# Patient Record
Sex: MALE | Race: BLACK OR AFRICAN AMERICAN | Employment: OTHER | URBAN - METROPOLITAN AREA
[De-identification: names, ages, dates, MRNs, and addresses within clinical notes are randomized per-mention and may not be internally consistent; named-entity substitution may affect disease eponyms.]

---

## 2022-07-11 ENCOUNTER — APPOINTMENT (OUTPATIENT)
Dept: GENERAL RADIOLOGY | Age: 47
DRG: 291 | End: 2022-07-11
Attending: EMERGENCY MEDICINE

## 2022-07-11 ENCOUNTER — HOSPITAL ENCOUNTER (INPATIENT)
Age: 47
LOS: 2 days | Discharge: HOME | DRG: 291 | End: 2022-07-13
Attending: EMERGENCY MEDICINE | Admitting: INTERNAL MEDICINE

## 2022-07-11 ENCOUNTER — APPOINTMENT (OUTPATIENT)
Dept: ULTRASOUND IMAGING | Age: 47
DRG: 291 | End: 2022-07-11
Attending: INTERNAL MEDICINE

## 2022-07-11 DIAGNOSIS — N18.4 ANEMIA DUE TO STAGE 4 CHRONIC KIDNEY DISEASE (HCC): ICD-10-CM

## 2022-07-11 DIAGNOSIS — D63.1 ANEMIA DUE TO STAGE 4 CHRONIC KIDNEY DISEASE (HCC): ICD-10-CM

## 2022-07-11 DIAGNOSIS — E87.20 METABOLIC ACIDOSIS: ICD-10-CM

## 2022-07-11 DIAGNOSIS — E87.5 HYPERKALEMIA, DIMINISHED RENAL EXCRETION: ICD-10-CM

## 2022-07-11 DIAGNOSIS — N18.4 CHRONIC RENAL FAILURE, STAGE 4 (SEVERE) (HCC): Primary | ICD-10-CM

## 2022-07-11 PROBLEM — I16.0 HYPERTENSIVE URGENCY: Status: ACTIVE | Noted: 2022-07-11

## 2022-07-11 PROBLEM — N17.9 AKI (ACUTE KIDNEY INJURY) (HCC): Status: ACTIVE | Noted: 2022-07-11

## 2022-07-11 PROBLEM — I50.9 ACUTE EXACERBATION OF CHF (CONGESTIVE HEART FAILURE) (HCC): Status: ACTIVE | Noted: 2022-07-11

## 2022-07-11 LAB
ALBUMIN SERPL-MCNC: 2.6 G/DL (ref 3.5–5)
ALBUMIN/GLOB SERPL: 0.6 {RATIO} (ref 1.1–2.2)
ALP SERPL-CCNC: 174 U/L (ref 45–117)
ALT SERPL-CCNC: 23 U/L (ref 12–78)
ANION GAP SERPL CALC-SCNC: 7 MMOL/L (ref 5–15)
AST SERPL-CCNC: 26 U/L (ref 15–37)
ATRIAL RATE: 111 BPM
BASOPHILS # BLD: 0 K/UL (ref 0–0.1)
BASOPHILS NFR BLD: 0 % (ref 0–1)
BILIRUB SERPL-MCNC: 0.5 MG/DL (ref 0.2–1)
BNP SERPL-MCNC: 9330 PG/ML
BUN SERPL-MCNC: 57 MG/DL (ref 6–20)
BUN/CREAT SERPL: 11 (ref 12–20)
CALCIUM SERPL-MCNC: 6.9 MG/DL (ref 8.5–10.1)
CALCULATED P AXIS, ECG09: 63 DEGREES
CALCULATED R AXIS, ECG10: 109 DEGREES
CALCULATED T AXIS, ECG11: -19 DEGREES
CHLORIDE SERPL-SCNC: 114 MMOL/L (ref 97–108)
CO2 SERPL-SCNC: 19 MMOL/L (ref 21–32)
CREAT SERPL-MCNC: 5.37 MG/DL (ref 0.7–1.3)
DIAGNOSIS, 93000: NORMAL
DIFFERENTIAL METHOD BLD: ABNORMAL
EOSINOPHIL # BLD: 0.2 K/UL (ref 0–0.4)
EOSINOPHIL NFR BLD: 2 % (ref 0–7)
ERYTHROCYTE [DISTWIDTH] IN BLOOD BY AUTOMATED COUNT: 14.5 % (ref 11.5–14.5)
GLOBULIN SER CALC-MCNC: 4.5 G/DL (ref 2–4)
GLUCOSE BLD STRIP.AUTO-MCNC: 134 MG/DL (ref 65–117)
GLUCOSE BLD STRIP.AUTO-MCNC: 154 MG/DL (ref 65–117)
GLUCOSE SERPL-MCNC: 133 MG/DL (ref 65–100)
HCT VFR BLD AUTO: 23.7 % (ref 36.6–50.3)
HGB BLD-MCNC: 7.6 G/DL (ref 12.1–17)
IMM GRANULOCYTES # BLD AUTO: 0 K/UL (ref 0–0.04)
IMM GRANULOCYTES NFR BLD AUTO: 0 % (ref 0–0.5)
LYMPHOCYTES # BLD: 1.4 K/UL (ref 0.8–3.5)
LYMPHOCYTES NFR BLD: 15 % (ref 12–49)
MCH RBC QN AUTO: 26.9 PG (ref 26–34)
MCHC RBC AUTO-ENTMCNC: 32.1 G/DL (ref 30–36.5)
MCV RBC AUTO: 83.7 FL (ref 80–99)
MONOCYTES # BLD: 0.9 K/UL (ref 0–1)
MONOCYTES NFR BLD: 10 % (ref 5–13)
NEUTS SEG # BLD: 6.9 K/UL (ref 1.8–8)
NEUTS SEG NFR BLD: 73 % (ref 32–75)
NRBC # BLD: 0 K/UL (ref 0–0.01)
NRBC BLD-RTO: 0 PER 100 WBC
P-R INTERVAL, ECG05: 152 MS
PLATELET # BLD AUTO: 471 K/UL (ref 150–400)
PMV BLD AUTO: 8.7 FL (ref 8.9–12.9)
POTASSIUM SERPL-SCNC: 5.6 MMOL/L (ref 3.5–5.1)
PROT SERPL-MCNC: 7.1 G/DL (ref 6.4–8.2)
Q-T INTERVAL, ECG07: 344 MS
QRS DURATION, ECG06: 80 MS
QTC CALCULATION (BEZET), ECG08: 467 MS
RBC # BLD AUTO: 2.83 M/UL (ref 4.1–5.7)
RBC MORPH BLD: ABNORMAL
SERVICE CMNT-IMP: ABNORMAL
SERVICE CMNT-IMP: ABNORMAL
SODIUM SERPL-SCNC: 140 MMOL/L (ref 136–145)
TROPONIN-HIGH SENSITIVITY: 32 NG/L (ref 0–76)
TROPONIN-HIGH SENSITIVITY: 36 NG/L (ref 0–76)
VENTRICULAR RATE, ECG03: 111 BPM
WBC # BLD AUTO: 9.4 K/UL (ref 4.1–11.1)

## 2022-07-11 PROCEDURE — 85025 COMPLETE CBC W/AUTO DIFF WBC: CPT

## 2022-07-11 PROCEDURE — 76770 US EXAM ABDO BACK WALL COMP: CPT

## 2022-07-11 PROCEDURE — 36416 COLLJ CAPILLARY BLOOD SPEC: CPT

## 2022-07-11 PROCEDURE — 74011250637 HC RX REV CODE- 250/637: Performed by: INTERNAL MEDICINE

## 2022-07-11 PROCEDURE — 74011250636 HC RX REV CODE- 250/636: Performed by: INTERNAL MEDICINE

## 2022-07-11 PROCEDURE — 71046 X-RAY EXAM CHEST 2 VIEWS: CPT

## 2022-07-11 PROCEDURE — 82962 GLUCOSE BLOOD TEST: CPT

## 2022-07-11 PROCEDURE — 80053 COMPREHEN METABOLIC PANEL: CPT

## 2022-07-11 PROCEDURE — 82570 ASSAY OF URINE CREATININE: CPT

## 2022-07-11 PROCEDURE — 99285 EMERGENCY DEPT VISIT HI MDM: CPT

## 2022-07-11 PROCEDURE — 83880 ASSAY OF NATRIURETIC PEPTIDE: CPT

## 2022-07-11 PROCEDURE — 86900 BLOOD TYPING SEROLOGIC ABO: CPT

## 2022-07-11 PROCEDURE — 74011000250 HC RX REV CODE- 250: Performed by: INTERNAL MEDICINE

## 2022-07-11 PROCEDURE — 84484 ASSAY OF TROPONIN QUANT: CPT

## 2022-07-11 PROCEDURE — 86923 COMPATIBILITY TEST ELECTRIC: CPT

## 2022-07-11 PROCEDURE — 84300 ASSAY OF URINE SODIUM: CPT

## 2022-07-11 PROCEDURE — 93005 ELECTROCARDIOGRAM TRACING: CPT

## 2022-07-11 PROCEDURE — 65270000046 HC RM TELEMETRY

## 2022-07-11 RX ORDER — AMLODIPINE BESYLATE 5 MG/1
5 TABLET ORAL DAILY
Status: DISCONTINUED | OUTPATIENT
Start: 2022-07-12 | End: 2022-07-12

## 2022-07-11 RX ORDER — ASPIRIN 325 MG
325 TABLET, DELAYED RELEASE (ENTERIC COATED) ORAL
Status: COMPLETED | OUTPATIENT
Start: 2022-07-11 | End: 2022-07-11

## 2022-07-11 RX ORDER — ACETAMINOPHEN 650 MG/1
650 SUPPOSITORY RECTAL
Status: DISCONTINUED | OUTPATIENT
Start: 2022-07-11 | End: 2022-07-13 | Stop reason: HOSPADM

## 2022-07-11 RX ORDER — DEXTROSE MONOHYDRATE 100 MG/ML
250 INJECTION, SOLUTION INTRAVENOUS ONCE
Status: DISCONTINUED | OUTPATIENT
Start: 2022-07-11 | End: 2022-07-11

## 2022-07-11 RX ORDER — INSULIN LISPRO 100 [IU]/ML
INJECTION, SOLUTION INTRAVENOUS; SUBCUTANEOUS
Status: DISCONTINUED | OUTPATIENT
Start: 2022-07-11 | End: 2022-07-13 | Stop reason: HOSPADM

## 2022-07-11 RX ORDER — MAGNESIUM SULFATE 100 %
4 CRYSTALS MISCELLANEOUS AS NEEDED
Status: DISCONTINUED | OUTPATIENT
Start: 2022-07-11 | End: 2022-07-13 | Stop reason: HOSPADM

## 2022-07-11 RX ORDER — OXYCODONE HYDROCHLORIDE 5 MG/1
5 TABLET ORAL
Status: DISCONTINUED | OUTPATIENT
Start: 2022-07-11 | End: 2022-07-12

## 2022-07-11 RX ORDER — ONDANSETRON 4 MG/1
4 TABLET, ORALLY DISINTEGRATING ORAL
Status: DISCONTINUED | OUTPATIENT
Start: 2022-07-11 | End: 2022-07-13 | Stop reason: HOSPADM

## 2022-07-11 RX ORDER — ACETAMINOPHEN 325 MG/1
650 TABLET ORAL
Status: DISCONTINUED | OUTPATIENT
Start: 2022-07-11 | End: 2022-07-13 | Stop reason: HOSPADM

## 2022-07-11 RX ORDER — POLYETHYLENE GLYCOL 3350 17 G/17G
17 POWDER, FOR SOLUTION ORAL DAILY PRN
Status: DISCONTINUED | OUTPATIENT
Start: 2022-07-11 | End: 2022-07-13 | Stop reason: HOSPADM

## 2022-07-11 RX ORDER — ONDANSETRON 2 MG/ML
4 INJECTION INTRAMUSCULAR; INTRAVENOUS
Status: DISCONTINUED | OUTPATIENT
Start: 2022-07-11 | End: 2022-07-13 | Stop reason: HOSPADM

## 2022-07-11 RX ORDER — SODIUM CHLORIDE 0.9 % (FLUSH) 0.9 %
5-40 SYRINGE (ML) INJECTION EVERY 8 HOURS
Status: DISCONTINUED | OUTPATIENT
Start: 2022-07-11 | End: 2022-07-13 | Stop reason: HOSPADM

## 2022-07-11 RX ORDER — LABETALOL HYDROCHLORIDE 5 MG/ML
20 INJECTION, SOLUTION INTRAVENOUS ONCE
Status: COMPLETED | OUTPATIENT
Start: 2022-07-11 | End: 2022-07-11

## 2022-07-11 RX ORDER — MORPHINE SULFATE 2 MG/ML
1 INJECTION, SOLUTION INTRAMUSCULAR; INTRAVENOUS ONCE
Status: COMPLETED | OUTPATIENT
Start: 2022-07-11 | End: 2022-07-11

## 2022-07-11 RX ORDER — FUROSEMIDE 10 MG/ML
40 INJECTION INTRAMUSCULAR; INTRAVENOUS EVERY 12 HOURS
Status: DISCONTINUED | OUTPATIENT
Start: 2022-07-11 | End: 2022-07-13

## 2022-07-11 RX ORDER — HYDRALAZINE HYDROCHLORIDE 20 MG/ML
10 INJECTION INTRAMUSCULAR; INTRAVENOUS
Status: DISCONTINUED | OUTPATIENT
Start: 2022-07-11 | End: 2022-07-13 | Stop reason: HOSPADM

## 2022-07-11 RX ORDER — SODIUM CHLORIDE 0.9 % (FLUSH) 0.9 %
5-40 SYRINGE (ML) INJECTION AS NEEDED
Status: DISCONTINUED | OUTPATIENT
Start: 2022-07-11 | End: 2022-07-13 | Stop reason: HOSPADM

## 2022-07-11 RX ORDER — GUAIFENESIN 100 MG/5ML
81 LIQUID (ML) ORAL DAILY
Status: DISCONTINUED | OUTPATIENT
Start: 2022-07-12 | End: 2022-07-13 | Stop reason: HOSPADM

## 2022-07-11 RX ADMIN — ASPIRIN 325 MG: 325 TABLET, COATED ORAL at 17:41

## 2022-07-11 RX ADMIN — OXYCODONE 5 MG: 5 TABLET ORAL at 16:50

## 2022-07-11 RX ADMIN — SODIUM CHLORIDE, PRESERVATIVE FREE 10 ML: 5 INJECTION INTRAVENOUS at 17:11

## 2022-07-11 RX ADMIN — HYDRALAZINE HYDROCHLORIDE 10 MG: 20 INJECTION INTRAMUSCULAR; INTRAVENOUS at 18:43

## 2022-07-11 RX ADMIN — FUROSEMIDE 40 MG: 10 INJECTION, SOLUTION INTRAMUSCULAR; INTRAVENOUS at 20:40

## 2022-07-11 RX ADMIN — MORPHINE SULFATE 1 MG: 2 INJECTION, SOLUTION INTRAMUSCULAR; INTRAVENOUS at 20:39

## 2022-07-11 RX ADMIN — LABETALOL HYDROCHLORIDE 20 MG: 5 INJECTION INTRAVENOUS at 16:51

## 2022-07-11 NOTE — PROGRESS NOTES
Report taken on pt.  1855- pt has arrived on floor. Pt is alert and oriented complaining of bilateral foot pain. Last pain medication given at 1650. Per pt he has bilateral metatarsal amputations that took place over 1 year ago. Wounds are currently wrapped with Kerlex with noted break though drainage noted. Will contact MD about dressings and pain medications.

## 2022-07-11 NOTE — PROGRESS NOTES
Cardiology consult called for heart failure and cp    Pt has r/o for MI in the setting of esrd  Echo pending    Will see in the am    Thank you for allowing me to participate in this patients care.     Calin Aguilera MD, Ely Carrel

## 2022-07-11 NOTE — ED TRIAGE NOTES
Patient presents to triage ambulatory complaining of cough and shortness of breath. Patient denies any recent ill exposure, nausea, vomiting, fever or chills. Patient denies any respiratory history.

## 2022-07-11 NOTE — ED NOTES
1651  IV not able to flush at this time swelling so IV infiltrated. Need to obtain alternate IV access. [id number]  ED tech with ultrasound unsuccessful attempt. Will attempt another time. 1722  Patient states foot pain 9/10 and chest pain 7/10 despite Percocet. 1  Dr. Jeanne Ybarra (Cardiology) consulted as ordered. 1758  Patient is being transferred to [facility] Neuroscience ICU, Room # 8438. Report given to Neuro Tele RN, RN on Kenneth Gunn for routine progression of care. Report consisted of the following information SBAR, Kardex, ED Summary, MAR, Recent Results and Cardiac Rhythm NSR. Patient transferred to receiving unit by: transport (RN or tech name). Outstanding consults needed: No ;Nephrology consult needs called. Cardiology consult called. Next labs due: No     The following personal items will be sent with the patient during transfer to the floor:     All valuables:    Cardiac monitoring ordered: Yes    The following CURRENT information was reported to the receiving RN:    Code status: Full Code at time of transfer    Last set of vital signs:  Vital Signs  Level of Consciousness: Alert (0) (07/11/22 1733)  Temp: 98.3 °F (36.8 °C) (07/11/22 1733)  Temp Source: Oral (07/11/22 1733)  Pulse (Heart Rate): (!) 102 (07/11/22 1733)  Heart Rate Source: Monitor (07/11/22 1733)  Cardiac Rhythm: Sinus Tachy (07/11/22 1713)  Resp Rate: 22 (07/11/22 1733)  BP: (!) 177/101 (07/11/22 1733)  MAP (Monitor): 121 (07/11/22 1733)  MAP (Calculated): 126 (07/11/22 1733)  BP 1 Location: Right upper arm (07/11/22 1733)  BP 1 Method: Automatic (07/11/22 1733)  BP Patient Position: At rest (07/11/22 1733)  MEWS Score: 3 (07/11/22 1733)         Oxygen Therapy  O2 Sat (%): 96 % (07/11/22 1733)  Pulse via Oximetry: 102 beats per minute (07/11/22 1733)  O2 Device: None (Room air) (07/11/22 1713)      Last pain assessment:  Pain 1  Pain Scale 1: Numeric (0 - 10)  Pain Intensity 1: 9  Patient Stated Pain Goal: 0  Pain Reassessment 1: Yes  Pain Onset 1: yesterday  Pain Location 1: Chest  Pain Orientation 1: Mid  Pain Description 1: Constant  Pain Intervention(s) 1: Medication (see MAR)      Wounds: unknown dressings to both feet on arrival    Urinary catheter: due to void  Is there a crocker order: No     LDAs:       Peripheral IV 40/80/31 Left Basilic (Active)         Opportunity for questions and clarification was provided.     Justen Paul RN

## 2022-07-11 NOTE — H&P
GENERAL GENERIC H&P/CONSULT  Presenting complaint: Shortness of breath    Subjective: 18-year-old male with past medical history of hypertension, diabetes, history of toe amputations presents to [facility] with complaints of shortness of breath as well as bilateral lower extremity swelling. Patient states over the past few days has been having gradual onset persistent progressive shortness of breath associated with orthopnea as well as paroxysmal nocturnal dyspnea, this associated with increased bilateral lower extremity swelling following which patient presented to the ED. Patient does endorse sudden onset intermittent chest pressure, moderate intensity, nonradiating with no aggravating or alleviating factors. Of note patient does not remember his medications at this time. Patient denies any fevers chills nausea vomiting lightheadedness dizziness palpitations headache focal weakness loss sensation auditory or visual symptoms abdominal stool or urine complaints or any other associated symptoms. Patient endorses no recent sick contacts or travel activity    No past medical history on file. Past medical history of hypertension, diabetes  No past surgical history on file. History of toe amputations  Prior to Admission medications    Not on File   Patient is unable to remember his medications, will need to obtain collateral information from [facility]  Allergies   Allergen Reactions    Heparin Other (comments)     NO PORK    Tramadol Hives   Patient lives at home, denies history of substance use/smoking/EtOH abuse  Social History     Tobacco Use    Smoking status: Not on file    Smokeless tobacco: Not on file   Substance Use Topics    Alcohol use: Not on file      No family history on file. No pertinent paternal or maternal family history  Review of Systems   Constitutional: Positive for activity change, appetite change and fatigue.  Negative for chills, diaphoresis, fever and unexpected weight change. HENT: Negative for congestion, dental problem, drooling, ear discharge, ear pain, facial swelling, hearing loss, mouth sores, nosebleeds, postnasal drip, rhinorrhea, sinus pressure, sinus pain, sneezing, sore throat, tinnitus, trouble swallowing and voice change. Eyes: Negative for photophobia, pain, discharge, redness, itching and visual disturbance. Respiratory: Positive for shortness of breath. Negative for apnea, cough, choking, chest tightness, wheezing and stridor. Cardiovascular: Positive for chest pain and leg swelling. Negative for palpitations. Gastrointestinal: Negative for abdominal distention, abdominal pain, anal bleeding, blood in stool, constipation, diarrhea, nausea, rectal pain and vomiting. Endocrine: Negative for cold intolerance, heat intolerance, polydipsia, polyphagia and polyuria. Genitourinary: Negative for decreased urine volume, difficulty urinating, dysuria, enuresis, flank pain, frequency, genital sores, hematuria, penile discharge, penile pain, penile swelling, scrotal swelling, testicular pain and urgency. Musculoskeletal: Negative for arthralgias, back pain, gait problem, joint swelling, myalgias, neck pain and neck stiffness. Skin: Negative for color change, pallor, rash and wound. Allergic/Immunologic: Negative for environmental allergies, food allergies and immunocompromised state. Neurological: Positive for weakness. Negative for dizziness, tremors, seizures, syncope, facial asymmetry, speech difficulty, light-headedness, numbness and headaches. Hematological: Negative for adenopathy. Does not bruise/bleed easily. Psychiatric/Behavioral: Negative for agitation, behavioral problems, confusion, decreased concentration, dysphoric mood, hallucinations, self-injury, sleep disturbance and suicidal ideas. The patient is not nervous/anxious and is not hyperactive. Objective:    No intake/output data recorded.   No intake/output data recorded. Patient Vitals for the past 8 hrs:   BP Temp Pulse Resp SpO2 Height Weight   07/11/22 1508 (!) 186/100 98.5 °F (36.9 °C) 97 18 100 % -- --   07/11/22 0938 (!) 175/87 98.5 °F (36.9 °C) (!) 109 20 97 % 6' (1.829 m) 81.7 kg (180 lb 1.9 oz)     Physical Exam  Vitals reviewed. Constitutional:       General: He is not in acute distress. Appearance: He is normal weight. He is ill-appearing. He is not toxic-appearing or diaphoretic. HENT:      Head: Normocephalic and atraumatic. Nose: Nose normal. No congestion or rhinorrhea. Mouth/Throat:      Mouth: Mucous membranes are moist.      Pharynx: Oropharynx is clear. No oropharyngeal exudate or posterior oropharyngeal erythema. Eyes:      General: No scleral icterus. Right eye: No discharge. Left eye: No discharge. Extraocular Movements: Extraocular movements intact. Conjunctiva/sclera: Conjunctivae normal.      Pupils: Pupils are equal, round, and reactive to light. Neck:      Vascular: No carotid bruit. Cardiovascular:      Rate and Rhythm: Normal rate and regular rhythm. Pulses: Normal pulses. Heart sounds: Normal heart sounds. No murmur heard. No friction rub. No gallop. Pulmonary:      Effort: Pulmonary effort is normal. No respiratory distress. Breath sounds: Normal breath sounds. No stridor. No wheezing, rhonchi or rales. Chest:      Chest wall: No tenderness. Abdominal:      General: Abdomen is flat. Bowel sounds are normal. There is no distension. Palpations: Abdomen is soft. There is no mass. Tenderness: There is no abdominal tenderness. There is no right CVA tenderness, left CVA tenderness, guarding or rebound. Hernia: No hernia is present. Musculoskeletal:         General: No swelling, tenderness, deformity or signs of injury. Normal range of motion. Cervical back: Normal range of motion and neck supple. No rigidity or tenderness.       Right lower leg: Edema present. Left lower leg: Edema present. Lymphadenopathy:      Cervical: No cervical adenopathy. Skin:     General: Skin is warm. Capillary Refill: Capillary refill takes less than 2 seconds. Coloration: Skin is not jaundiced or pale. Findings: No bruising, erythema, lesion or rash. Neurological:      General: No focal deficit present. Mental Status: He is alert and oriented to person, place, and time. Mental status is at baseline. Cranial Nerves: No cranial nerve deficit. Sensory: No sensory deficit. Motor: No weakness. Coordination: Coordination normal.      Gait: Gait normal.      Deep Tendon Reflexes: Reflexes normal.   Psychiatric:         Mood and Affect: Mood normal.         Behavior: Behavior normal.         Thought Content:  Thought content normal.         Judgment: Judgment normal.          Labs:    Recent Results (from the past 24 hour(s))   EKG, 12 LEAD, INITIAL    Collection Time: 07/11/22 10:15 AM   Result Value Ref Range    Ventricular Rate 111 BPM    Atrial Rate 111 BPM    P-R Interval 152 ms    QRS Duration 80 ms    Q-T Interval 344 ms    QTC Calculation (Bezet) 467 ms    Calculated P Axis 63 degrees    Calculated R Axis 109 degrees    Calculated T Axis -19 degrees    Diagnosis       Sinus tachycardia  Rightward axis  ST & T wave abnormality, consider inferolateral ischemia  No previous ECGs available  Confirmed by Piotr Mcintyre ([id number]) on 7/11/2022 2:42:47 PM     CBC WITH AUTOMATED DIFF    Collection Time: 07/11/22 10:31 AM   Result Value Ref Range    WBC 9.4 4.1 - 11.1 K/uL    RBC 2.83 (L) 4.10 - 5.70 M/uL    HGB 7.6 (L) 12.1 - 17.0 g/dL    HCT 23.7 (L) 36.6 - 50.3 %    MCV 83.7 80.0 - 99.0 FL    MCH 26.9 26.0 - 34.0 PG    MCHC 32.1 30.0 - 36.5 g/dL    RDW 14.5 11.5 - 14.5 %    PLATELET 790 (H) 150 - 400 K/uL    MPV 8.7 (L) 8.9 - 12.9 FL    NRBC 0.0 0  WBC    ABSOLUTE NRBC 0.00 0.00 - 0.01 K/uL    NEUTROPHILS 73 32 - 75 % LYMPHOCYTES 15 12 - 49 %    MONOCYTES 10 5 - 13 %    EOSINOPHILS 2 0 - 7 %    BASOPHILS 0 0 - 1 %    IMMATURE GRANULOCYTES 0 0.0 - 0.5 %    ABS. NEUTROPHILS 6.9 1.8 - 8.0 K/UL    ABS. LYMPHOCYTES 1.4 0.8 - 3.5 K/UL    ABS. MONOCYTES 0.9 0.0 - 1.0 K/UL    ABS. EOSINOPHILS 0.2 0.0 - 0.4 K/UL    ABS. BASOPHILS 0.0 0.0 - 0.1 K/UL    ABS. IMM. GRANS. 0.0 0.00 - 0.04 K/UL    DF SMEAR SCANNED      RBC COMMENTS       ROULEAUX SEEN  ELEVATED PLATLET COUNT   NORMOCYTIC, NORMOCHROMIC     METABOLIC PANEL, COMPREHENSIVE    Collection Time: 07/11/22 10:31 AM   Result Value Ref Range    Sodium 140 136 - 145 mmol/L    Potassium 5.6 (H) 3.5 - 5.1 mmol/L    Chloride 114 (H) 97 - 108 mmol/L    CO2 19 (L) 21 - 32 mmol/L    Anion gap 7 5 - 15 mmol/L    Glucose 133 (H) 65 - 100 mg/dL    BUN 57 (H) 6 - 20 MG/DL    Creatinine 5.37 (H) 0.70 - 1.30 MG/DL    BUN/Creatinine ratio 11 (L) 12 - 20      GFR est AA 14 (L) >60 ml/min/1.73m2    GFR est non-AA 11 (L) >60 ml/min/1.73m2    Calcium 6.9 (L) 8.5 - 10.1 MG/DL    Bilirubin, total 0.5 0.2 - 1.0 MG/DL    ALT (SGPT) 23 12 - 78 U/L    AST (SGOT) 26 15 - 37 U/L    Alk. phosphatase 174 (H) 45 - 117 U/L    Protein, total 7.1 6.4 - 8.2 g/dL    Albumin 2.6 (L) 3.5 - 5.0 g/dL    Globulin 4.5 (H) 2.0 - 4.0 g/dL    A-G Ratio 0.6 (L) 1.1 - 2.2     TROPONIN-HIGH SENSITIVITY    Collection Time: 07/11/22 10:31 AM   Result Value Ref Range    Troponin-High Sensitivity 32 0 - 76 ng/L   NT-PRO BNP    Collection Time: 07/11/22 10:31 AM   Result Value Ref Range    NT pro-BNP 9,330 (H) <125 PG/ML       ECG: nonspecific ST and T waves changes   Chest X-Ray: IMPRESSION  Bilateral pulmonary infiltrates and small left pleural effusion.     Assessment:  Active Problems:    Acute exacerbation of CHF (congestive heart failure) ([facility] [address] (7/11/2022)      [facility] (7/11/2022)      Hypertensive urgency (7/11/2022)        Plan:    Acute decompensated heart failure with unknown ejection fraction-patient presents with above med symptomatology found to have acute decompensated heart failure with unknown ejection fraction, remains hemodynamically stable at this time  Daily weights  Frequent intake and output monitoring  Lasix 40 mg IV twice daily  Obtain transthoracic echo  Obtain cardiology consult    Acute kidney injury-of note patient found to have an elevated serum creatinine, differentials include prerenal versus renal responsible etiologies  Obtain urine electrolytes And fractional excretion of sodium  Obtain ultrasound retroperitoneal to rule out anatomic abnormalities  Could be secondary to cardiorenal syndrome  Obtain nephrology consult further evaluation    Hypertensive urgency-of note patient does not remember his home medications  Hydralazine as needed for systolic blood pressure over 160  Start Norvasc, uptitrate as necessary  Obtain collateral information with regards to patient's home medications    Type 2 diabetes-patient does not remember his home medications, will obtain collateral information from [facility]  Insulin sliding scale at this time    Chest pain-of note patient complaining of chest pain, EKG consistent with nonspecific changes, however ACS needs to be ruled out  Aspirin 325 mg x 1  Aspirin 81 mg once daily  Trend cardiac enzymes  Obtain transthoracic echo  Follow cardiology recommendations    Prophylaxis- SCDs  FEN-cardiac diet with fluid restriction, replete potassium and magnesium  Full code, will clarify about surrogate decision-maker, admitted to telemetry further management    Signed:   Shirley Good MD 7/11/2022

## 2022-07-11 NOTE — ED PROVIDER NOTES
EMERGENCY DEPARTMENT HISTORY AND PHYSICAL EXAM           Date: 7/11/2022  Patient Name: Syed Malagon  Patient Age and Sex: 49 y.o. male  MRN:  [id number]  [facility]:  [id number]    History of Presenting Illness     Chief Complaint   Patient presents with    Shortness of Breath       History Provided By: Patient    Ability to gather history was limited by:     HPI: Syed Malagon, 49 y.o. male with no prior medical records available to me, but who reports a history of kidney disease, complains of shortness of breath, worsening leg swelling. Has been worsening for weeks. Moderate severity. No pain. Location:    Quality:      Severity:    Duration:   Timing:      Context:    Modifying factors:   Associated symptoms:     Past History      The patient's medical, surgical, and social history on file were reviewed by me today.  The family history was reviewed by me today and was non-contributory, unless otherwise specified below:    Past Medical History:  No past medical history on file. Past Surgical History:  No past surgical history on file. Family History:  No family history on file. Social History:  Social History     Tobacco Use    Smoking status: Not on file    Smokeless tobacco: Not on file   Substance Use Topics    Alcohol use: Not on file    Drug use: Not on file       Current Medications:  No current facility-administered medications on file prior to encounter. No current outpatient medications on file prior to encounter. Allergies: Allergies   Allergen Reactions    Heparin Other (comments)     NO PORK    Tramadol Hives     Review of Systems    A complete ROS was reviewed by me today and was negative, unless otherwise specified below:    Review of Systems   Constitutional: Positive for fatigue. Negative for fever. Respiratory: Positive for shortness of breath. Cardiovascular: Positive for leg swelling. Negative for chest pain.    Gastrointestinal: Negative for abdominal pain. All other systems reviewed and are negative. Physical Exam   Vital Signs  Patient Vitals for the past 8 hrs:   Temp Pulse Resp BP SpO2   07/11/22 1508 98.5 °F (36.9 °C) 97 18 (!) 186/100 100 %   07/11/22 0938 98.5 °F (36.9 °C) (!) 109 20 (!) 175/87 97 %          Physical Exam  Vitals and nursing note reviewed. Constitutional:       General: He is not in acute distress. Appearance: Normal appearance. He is well-developed. He is ill-appearing. HENT:      Head: Normocephalic and atraumatic. Eyes:      General:         Right eye: No discharge. Left eye: No discharge. Conjunctiva/sclera: Conjunctivae normal.   Cardiovascular:      Rate and Rhythm: Normal rate and regular rhythm. Heart sounds: Normal heart sounds. No murmur heard. Pulmonary:      Effort: Pulmonary effort is normal. No respiratory distress. Breath sounds: Rales present. No wheezing. Abdominal:      General: There is no distension. Palpations: Abdomen is soft. Tenderness: There is no abdominal tenderness. Musculoskeletal:         General: No deformity. Normal range of motion. Cervical back: Normal range of motion and neck supple. Right lower leg: Edema ( 3+ bilateral lower extremity edema) present. Left lower leg: Edema present. Skin:     General: Skin is warm and dry. Coloration: Skin is pale. Findings: No rash. Neurological:      General: No focal deficit present. Mental Status: He is alert and oriented to person, place, and time. Psychiatric:         Mood and Affect: Mood normal.         Behavior: Behavior normal.         Thought Content:  Thought content normal.         Diagnostic Study Results   Labs  Recent Results (from the past 24 hour(s))   EKG, 12 LEAD, INITIAL    Collection Time: 07/11/22 10:15 AM   Result Value Ref Range    Ventricular Rate 111 BPM    Atrial Rate 111 BPM    P-R Interval 152 ms    QRS Duration 80 ms    Q-T Interval 344 ms QTC Calculation (Bezet) 467 ms    Calculated P Axis 63 degrees    Calculated R Axis 109 degrees    Calculated T Axis -19 degrees    Diagnosis       Sinus tachycardia  Rightward axis  ST & T wave abnormality, consider inferolateral ischemia  No previous ECGs available  Confirmed by Piotr Mcintyre ([id number]) on 7/11/2022 2:42:47 PM     CBC WITH AUTOMATED DIFF    Collection Time: 07/11/22 10:31 AM   Result Value Ref Range    WBC 9.4 4.1 - 11.1 K/uL    RBC 2.83 (L) 4.10 - 5.70 M/uL    HGB 7.6 (L) 12.1 - 17.0 g/dL    HCT 23.7 (L) 36.6 - 50.3 %    MCV 83.7 80.0 - 99.0 FL    MCH 26.9 26.0 - 34.0 PG    MCHC 32.1 30.0 - 36.5 g/dL    RDW 14.5 11.5 - 14.5 %    PLATELET 439 (H) 340 - 400 K/uL    MPV 8.7 (L) 8.9 - 12.9 FL    NRBC 0.0 0  WBC    ABSOLUTE NRBC 0.00 0.00 - 0.01 K/uL    NEUTROPHILS 73 32 - 75 %    LYMPHOCYTES 15 12 - 49 %    MONOCYTES 10 5 - 13 %    EOSINOPHILS 2 0 - 7 %    BASOPHILS 0 0 - 1 %    IMMATURE GRANULOCYTES 0 0.0 - 0.5 %    ABS. NEUTROPHILS 6.9 1.8 - 8.0 K/UL    ABS. LYMPHOCYTES 1.4 0.8 - 3.5 K/UL    ABS. MONOCYTES 0.9 0.0 - 1.0 K/UL    ABS. EOSINOPHILS 0.2 0.0 - 0.4 K/UL    ABS. BASOPHILS 0.0 0.0 - 0.1 K/UL    ABS. IMM. GRANS. 0.0 0.00 - 0.04 K/UL    DF SMEAR SCANNED      RBC COMMENTS       ROULEAUX SEEN  ELEVATED PLATLET COUNT   NORMOCYTIC, NORMOCHROMIC     METABOLIC PANEL, COMPREHENSIVE    Collection Time: 07/11/22 10:31 AM   Result Value Ref Range    Sodium 140 136 - 145 mmol/L    Potassium 5.6 (H) 3.5 - 5.1 mmol/L    Chloride 114 (H) 97 - 108 mmol/L    CO2 19 (L) 21 - 32 mmol/L    Anion gap 7 5 - 15 mmol/L    Glucose 133 (H) 65 - 100 mg/dL    BUN 57 (H) 6 - 20 MG/DL    Creatinine 5.37 (H) 0.70 - 1.30 MG/DL    BUN/Creatinine ratio 11 (L) 12 - 20      GFR est AA 14 (L) >60 ml/min/1.73m2    GFR est non-AA 11 (L) >60 ml/min/1.73m2    Calcium 6.9 (L) 8.5 - 10.1 MG/DL    Bilirubin, total 0.5 0.2 - 1.0 MG/DL    ALT (SGPT) 23 12 - 78 U/L    AST (SGOT) 26 15 - 37 U/L    Alk.  phosphatase 174 (H) 45 - 117 U/L    Protein, total 7.1 6.4 - 8.2 g/dL    Albumin 2.6 (L) 3.5 - 5.0 g/dL    Globulin 4.5 (H) 2.0 - 4.0 g/dL    A-G Ratio 0.6 (L) 1.1 - 2.2     TROPONIN-HIGH SENSITIVITY    Collection Time: 07/11/22 10:31 AM   Result Value Ref Range    Troponin-High Sensitivity 32 0 - 76 ng/L   NT-PRO BNP    Collection Time: 07/11/22 10:31 AM   Result Value Ref Range    NT pro-BNP 9,330 (H) <125 PG/ML       Radiologic Studies  XR CHEST PA LAT   Final Result   Bilateral pulmonary infiltrates and small left pleural effusion. CT Results  (Last 48 hours)    None        CXR Results  (Last 48 hours)               07/11/22 1053  XR CHEST PA LAT Final result    Impression:  Bilateral pulmonary infiltrates and small left pleural effusion. Narrative:  Exam:  2 view chest       Indication: Shortness of breath       PA and lateral views demonstrate normal heart size. There is a small left   pleural effusion. Left lower lobe infiltrate is noted. Lesser infiltrate is seen   throughout the right lung. The osseous structures are unremarkable.                  Billable Procedures   EKG reviewed by ED Physician in the absence of a cardiologist: Yes  EKG below was interpreted by Carla Jason MD    Critical Care  Performed by: Liana Rodrigues MD  Authorized by: Liana Rodrigues MD     Critical care provider statement:     Critical care time (minutes):  40    Critical care time was exclusive of:  Separately billable procedures and treating other patients    Critical care was necessary to treat or prevent imminent or life-threatening deterioration of the following conditions:  Metabolic crisis    Critical care was time spent personally by me on the following activities:  Ordering and review of laboratory studies, pulse oximetry, re-evaluation of patient's condition, examination of patient, evaluation of patient's response to treatment, ordering and performing treatments and interventions and review of old charts    EKG    Date/Time: 7/11/2022 4:16 PM  Performed by: Netta Manzanares MD  Authorized by: Netta Manzanares MD     ECG reviewed by ED Physician in the absence of a cardiologist: yes    Interpretation:     Interpretation: non-specific    Rate:     ECG rate assessment: tachycardic    Rhythm:     Rhythm: sinus rhythm and sinus tachycardia    Ectopy:     Ectopy: none    QRS:     QRS axis:  Normal  ST segments:     ST segments:  Normal  T waves:     T waves: inverted          Medical Decision Making     I reviewed the patient's most recent Emergency Dept notes and diagnostic tests in formulating my MDM on today's visit. Provider Notes (Medical Decision Making):   41-year-old male presenting with shortness of breath and leg swelling for weeks. On examination he is ill-appearing, very hypertensive, afebrile. No oxygen requirement. 3+ lower extremity edema. Pale mucous membranes and conjunctiva. Laboratories with multiple severe derangements including very elevated creatinine, hyperkalemia, mild metabolic acidosis. Also anemic, not quite requiring blood transfusion at this time. Admit to medicine for diuresis for fluid overload in the setting of end-stage renal disease, hyperkalemia, hypertension, may benefit from dialysis. I have a nephrology consult pending. Labetalol for high blood pressure.       Sonny Sears MD  4:10 PM  7/11/2022       Social History     Tobacco Use    Smoking status: Not on file    Smokeless tobacco: Not on file   Substance Use Topics    Alcohol use: Not on file    Drug use: Not on file       Medications Administered during ED course:  Medications   insulin regular (NOVOLIN R, HUMULIN R) injection 10 Units (has no administration in time range)   dextrose 10% infusion 250 mL (has no administration in time range)   labetaloL (NORMODYNE;TRANDATE) injection 20 mg (has no administration in time range)          Prescriptions from today's ED visit:  There are no discharge medications for this patient. Diagnosis and Disposition     Disposition:  Admitted    Clinical Impression:   1. Chronic renal failure, stage 4 (severe) (HCC)    2. Hyperkalemia, diminished renal excretion    3. Metabolic acidosis    4. Anemia due to stage 4 chronic kidney disease ([facility]        Attestation:  I personally performed the services described in this documentation on this date 7/11/2022 for patient Carissa Carrasco. Perla Aponte MD        I was the first provider for this patient on this visit. To the best of my ability I reviewed relevant prior medical records, electrocardiograms, laboratories, and radiologic studies. The patient's presenting problems were discussed, and the patient was in agreement with the care plan formulated and outlined with them. Perla Aponte MD    Please note that this dictation was completed with Dragon voice recognition software. Quite often unanticipated grammatical, syntax, homophones, and other interpretive errors are inadvertently transcribed by the computer software. Please disregard these errors and excuse any errors that have escaped final proofreading.

## 2022-07-12 ENCOUNTER — APPOINTMENT (OUTPATIENT)
Dept: NON INVASIVE DIAGNOSTICS | Age: 47
DRG: 291 | End: 2022-07-12
Attending: INTERNAL MEDICINE

## 2022-07-12 PROBLEM — R06.02 SOB (SHORTNESS OF BREATH): Status: ACTIVE | Noted: 2022-07-12

## 2022-07-12 LAB
ANION GAP SERPL CALC-SCNC: 7 MMOL/L (ref 5–15)
APPEARANCE UR: CLEAR
BACTERIA URNS QL MICRO: NEGATIVE /HPF
BASOPHILS # BLD: 0 K/UL (ref 0–0.1)
BASOPHILS NFR BLD: 1 % (ref 0–1)
BILIRUB UR QL: NEGATIVE
BUN SERPL-MCNC: 58 MG/DL (ref 6–20)
BUN/CREAT SERPL: 11 (ref 12–20)
CALCIUM SERPL-MCNC: 6.5 MG/DL (ref 8.5–10.1)
CHLORIDE SERPL-SCNC: 115 MMOL/L (ref 97–108)
CHLORIDE UR-SCNC: 126 MMOL/L
CO2 SERPL-SCNC: 18 MMOL/L (ref 21–32)
COLOR UR: ABNORMAL
CREAT SERPL-MCNC: 5.33 MG/DL (ref 0.7–1.3)
CREAT UR-MCNC: 60 MG/DL
DIFFERENTIAL METHOD BLD: ABNORMAL
ECHO AO ROOT DIAM: 3.1 CM
ECHO AO ROOT INDEX: 1.52 CM/M2
ECHO AV AREA PEAK VELOCITY: 3 CM2
ECHO AV AREA PEAK VELOCITY: 3.1 CM2
ECHO AV AREA PEAK VELOCITY: 3.1 CM2
ECHO AV AREA PEAK VELOCITY: 3.2 CM2
ECHO AV AREA VTI: 2.6 CM2
ECHO AV AREA/BSA VTI: 1.3 CM2/M2
ECHO AV CUSP MM: 2.3 CM
ECHO AV MEAN GRADIENT: 3 MMHG
ECHO AV MEAN VELOCITY: 0.9 M/S
ECHO AV PEAK GRADIENT: 6 MMHG
ECHO AV PEAK GRADIENT: 6 MMHG
ECHO AV PEAK VELOCITY: 1.2 M/S
ECHO AV PEAK VELOCITY: 1.3 M/S
ECHO AV VTI: 23.1 CM
ECHO LA DIAMETER INDEX: 2.55 CM/M2
ECHO LA DIAMETER: 5.2 CM
ECHO LA TO AORTIC ROOT RATIO: 1.68
ECHO LA VOL 2C: 120 ML (ref 18–58)
ECHO LA VOL 4C: 102 ML (ref 18–58)
ECHO LA VOLUME AREA LENGTH: 127 ML
ECHO LA VOLUME INDEX A2C: 59 ML/M2 (ref 16–34)
ECHO LA VOLUME INDEX A4C: 50 ML/M2 (ref 16–34)
ECHO LA VOLUME INDEX AREA LENGTH: 62 ML/M2 (ref 16–34)
ECHO LV FRACTIONAL SHORTENING: 34 % (ref 28–44)
ECHO LV INTERNAL DIMENSION DIASTOLE INDEX: 2.3 CM/M2
ECHO LV INTERNAL DIMENSION DIASTOLIC: 4.7 CM (ref 4.2–5.9)
ECHO LV INTERNAL DIMENSION SYSTOLIC INDEX: 1.52 CM/M2
ECHO LV INTERNAL DIMENSION SYSTOLIC: 3.1 CM
ECHO LV IVSD: 2 CM (ref 0.6–1)
ECHO LV MASS 2D: 462.1 G (ref 88–224)
ECHO LV MASS INDEX 2D: 226.5 G/M2 (ref 49–115)
ECHO LV POSTERIOR WALL DIASTOLIC: 2 CM (ref 0.6–1)
ECHO LV RELATIVE WALL THICKNESS RATIO: 0.85
ECHO LVOT AREA: 3.1 CM2
ECHO LVOT AV VTI INDEX: 0.86
ECHO LVOT DIAM: 2 CM
ECHO LVOT MEAN GRADIENT: 3 MMHG
ECHO LVOT PEAK GRADIENT: 6 MMHG
ECHO LVOT PEAK GRADIENT: 6 MMHG
ECHO LVOT PEAK VELOCITY: 1.3 M/S
ECHO LVOT PEAK VELOCITY: 1.3 M/S
ECHO LVOT STROKE VOLUME INDEX: 30.6 ML/M2
ECHO LVOT SV: 62.5 ML
ECHO LVOT VTI: 19.9 CM
ECHO MV AREA PHT: 4.2 CM2
ECHO MV E DECELERATION TIME (DT): 175.5 MS
ECHO MV E VELOCITY: 1.14 M/S
ECHO MV PRESSURE HALF TIME (PHT): 52.9 MS
ECHO MV REGURGITANT PEAK VELOCITY: 5.1 M/S
ECHO MV REGURGITANT PEAK VELOCITY: 5.1 M/S
ECHO MV REGURGITANT VTIA: 161 CM
ECHO PV MAX VELOCITY: 1.3 M/S
ECHO PV PEAK GRADIENT: 6 MMHG
ECHO RV FREE WALL PEAK S': 16 CM/S
ECHO RV INTERNAL DIMENSION: 4.4 CM
ECHO RVOT PEAK GRADIENT: 3 MMHG
ECHO RVOT PEAK VELOCITY: 0.8 M/S
ECHO TV REGURGITANT MAX VELOCITY: 2.38 M/S
ECHO TV REGURGITANT PEAK GRADIENT: 23 MMHG
EOSINOPHIL # BLD: 0.2 K/UL (ref 0–0.4)
EOSINOPHIL NFR BLD: 4 % (ref 0–7)
EPITH CASTS URNS QL MICRO: ABNORMAL /LPF
ERYTHROCYTE [DISTWIDTH] IN BLOOD BY AUTOMATED COUNT: 14.7 % (ref 11.5–14.5)
FERRITIN SERPL-MCNC: 165 NG/ML (ref 26–388)
FOLATE SERPL-MCNC: 5.1 NG/ML (ref 5–21)
GLUCOSE BLD STRIP.AUTO-MCNC: 123 MG/DL (ref 65–117)
GLUCOSE BLD STRIP.AUTO-MCNC: 138 MG/DL (ref 65–117)
GLUCOSE BLD STRIP.AUTO-MCNC: 150 MG/DL (ref 65–117)
GLUCOSE BLD STRIP.AUTO-MCNC: 192 MG/DL (ref 65–117)
GLUCOSE SERPL-MCNC: 125 MG/DL (ref 65–100)
GLUCOSE UR STRIP.AUTO-MCNC: NEGATIVE MG/DL
HCT VFR BLD AUTO: 20 % (ref 36.6–50.3)
HGB BLD-MCNC: 6.3 G/DL (ref 12.1–17)
HGB UR QL STRIP: ABNORMAL
HISTORY CHECKED?,CKHIST: NORMAL
HYALINE CASTS URNS QL MICRO: ABNORMAL /LPF (ref 0–2)
IMM GRANULOCYTES # BLD AUTO: 0 K/UL (ref 0–0.04)
IMM GRANULOCYTES NFR BLD AUTO: 0 % (ref 0–0.5)
KETONES UR QL STRIP.AUTO: NEGATIVE MG/DL
LEUKOCYTE ESTERASE UR QL STRIP.AUTO: NEGATIVE
LYMPHOCYTES # BLD: 1.8 K/UL (ref 0.8–3.5)
LYMPHOCYTES NFR BLD: 28 % (ref 12–49)
MAGNESIUM SERPL-MCNC: 1.5 MG/DL (ref 1.6–2.4)
MCH RBC QN AUTO: 26.7 PG (ref 26–34)
MCHC RBC AUTO-ENTMCNC: 31.5 G/DL (ref 30–36.5)
MCV RBC AUTO: 84.7 FL (ref 80–99)
MONOCYTES # BLD: 0.8 K/UL (ref 0–1)
MONOCYTES NFR BLD: 12 % (ref 5–13)
NEUTS SEG # BLD: 3.8 K/UL (ref 1.8–8)
NEUTS SEG NFR BLD: 57 % (ref 32–75)
NITRITE UR QL STRIP.AUTO: NEGATIVE
NRBC # BLD: 0 K/UL (ref 0–0.01)
NRBC BLD-RTO: 0 PER 100 WBC
PH UR STRIP: 5.5 [PH] (ref 5–8)
PLATELET # BLD AUTO: 360 K/UL (ref 150–400)
PMV BLD AUTO: 8.6 FL (ref 8.9–12.9)
POTASSIUM SERPL-SCNC: 4.7 MMOL/L (ref 3.5–5.1)
PROT UR STRIP-MCNC: 300 MG/DL
RBC # BLD AUTO: 2.36 M/UL (ref 4.1–5.7)
RBC #/AREA URNS HPF: ABNORMAL /HPF (ref 0–5)
SERVICE CMNT-IMP: ABNORMAL
SODIUM SERPL-SCNC: 140 MMOL/L (ref 136–145)
SODIUM UR-SCNC: 91 MMOL/L
SP GR UR REFRACTOMETRY: 1.01
TROPONIN-HIGH SENSITIVITY: 33 NG/L (ref 0–76)
UA: UC IF INDICATED,UAUC: ABNORMAL
UROBILINOGEN UR QL STRIP.AUTO: 0.2 EU/DL (ref 0.2–1)
VIT B12 SERPL-MCNC: 529 PG/ML (ref 193–986)
WBC # BLD AUTO: 6.6 K/UL (ref 4.1–11.1)
WBC URNS QL MICRO: ABNORMAL /HPF (ref 0–4)

## 2022-07-12 PROCEDURE — 83540 ASSAY OF IRON: CPT

## 2022-07-12 PROCEDURE — 93306 TTE W/DOPPLER COMPLETE: CPT | Performed by: INTERNAL MEDICINE

## 2022-07-12 PROCEDURE — 36415 COLL VENOUS BLD VENIPUNCTURE: CPT

## 2022-07-12 PROCEDURE — 81001 URINALYSIS AUTO W/SCOPE: CPT

## 2022-07-12 PROCEDURE — 36430 TRANSFUSION BLD/BLD COMPNT: CPT

## 2022-07-12 PROCEDURE — 82746 ASSAY OF FOLIC ACID SERUM: CPT

## 2022-07-12 PROCEDURE — 74011250636 HC RX REV CODE- 250/636: Performed by: INTERNAL MEDICINE

## 2022-07-12 PROCEDURE — 74011000250 HC RX REV CODE- 250: Performed by: INTERNAL MEDICINE

## 2022-07-12 PROCEDURE — 93306 TTE W/DOPPLER COMPLETE: CPT

## 2022-07-12 PROCEDURE — 83735 ASSAY OF MAGNESIUM: CPT

## 2022-07-12 PROCEDURE — 85025 COMPLETE CBC W/AUTO DIFF WBC: CPT

## 2022-07-12 PROCEDURE — 82962 GLUCOSE BLOOD TEST: CPT

## 2022-07-12 PROCEDURE — 30233N1 TRANSFUSION OF NONAUTOLOGOUS RED BLOOD CELLS INTO PERIPHERAL VEIN, PERCUTANEOUS APPROACH: ICD-10-PCS | Performed by: INTERNAL MEDICINE

## 2022-07-12 PROCEDURE — 84484 ASSAY OF TROPONIN QUANT: CPT

## 2022-07-12 PROCEDURE — 82436 ASSAY OF URINE CHLORIDE: CPT

## 2022-07-12 PROCEDURE — 82728 ASSAY OF FERRITIN: CPT

## 2022-07-12 PROCEDURE — 65270000046 HC RM TELEMETRY

## 2022-07-12 PROCEDURE — C9113 INJ PANTOPRAZOLE SODIUM, VIA: HCPCS | Performed by: INTERNAL MEDICINE

## 2022-07-12 PROCEDURE — 80048 BASIC METABOLIC PNL TOTAL CA: CPT

## 2022-07-12 PROCEDURE — P9016 RBC LEUKOCYTES REDUCED: HCPCS

## 2022-07-12 PROCEDURE — 74011250637 HC RX REV CODE- 250/637: Performed by: INTERNAL MEDICINE

## 2022-07-12 PROCEDURE — 82607 VITAMIN B-12: CPT

## 2022-07-12 PROCEDURE — 74011000258 HC RX REV CODE- 258: Performed by: INTERNAL MEDICINE

## 2022-07-12 RX ORDER — INSULIN GLARGINE-YFGN 100 [IU]/ML
15 INJECTION, SOLUTION SUBCUTANEOUS
COMMUNITY
Start: 2022-02-21

## 2022-07-12 RX ORDER — ALPRAZOLAM 0.5 MG/1
1 TABLET ORAL
COMMUNITY
Start: 2022-02-21

## 2022-07-12 RX ORDER — HYDRALAZINE HYDROCHLORIDE 100 MG/1
1 TABLET, FILM COATED ORAL 3 TIMES DAILY
COMMUNITY
Start: 2022-02-21

## 2022-07-12 RX ORDER — NIFEDIPINE 30 MG/1
90 TABLET, EXTENDED RELEASE ORAL DAILY
Status: DISCONTINUED | OUTPATIENT
Start: 2022-07-12 | End: 2022-07-13 | Stop reason: HOSPADM

## 2022-07-12 RX ORDER — CHLORTHALIDONE 25 MG/1
1 TABLET ORAL DAILY
COMMUNITY
Start: 2022-02-21 | End: 2022-07-13

## 2022-07-12 RX ORDER — ASPIRIN 81 MG/1
1 TABLET ORAL DAILY
COMMUNITY
Start: 2022-02-21

## 2022-07-12 RX ORDER — CALCIUM GLUCONATE 20 MG/ML
2 INJECTION, SOLUTION INTRAVENOUS ONCE
Status: COMPLETED | OUTPATIENT
Start: 2022-07-12 | End: 2022-07-12

## 2022-07-12 RX ORDER — HYDRALAZINE HYDROCHLORIDE 25 MG/1
100 TABLET, FILM COATED ORAL 3 TIMES DAILY
Status: DISCONTINUED | OUTPATIENT
Start: 2022-07-12 | End: 2022-07-13 | Stop reason: HOSPADM

## 2022-07-12 RX ORDER — OXYCODONE HYDROCHLORIDE 5 MG/1
7.5 TABLET ORAL
Status: DISCONTINUED | OUTPATIENT
Start: 2022-07-12 | End: 2022-07-13 | Stop reason: HOSPADM

## 2022-07-12 RX ORDER — NIFEDIPINE 90 MG/1
90 TABLET, EXTENDED RELEASE ORAL DAILY
COMMUNITY
Start: 2021-10-07

## 2022-07-12 RX ORDER — FLUDROCORTISONE ACETATE 0.1 MG/1
1 TABLET ORAL DAILY
COMMUNITY
Start: 2022-02-21

## 2022-07-12 RX ORDER — SITAGLIPTIN AND METFORMIN HYDROCHLORIDE 500; 50 MG/1; MG/1
1 TABLET, FILM COATED ORAL DAILY
COMMUNITY
Start: 2022-02-21

## 2022-07-12 RX ORDER — SODIUM CHLORIDE 9 MG/ML
250 INJECTION, SOLUTION INTRAVENOUS AS NEEDED
Status: DISCONTINUED | OUTPATIENT
Start: 2022-07-12 | End: 2022-07-13 | Stop reason: HOSPADM

## 2022-07-12 RX ORDER — ATORVASTATIN CALCIUM 40 MG/1
1 TABLET, FILM COATED ORAL DAILY
COMMUNITY
Start: 2022-02-21

## 2022-07-12 RX ORDER — ATORVASTATIN CALCIUM 40 MG/1
40 TABLET, FILM COATED ORAL DAILY
Status: DISCONTINUED | OUTPATIENT
Start: 2022-07-12 | End: 2022-07-13 | Stop reason: HOSPADM

## 2022-07-12 RX ORDER — ARIPIPRAZOLE 5 MG/1
10 TABLET ORAL DAILY
Status: DISCONTINUED | OUTPATIENT
Start: 2022-07-12 | End: 2022-07-13 | Stop reason: HOSPADM

## 2022-07-12 RX ORDER — HYDROMORPHONE HYDROCHLORIDE 1 MG/ML
0.5 INJECTION, SOLUTION INTRAMUSCULAR; INTRAVENOUS; SUBCUTANEOUS ONCE
Status: COMPLETED | OUTPATIENT
Start: 2022-07-12 | End: 2022-07-12

## 2022-07-12 RX ORDER — OXYCODONE HYDROCHLORIDE 5 MG/1
5 TABLET ORAL
Status: DISCONTINUED | OUTPATIENT
Start: 2022-07-12 | End: 2022-07-13 | Stop reason: HOSPADM

## 2022-07-12 RX ORDER — HYDROMORPHONE HYDROCHLORIDE 2 MG/1
2 TABLET ORAL ONCE
Status: COMPLETED | OUTPATIENT
Start: 2022-07-12 | End: 2022-07-12

## 2022-07-12 RX ORDER — ALPRAZOLAM 0.5 MG/1
0.5 TABLET ORAL
Status: DISCONTINUED | OUTPATIENT
Start: 2022-07-12 | End: 2022-07-13 | Stop reason: HOSPADM

## 2022-07-12 RX ORDER — AMLODIPINE BESYLATE 5 MG/1
10 TABLET ORAL DAILY
Status: DISCONTINUED | OUTPATIENT
Start: 2022-07-12 | End: 2022-07-12

## 2022-07-12 RX ORDER — ARIPIPRAZOLE 10 MG/1
1 TABLET ORAL DAILY
COMMUNITY
Start: 2022-02-21

## 2022-07-12 RX ADMIN — HYDRALAZINE HYDROCHLORIDE 10 MG: 20 INJECTION INTRAMUSCULAR; INTRAVENOUS at 04:51

## 2022-07-12 RX ADMIN — SODIUM CHLORIDE, PRESERVATIVE FREE 10 ML: 5 INJECTION INTRAVENOUS at 21:07

## 2022-07-12 RX ADMIN — SODIUM CHLORIDE, PRESERVATIVE FREE 10 ML: 5 INJECTION INTRAVENOUS at 05:06

## 2022-07-12 RX ADMIN — HYDROMORPHONE HYDROCHLORIDE 0.5 MG: 1 INJECTION, SOLUTION INTRAMUSCULAR; INTRAVENOUS; SUBCUTANEOUS at 07:01

## 2022-07-12 RX ADMIN — HYDRALAZINE HYDROCHLORIDE 100 MG: 25 TABLET, FILM COATED ORAL at 21:03

## 2022-07-12 RX ADMIN — MAGNESIUM SULFATE HEPTAHYDRATE 3 G: 500 INJECTION, SOLUTION INTRAMUSCULAR; INTRAVENOUS at 13:43

## 2022-07-12 RX ADMIN — HYDRALAZINE HYDROCHLORIDE 100 MG: 25 TABLET, FILM COATED ORAL at 10:59

## 2022-07-12 RX ADMIN — ATORVASTATIN CALCIUM 40 MG: 40 TABLET, FILM COATED ORAL at 10:59

## 2022-07-12 RX ADMIN — HYDRALAZINE HYDROCHLORIDE 100 MG: 25 TABLET, FILM COATED ORAL at 17:56

## 2022-07-12 RX ADMIN — OXYCODONE 5 MG: 5 TABLET ORAL at 04:29

## 2022-07-12 RX ADMIN — OXYCODONE 5 MG: 5 TABLET ORAL at 21:02

## 2022-07-12 RX ADMIN — SODIUM CHLORIDE, PRESERVATIVE FREE 10 ML: 5 INJECTION INTRAVENOUS at 13:47

## 2022-07-12 RX ADMIN — NIFEDIPINE 90 MG: 30 TABLET, FILM COATED, EXTENDED RELEASE ORAL at 10:59

## 2022-07-12 RX ADMIN — SODIUM CHLORIDE 40 MG: 9 INJECTION, SOLUTION INTRAMUSCULAR; INTRAVENOUS; SUBCUTANEOUS at 11:00

## 2022-07-12 RX ADMIN — HYDROMORPHONE HYDROCHLORIDE 2 MG: 2 TABLET ORAL at 13:43

## 2022-07-12 RX ADMIN — CALCIUM GLUCONATE 2 G: 20 INJECTION, SOLUTION INTRAVENOUS at 10:58

## 2022-07-12 NOTE — CONSULTS
NEPHROLOGY CONSULT NOTE     Patient: Maury Sandhoff MRN: [id number]  PCP: None   :     1975  Age:   49 y.o. Sex:  male      Referring physician: Forrest Soto*  Reason for consultation: 49 y.o. male with Acute exacerbation of CHF (congestive heart failure) ([facility] [I50.9]  PONCHO (acute kidney injury) ([facility] [N17.9]  Hypertensive urgency [C78.9] complicated by PONCHO   Admission Date: 2022  3:56 PM  LOS: 1 day      ASSESSMENT and PLAN :   PONCHO on CKD:  - suspect progressive underlying CKD, likely diabetic nephropathy  - Cr stable, U/S neg for hydro  - cont diuretics, can probably transition to oral today  - ECHO pending to eval LV function  - no urgent need for RRT  - if ECHO nl and cleared from cardiology standpoint, he can be d/c'd home with oral diuretics and follow up with his outpatient nephrologist in  [address], [place]    CKD 4:  - presumed diabetic nephropathy  - will quantify his proteinuria  - hold RAAS inhibition for now    Hypervolemia:  - cont diuresis  - CHF w/u as above    HTN:  - cont present medications    Severe anemia:  - would transfuse 1 unit of blood today  - check iron studies, stool for occult blood  - denies any active bleeding    DM2:  - on insulin    Hx of PAD     Active Problems / Assessment AAActive  :   Principal Problem:    SOB (shortness of breath) (2022)    Active Problems:    Acute exacerbation of CHF (congestive heart failure) ([facility] (2022)      PONCHO (acute kidney injury) ([facility] (2022)      Hypertensive urgency (2022)         Subjective:   HPI: Maury Sandhoff is a 49 y.o.  male who has been admitted to the hospital for SOB. He has a hx of CKD 4, DM2, PAD, HTN. He receives all of his care at [facility]. He was last there the beginning of . His Cr was 4.9 at that time. He was referred to a nephrologist but missed his appointment.   He is visiting his mother here in [place] when he presented with the above complaints. Cr was 5.3, CXR showed b/l infiltrates. hgb 7.6, 6.3 this AM.  Renal U/S showing no hydronephrosis. He was diuresed with IV lasix. Stable UOP. Cr stable today. Feeling better. Pending ECHO this AM.  Denies any further cp, sob, n/v/d. Past Medical Hx:   No past medical history on file. Past Surgical Hx:   No past surgical history on file. Medications:  Prior to Admission medications    Medication Sig Start Date End Date Taking? Authorizing Provider   ALPRAZolam (Xanax) 0.5 mg tablet Take 1 Tablet by mouth two (2) times daily as needed. 2/21/22  Yes Provider, Historical   ARIPiprazole (ABILIFY) 10 mg tablet Take 1 Tablet by mouth daily. 2/21/22  Yes Provider, Historical   aspirin delayed-release (Adult Low Dose Aspirin) 81 mg tablet Take 1 Tablet by mouth daily. 2/21/22  Yes Provider, Historical   atorvastatin (Lipitor) 40 mg tablet Take 1 Tablet by mouth daily. 2/21/22  Yes Provider, Historical   chlorthalidone (HYGROTON) 25 mg tablet Take 1 Tablet by mouth daily. 2/21/22  Yes Provider, Historical   fludrocortisone (FLORINEF) 0.1 mg tablet Take 1 Tablet by mouth daily. 2/21/22  Yes Provider, Historical   hydrALAZINE (APRESOLINE) 100 mg tablet Take 1 Tablet by mouth three (3) times daily. 2/21/22  Yes Provider, Historical   insulin glargine-yfgn 100 unit/mL soln 15 Units by SubCUTAneous route nightly. 2/21/22  Yes Provider, Historical   insulin lispro-aabc 100 unit/mL soln 5 Units by SubCUTAneous route three (3) times daily. 2/21/22  Yes Provider, Historical   SITagliptin-metFORMIN (Janumet)  mg per tablet Take 1 Tablet by mouth daily. 2/21/22  Yes Provider, Historical   NIFEdipine ER (Procardia XL) 90 mg ER tablet Take 90 mg by mouth daily. 10/7/21  Yes Provider, Historical       Allergies   Allergen Reactions    Heparin Other (comments)     NO PORK    Tramadol Hives       Social Hx:       No family history on file.     Review of Systems:  A twelve point review of system was performed today. Pertinent positives and negatives are mentioned in the HPI. The reminder of the ROS is negative and noncontributory. Objective:    Vitals:    Vitals:    07/11/22 2040 07/11/22 2100 07/12/22 0451 07/12/22 0755   BP: (!) 169/84  (!) 170/83 (!) 150/76   Pulse: 99  (!) 110 94   Resp:   19 18   Temp:   98.7 °F (37.1 °C) 98 °F (36.7 °C)   SpO2:  99% 99% 99%   Weight:       Height:         I&O's:  07/11 0701 - 07/12 0700  In: 125 [P.O.:125]  Out: 1200 [Urine:1200]  Visit Vitals  BP (!) 150/76 (BP 1 Location: Right upper arm, BP Patient Position: Sitting)   Pulse 94   Temp 98 °F (36.7 °C)   Resp 18   Ht 6' (1.829 m)   Wt 81.7 kg (180 lb 1.9 oz)   SpO2 99%   BMI 24.43 kg/m²       Physical Exam:  General:Alert, No distress,   HEENT: Eyes are PERRL. Conjunctiva without pallor ,erythema. The sclerae without icterus. .   Neck:Supple,no mass palpable  Lungs : Clears to auscultation Bilaterally, Normal respiratory effort  CVS: RRR, S1 S2 normal, No rub,  trace LE edema  Abdomen: Soft, Non tender, No hepatosplenomegaly, bowel sounds present  Extremities: No cyanosis, No clubbing  Skin: No rash or lesions.   Lymph nodes: No palpable nodes  MS: No joint swelling, erythema, warmth  Neurologic: non focal, AAO x 3  Psych: normal affect    Laboratory Results:    Lab Results   Component Value Date    BUN 58 (H) 07/12/2022     07/12/2022    K 4.7 07/12/2022     (H) 07/12/2022    CO2 18 (L) 07/12/2022       Lab Results   Component Value Date    BUN 58 (H) 07/12/2022    BUN 57 (H) 07/11/2022    K 4.7 07/12/2022    K 5.6 (H) 07/11/2022       Lab Results   Component Value Date    WBC 6.6 07/12/2022    RBC 2.36 (L) 07/12/2022    HGB 6.3 (L) 07/12/2022    HCT 20.0 (L) 07/12/2022    MCV 84.7 07/12/2022    MCH 26.7 07/12/2022    RDW 14.7 (H) 07/12/2022     07/12/2022       No results found for: PTH, PHOS    Urine dipstick:   Lab Results   Component Value Date/Time    Color YELLOW/STRAW 07/12/2022 04:11 AM Appearance CLEAR 07/12/2022 04:11 AM    Specific gravity 1.008 07/12/2022 04:11 AM    pH (UA) 5.5 07/12/2022 04:11 AM    Protein 300 (A) 07/12/2022 04:11 AM    Glucose Negative 07/12/2022 04:11 AM    Ketone Negative 07/12/2022 04:11 AM    Bilirubin Negative 07/12/2022 04:11 AM    Urobilinogen 0.2 07/12/2022 04:11 AM    Nitrites Negative 07/12/2022 04:11 AM    Leukocyte Esterase Negative 07/12/2022 04:11 AM    Epithelial cells FEW 07/12/2022 04:11 AM    Bacteria Negative 07/12/2022 04:11 AM    WBC 0-4 07/12/2022 04:11 AM    RBC 0-5 07/12/2022 04:11 AM             Thank you for allowing us to participate in the care of this patient. We will follow patient. Please dont hesitate to call with any questions    Kamar Adams MD  7/12/2022    [facility] [address]  Phone - [phone]   Fax - [phone]  [email or web address]

## 2022-07-12 NOTE — PROGRESS NOTES
Hospitalist Progress Note    NAME: Asif Rodriguez   :  1975   MRN:  [id number]           Subjective:     Chief Complaint / Reason for Physician Visit  Patient seen and evaluated at bedside, overnight events reviewed, patient states his bilateral lower extremity swelling has improved, has no new complaints. Discussed with RN events overnight. Review of Systems:  Symptom Y/N Comments  Symptom Y/N Comments   Fever/Chills N   Chest Pain N    Poor Appetite Y   Edema Y    Cough N   Abdominal Pain N    Sputum N   Joint Pain N    SOB/DIETZ N   Pruritis/Rash N    Nausea/vomit N   Tolerating PT/OT NA    Diarrhea N   Tolerating Diet Y    Constipation N   Other       Could NOT obtain due to:    Patient denies any fevers chills nausea vomiting lightheadedness dizziness palpitations headache focal weakness loss of sensation auditory or visual symptoms abdominal stool or urinary complaints or any other associated symptoms. Objective:     VITALS:   Last 24hrs VS reviewed since prior progress note. Most recent are:  Patient Vitals for the past 24 hrs:   Temp Pulse Resp BP SpO2   22 0755 98 °F (36.7 °C) 94 18 (!) 150/76 99 %   22 0451 98.7 °F (37.1 °C) (!) 110 19 (!) 170/83 99 %   22 2100 -- -- -- -- 99 %   22 2040 -- 99 -- (!) 169/84 --   22 1850 98.3 °F (36.8 °C) 88 18 (!) 188/103 97 %   22 1843 -- 90 -- (!) 173/106 --   22 1733 98.3 °F (36.8 °C) (!) 102 22 (!) 177/101 96 %   22 1722 -- (!) 101 18 -- 100 %   22 1712 -- (!) 102 25 -- 98 %   22 1651 -- 100 -- (!) 182/100 --   22 1508 98.5 °F (36.9 °C) 97 18 (!) 186/100 100 %   22 0938 98.5 °F (36.9 °C) (!) 109 20 (!) 175/87 97 %     No intake or output data in the 24 hours ending 22 0808     PHYSICAL EXAM:  General: Patient appears comfortable    EENT:  EOMI. Anicteric sclerae.  MMM  Resp:  Decreased air entry bilaterally with appreciable bilateral bibasilar crackles  CV:  Regular  rhythm, s1/s2 no m/r/g 2+ edema  GI:  Soft, Non distended, Non tender. +Bowel sounds  Neurologic:  Alert and oriented X 3, normal speech,   Psych:   Good insight. Not anxious nor agitated  Skin:  No rashes. No jaundice    Procedures: see electronic medical records for all procedures/Xrays and details which were not copied into this note but were reviewed prior to creation of Plan. LABS:  I reviewed today's most current labs and imaging studies. Pertinent labs include:  Recent Labs     07/12/22  0411 07/11/22  1031   WBC 6.6 9.4   HGB 6.3* 7.6*   HCT 20.0* 23.7*    471*     Recent Labs     07/12/22  0411 07/11/22  1031    140   K 4.7 5.6*   * 114*   CO2 18* 19*   * 133*   BUN 58* 57*   CREA 5.33* 5.37*   CA 6.5* 6.9*   MG 1.5*  --    ALB  --  2.6*   TBILI  --  0.5   ALT  --  23       Signed: Abiel Khan MD    X-ray chest:IMPRESSION  Bilateral pulmonary infiltrates and small left pleural effusion.     US RP:IMPRESSION     No hydronephrosis.       Reviewed most current lab test results and cultures  YES  Reviewed most current radiology test results   YES  Review and summation of old records today    NO  Reviewed patient's current orders and MAR    YES  PMH/SH reviewed - no change compared to H&P      Assessment / Plan:    Acute decompensated heart failure with unknown ejection fraction-patient presents with above med symptomatology found to have acute decompensated heart failure with unknown ejection fraction, remains hemodynamically stable at this time, currently diuresing well  Daily weights  Frequent intake and output monitoring  Lasix 40 mg IV twice daily  Follow-up transthoracic echo  Follow-up cardiology recommendations     Acute kidney injury-of note patient found to have an elevated serum creatinine, differentials include prerenal versus renal responsible etiologies, ultrasound retroperitoneal negative for any acute abnormalities, serum creatinine remained stable, patient diuresing well, lower extremity edema improving  Continue Lasix for diuresis  Continue to trend serum creatinine  Nephrology consult appreciated, no emergent need for dialysis at this time, continue to follow recommendations     Hypertensive urgency- reinitiate home antihypertensive medications, medical records obtained  Hydralazine as needed for systolic blood pressure of over 160     Type 2 diabetes- hold off on home Lantus and home insulin regimen as patient's blood glucoses remain normal  Insulin sliding scale    Hypomagnesemia-replete magnesium and recheck magnesium     Chest pain-of note patient complaining of chest pain, EKG consistent with nonspecific changes, however ACS needs to be ruled out  Continue aspirin once daily  Troponins remain normal  Follow-up transthoracic echo  Follow up cardiology recommendations     Prophylaxis- SCDs  FEN-cardiac diet with fluid restriction, replete potassium and magnesium  Full code, will clarify about surrogate decision-maker,   Disposition-pending clinical improvement/clearance by cardiology/nephrology, possible discharge in 24 to 48 hours    18.5 - 24.9 Normal weight / Body mass index is 24.43 kg/m². Code status: Full  Prophylaxis: SCD's  Recommended Disposition: Home w/Family     ________________________________________________________________________  Care Plan discussed with:    Comments   Patient x    Family      RN x    Care Manager x    Consultant  x                     x Multidiciplinary team rounds were held today with , nursing, pharmacist and clinical coordinator. Patient's plan of care was discussed; medications were reviewed and discharge planning was addressed.      ________________________________________________________________________  Total NON critical care TIME:  35   Minutes      Comments   >50% of visit spent in counseling and coordination of care x    ________________________________________________________________________  Rena Malave Pola Hadley MD

## 2022-07-12 NOTE — WOUND CARE
Wound care nurse consult: consult placed for POA bilateral feet wounds. Patient is a 50 y/o AAM admitted 7/11/22 for acute CHF exacerbation and PONCHO on CKD. No past medical history on file. No past surgical history on file. Per chart review: CKD 4, HTN, DM2, PAD - he states he receives all his care at [facility] and is followed there for his bilateral metatarsal amputations which he states he had \"a few months ago\".     Wounds are open surgical wounds from bilateral metatarsal amputations that are complicated by DM and PAD:  Left TMA: 2.5 x 4 x 0.2 cm    Right TMA: 4 x 5 cm      WOUND POA CONDITIONS    Wound Foot Right Green exudate seen, foul smelling, chronic wound, 07/12/22 (Active)   Wound Image   07/12/22 1042   Wound Etiology Surgical 07/12/22 1042   Dressing Status New dressing applied 07/12/22 1042   Cleansed Wound cleanser 07/12/22 1042   Dressing/Treatment Hydrating gel with Ag;Alginate with Ag 07/12/22 1042   Dressing Change Due 07/13/22 07/12/22 1042   Wound Length (cm) 4 cm 07/12/22 1042   Wound Width (cm) 5 cm 07/12/22 1042   Wound Surface Area (cm^2) 20 cm^2 07/12/22 1042   Wound Assessment Eschar dry;Eschar moist;Slough 07/12/22 1042   Drainage Amount Small 07/12/22 1042   Drainage Description Green;Rosa 07/12/22 1042   Wound Odor Mild 07/12/22 1042   Luciana-Wound/Incision Assessment Dry/flaky 07/12/22 1042   Edges Defined edges 07/12/22 1042   Wound Thickness Description Full thickness 07/12/22 1042   Number of days: 1       Wound Foot Left TMA 07/12/22 (Active)   Wound Image   07/12/22 1042   Wound Etiology Surgical 07/12/22 1042   Dressing Status New dressing applied 07/12/22 1042   Cleansed Wound cleanser 07/12/22 1042   Dressing/Treatment Hydrating gel with Ag;Alginate with Ag 07/12/22 1042   Wound Length (cm) 2.5 cm 07/12/22 1042   Wound Width (cm) 4 cm 07/12/22 1042   Wound Depth (cm) 0.2 cm 07/12/22 1042   Wound Surface Area (cm^2) 10 cm^2 07/12/22 1042   Wound Volume (cm^3) 2 cm^3 07/12/22 1042   Wound Assessment Pink/red 07/12/22 1042   Drainage Amount Small 07/12/22 1042   Drainage Description Serosanguinous 07/12/22 1042   Wound Odor Mild 07/12/22 1042   Luciana-Wound/Incision Assessment Dry/flaky 07/12/22 1042   Edges Defined edges 07/12/22 1042   Wound Thickness Description Partial thickness 07/12/22 1042   Number of days: 1        nurse spoke briefly with Dr Lisa Ford about wounds and dressing. No acute needs for wound, patient needs to follow up with his MD/foot surgeon at [facility] for right foot care soon. He is only wrapping it daily with a dry dressing at home and he is in this area visiting his mom. Right & Left foot wounds: daily, cleanse with wound cleanser spray and wipe firmly clean. Spread some Silvasorb gel onto each wound with gloved finger and cover with a piece of Opticell-AG. Cover with dry dressing (4x4's, ABD pad, Kerlix/Bulkee rolled gauze and ACE wrap).     Dominguez Taylor RN, Tierney Muñoz, RN, [employer]

## 2022-07-12 NOTE — PROGRESS NOTES
End of Shift Note    Bedside shift change report given to Vandana Red (oncoming nurse) by Vin Chow RN (offgoing nurse). Report included the following information     Shift worked:  3-7p   Shift summary and any significant changes:     receiving unit of [facility]       Concerns for physician to address:     Zone phone for oncoming shift:        Patient Information  Yared Whyte  49 y.o.  7/11/2022  3:56 PM by Ute Blank MD. Yared Whyte was admitted from home    Problem List  Patient Active Problem List    Diagnosis Date Noted    SOB (shortness of breath) 07/12/2022    Acute exacerbation of CHF (congestive heart failure) ([facility] [address] 07/11/2022    PONCHO (acute kidney injury) ([facility] 07/11/2022    Hypertensive urgency 07/11/2022     No past medical history on file. Core Measures: Activity:  Activity Level: Up ad leonardo  Number times ambulated in hallways past shift: 2  Number of times OOB to chair past shift:0    Cardiac:   Cardiac Monitoring:Y   Cardiac Rhythm: Sinus Rhythm    Access:   Current line(s):PIV  Genitourinary:   Urinary status:voiding  Respiratory:   O2 Device: None (Room air)  Chronic home O2 use?:NO  GI:  Last Bowel Movement Date: 07/11/22  Current diet:  ADULT DIET Regular; Low Fat/Low Chol/High Fiber/AMISH; No Salt Added (3-4 gm); 1200 ml  DIET NPO Sips of Water with Meds  Passing flatus:   Pain Management:   Patient states pain is manageable on current regimen: N/A  Skin:  Dao Score: 21  Interventions:  Patient Safety:  Fall Score:  Total Score: 1  Interventions   @Rollbelt  @dexterity to release roll belt  Yes/No ( must document dexterity  here by stating Yes or No here, otherwise this is a restraint and must follow restraint documentation policy.)    DVT prophylaxis:  DVT prophylaxis Med-N  Wounds: (If Applicable)  Wounds-N/A  Active Consults:  IP CONSULT TO HOSPITALIST  IP CONSULT TO NEPHROLOGY  IP CONSULT TO GASTROENTEROLOGY  IP CONSULT TO CARDIOLOGY    Length of Stay:  Expected LOS: 3d 0h  Actual LOS: 1  Discharge Plan: to be deteremined

## 2022-07-12 NOTE — CONSULTS
Gastroenterology Consult  ([facility], [address]   for Dr. Jina Narvaez)     Referring Physician: Dr. Gareld Sacks Date: 7/12/2022     Subjective:     Chief Complaint: SOB, swelling     History of Present Illness: Hector Rios is a 49 y.o. male who is seen in consultation for anemia. He lives in the [facility] and is here visiting family. He does not have a gastroenterologist.  He has never had a colonoscopy or an EGD. No history of GI bleeding though he was anemic during a recent [place] and received a unit of blood there. He does not know why he was anemic and did not follow-up afterwards. He is not on any blood thinners or regular aspirin or NSAIDs. He denies any melena or hematochezia. He denies any abdominal pain or unexplained weight loss. He states he used to weigh at 250 pounds and lost a lot of weight several years ago when he was diagnosed with diabetes. States his diabetes is currently well controlled. When he was overweight he had a lot of acid reflux but has not needed to take any medication for that recently. He was admitted with shortness of breath and lower extremity swelling and says that is much better. Patient's hemoglobin on admission was 7.6 with an MCV of 83. This morning it is 6.3. His BUN and creatinine are 58 and 5.33 respectively. PMH  DM  CKD    No past surgical history on file.      FH:  No FH of CRC or stomach cancer    Social History     Tobacco Use    Smoking status: Not on file    Smokeless tobacco: Not on file   Substance Use Topics    Alcohol use: Not on file      Allergies   Allergen Reactions    Heparin Other (comments)     NO PORK    Tramadol Hives     Current Facility-Administered Medications   Medication Dose Route Frequency    ALPRAZolam (XANAX) tablet 0.5 mg  0.5 mg Oral BID PRN    ARIPiprazole (ABILIFY) tablet 10 mg  10 mg Oral DAILY    atorvastatin (LIPITOR) tablet 40 mg  40 mg Oral DAILY    hydrALAZINE (APRESOLINE) tablet 100 mg  100 mg Oral TID    NIFEdipine ER (PROCARDIA XL) tablet 90 mg  90 mg Oral DAILY    magnesium sulfate 3 g in 0.9% sodium chloride 100 mL IVPB  3 g IntraVENous ONCE    calcium gluconate 2 g/100 mL sodium chloride (ISO-OSM)  2 g IntraVENous ONCE    0.9% sodium chloride infusion 250 mL  250 mL IntraVENous PRN    pantoprazole (PROTONIX) 40 mg in 0.9% sodium chloride 10 mL injection  40 mg IntraVENous Q12H    furosemide (LASIX) injection 40 mg  40 mg IntraVENous Q12H    hydrALAZINE (APRESOLINE) 20 mg/mL injection 10 mg  10 mg IntraVENous Q6H PRN    sodium chloride (NS) flush 5-40 mL  5-40 mL IntraVENous Q8H    sodium chloride (NS) flush 5-40 mL  5-40 mL IntraVENous PRN    acetaminophen (TYLENOL) tablet 650 mg  650 mg Oral Q6H PRN    Or    acetaminophen (TYLENOL) suppository 650 mg  650 mg Rectal Q6H PRN    polyethylene glycol (MIRALAX) packet 17 g  17 g Oral DAILY PRN    ondansetron (ZOFRAN ODT) tablet 4 mg  4 mg Oral Q8H PRN    Or    ondansetron (ZOFRAN) injection 4 mg  4 mg IntraVENous Q6H PRN    glucose chewable tablet 16 g  4 Tablet Oral PRN    dextrose 10% infusion 0-250 mL  0-250 mL IntraVENous PRN    glucagon (GLUCAGEN) injection 1 mg  1 mg IntraMUSCular PRN    insulin lispro (HUMALOG) injection   SubCUTAneous AC&HS    [Held by provider] aspirin chewable tablet 81 mg  81 mg Oral DAILY    oxyCODONE IR (ROXICODONE) tablet 5 mg  5 mg Oral Q4H PRN        Review of Systems:  A detailed review of systems was performed as follows:  Constitutional:  Negative  Eyes:  No ocular sensitivity to the sun, blurred vision or double vision. ENMT:  No nose or sinus problems. Respiratory: + sob  Cardiac:  +CP and LE edema  Gastrointestinal:  See history of the present illness  :   No pain with urination or hematuria  Musculoskeletal:  No arthritis or hot swollen joints.     Endocrine:  + diabetes  Psychiatric: No depression or feeling blue  Integumentary:  No skin rash or sensitivity to the sun. Neurologic:  No stroke or seizure; no numbness or tingling of the extremities. Heme-Lymphatic:  See HPI    Objective:     Physical Exam:  Visit Vitals  BP (!) 150/76 (BP 1 Location: Right upper arm, BP Patient Position: Sitting)   Pulse 94   Temp 98 °F (36.7 °C)   Resp 18   Ht 6' (1.829 m)   Wt 81.7 kg (180 lb 1.9 oz)   SpO2 99%   BMI 24.43 kg/m²        Gen: BM in NAD  Skin:  Extremities and face reveal no rashes. HEENT: Sclerae anicteric. Cardiovascular: Regular rate and rhythm. \"click\" appreciated  Respiratory:  Comfortable breathing with no accessory muscle use. Clear breath sounds with no wheezes, rales, or rhonchi. GI:  Abdomen nondistended, soft, and nontender. Normal active bowel sounds. No enlargement of the liver or spleen. No masses palpable. Rectal:  Patient refused  Musculoskeletal:  2+ pitting edema bilaterally   Neurological:  Gross memory appears intact. Patient is alert and oriented. Psychiatric:  Appears agitated   Lymphatic:  No cervical or supraclavicular adenopathy. Lab/Data Review:  Recent Labs     07/12/22  0411 07/11/22  1031   WBC 6.6 9.4   HGB 6.3* 7.6*   HCT 20.0* 23.7*    471*     Recent Labs     07/12/22  0411 07/11/22  1031    140   K 4.7 5.6*   * 114*   CO2 18* 19*   BUN 58* 57*   CREA 5.33* 5.37*   * 133*   CA 6.5* 6.9*   MG 1.5*  --      Recent Labs     07/11/22  1031   ALT 23   *   TBILI 0.5   TP 7.1   ALB 2.6*   GLOB 4.5*     No results for input(s): INR, PTP, APTT, INREXT in the last 72 hours. No results for input(s): FE, TIBC, PSAT, FERR in the last 72 hours. No results found for: FOL, RBCF   No results for input(s): PH, PCO2, PO2 in the last 72 hours. No results for input(s): CPK, CKNDX, TROIQ in the last 72 hours.     No lab exists for component: CPKMB  No results found for: CHOL, CHOLX, CHLST, CHOLV, HDL, HDLP, LDL, LDLC, DLDLP, TGLX, TRIGL, TRIGP, CHHD, CHHDX  Lab Results   Component Value Date/Time Glucose (POC) 150 (H) 07/12/2022 07:59 AM    Glucose (POC) 154 (H) 07/11/2022 10:26 PM    Glucose (POC) 134 (H) 07/11/2022 04:59 PM     Lab Results   Component Value Date/Time    Color YELLOW/STRAW 07/12/2022 04:11 AM    Appearance CLEAR 07/12/2022 04:11 AM    Specific gravity 1.008 07/12/2022 04:11 AM    pH (UA) 5.5 07/12/2022 04:11 AM    Protein 300 (A) 07/12/2022 04:11 AM    Glucose Negative 07/12/2022 04:11 AM    Ketone Negative 07/12/2022 04:11 AM    Bilirubin Negative 07/12/2022 04:11 AM    Urobilinogen 0.2 07/12/2022 04:11 AM    Nitrites Negative 07/12/2022 04:11 AM    Leukocyte Esterase Negative 07/12/2022 04:11 AM    Epithelial cells FEW 07/12/2022 04:11 AM    Bacteria Negative 07/12/2022 04:11 AM    WBC 0-4 07/12/2022 04:11 AM    RBC 0-5 07/12/2022 04:11 AM           Assessment/Plan:     Principal Problem:    SOB (shortness of breath) (7/12/2022)    Active Problems:    Acute exacerbation of CHF (congestive heart failure) ([facility] [address] (7/11/2022)      PONCHO (acute kidney injury) ([facility] (7/11/2022)      Hypertensive urgency (7/11/2022)    Normocytic anemia       76-year-old male presents with normocytic anemia. There is no overt bleeding. Patient refuses a rectal exam.  He also refuses an EGD and colonoscopy during this admission. He states he will go back to [address] [place] [address] to have it done. I recommend blood transfusion which he agrees to and checking iron and B12 and folate studies prior to giving the transfusion. If iron is low, I recommend IV iron infusion. I recommend empiric PPI and avoidance of NSAIDs. I recommend follow-up with a local GI for EGD and colonoscopy to further evaluate his unexplained anemia. He does have renal failure and if this is chronic he may have anemia of chronic disease.   We will see again on request    Dontae Oakley  07/12/22  11:02 AM

## 2022-07-12 NOTE — PROGRESS NOTES
Text paged Yamile[employer] [id number]    Pt complaining of bilateral lower leg pain, he has had oxycodone and i gave one time dose of morphine that was ordered. He is requesting for more pain meds, as those are not working for him. .  .  .  .  .  .  .  . Javy Gonzales

## 2022-07-12 NOTE — PROGRESS NOTES
1030: During medication administration pt verbalized that he didn't want to get endoscopy or blood transfusion here, said he was visiting and wants to do it when he goes back to his hometown in [place]. Verbalized to pt the urgency of his care while here, pt continued to state that he doesn't need his lasix because hes peeing just fine. Atempted to educate pt on importance of these things. MD notified. 1200: pt lost his IV. Difficult stick. PICC team called for placement. 1300: MD in to speak with pt. Pt now agreeable to endoscopy and blood transfusion. Paged DEIRDRE chery to notify, stated simmilink will be in to see pt this evening. 1600: Bedside and Verbal shift change report given to brian matthew (oncoming nurse) by Ric Hernandez (offgoing nurse). Report given with SBAR, Kardex, Intake/Output, MAR and Recent Results.

## 2022-07-12 NOTE — CONSULTS
[facility]  [address]  [phone]  [id number]       PLEASE NOTE THAT WE CONFIRMED WITH THE REFERRING PHYSICIAN PRIOR TO SEEING THE PATIENT THAT THE PATIENT IS BEING REFERRED FOR INITIAL HOSPITAL EVALUATION AND FOR LONG-TERM ONGOING CARDIAC CARE. Date of  Admission: 7/11/2022  3:56 PM     Admission type:Emergency    Yulissa Ramsay is a 49 y.o. male admitted for Acute exacerbation of CHF (congestive heart failure) ([facility] [address] [I50.9]  [facility] [N17.9]  Hypertensive urgency [I16.0]. He presented to the er with 2 days of sob. [place]. Had some chest pain yesterday. Patient Active Problem List    Diagnosis Date Noted    SOB (shortness of breath) 07/12/2022    Acute exacerbation of CHF (congestive heart failure) ([facility] 07/11/2022    PONCHO (acute kidney injury) ([facility] 07/11/2022    Hypertensive urgency 07/11/2022      None  No past medical history on file. No past surgical history on file. Allergies   Allergen Reactions    Heparin Other (comments)     NO PORK    Tramadol Hives      Social History     Tobacco Use    Smoking status: Not on file    Smokeless tobacco: Not on file   Substance Use Topics    Alcohol use: Not on file    Drug use: Not on file     No family history on file.    Current Facility-Administered Medications   Medication Dose Route Frequency    ALPRAZolam (XANAX) tablet 0.5 mg  0.5 mg Oral BID PRN    ARIPiprazole (ABILIFY) tablet 10 mg  10 mg Oral DAILY    atorvastatin (LIPITOR) tablet 40 mg  40 mg Oral DAILY    hydrALAZINE (APRESOLINE) tablet 100 mg  100 mg Oral TID    NIFEdipine ER (PROCARDIA XL) tablet 90 mg  90 mg Oral DAILY    magnesium sulfate 3 g in 0.9% sodium chloride 100 mL IVPB  3 g IntraVENous ONCE    calcium gluconate 2 g/100 mL sodium chloride (ISO-OSM)  2 g IntraVENous ONCE    furosemide (LASIX) injection 40 mg  40 mg IntraVENous Q12H    hydrALAZINE (APRESOLINE) 20 mg/mL injection 10 mg  10 mg IntraVENous Q6H PRN    sodium chloride (NS) flush 5-40 mL  5-40 mL IntraVENous Q8H    sodium chloride (NS) flush 5-40 mL  5-40 mL IntraVENous PRN    acetaminophen (TYLENOL) tablet 650 mg  650 mg Oral Q6H PRN    Or    acetaminophen (TYLENOL) suppository 650 mg  650 mg Rectal Q6H PRN    polyethylene glycol (MIRALAX) packet 17 g  17 g Oral DAILY PRN    ondansetron (ZOFRAN ODT) tablet 4 mg  4 mg Oral Q8H PRN    Or    ondansetron (ZOFRAN) injection 4 mg  4 mg IntraVENous Q6H PRN    glucose chewable tablet 16 g  4 Tablet Oral PRN    dextrose 10% infusion 0-250 mL  0-250 mL IntraVENous PRN    glucagon (GLUCAGEN) injection 1 mg  1 mg IntraMUSCular PRN    insulin lispro (HUMALOG) injection   SubCUTAneous AC&HS    aspirin chewable tablet 81 mg  81 mg Oral DAILY    oxyCODONE IR (ROXICODONE) tablet 5 mg  5 mg Oral Q4H PRN         Review of Symptoms:  Constitutional: negative  Eyes: negative  Ears, nose, mouth, throat, and face: negative  Respiratory: sob  Cardiovascular: cp (now resolved)  Gastrointestinal: negative  Genitourinary: negative  Musculoskeletal: negative  Neurological: negative  Behvioral/Psych: negative  Endocrine: negative     Subjective:      Visit Vitals  BP (!) 150/76 (BP 1 Location: Right upper arm, BP Patient Position: Sitting)   Pulse 94   Temp 98 °F (36.7 °C)   Resp 18   Ht 6' (1.829 m)   Wt 180 lb 1.9 oz (81.7 kg)   SpO2 99%   BMI 24.43 kg/m²       Physical Exam:  General:  Alert, cooperative, well noursished, well developed, appears stated age   Eyes:  Sclera anicteric. Pupils equally round and reactive to light. Mouth/Throat: Mucous membranes normal, oral pharynx clear   Neck: Supple   Lungs:   Clear to auscultation bilaterally, good effort   CV:  Regular rate and rhythm,no murmur, click, rub or gallop   Abdomen:   Soft, non-tender.  bowel sounds normal. non-distended   Extremities: No cyanosis or edema   Skin: Skin color, texture, turgor normal. no acute rash or lesions   Lymph nodes: Cervical and supraclavicular normal   Musculoskeletal: No swelling or deformity       Psych: Alert and oriented, normal mood affect given the setting       Cardiographics    Telemetry: nsr    ECG: nsr    Echocardiogram: pending    Labs:  Recent Labs     07/12/22  0411 07/11/22  1031   WBC 6.6 9.4   HGB 6.3* 7.6*   HCT 20.0* 23.7*    471*     Recent Labs     07/12/22  0411 07/11/22  1031    140   K 4.7 5.6*   * 114*   CO2 18* 19*   * 133*   BUN 58* 57*   CREA 5.33* 5.37*   CA 6.5* 6.9*   MG 1.5*  --    ALB  --  2.6*   TBILI  --  0.5   ALT  --  23       No results for input(s): TROIQ, CPK, CKMB in the last 72 hours. Intake/Output Summary (Last 24 hours) at 7/12/2022 0901  Last data filed at 7/12/2022 0600  Gross per 24 hour   Intake 125 ml   Output 1200 ml   Net -1075 ml         Assessment:     Assessment:       Principal Problem:    SOB (shortness of breath) (7/12/2022)    Active Problems:    Acute exacerbation of CHF (congestive heart failure) ([facility] [address] (7/11/2022)      PONCHO (acute kidney injury) ([facility] (7/11/2022)      Hypertensive urgency (7/11/2022)         Plan:     Koko Gillespie is a a pleasant gentleman with sob and new dx of ARF and marked anemia as the likely cause of his sob. No previous hx of chf. Echo pending. If lvef is wnl, then ok to dc from cardiac standpoint. Cont med rx for htn.  D/w Dr Adeline Goodpasture, MD, [facility]    7/12/2022

## 2022-07-13 VITALS
HEIGHT: 72 IN | WEIGHT: 180.12 LBS | SYSTOLIC BLOOD PRESSURE: 125 MMHG | RESPIRATION RATE: 18 BRPM | TEMPERATURE: 97.9 F | DIASTOLIC BLOOD PRESSURE: 60 MMHG | OXYGEN SATURATION: 96 % | HEART RATE: 96 BPM | BODY MASS INDEX: 24.4 KG/M2

## 2022-07-13 LAB
ABO + RH BLD: NORMAL
ANION GAP SERPL CALC-SCNC: 7 MMOL/L (ref 5–15)
BLD PROD TYP BPU: NORMAL
BLOOD GROUP ANTIBODIES SERPL: NORMAL
BPU ID: NORMAL
BUN SERPL-MCNC: 57 MG/DL (ref 6–20)
BUN/CREAT SERPL: 10 (ref 12–20)
CALCIUM SERPL-MCNC: 7 MG/DL (ref 8.5–10.1)
CHLORIDE SERPL-SCNC: 112 MMOL/L (ref 97–108)
CO2 SERPL-SCNC: 18 MMOL/L (ref 21–32)
CREAT SERPL-MCNC: 5.52 MG/DL (ref 0.7–1.3)
CROSSMATCH RESULT,%XM: NORMAL
ERYTHROCYTE [DISTWIDTH] IN BLOOD BY AUTOMATED COUNT: 14.5 % (ref 11.5–14.5)
FERRITIN SERPL-MCNC: 180 NG/ML (ref 26–388)
GLUCOSE BLD STRIP.AUTO-MCNC: 123 MG/DL (ref 65–117)
GLUCOSE SERPL-MCNC: 163 MG/DL (ref 65–100)
HCT VFR BLD AUTO: 24.9 % (ref 36.6–50.3)
HGB BLD-MCNC: 7.9 G/DL (ref 12.1–17)
IRON SATN MFR SERPL: 14 % (ref 20–50)
IRON SERPL-MCNC: 23 UG/DL (ref 35–150)
MAGNESIUM SERPL-MCNC: 2 MG/DL (ref 1.6–2.4)
MCH RBC QN AUTO: 26.7 PG (ref 26–34)
MCHC RBC AUTO-ENTMCNC: 31.7 G/DL (ref 30–36.5)
MCV RBC AUTO: 84.1 FL (ref 80–99)
NRBC # BLD: 0 K/UL (ref 0–0.01)
NRBC BLD-RTO: 0 PER 100 WBC
PLATELET # BLD AUTO: 378 K/UL (ref 150–400)
PMV BLD AUTO: 8.3 FL (ref 8.9–12.9)
POTASSIUM SERPL-SCNC: 5.3 MMOL/L (ref 3.5–5.1)
RBC # BLD AUTO: 2.96 M/UL (ref 4.1–5.7)
SERVICE CMNT-IMP: ABNORMAL
SODIUM SERPL-SCNC: 137 MMOL/L (ref 136–145)
SPECIMEN EXP DATE BLD: NORMAL
STATUS OF UNIT,%ST: NORMAL
TIBC SERPL-MCNC: 170 UG/DL (ref 250–450)
UNIT DIVISION, %UDIV: 0
WBC # BLD AUTO: 8.2 K/UL (ref 4.1–11.1)

## 2022-07-13 PROCEDURE — 74011250637 HC RX REV CODE- 250/637: Performed by: INTERNAL MEDICINE

## 2022-07-13 PROCEDURE — 83735 ASSAY OF MAGNESIUM: CPT

## 2022-07-13 PROCEDURE — 82962 GLUCOSE BLOOD TEST: CPT

## 2022-07-13 PROCEDURE — 83540 ASSAY OF IRON: CPT

## 2022-07-13 PROCEDURE — 36415 COLL VENOUS BLD VENIPUNCTURE: CPT

## 2022-07-13 PROCEDURE — 85027 COMPLETE CBC AUTOMATED: CPT

## 2022-07-13 PROCEDURE — C9113 INJ PANTOPRAZOLE SODIUM, VIA: HCPCS | Performed by: INTERNAL MEDICINE

## 2022-07-13 PROCEDURE — 74011250637 HC RX REV CODE- 250/637: Performed by: PHYSICIAN ASSISTANT

## 2022-07-13 PROCEDURE — 80048 BASIC METABOLIC PNL TOTAL CA: CPT

## 2022-07-13 PROCEDURE — 74011000250 HC RX REV CODE- 250: Performed by: INTERNAL MEDICINE

## 2022-07-13 PROCEDURE — 74011250636 HC RX REV CODE- 250/636: Performed by: INTERNAL MEDICINE

## 2022-07-13 PROCEDURE — 82728 ASSAY OF FERRITIN: CPT

## 2022-07-13 RX ORDER — BUMETANIDE 2 MG/1
2 TABLET ORAL DAILY
Qty: 60 TABLET | Refills: 0 | Status: SHIPPED | OUTPATIENT
Start: 2022-07-13

## 2022-07-13 RX ORDER — BUMETANIDE 1 MG/1
2 TABLET ORAL DAILY
Status: DISCONTINUED | OUTPATIENT
Start: 2022-07-13 | End: 2022-07-13 | Stop reason: HOSPADM

## 2022-07-13 RX ORDER — LANOLIN ALCOHOL/MO/W.PET/CERES
325 CREAM (GRAM) TOPICAL
Qty: 30 TABLET | Refills: 0 | Status: SHIPPED | OUTPATIENT
Start: 2022-07-13

## 2022-07-13 RX ADMIN — BUMETANIDE 2 MG: 1 TABLET ORAL at 09:11

## 2022-07-13 RX ADMIN — ATORVASTATIN CALCIUM 40 MG: 40 TABLET, FILM COATED ORAL at 09:12

## 2022-07-13 RX ADMIN — OXYCODONE 7.5 MG: 5 TABLET ORAL at 01:15

## 2022-07-13 RX ADMIN — HYDRALAZINE HYDROCHLORIDE 100 MG: 25 TABLET, FILM COATED ORAL at 09:12

## 2022-07-13 RX ADMIN — SODIUM CHLORIDE, PRESERVATIVE FREE 10 ML: 5 INJECTION INTRAVENOUS at 05:49

## 2022-07-13 RX ADMIN — SODIUM CHLORIDE 40 MG: 9 INJECTION, SOLUTION INTRAMUSCULAR; INTRAVENOUS; SUBCUTANEOUS at 09:12

## 2022-07-13 RX ADMIN — NIFEDIPINE 90 MG: 30 TABLET, FILM COATED, EXTENDED RELEASE ORAL at 09:11

## 2022-07-13 RX ADMIN — OXYCODONE 7.5 MG: 5 TABLET ORAL at 09:12

## 2022-07-13 NOTE — DISCHARGE INSTRUCTIONS
No acute needs for wound, patient needs to follow up with his MD/foot surgeon at [facility] for right foot care soon. He is only wrapping it daily with a dry dressing at home and he is in this area visiting his mom.     Right & Left foot wounds: daily, cleanse with wound cleanser spray and wipe firmly clean. Spread some Silvasorb gel onto each wound with gloved finger and cover with a piece of Opticell-AG. Cover with dry dressing (4x4's, ABD pad, Kerlix/Bulkee rolled gauze and ACE wrap).

## 2022-07-13 NOTE — PROGRESS NOTES
[facility]   [address] [facility], [address] [facility]  Phone: [phone]   [phone]    [email or web address]     Nephrology Progress Note    Patient Name : Loy Snow      : 1975     MRN : [id number]  Date of Admission : 2022  Date of Servive : 22    CC:  Follow up for progressive CKD      Assessment and Plan   PONCHO on CKD:  - suspect progressive underlying CKD, likely diabetic nephropathy  - Cr mostly stable, U/S neg for hydro  - ECHO -normal LV function, no VHD  - no urgent need for RRT  - change to oral Bumex  -OK for discharge from renal perspective, with F/U with home Nephroogist     CKD 4:  - presumed diabetic nephropathy  - will quantify his proteinuria  - hold RAAS inhibition for now     Hypervolemia:  - cont diuresis  - CHF w/u as above     HTN:  - cont present medications     Severe anemia:  - would transfuse 1 unit of blood today  - check iron studies, stool for occult blood  - denies any active bleeding     DM2:  - on insulin     Hx of PAD     Interval History:  Stable, good U/O with diuretics; not SOB      Review of Systems: Pertinent items are noted in HPI.     Current Medications:   Current Facility-Administered Medications   Medication Dose Route Frequency    bumetanide (BUMEX) tablet 2 mg  2 mg Oral DAILY    ALPRAZolam (XANAX) tablet 0.5 mg  0.5 mg Oral BID PRN    ARIPiprazole (ABILIFY) tablet 10 mg  10 mg Oral DAILY    atorvastatin (LIPITOR) tablet 40 mg  40 mg Oral DAILY    hydrALAZINE (APRESOLINE) tablet 100 mg  100 mg Oral TID    NIFEdipine ER (PROCARDIA XL) tablet 90 mg  90 mg Oral DAILY    0.9% sodium chloride infusion 250 mL  250 mL IntraVENous PRN    pantoprazole (PROTONIX) 40 mg in 0.9% sodium chloride 10 mL injection  40 mg IntraVENous Q12H    oxyCODONE IR (ROXICODONE) tablet 7.5 mg  7.5 mg Oral Q4H PRN    oxyCODONE IR (ROXICODONE) tablet 5 mg  5 mg Oral Q4H PRN    hydrALAZINE (APRESOLINE) 20 mg/mL injection 10 mg  10 mg IntraVENous Q6H PRN    sodium chloride (NS) flush 5-40 mL  5-40 mL IntraVENous Q8H    sodium chloride (NS) flush 5-40 mL  5-40 mL IntraVENous PRN    acetaminophen (TYLENOL) tablet 650 mg  650 mg Oral Q6H PRN    Or    acetaminophen (TYLENOL) suppository 650 mg  650 mg Rectal Q6H PRN    polyethylene glycol (MIRALAX) packet 17 g  17 g Oral DAILY PRN    ondansetron (ZOFRAN ODT) tablet 4 mg  4 mg Oral Q8H PRN    Or    ondansetron (ZOFRAN) injection 4 mg  4 mg IntraVENous Q6H PRN    glucose chewable tablet 16 g  4 Tablet Oral PRN    dextrose 10 % infusion 0-250 mL  0-250 mL IntraVENous PRN    glucagon (GLUCAGEN) injection 1 mg  1 mg IntraMUSCular PRN    insulin lispro (HUMALOG) injection   SubCUTAneous AC&HS    [Held by provider] aspirin chewable tablet 81 mg  81 mg Oral DAILY      Allergies   Allergen Reactions    Heparin Other (comments)     NO PORK    Tramadol Hives       Objective:  Vitals:    Vitals:    07/12/22 1859 07/12/22 1915 07/12/22 2330 07/13/22 0330   BP: 132/68 (!) 120/54 (!) 121/58 125/60   Pulse: 94 85 95 96   Resp: 16 18 18 18   Temp: 97.4 °F (36.3 °C) 97.9 °F (36.6 °C) 97.9 °F (36.6 °C) 97.9 °F (36.6 °C)   SpO2: 99% 98% 96% 96%   Weight:       Height:         Intake and Output:  07/12 1901 - 07/13 0700  In: 786.7 [P.O.:480]  Out: -   07/11 0701 - 07/12 1900  In: 625 [P.O.:625]  Out: 2825 [Urine:2825]    Physical Examination:    Pt intubated    No    General: NAD,Conversant   Neck:  Supple, no mass  Resp:  Lungs CTA B/L, no wheezing , normal respiratory effort  CV:  RRR,  no murmur or rub, ++LE edema  GI:  Soft, NT, + BS, no HS megaly  Neurologic:  Non focal  Psych:             AAO x 3 appropriate affect   Skin:  No Rash  :  [place]  -       []    High complexity decision making was performed  []    Patient is at high-risk of decompensation with multiple organ involvement    Lab Data Personally Reviewed: I have reviewed all the pertinent labs, microbiology data and radiology studies during assessment. Recent Labs     07/13/22  0127 07/12/22  0411 07/11/22  1031    140 140   K 5.3* 4.7 5.6*   * 115* 114*   CO2 18* 18* 19*   * 125* 133*   BUN 57* 58* 57*   CREA 5.52* 5.33* 5.37*   CA 7.0* 6.5* 6.9*   MG 2.0 1.5*  --    ALB  --   --  2.6*   ALT  --   --  23     Recent Labs     07/13/22  0127 07/12/22  0411 07/11/22  1031   WBC 8.2 6.6 9.4   HGB 7.9* 6.3* 7.6*   HCT 24.9* 20.0* 23.7*    360 471*     No results found for: SDES  No results found for: CULT  Recent Results (from the past 24 hour(s))   GLUCOSE, POC    Collection Time: 07/12/22  7:59 AM   Result Value Ref Range    Glucose (POC) 150 (H) 65 - 117 mg/dL    Performed by Zebedee Krabbe PCT    RBC, ALLOCATE    Collection Time: 07/12/22  9:15 AM   Result Value Ref Range    HISTORY CHECKED?  Historical check performed    GLUCOSE, POC    Collection Time: 07/12/22 11:48 AM   Result Value Ref Range    Glucose (POC) 138 (H) 65 - 117 mg/dL    Performed by Zebedee Krabbe PCT    ECHO ADULT COMPLETE    Collection Time: 07/12/22  3:55 PM   Result Value Ref Range    IVSd 2.0 (A) 0.6 - 1.0 cm    LVIDd 4.7 4.2 - 5.9 cm    LVIDs 3.1 cm    LVOT Diameter 2.0 cm    LVPWd 2.0 (A) 0.6 - 1.0 cm    LVOT Peak Gradient 6 mmHg    LVOT Peak Gradient 6 mmHg    LVOT Mean Gradient 3 mmHg    LVOT SV 62.5 ml    LVOT Peak Velocity 1.3 m/s    LVOT Peak Velocity 1.3 m/s    LVOT VTI 19.9 cm    RVIDd 4.4 cm    RV Free Wall Peak S' 16 cm/s    RVOT Peak Gradient 3 mmHg    RVOT Peak Velocity 0.8 m/s    LA Diameter 5.2 cm    LA Volume A/L 127 mL    LA Volume 2C 120 (A) 18 - 58 mL    LA Volume 4C 102 (A) 18 - 58 mL    AV Cusp Mmode 2.3 cm    AV Area by Peak Velocity 3.0 cm2    AV Area by Peak Velocity 3.1 cm2    AV Area by Peak Velocity 3.2 cm2    AV Area by Peak Velocity 3.1 cm2    AV Area by VTI 2.6 cm2    AV Peak Gradient 6 mmHg    AV Peak Gradient 6 mmHg    AV Mean Gradient 3 mmHg    AV Peak Velocity 1.3 m/s    AV Peak Velocity 1.2 m/s AV Mean Velocity 0.9 m/s    AV VTI 23.1 cm    MV E Wave Deceleration Time 175.5 ms    MV E Velocity 1.14 m/s    MV PHT 52.9 ms    MV Area by PHT 4.2 cm2    MR Peak Velocity 5.1 m/s    MR Peak Velocity 5.1 m/s    MR .0 cm    PV Peak Gradient 6 mmHg    PV Max Velocity 1.3 m/s    TR Peak Gradient 23 mmHg    TR Max Velocity 2.38 m/s    Aortic Root 3.1 cm    Fractional Shortening 2D 34 28 - 44 %    LVIDd Index 2.30 cm/m2    LVIDs Index 1.52 cm/m2    LV RWT Ratio 0.85     LV Mass 2D 462.1 (A) 88 - 224 g    LV Mass 2D Index 226.5 (A) 49 - 115 g/m2    LA Volume Index A/L 62 16 - 34 mL/m2    LVOT Stroke Volume Index 30.6 mL/m2    LVOT Area 3.1 cm2    LA Volume Index 2C 59 (A) 16 - 34 mL/m2    LA Volume Index 4C 50 (A) 16 - 34 mL/m2    LA Size Index 2.55 cm/m2    LA/AO Root Ratio 1.68     Ao Root Index 1.52 cm/m2    LVOT:AV VTI Index 0.86     LUIS/BSA VTI 1.3 cm2/m2   GLUCOSE, POC    Collection Time: 07/12/22  5:08 PM   Result Value Ref Range    Glucose (POC) 123 (H) 65 - 117 mg/dL    Performed by Gopal SOTO    GLUCOSE, POC    Collection Time: 07/12/22  9:46 PM   Result Value Ref Range    Glucose (POC) 192 (H) 65 - 117 mg/dL    Performed by Kaz Booker RN    CBC W/O DIFF    Collection Time: 07/13/22  1:27 AM   Result Value Ref Range    WBC 8.2 4.1 - 11.1 K/uL    RBC 2.96 (L) 4.10 - 5.70 M/uL    HGB 7.9 (L) 12.1 - 17.0 g/dL    HCT 24.9 (L) 36.6 - 50.3 %    MCV 84.1 80.0 - 99.0 FL    MCH 26.7 26.0 - 34.0 PG    MCHC 31.7 30.0 - 36.5 g/dL    RDW 14.5 11.5 - 14.5 %    PLATELET 196 796 - 323 K/uL    MPV 8.3 (L) 8.9 - 12.9 FL    NRBC 0.0 0  WBC    ABSOLUTE NRBC 0.00 0.00 - 0.89 K/uL   METABOLIC PANEL, BASIC    Collection Time: 07/13/22  1:27 AM   Result Value Ref Range    Sodium 137 136 - 145 mmol/L    Potassium 5.3 (H) 3.5 - 5.1 mmol/L    Chloride 112 (H) 97 - 108 mmol/L    CO2 18 (L) 21 - 32 mmol/L    Anion gap 7 5 - 15 mmol/L    Glucose 163 (H) 65 - 100 mg/dL    BUN 57 (H) 6 - 20 MG/DL    Creatinine 5.52 (H) 0.70 - 1.30 MG/DL    BUN/Creatinine ratio 10 (L) 12 - 20      GFR est AA 13 (L) >60 ml/min/1.73m2    GFR est non-AA 11 (L) >60 ml/min/1.73m2    Calcium 7.0 (L) 8.5 - 10.1 MG/DL   MAGNESIUM    Collection Time: 07/13/22  1:27 AM   Result Value Ref Range    Magnesium 2.0 1.6 - 2.4 mg/dL           I have reviewed the flowsheets. Chart and Pertinent Notes have been reviewed. No change in PMH ,family and social history from Consult note.       Dasha Barnett MD  [facility]

## 2022-07-13 NOTE — PROGRESS NOTES
No further CM needs identified. CM notified pt's nurse of d/c. Transition of Care Plan:    RUR: 11% - \"low risk\"  Disposition: Home with f/u apts - pt returning to [address] [place] [address]   Follow up appointments: PCP & specialist as indicated   DME needed: No DME needs identified for d/c  Transportation at Discharge: Pt's mother will provide transportation at d/c  [place] or means to access home: Pt has access to his home      IM Medicare Letter: N/A - \"Self-pay\"  Is patient a BCPI-A Bundle: N/A       Is patient a  and connected with the [facility]? N/A               Caregiver Contact: Pt's mother Man Antonio: [phone])  Discharge Caregiver contacted prior to discharge? Per request  Care Conference needed?: N/A                  Initial note: Chart reviewed. CM spoke with attending MD who reported pt is anticipated to d/c today. Pt will d/c home with f/u apts; no post-acute therapy needs identified for d/c. CM met with pt at bedside, introduced role as d/c planner, and confirmed demographic information is up-to-date in the chart. CM inquired if pt had access to insurance coverage; pt confirmed reporting he has [facility] coverage. CM inquired if pt had copy of care and/or policy number for reference; pt declined. CM inquired if pt had access to PCP; pt confirmed. CM inquired about name of provider/office location; pt declined to provide but reported provider is associated with a clinic in [facility]. [address] informed pf of need for PCP f/u within 3-5 days of d/c; pt reported he'll make his own apt once he returns to [place] C. Pt reported his mother Man Antonio) will provide transportation at d/c; pt to contact his mother now to request ride within the hr. Pt agreeable to the d/c plan; pt not interested in CM's assistance securing/identifying post-acute apts/resources. No additional questions/concerns identified. CM will remain accessible for consult if additional needs arise prior to d/c.  Full assessment detailed below:     Reason for Admission: Acute exacerbation of CHF, PONCHO, Hypertensive urgency                    RUR Score: 11% - \"low risk\"                   Plan for utilizing home health: No PT/OT/SLP consults; no post-acute therapy needs identified for d/c. Pt reported being independent with ADL's at baseline. Pt identified DME use in the form of a cane, walker, and wheel chair. Pt reported [facility] intervention; name of servicing agency unknown. Pt declined prior hx of utilizing SNF/IPR interventions         PCP: First and Last name:  PCP's name unknown - located in [place]   Name of Practice: Pt reported PCP is associated with a clinic - name of provider/clinic unknown    Are you a current patient: Yes/No: Yes   Approximate date of last visit: Pt declined to discuss   Can you participate in a virtual visit with your PCP: In-person                    Current Advanced Directive/Advance Care Plan: Full Code    Advance Care Planning     General Advance Care Planning (ACP) Conversation    Date of Conversation: 7/11/2022  Conducted with: Patient with decision making capacity     Content/Action Overview:   DECLINED ACP conversation - will revisit periodically   Reviewed DNR/DNI and patient elects Full Code (Attempt Resuscitation)     Length of Voluntary ACP Conversation in minutes:  <16 minutes (Non-Billable)    Care Management Interventions  PCP Verified by CM: No (Pt's PCP is central to [address] [place] [address] - pt's PCP is associated with a clinic (provider name unknown))  Palliative Care Criteria Met (RRAT>21 & CHF Dx)?: Yes (CHF dx)  Mode of Transport at Discharge:  Other (see comment) (Pt's mother will provide transportation at d/c)  Transition of Care Consult (CM Consult): Discharge Planning  Discharge Durable Medical Equipment: No  Physical Therapy Consult: No  Occupational Therapy Consult: No  Speech Therapy Consult: No  Support Systems: Parent(s) (Pt lives alone in a third story apt with an elevator )  Confirm Follow Up Transport: [address] Provided?: No  Discharge Location  Patient Expects to be Discharged to[de-identified] Home (f/u apts)    Gail Spivey, [address], [address] [facility]

## 2022-07-13 NOTE — DISCHARGE SUMMARY
Hospitalist Discharge Summary     Patient ID:  Yohan Brown  [id number]  49 y.o.  1975    PCP on record: None    Admit date: 7/11/2022  Discharge date and time: 7/13/2022      DISCHARGE DIAGNOSIS:    Acute decompensated heart failure  DM 2  Chest pain  ESRD   Anemia      CONSULTATIONS:  IP CONSULT TO HOSPITALIST  IP CONSULT TO NEPHROLOGY  IP CONSULT TO GASTROENTEROLOGY  IP CONSULT TO CARDIOLOGY    Excerpted HPI from H&P of Cristal Landry MD:    Past medical history of hypertension, diabetes, history of toe amputations presents to [facility] with complaints of shortness of breath as well as bilateral lower extremity swelling. Patient states over the past few days has been having gradual onset persistent progressive shortness of breath associated with orthopnea as well as paroxysmal nocturnal dyspnea, this associated with increased bilateral lower extremity swelling following which patient presented to the ED. Patient does endorse sudden onset intermittent chest pressure, moderate intensity, nonradiating with no aggravating or alleviating factors. Of note patient does not remember his medications at this time. Patient denies any fevers chills nausea vomiting lightheadedness dizziness palpitations headache focal weakness loss sensation auditory or visual symptoms abdominal stool or urine complaints or any other associated symptoms. Patient endorses no recent sick contacts or travel activity  ______________________________________________________________________  DISCHARGE SUMMARY/HOSPITAL COURSE:  for full details see H&P, daily progress notes, labs, consult notes.      Acute decompensated heart failure with unknown ejection fraction-patient presents with above med symptomatology found to have acute decompensated heart failure with unknown ejection fraction, remains hemodynamically stable  Daily weights  Frequent intake and output monitoring  Lasix transitioned to bumex  ECHO done (see below)     Acute kidney injury-of note patient found to have an elevated serum creatinine, differentials include prerenal versus renal responsible etiologies, ultrasound retroperitoneal negative for any acute abnormalities, serum creatinine remained stable, patient diuresing well, lower extremity edema improving  Lasix transitioned to oral bumex. Will continue on DC  Continue to trend serum creatinine  Nephrology consult appreciated, no emergent need for dialysis at this time,    Hypertensive urgency- resolved       Type 2 diabetes- hold off on home Lantus and home insulin regimen as patient's blood glucoses remain normal  Insulin sliding scale  Resume home medications on DC     Hypomagnesemia-Resolved     Chest pain-of note patient complaining of chest pain, EKG consistent with nonspecific changes, however ACS needs to be ruled out  Continue aspirin once daily  Troponins remain normal  TTE showed Left Ventricle: Normal left ventricular systolic function with a visually estimated EF of 60 - 65%. Left ventricle size is normal. Severely increased wall thickness. Normal wall motion. Abnormal diastolic function.   Left Atrium: Left atrium is moderately dilated. Cardiology recs appreciated. Outpatient follow up. Anemia  Given 1 unit PRBC  Iron deficient on labs  Will give ferrous sulfate 325 mg PO for home    Bilateral foot wounds  Wound care appreciated  C/w recs  Outpatient follow up with podiatry    Patient stable for discharge    _______________________________________________________________________  Patient seen and examined by me on discharge day. Pertinent Findings:  General:          Patient appears comfortable    EENT:              EOMI. Anicteric sclerae.  MMM  Resp:               Decreased air entry bilaterally with appreciable bilateral bibasilar crackles  CV:                  Regular  rhythm, s1/s2 no m/r/g 2+ edema  GI:                   Soft, Non distended, Non tender.  +Bowel sounds  Neurologic:       Alert and oriented X 3, normal speech,   Psych:   Good insight. Not anxious nor agitated  Skin:                No rashes. No jaundice  MSK:               B/l TMA  _______________________________________________________________________  DISCHARGE MEDICATIONS:   Current Discharge Medication List      START taking these medications    Details   bumetanide (BUMEX) 2 mg tablet Take 1 Tablet by mouth daily. Qty: 60 Tablet, Refills: 0  Start date: 7/13/2022      ferrous sulfate 325 mg (65 mg iron) tablet Take 1 Tablet by mouth Daily (before breakfast). Qty: 30 Tablet, Refills: 0  Start date: 7/13/2022         CONTINUE these medications which have NOT CHANGED    Details   ALPRAZolam (Xanax) 0.5 mg tablet Take 1 Tablet by mouth two (2) times daily as needed. ARIPiprazole (ABILIFY) 10 mg tablet Take 1 Tablet by mouth daily. aspirin delayed-release (Adult Low Dose Aspirin) 81 mg tablet Take 1 Tablet by mouth daily. atorvastatin (Lipitor) 40 mg tablet Take 1 Tablet by mouth daily. fludrocortisone (FLORINEF) 0.1 mg tablet Take 1 Tablet by mouth daily. hydrALAZINE (APRESOLINE) 100 mg tablet Take 1 Tablet by mouth three (3) times daily. insulin glargine-yfgn 100 unit/mL soln 15 Units by SubCUTAneous route nightly. insulin lispro-aabc 100 unit/mL soln 5 Units by SubCUTAneous route three (3) times daily. NIFEdipine ER (Procardia XL) 90 mg ER tablet Take 90 mg by mouth daily. SITagliptin-metFORMIN (Janumet)  mg per tablet Take 1 Tablet by mouth daily. STOP taking these medications       chlorthalidone (HYGROTON) 25 mg tablet Comments:   Reason for Stopping:               My Recommended Diet, Activity, Wound Care, and follow-up labs are listed in the patient's Discharge Insturctions which I have personally completed and reviewed.   Risk of deterioration: High    Condition at Discharge: Stable  _____________________________________________________________________    Disposition  Home with family, no needs  ____________________________________________________________________    Care Plan discussed with:   Patient, Family, RN, Care Manager, Consultant    ____________________________________________________________________    Code Status: Full Code  ____________________________________________________________________      Condition at Discharge:  Stable  _____________________________________________________________________  Follow up with:   PCP : None  Follow-up Information     Follow up With Specialties Details Why Contact Info    PCP        Cardiology        Nephrology        None    None (395) Patient stated that they have no PCP                Total time in minutes spent coordinating this discharge (includes going over instructions, follow-up, prescriptions, and preparing report for sign off to her PCP) :  35 minutes    Signed:  Yolanda Selby MD

## 2022-07-13 NOTE — PROGRESS NOTES
End of Shift Note    Bedside shift change report given to Kayla Bonilla 1313 (oncoming nurse) by Wolfgang Michelle, RN (offgoing nurse). Report included the following information SBAR, Kardex, ED Summary, Intake/Output, MAR, Recent Results and Med Rec Status    Shift worked:  7p-7a   Shift summary and any significant changes:    none   Concerns for physician to address: none   Zone phone for oncoming shift:  2220       Patient Information  Judd Veal  49 y.o.  7/11/2022  3:56 PM by Yousuf Farfan MD. Judd Vela was admitted from Home    Problem List  Patient Active Problem List    Diagnosis Date Noted    SOB (shortness of breath) 07/12/2022    Acute exacerbation of CHF (congestive heart failure) ([facility] 07/11/2022    PONCHO (acute kidney injury) ([facility] 07/11/2022    Hypertensive urgency 07/11/2022     No past medical history on file. Core Measures:  CVA: No No  CHF:Yes Yes  PNA:No No    Activity:  Activity Level: Up ad leonardo  Number times ambulated in hallways past shift: 0  Number of times OOB to chair past shift: 0    Cardiac:   Cardiac Monitoring: Yes      Cardiac Rhythm: Sinus Rhythm    Access:   Current line(s): PIV       Genitourinary:   Urinary status: voiding    Respiratory:   O2 Device: None (Room air)  Chronic home O2 use?: NO  Incentive spirometer at bedside: N/A       GI:  Last Bowel Movement Date: 07/11/22  Current diet:  DIET NPO Sips of Water with Meds  Passing flatus: YES  Tolerating current diet: YES       Pain Management:   Patient states pain is manageable on current regimen: NO    Skin:  Dao Score: 21  Interventions: increase time out of bed    Patient Safety:  Fall Score:  Total Score: 1  Interventions: bed/chair alarm and gripper socks     @Rollbelt  @dexterity to release roll belt  Yes/No ( must document dexterity  here by stating Yes or No here, otherwise this is a restraint and must follow restraint documentation policy.)    DVT prophylaxis:  DVT prophylaxis Med- No  DVT prophylaxis SCD or MORAIMA- Yes     Wounds: (If Applicable)  Wounds- yes    Location bilat feet      Active Consults:  IP CONSULT TO HOSPITALIST  IP CONSULT TO NEPHROLOGY  IP CONSULT TO GASTROENTEROLOGY  IP CONSULT TO CARDIOLOGY    Length of Stay:  Expected LOS: 3d 0h  Actual LOS: 2  Discharge Plan: Yes home with family       Jeronimo Pop RN

## 2022-08-18 ENCOUNTER — HOSPITAL ENCOUNTER (INPATIENT)
Age: 47
LOS: 11 days | Discharge: LEFT BEFORE DISCHARGE | DRG: 291 | End: 2022-08-29
Attending: EMERGENCY MEDICINE | Admitting: INTERNAL MEDICINE
Payer: MEDICAID

## 2022-08-18 ENCOUNTER — APPOINTMENT (OUTPATIENT)
Dept: GENERAL RADIOLOGY | Age: 47
DRG: 291 | End: 2022-08-18
Attending: EMERGENCY MEDICINE
Payer: MEDICAID

## 2022-08-18 DIAGNOSIS — E87.5 ACUTE HYPERKALEMIA: ICD-10-CM

## 2022-08-18 DIAGNOSIS — E87.70 HYPERVOLEMIA, UNSPECIFIED HYPERVOLEMIA TYPE: ICD-10-CM

## 2022-08-18 DIAGNOSIS — N18.9 ACUTE RENAL FAILURE SUPERIMPOSED ON CHRONIC KIDNEY DISEASE, UNSPECIFIED CKD STAGE, UNSPECIFIED ACUTE RENAL FAILURE TYPE (HCC): Primary | ICD-10-CM

## 2022-08-18 DIAGNOSIS — N17.9 ACUTE RENAL FAILURE SUPERIMPOSED ON CHRONIC KIDNEY DISEASE, UNSPECIFIED CKD STAGE, UNSPECIFIED ACUTE RENAL FAILURE TYPE (HCC): Primary | ICD-10-CM

## 2022-08-18 LAB
ALBUMIN SERPL-MCNC: 2.4 G/DL (ref 3.5–5)
ALBUMIN/GLOB SERPL: 0.6 {RATIO} (ref 1.1–2.2)
ALP SERPL-CCNC: 231 U/L (ref 45–117)
ALT SERPL-CCNC: 23 U/L (ref 12–78)
ANION GAP SERPL CALC-SCNC: 9 MMOL/L (ref 5–15)
AST SERPL-CCNC: 28 U/L (ref 15–37)
ATRIAL RATE: 100 BPM
BASOPHILS # BLD: 0 K/UL (ref 0–0.1)
BASOPHILS NFR BLD: 0 % (ref 0–1)
BILIRUB SERPL-MCNC: 0.5 MG/DL (ref 0.2–1)
BNP SERPL-MCNC: ABNORMAL PG/ML
BUN SERPL-MCNC: 67 MG/DL (ref 6–20)
BUN/CREAT SERPL: 10 (ref 12–20)
CALCIUM SERPL-MCNC: 6.5 MG/DL (ref 8.5–10.1)
CALCULATED P AXIS, ECG09: 55 DEGREES
CALCULATED R AXIS, ECG10: -27 DEGREES
CALCULATED T AXIS, ECG11: 98 DEGREES
CHLORIDE SERPL-SCNC: 113 MMOL/L (ref 97–108)
CO2 SERPL-SCNC: 18 MMOL/L (ref 21–32)
CREAT SERPL-MCNC: 6.51 MG/DL (ref 0.7–1.3)
DIAGNOSIS, 93000: NORMAL
DIFFERENTIAL METHOD BLD: ABNORMAL
EOSINOPHIL # BLD: 0.2 K/UL (ref 0–0.4)
EOSINOPHIL NFR BLD: 2 % (ref 0–7)
ERYTHROCYTE [DISTWIDTH] IN BLOOD BY AUTOMATED COUNT: 16 % (ref 11.5–14.5)
GLOBULIN SER CALC-MCNC: 4 G/DL (ref 2–4)
GLUCOSE BLD STRIP.AUTO-MCNC: 157 MG/DL (ref 65–117)
GLUCOSE BLD STRIP.AUTO-MCNC: 192 MG/DL (ref 65–117)
GLUCOSE SERPL-MCNC: 198 MG/DL (ref 65–100)
HCT VFR BLD AUTO: 23.7 % (ref 36.6–50.3)
HGB BLD-MCNC: 7.5 G/DL (ref 12.1–17)
IMM GRANULOCYTES # BLD AUTO: 0 K/UL (ref 0–0.04)
IMM GRANULOCYTES NFR BLD AUTO: 0 % (ref 0–0.5)
LYMPHOCYTES # BLD: 1.4 K/UL (ref 0.8–3.5)
LYMPHOCYTES NFR BLD: 17 % (ref 12–49)
MAGNESIUM SERPL-MCNC: 1.7 MG/DL (ref 1.6–2.4)
MCH RBC QN AUTO: 27.4 PG (ref 26–34)
MCHC RBC AUTO-ENTMCNC: 31.6 G/DL (ref 30–36.5)
MCV RBC AUTO: 86.5 FL (ref 80–99)
MONOCYTES # BLD: 0.7 K/UL (ref 0–1)
MONOCYTES NFR BLD: 9 % (ref 5–13)
NEUTS SEG # BLD: 5.7 K/UL (ref 1.8–8)
NEUTS SEG NFR BLD: 72 % (ref 32–75)
NRBC # BLD: 0 K/UL (ref 0–0.01)
NRBC BLD-RTO: 0 PER 100 WBC
P-R INTERVAL, ECG05: 174 MS
PLATELET # BLD AUTO: 298 K/UL (ref 150–400)
PMV BLD AUTO: 9.6 FL (ref 8.9–12.9)
POTASSIUM SERPL-SCNC: 5.6 MMOL/L (ref 3.5–5.1)
PROT SERPL-MCNC: 6.4 G/DL (ref 6.4–8.2)
Q-T INTERVAL, ECG07: 384 MS
QRS DURATION, ECG06: 82 MS
QTC CALCULATION (BEZET), ECG08: 495 MS
RBC # BLD AUTO: 2.74 M/UL (ref 4.1–5.7)
SERVICE CMNT-IMP: ABNORMAL
SERVICE CMNT-IMP: ABNORMAL
SODIUM SERPL-SCNC: 140 MMOL/L (ref 136–145)
TROPONIN-HIGH SENSITIVITY: 37 NG/L (ref 0–76)
VENTRICULAR RATE, ECG03: 100 BPM
WBC # BLD AUTO: 7.9 K/UL (ref 4.1–11.1)

## 2022-08-18 PROCEDURE — 83880 ASSAY OF NATRIURETIC PEPTIDE: CPT

## 2022-08-18 PROCEDURE — 74011250636 HC RX REV CODE- 250/636: Performed by: EMERGENCY MEDICINE

## 2022-08-18 PROCEDURE — 82962 GLUCOSE BLOOD TEST: CPT

## 2022-08-18 PROCEDURE — 99285 EMERGENCY DEPT VISIT HI MDM: CPT

## 2022-08-18 PROCEDURE — 74011250637 HC RX REV CODE- 250/637: Performed by: EMERGENCY MEDICINE

## 2022-08-18 PROCEDURE — 74011250636 HC RX REV CODE- 250/636: Performed by: INTERNAL MEDICINE

## 2022-08-18 PROCEDURE — 74011250637 HC RX REV CODE- 250/637: Performed by: INTERNAL MEDICINE

## 2022-08-18 PROCEDURE — 85025 COMPLETE CBC W/AUTO DIFF WBC: CPT

## 2022-08-18 PROCEDURE — 65270000029 HC RM PRIVATE

## 2022-08-18 PROCEDURE — 71046 X-RAY EXAM CHEST 2 VIEWS: CPT

## 2022-08-18 PROCEDURE — 84484 ASSAY OF TROPONIN QUANT: CPT

## 2022-08-18 PROCEDURE — 96375 TX/PRO/DX INJ NEW DRUG ADDON: CPT

## 2022-08-18 PROCEDURE — 36415 COLL VENOUS BLD VENIPUNCTURE: CPT

## 2022-08-18 PROCEDURE — 93005 ELECTROCARDIOGRAM TRACING: CPT

## 2022-08-18 PROCEDURE — 80053 COMPREHEN METABOLIC PANEL: CPT

## 2022-08-18 PROCEDURE — 74011250637 HC RX REV CODE- 250/637: Performed by: NURSE PRACTITIONER

## 2022-08-18 PROCEDURE — 83735 ASSAY OF MAGNESIUM: CPT

## 2022-08-18 PROCEDURE — 96374 THER/PROPH/DIAG INJ IV PUSH: CPT

## 2022-08-18 PROCEDURE — 74011000250 HC RX REV CODE- 250: Performed by: INTERNAL MEDICINE

## 2022-08-18 RX ORDER — ASPIRIN 81 MG/1
81 TABLET ORAL DAILY
Status: DISCONTINUED | OUTPATIENT
Start: 2022-08-19 | End: 2022-08-29 | Stop reason: HOSPADM

## 2022-08-18 RX ORDER — LANOLIN ALCOHOL/MO/W.PET/CERES
325 CREAM (GRAM) TOPICAL
Status: DISCONTINUED | OUTPATIENT
Start: 2022-08-19 | End: 2022-08-22

## 2022-08-18 RX ORDER — POLYETHYLENE GLYCOL 3350 17 G/17G
17 POWDER, FOR SOLUTION ORAL DAILY PRN
Status: DISCONTINUED | OUTPATIENT
Start: 2022-08-18 | End: 2022-08-29 | Stop reason: HOSPADM

## 2022-08-18 RX ORDER — INSULIN GLARGINE 100 [IU]/ML
15 INJECTION, SOLUTION SUBCUTANEOUS
Status: DISCONTINUED | OUTPATIENT
Start: 2022-08-18 | End: 2022-08-18

## 2022-08-18 RX ORDER — NIFEDIPINE 90 MG/1
90 TABLET, EXTENDED RELEASE ORAL DAILY
Status: DISCONTINUED | OUTPATIENT
Start: 2022-08-18 | End: 2022-08-29 | Stop reason: HOSPADM

## 2022-08-18 RX ORDER — HYDRALAZINE HYDROCHLORIDE 20 MG/ML
20 INJECTION INTRAMUSCULAR; INTRAVENOUS ONCE
Status: COMPLETED | OUTPATIENT
Start: 2022-08-18 | End: 2022-08-18

## 2022-08-18 RX ORDER — OXYCODONE HYDROCHLORIDE 5 MG/1
5 TABLET ORAL
Status: COMPLETED | OUTPATIENT
Start: 2022-08-18 | End: 2022-08-18

## 2022-08-18 RX ORDER — LABETALOL HYDROCHLORIDE 5 MG/ML
20 INJECTION, SOLUTION INTRAVENOUS
Status: DISCONTINUED | OUTPATIENT
Start: 2022-08-18 | End: 2022-08-29 | Stop reason: HOSPADM

## 2022-08-18 RX ORDER — BUMETANIDE 0.25 MG/ML
4 INJECTION INTRAMUSCULAR; INTRAVENOUS 2 TIMES DAILY
Status: DISCONTINUED | OUTPATIENT
Start: 2022-08-18 | End: 2022-08-22

## 2022-08-18 RX ORDER — HYDROMORPHONE HYDROCHLORIDE 1 MG/ML
0.5 INJECTION, SOLUTION INTRAMUSCULAR; INTRAVENOUS; SUBCUTANEOUS
Status: COMPLETED | OUTPATIENT
Start: 2022-08-18 | End: 2022-08-18

## 2022-08-18 RX ORDER — SODIUM CHLORIDE 0.9 % (FLUSH) 0.9 %
5-40 SYRINGE (ML) INJECTION AS NEEDED
Status: DISCONTINUED | OUTPATIENT
Start: 2022-08-18 | End: 2022-08-29 | Stop reason: HOSPADM

## 2022-08-18 RX ORDER — ACETAMINOPHEN 325 MG/1
650 TABLET ORAL
Status: DISCONTINUED | OUTPATIENT
Start: 2022-08-18 | End: 2022-08-29 | Stop reason: HOSPADM

## 2022-08-18 RX ORDER — PROMETHAZINE HYDROCHLORIDE 25 MG/1
12.5 TABLET ORAL
Status: DISCONTINUED | OUTPATIENT
Start: 2022-08-18 | End: 2022-08-29 | Stop reason: HOSPADM

## 2022-08-18 RX ORDER — DEXTROSE MONOHYDRATE 100 MG/ML
0-250 INJECTION, SOLUTION INTRAVENOUS AS NEEDED
Status: DISCONTINUED | OUTPATIENT
Start: 2022-08-18 | End: 2022-08-29 | Stop reason: HOSPADM

## 2022-08-18 RX ORDER — OXYCODONE AND ACETAMINOPHEN 10; 325 MG/1; MG/1
1 TABLET ORAL ONCE
Status: COMPLETED | OUTPATIENT
Start: 2022-08-19 | End: 2022-08-18

## 2022-08-18 RX ORDER — ONDANSETRON 2 MG/ML
4 INJECTION INTRAMUSCULAR; INTRAVENOUS
Status: DISCONTINUED | OUTPATIENT
Start: 2022-08-18 | End: 2022-08-29 | Stop reason: HOSPADM

## 2022-08-18 RX ORDER — INSULIN LISPRO 100 [IU]/ML
INJECTION, SOLUTION INTRAVENOUS; SUBCUTANEOUS
Status: DISCONTINUED | OUTPATIENT
Start: 2022-08-18 | End: 2022-08-29 | Stop reason: HOSPADM

## 2022-08-18 RX ORDER — FLUDROCORTISONE ACETATE 0.1 MG/1
0.1 TABLET ORAL DAILY
Status: DISCONTINUED | OUTPATIENT
Start: 2022-08-19 | End: 2022-08-22

## 2022-08-18 RX ORDER — ARIPIPRAZOLE 5 MG/1
10 TABLET ORAL DAILY
Status: DISCONTINUED | OUTPATIENT
Start: 2022-08-19 | End: 2022-08-29 | Stop reason: HOSPADM

## 2022-08-18 RX ORDER — SODIUM CHLORIDE 0.9 % (FLUSH) 0.9 %
5-40 SYRINGE (ML) INJECTION EVERY 8 HOURS
Status: DISCONTINUED | OUTPATIENT
Start: 2022-08-18 | End: 2022-08-29 | Stop reason: HOSPADM

## 2022-08-18 RX ORDER — HYDRALAZINE HYDROCHLORIDE 50 MG/1
100 TABLET, FILM COATED ORAL 3 TIMES DAILY
Status: DISCONTINUED | OUTPATIENT
Start: 2022-08-18 | End: 2022-08-29 | Stop reason: HOSPADM

## 2022-08-18 RX ORDER — ATORVASTATIN CALCIUM 40 MG/1
40 TABLET, FILM COATED ORAL DAILY
Status: DISCONTINUED | OUTPATIENT
Start: 2022-08-19 | End: 2022-08-29 | Stop reason: HOSPADM

## 2022-08-18 RX ORDER — MAGNESIUM SULFATE 100 %
4 CRYSTALS MISCELLANEOUS AS NEEDED
Status: DISCONTINUED | OUTPATIENT
Start: 2022-08-18 | End: 2022-08-29 | Stop reason: HOSPADM

## 2022-08-18 RX ORDER — MORPHINE SULFATE 2 MG/ML
2 INJECTION, SOLUTION INTRAMUSCULAR; INTRAVENOUS
Status: COMPLETED | OUTPATIENT
Start: 2022-08-18 | End: 2022-08-20

## 2022-08-18 RX ORDER — FUROSEMIDE 10 MG/ML
60 INJECTION INTRAMUSCULAR; INTRAVENOUS
Status: COMPLETED | OUTPATIENT
Start: 2022-08-18 | End: 2022-08-18

## 2022-08-18 RX ORDER — ACETAMINOPHEN 650 MG/1
650 SUPPOSITORY RECTAL
Status: DISCONTINUED | OUTPATIENT
Start: 2022-08-18 | End: 2022-08-29 | Stop reason: HOSPADM

## 2022-08-18 RX ORDER — ACETAMINOPHEN 325 MG/1
650 TABLET ORAL
Status: DISCONTINUED | OUTPATIENT
Start: 2022-08-18 | End: 2022-08-18 | Stop reason: SDUPTHER

## 2022-08-18 RX ORDER — SODIUM POLYSTYRENE SULFONATE 15 G/60ML
30 SUSPENSION ORAL; RECTAL
Status: DISCONTINUED | OUTPATIENT
Start: 2022-08-18 | End: 2022-08-18

## 2022-08-18 RX ADMIN — SODIUM CHLORIDE, PRESERVATIVE FREE 10 ML: 5 INJECTION INTRAVENOUS at 22:02

## 2022-08-18 RX ADMIN — BUMETANIDE 4 MG: 0.25 INJECTION INTRAMUSCULAR; INTRAVENOUS at 16:44

## 2022-08-18 RX ADMIN — EPOETIN ALFA-EPBX 20000 UNITS: 20000 INJECTION, SOLUTION INTRAVENOUS; SUBCUTANEOUS at 23:06

## 2022-08-18 RX ADMIN — HYDROMORPHONE HYDROCHLORIDE 0.5 MG: 1 INJECTION, SOLUTION INTRAMUSCULAR; INTRAVENOUS; SUBCUTANEOUS at 14:04

## 2022-08-18 RX ADMIN — SODIUM CHLORIDE, PRESERVATIVE FREE 10 ML: 5 INJECTION INTRAVENOUS at 16:46

## 2022-08-18 RX ADMIN — HYDRALAZINE HYDROCHLORIDE 100 MG: 50 TABLET, FILM COATED ORAL at 16:45

## 2022-08-18 RX ADMIN — NIFEDIPINE 90 MG: 90 TABLET, EXTENDED RELEASE ORAL at 16:45

## 2022-08-18 RX ADMIN — HYDRALAZINE HYDROCHLORIDE 100 MG: 50 TABLET, FILM COATED ORAL at 22:00

## 2022-08-18 RX ADMIN — FUROSEMIDE 60 MG: 10 INJECTION, SOLUTION INTRAMUSCULAR; INTRAVENOUS at 11:58

## 2022-08-18 RX ADMIN — OXYCODONE AND ACETAMINOPHEN 1 TABLET: 10; 325 TABLET ORAL at 23:34

## 2022-08-18 RX ADMIN — HYDRALAZINE HYDROCHLORIDE 20 MG: 20 INJECTION INTRAMUSCULAR; INTRAVENOUS at 11:58

## 2022-08-18 RX ADMIN — OXYCODONE 5 MG: 5 TABLET ORAL at 12:48

## 2022-08-18 NOTE — CONSULTS
[address]  ZST:[id number]   :1975     Pt [id number]  Elvis due to progression of ckd  Sob  Uncontrolled htn  Hyperkalemia    Plan  Iv lasix  Po lokelma  He is refusing kayexalate  No RRT indicated at present  Resume home bp meds   D/w patient        No admission diagnoses are documented for this encounter.      Opal Dang, [facility]  [facility] [address], [place] [address]  [place], [address] [facility]  Phone - [phone]         Fax - [phone] [facility] Office  [address] [address]  Phone - [phone]        Fax - [phone]

## 2022-08-18 NOTE — ED NOTES
Pt states 8/10 phantom pain in amputated toes and legs. Not relieved by previous pain meds. Pt ok with IV medications. Received verba, order from MD Navarro for 0.5mg of dilaudid. reviewed allergies with pt and pt does not have reaction to this medication.

## 2022-08-18 NOTE — H&P
Hospitalist Admission Note    NAME: Maura Banuelos   :  1975   MRN:  [id number]     Date/Time:  2022 2:31 PM    Patient PCP: None  ______________________________________________________________________  Given the patient's current clinical presentation, I have a high level of concern for decompensation if discharged from the emergency department. Complex decision making was performed, which includes reviewing the patient's available past medical records, laboratory results, and x-ray films. My assessment of this patient's clinical condition and my plan of care is as follows. Assessment / Plan:    PONCHO on CKD stage IV, POA  Hyperkalemia, POA  Uncontrolled hypertension, POA  - Presented with shortness of breath initially  - Potassium 5.6 on admission with creatinine 6.5 from 4 last month  - Evaluated by nephrology, plans for IV diuresis for now  - Patient refused Kayexalate  - Resume home blood pressure medications  - Follow BMP    Acute on chronic diastolic heart failure, POA  - Presented with shortness of breath  - Edema lower extremities  - Chest x-ray with cardiomegaly, vascular congestions, small to moderate left pleural effusion  - IV diuresis and Bumex  - Daily weights  - PATRICA's  - Echo last month with normal EF and abnormal diastolic dysfunction    Chronic normocytic anemia, POA  - Anemia of CKD  - Hemoglobin stable  - Follow CBC  - Chronically on iron, continue    Code Status: Full code  Surrogate Decision Maker: Patient is not sure who to name and he said he will tell us later    DVT Prophylaxis: Allergic to heparin products  GI Prophylaxis: not indicated    Baseline:  Active, independent      Subjective:   CHIEF COMPLAINT: Shortness of breath, leg swellings    HISTORY OF PRESENT ILLNESS:       The patient is 49years old man with past medical history significant for diastolic heart failure, CKD stage IV, chronic anemia presented emergency department due to shortness of breath and leg swelling started few days ago. Patient reports that he has been getting short of breath every time he moves around he has been noticing swelling in his legs. He denied any chest pain, dizziness, abdominal pain, nausea, vomiting, diarrhea. In the emergency department, his creatinine was found to be 6.5 and his potassium was found to be around 5.5. Nephrology was called and recommended IV diuresis for now. We were asked to admit for work up and evaluation of the above problems. No past medical history on file. No past surgical history on file. Social History     Tobacco Use    Smoking status: Not on file    Smokeless tobacco: Not on file   Substance Use Topics    Alcohol use: Not on file        No family history on file. Allergies   Allergen Reactions    Heparin Other (comments)     NO PORK    Tramadol Hives        Prior to Admission medications    Medication Sig Start Date End Date Taking? Authorizing Provider   bumetanide (BUMEX) 2 mg tablet Take 1 Tablet by mouth daily. 7/13/22   Flores Barron MD   ferrous sulfate 325 mg (65 mg iron) tablet Take 1 Tablet by mouth Daily (before breakfast). 7/13/22   Flores Barron MD   ALPRAZolam (Xanax) 0.5 mg tablet Take 1 Tablet by mouth two (2) times daily as needed. 2/21/22   Provider, Historical   ARIPiprazole (ABILIFY) 10 mg tablet Take 1 Tablet by mouth daily. 2/21/22   Provider, Historical   aspirin delayed-release (Adult Low Dose Aspirin) 81 mg tablet Take 1 Tablet by mouth daily. 2/21/22   Provider, Historical   atorvastatin (Lipitor) 40 mg tablet Take 1 Tablet by mouth daily. 2/21/22   Provider, Historical   fludrocortisone (FLORINEF) 0.1 mg tablet Take 1 Tablet by mouth daily. 2/21/22   Provider, Historical   hydrALAZINE (APRESOLINE) 100 mg tablet Take 1 Tablet by mouth three (3) times daily. 2/21/22   Provider, Historical   insulin glargine-yfgn 100 unit/mL soln 15 Units by SubCUTAneous route nightly.  2/21/22   Provider, Historical   insulin lispro-aabc 100 unit/mL soln 5 Units by SubCUTAneous route three (3) times daily. 2/21/22   Provider, Historical   SITagliptin-metFORMIN (Janumet)  mg per tablet Take 1 Tablet by mouth daily. Patient not taking: Reported on 7/12/2022 2/21/22   Provider, Historical   NIFEdipine ER (Procardia XL) 90 mg ER tablet Take 90 mg by mouth daily. 10/7/21   Provider, Historical       REVIEW OF SYSTEMS:     I am not able to complete the review of systems because:    The patient is intubated and sedated    The patient has altered mental status due to his acute medical problems    The patient has baseline aphasia from prior stroke(s)    The patient has baseline dementia and is not reliable historian    The patient is in acute medical distress and unable to provide information           Total of 12 systems reviewed as follows:       POSITIVE= underlined text  Negative = text not underlined  General:  fever, chills, sweats, generalized weakness, weight loss/gain,      loss of appetite   Eyes:    blurred vision, eye pain, loss of vision, double vision  ENT:    rhinorrhea, pharyngitis   Respiratory:   cough, sputum production, SOB, DIETZ, wheezing, pleuritic pain   Cardiology:   chest pain, palpitations, orthopnea, PND, edema, syncope   Gastrointestinal:  abdominal pain , N/V, diarrhea, dysphagia, constipation, bleeding   Genitourinary:  frequency, urgency, dysuria, hematuria, incontinence   Muskuloskeletal :  arthralgia, myalgia, back pain  Hematology:  easy bruising, nose or gum bleeding, lymphadenopathy   Dermatological: rash, ulceration, pruritis, color change / jaundice  Endocrine:   hot flashes or polydipsia   Neurological:  headache, dizziness, confusion, focal weakness, paresthesia,     Speech difficulties, memory loss, gait difficulty  Psychological: Feelings of anxiety, depression, agitation    Objective:   VITALS:    Visit Vitals  BP (!) 161/76   Pulse 100   Temp 97.8 °F (36.6 °C)   Resp 19   Ht 6' (1.829 m)   Wt 88.9 kg (195 lb 15.8 oz)   SpO2 94%   BMI 26.58 kg/m²       PHYSICAL EXAM:    General:    Alert, cooperative, no distress, appears stated age. HEENT: Atraumatic, anicteric sclerae, pink conjunctivae     No oral ulcers, mucosa moist, throat clear, dentition fair  Neck:  Supple, symmetrical,  thyroid: non tender  Lungs:   Clear to auscultation bilaterally. No Wheezing or Rhonchi. No rales. Chest wall:  No tenderness  No Accessory muscle use. Heart:   Regular  rhythm,  No  murmur   +2 pitting edema on lower extremities  Abdomen:   Soft, non-tender. Not distended. Bowel sounds normal  Extremities: No cyanosis. No clubbing, toes are amputated on both feet    Skin turgor normal, Capillary refill normal, Radial dial pulse 2+  Skin:     Not pale. Not Jaundiced  No rashes   Psych:  Good insight. Not depressed. Not anxious or agitated. Neurologic: EOMs intact. No facial asymmetry. No aphasia or slurred speech. Symmetrical strength, Sensation grossly intact.  Alert and oriented X 4.     _______________________________________________________________________  Care Plan discussed with:    Comments   Patient x    Family      RN x    Care Manager                    Consultant:      _______________________________________________________________________  Expected  Disposition:   Home with Family x   HH/PT/OT/RN    SNF/LTC    [facility]    ________________________________________________________________________  TOTAL TIME:  66 Minutes    Critical Care Provided     Minutes non procedure based      Comments    x Reviewed previous records   >50% of visit spent in counseling and coordination of care x Discussion with patient and/or family and questions answered       ________________________________________________________________________  Signed: Tomas Iverson MD    Procedures: see electronic medical records for all procedures/Xrays and details which were not copied into this note but were reviewed prior to creation of Plan. LAB DATA REVIEWED:    Recent Results (from the past 24 hour(s))   EKG, 12 LEAD, INITIAL    Collection Time: 08/18/22 10:04 AM   Result Value Ref Range    Ventricular Rate 100 BPM    Atrial Rate 100 BPM    P-R Interval 174 ms    QRS Duration 82 ms    Q-T Interval 384 ms    QTC Calculation (Bezet) 495 ms    Calculated P Axis 55 degrees    Calculated R Axis -27 degrees    Calculated T Axis 98 degrees    Diagnosis       Normal sinus rhythm  Possible Left atrial enlargement  Nonspecific T wave abnormality  Prolonged QT  When compared with ECG of 11-JUL-2022 10:15,  QRS axis shifted left  T wave inversion no longer evident in Inferior leads  Confirmed by Burt Murray ([id number]) on 8/18/2022 11:23:36 AM     CBC WITH AUTOMATED DIFF    Collection Time: 08/18/22 10:17 AM   Result Value Ref Range    WBC 7.9 4.1 - 11.1 K/uL    RBC 2.74 (L) 4.10 - 5.70 M/uL    HGB 7.5 (L) 12.1 - 17.0 g/dL    HCT 23.7 (L) 36.6 - 50.3 %    MCV 86.5 80.0 - 99.0 FL    MCH 27.4 26.0 - 34.0 PG    MCHC 31.6 30.0 - 36.5 g/dL    RDW 16.0 (H) 11.5 - 14.5 %    PLATELET 339 890 - 358 K/uL    MPV 9.6 8.9 - 12.9 FL    NRBC 0.0 0  WBC    ABSOLUTE NRBC 0.00 0.00 - 0.01 K/uL    NEUTROPHILS 72 32 - 75 %    LYMPHOCYTES 17 12 - 49 %    MONOCYTES 9 5 - 13 %    EOSINOPHILS 2 0 - 7 %    BASOPHILS 0 0 - 1 %    IMMATURE GRANULOCYTES 0 0.0 - 0.5 %    ABS. NEUTROPHILS 5.7 1.8 - 8.0 K/UL    ABS. LYMPHOCYTES 1.4 0.8 - 3.5 K/UL    ABS. MONOCYTES 0.7 0.0 - 1.0 K/UL    ABS. EOSINOPHILS 0.2 0.0 - 0.4 K/UL    ABS. BASOPHILS 0.0 0.0 - 0.1 K/UL    ABS. IMM.  GRANS. 0.0 0.00 - 0.04 K/UL    DF AUTOMATED     METABOLIC PANEL, COMPREHENSIVE    Collection Time: 08/18/22 10:17 AM   Result Value Ref Range    Sodium 140 136 - 145 mmol/L    Potassium 5.6 (H) 3.5 - 5.1 mmol/L    Chloride 113 (H) 97 - 108 mmol/L    CO2 18 (L) 21 - 32 mmol/L    Anion gap 9 5 - 15 mmol/L    Glucose 198 (H) 65 - 100 mg/dL    BUN 67 (H) 6 - 20 MG/DL    Creatinine 6.51 (H) 0.70 - 1.30 MG/DL BUN/Creatinine ratio 10 (L) 12 - 20      GFR est AA 11 (L) >60 ml/min/1.73m2    GFR est non-AA 9 (L) >60 ml/min/1.73m2    Calcium 6.5 (L) 8.5 - 10.1 MG/DL    Bilirubin, total 0.5 0.2 - 1.0 MG/DL    ALT (SGPT) 23 12 - 78 U/L    AST (SGOT) 28 15 - 37 U/L    Alk.  phosphatase 231 (H) 45 - 117 U/L    Protein, total 6.4 6.4 - 8.2 g/dL    Albumin 2.4 (L) 3.5 - 5.0 g/dL    Globulin 4.0 2.0 - 4.0 g/dL    A-G Ratio 0.6 (L) 1.1 - 2.2     TROPONIN-HIGH SENSITIVITY    Collection Time: 08/18/22 10:17 AM   Result Value Ref Range    Troponin-High Sensitivity 37 0 - 76 ng/L   NT-PRO BNP    Collection Time: 08/18/22 10:17 AM   Result Value Ref Range    NT pro-BNP 12,475 (H) <125 PG/ML   MAGNESIUM    Collection Time: 08/18/22 10:20 AM   Result Value Ref Range    Magnesium 1.7 1.6 - 2.4 mg/dL

## 2022-08-18 NOTE — ED PROVIDER NOTES
EMERGENCY DEPARTMENT HISTORY AND PHYSICAL EXAM      Date: 8/18/2022  Patient Name: Dovie Blizzard    History of Presenting Illness     Chief Complaint   Patient presents with    Shortness of Breath     Pt with hx of CKD and sob, became more sob last night. History Provided By: Patient    HPI: Dovie Blizzard, 49 y.o. male with PMHx as noted below presents emergency department with chief complaint shortness of breath. Patient states over the last 3 to 4 days now has had increasing dyspnea. He notes his symptoms are severe with exertion, also reports orthopnea and worsening lower extremity edema. Patient states the shortness of breath seem to acutely worsen yesterday evening. Patient reports he had been followed by nephrology and at [address] [place] [address], recommendation to start dialysis however patient states he is returning here to have dialysis initiated here so he can stay with his mother. Pt denies any other alleviating or exacerbating factors. Additionally, pt specifically denies any recent fever, chills, headache, nausea, vomiting, abdominal pain, CP, , lightheadedness, dizziness, numbness, weakness, heart palpitations, urinary sxs, diarrhea, constipation, melena, hematochezia, cough, or congestion. PCP: None    Current Facility-Administered Medications   Medication Dose Route Frequency Provider Last Rate Last Admin    sodium polystyrene (KAYEXALATE) 15 gram/60 mL oral suspension 30 g  30 g Oral NOW Garlan Epley, MD         Current Outpatient Medications   Medication Sig Dispense Refill    bumetanide (BUMEX) 2 mg tablet Take 1 Tablet by mouth daily. 60 Tablet 0    ferrous sulfate 325 mg (65 mg iron) tablet Take 1 Tablet by mouth Daily (before breakfast). 30 Tablet 0    ALPRAZolam (Xanax) 0.5 mg tablet Take 1 Tablet by mouth two (2) times daily as needed. ARIPiprazole (ABILIFY) 10 mg tablet Take 1 Tablet by mouth daily.       aspirin delayed-release (Adult Low Dose Aspirin) 81 mg tablet Take 1 Tablet by mouth daily. atorvastatin (Lipitor) 40 mg tablet Take 1 Tablet by mouth daily. fludrocortisone (FLORINEF) 0.1 mg tablet Take 1 Tablet by mouth daily. hydrALAZINE (APRESOLINE) 100 mg tablet Take 1 Tablet by mouth three (3) times daily. insulin glargine-yfgn 100 unit/mL soln 15 Units by SubCUTAneous route nightly. insulin lispro-aabc 100 unit/mL soln 5 Units by SubCUTAneous route three (3) times daily. SITagliptin-metFORMIN (Janumet)  mg per tablet Take 1 Tablet by mouth daily. (Patient not taking: Reported on 7/12/2022)      NIFEdipine ER (Procardia XL) 90 mg ER tablet Take 90 mg by mouth daily. Past History     Past Medical History:  Chronic kidney disease, hypertension    Past Surgical History:  No past surgical history on file. Family History:  No family history on file. Social History:  Denies heavy alcohol or current illicit drug use    Allergies: Allergies   Allergen Reactions    Heparin Other (comments)     NO PORK    Tramadol Hives         Review of Systems   Review of Systems  Constitutional: Negative for fever, chills, and fatigue. HENT: Negative for congestion, sore throat, rhinorrhea, sneezing and neck stiffness   Eyes: Negative for discharge and redness. Respiratory: Positive for  shortness of breath. Negative wheezing   Cardiovascular: Negative for chest pain, palpitations   Gastrointestinal: Negative for nausea, vomiting, abdominal pain, constipation, diarrhea and blood in stool. Genitourinary: Negative for dysuria, urgency, frequency, hematuria, flank pain, decreased urine volume, discharge,   Musculoskeletal: Negative for myalgias or joint pain . Skin: Negative for rash or lesions . Neurological: Negative weakness, light-headedness, numbness and headaches. Physical Exam   Physical Exam    GENERAL: alert and oriented, no acute distress  EYES: PEERL, No injection, discharge or icterus.   ENT: Mucous membranes pink and moist.  NECK: Supple  LUNGS: Airway patent, mild labored respirations, crackles in the base bilaterally  HEART: Regular rate and rhythm. 1+ pitting edema bilateral lower extremities  ABDOMEN: Non-distended and non-tender, without guarding or rebound. SKIN:  warm, dry  EXTREMITIES: Without swelling, tenderness or deformity, symmetric with normal ROM  NEUROLOGICAL: Alert, oriented      Diagnostic Study Results     Labs -     Recent Results (from the past 12 hour(s))   EKG, 12 LEAD, INITIAL    Collection Time: 08/18/22 10:04 AM   Result Value Ref Range    Ventricular Rate 100 BPM    Atrial Rate 100 BPM    P-R Interval 174 ms    QRS Duration 82 ms    Q-T Interval 384 ms    QTC Calculation (Bezet) 495 ms    Calculated P Axis 55 degrees    Calculated R Axis -27 degrees    Calculated T Axis 98 degrees    Diagnosis       Normal sinus rhythm  Possible Left atrial enlargement  Nonspecific T wave abnormality  Prolonged QT  When compared with ECG of 11-JUL-2022 10:15,  QRS axis shifted left  T wave inversion no longer evident in Inferior leads  Confirmed by Blase Nail ([id number]) on 8/18/2022 11:23:36 AM     CBC WITH AUTOMATED DIFF    Collection Time: 08/18/22 10:17 AM   Result Value Ref Range    WBC 7.9 4.1 - 11.1 K/uL    RBC 2.74 (L) 4.10 - 5.70 M/uL    HGB 7.5 (L) 12.1 - 17.0 g/dL    HCT 23.7 (L) 36.6 - 50.3 %    MCV 86.5 80.0 - 99.0 FL    MCH 27.4 26.0 - 34.0 PG    MCHC 31.6 30.0 - 36.5 g/dL    RDW 16.0 (H) 11.5 - 14.5 %    PLATELET 623 778 - 344 K/uL    MPV 9.6 8.9 - 12.9 FL    NRBC 0.0 0  WBC    ABSOLUTE NRBC 0.00 0.00 - 0.01 K/uL    NEUTROPHILS 72 32 - 75 %    LYMPHOCYTES 17 12 - 49 %    MONOCYTES 9 5 - 13 %    EOSINOPHILS 2 0 - 7 %    BASOPHILS 0 0 - 1 %    IMMATURE GRANULOCYTES 0 0.0 - 0.5 %    ABS. NEUTROPHILS 5.7 1.8 - 8.0 K/UL    ABS. LYMPHOCYTES 1.4 0.8 - 3.5 K/UL    ABS. MONOCYTES 0.7 0.0 - 1.0 K/UL    ABS. EOSINOPHILS 0.2 0.0 - 0.4 K/UL    ABS. BASOPHILS 0.0 0.0 - 0.1 K/UL    ABS. IMM.  GRANS. 0.0 0.00 - 0.04 K/UL    DF AUTOMATED     METABOLIC PANEL, COMPREHENSIVE    Collection Time: 08/18/22 10:17 AM   Result Value Ref Range    Sodium 140 136 - 145 mmol/L    Potassium 5.6 (H) 3.5 - 5.1 mmol/L    Chloride 113 (H) 97 - 108 mmol/L    CO2 18 (L) 21 - 32 mmol/L    Anion gap 9 5 - 15 mmol/L    Glucose 198 (H) 65 - 100 mg/dL    BUN 67 (H) 6 - 20 MG/DL    Creatinine 6.51 (H) 0.70 - 1.30 MG/DL    BUN/Creatinine ratio 10 (L) 12 - 20      GFR est AA 11 (L) >60 ml/min/1.73m2    GFR est non-AA 9 (L) >60 ml/min/1.73m2    Calcium 6.5 (L) 8.5 - 10.1 MG/DL    Bilirubin, total 0.5 0.2 - 1.0 MG/DL    ALT (SGPT) 23 12 - 78 U/L    AST (SGOT) 28 15 - 37 U/L    Alk. phosphatase 231 (H) 45 - 117 U/L    Protein, total 6.4 6.4 - 8.2 g/dL    Albumin 2.4 (L) 3.5 - 5.0 g/dL    Globulin 4.0 2.0 - 4.0 g/dL    A-G Ratio 0.6 (L) 1.1 - 2.2     TROPONIN-HIGH SENSITIVITY    Collection Time: 08/18/22 10:17 AM   Result Value Ref Range    Troponin-High Sensitivity 37 0 - 76 ng/L   NT-PRO BNP    Collection Time: 08/18/22 10:17 AM   Result Value Ref Range    NT pro-BNP 12,475 (H) <125 PG/ML   MAGNESIUM    Collection Time: 08/18/22 10:20 AM   Result Value Ref Range    Magnesium 1.7 1.6 - 2.4 mg/dL       Radiologic Studies -   XR CHEST PA LAT   Final Result   1. Small to moderate left pleural effusion with left lower lobe subsegmental   atelectasis. 2. Unchanged cardiomegaly. Pulmonary vascular congestion without overt pulmonary   edema. CT Results  (Last 48 hours)      None          CXR Results  (Last 48 hours)                 08/18/22 1028  XR CHEST PA LAT Final result    Impression:  1. Small to moderate left pleural effusion with left lower lobe subsegmental   atelectasis. 2. Unchanged cardiomegaly. Pulmonary vascular congestion without overt pulmonary   edema. Narrative:  EXAM:  XR CHEST PA LAT       INDICATION:   Shortness of Breath       COMPARISON: Chest radiograph 7/11/2022.        FINDINGS: PA and lateral radiographs of the chest were obtained. Small to moderate left pleural effusion with left lower lobe subsegmental   atelectasis. Pulmonary vascular congestion without overt pulmonary edema. Unchanged cardiomegaly. No pneumothorax. No acute osseous abnormality. Unchanged   incidental enchondroma in the right humeral head. Mild degenerative disc disease   in the thoracic spine. Medical Decision Making     INina MD am the first provider for this patient and am the attending of record for this patient encounter. I reviewed the vital signs, available nursing notes, past medical history, past surgical history, family history and social history. Vital Signs-Reviewed the patient's vital signs. Patient Vitals for the past 12 hrs:   Temp Pulse Resp BP SpO2   08/18/22 1158 -- 100 -- (!) 170/96 --   08/18/22 0956 97.8 °F (36.6 °C) 100 16 (!) 186/91 100 %         EKG interpretation: (Preliminary)  Rhythm: normal sinus rhythm; and regular . Rate (approx.): 100;  P wave: normal; QRS interval: normal ; ST/T wave: Nonspecific changes;  was interpreted by iZta Moscoso MD,ED Provider. Records Reviewed: Nursing Notes and Old Medical Records    Provider Notes (Medical Decision Making): On presentation, the patient is well appearing, but is hypertensive on arrival.  Differential includes hypertensive emergency, end-stage renal disease, electrolyte abnormalities, volume overload among others. Work-up notable for worsening renal dysfunction, worsening hyperkalemia. Given the patient's significant symptoms he is experiencing consulted with Dr. Norma Hays, nephrology recommended admission for initiation of dialysis. Otherwise we will give Lasix, Kayexalate as well as hydralazine and reassess. ED Course:   Initial assessment performed. The patients presenting problems have been discussed, and they are in agreement with the care plan formulated and outlined with them.   I have encouraged them to ask questions as they arise throughout their visit. Medications   sodium polystyrene (KAYEXALATE) 15 gram/60 mL oral suspension 30 g (has no administration in time range)   hydrALAZINE (APRESOLINE) 20 mg/mL injection 20 mg (20 mg IntraVENous Given 8/18/22 1158)   furosemide (LASIX) injection 60 mg (60 mg IntraVENous Given 8/18/22 1158)   oxyCODONE IR (ROXICODONE) tablet 5 mg (5 mg Oral Given 8/18/22 1248)         PROGRESS:  The patient has been re-evaluated and sx have improved. Reviewed available results with patient and have counseled them on diagnosis and care plan. They have expressed understanding, and all their questions have been answered. They agree with plan for admission. CONSULT:  Edwin Corbett MD spoke with the hospitalist.  Discussed HPI and PE, available diagnostic tests and clinical findings. He is in agreement with care plans as outlined and will evaluate for admission     Admit Note  Patient is being admitted to the hospitalist.  The results of their tests and reasons for their admission have been discussed with them and/or available family. They convey agreement and understanding for the need to be admitted and for their admission diagnosis. Consultation has been made with the inpatient physician specialist for hospitalization. Critical Care Note      IMPENDING DETERIORATION -Respiratory and Cardiovascular  ASSOCIATED RISK FACTORS - Metabolic changes and Vascular Compromise  MANAGEMENT- Bedside Assessment and Supervision of Care  INTERPRETATION -  Xrays, ECG, and Blood Pressure  INTERVENTIONS - hemodynamic mngmt  CASE REVIEW - Hospitalist/Intensivist  TREATMENT RESPONSE -Improved  PERFORMED BY - Self    NOTES   :  I have provided a total of 35 minutes of critical time not including time spent on separately documented procedures.   The reason for providing this level of medical care for this critically ill patient was due to a critical illness that impaired one or more vital organ systems such that there was a high probability of imminent or life threatening deterioration in the patients condition. This care involved high complexity decision making to assess, manipulate, and support vital system functions,  lab review, consultations with specialist, family decision- making, bedside attention and documentation. During this entire length of time I was immediately available to the patient      Disposition:  admission    PLAN:  1. Admit     Diagnosis     Clinical Impression:   1. Acute renal failure superimposed on chronic kidney disease, unspecified CKD stage, unspecified acute renal failure type ([facility] [address]    2. Acute hyperkalemia    3. Hypervolemia, unspecified hypervolemia type        Please note that this dictation was completed with Dragon, computer voice recognition software. Quite often unanticipated grammatical, syntax, homophones, and other interpretive errors are inadvertently transcribed by the computer software. Please disregard these errors. Additionally, please excuse any errors that have escaped final proofreading.

## 2022-08-18 NOTE — ED NOTES
Patient is being transferred to [address], Room # 2109. Report given to Katya Panda RN on [facility] for routine progression of care. Report consisted of the following information SBAR. Patient transferred to receiving unit by: Transport (RN or tech name). Outstanding consults needed: No     Next labs due: No     The following personal items will be sent with the patient during transfer to the floor: All valuables:    Cardiac monitoring ordered: No     The following CURRENT information was reported to the receiving RN:    Code status: Full Code at time of transfer    Last set of vital signs:  Vital Signs  Level of Consciousness: Alert (0) (08/18/22 0956)  Temp: 97.8 °F (36.6 °C) (08/18/22 0956)  Temp Source: Oral (08/18/22 0956)  Pulse (Heart Rate): 100 (08/18/22 1350)  Resp Rate: 19 (08/18/22 1350)  BP: (!) 161/76 (08/18/22 1350)  MAP (Monitor): 100 (08/18/22 1350)  MAP (Calculated): 104 (08/18/22 1350)  MEWS Score: 1 (08/18/22 0956)         Oxygen Therapy  O2 Sat (%): 94 % (08/18/22 1350)  Pulse via Oximetry: 100 beats per minute (08/18/22 1350)  O2 Device: None (Room air) (08/18/22 0956)      Last pain assessment:  Pain 1  Pain Scale 1: Numeric (0 - 10)  Pain Intensity 1: 10  Patient Stated Pain Goal: 0      Wounds: No     Urinary catheter: anuric  Is there a crocker order: No     LDAs:       Peripheral IV 02/31/51 Right Basilic (Active)   Site Assessment Clean, dry, & intact 08/18/22 1029   Phlebitis Assessment 0 08/18/22 1029   Infiltration Assessment 0 08/18/22 1029   Dressing Status Clean, dry, & intact 08/18/22 1029   Dressing Type Tape;Transparent 08/18/22 1029         Opportunity for questions and clarification was provided.     Vicky Rivas RN

## 2022-08-18 NOTE — ED NOTES
Pt states increased SOB over the last 3 days, hx of \Kdneys Failure, Diabetes and HTN. States increased swelling in lower extremities. Pt worsening after ambulation.

## 2022-08-19 ENCOUNTER — APPOINTMENT (OUTPATIENT)
Dept: GENERAL RADIOLOGY | Age: 47
DRG: 291 | End: 2022-08-19
Attending: STUDENT IN AN ORGANIZED HEALTH CARE EDUCATION/TRAINING PROGRAM
Payer: MEDICAID

## 2022-08-19 ENCOUNTER — HOSPITAL ENCOUNTER (INPATIENT)
Dept: INTERVENTIONAL RADIOLOGY/VASCULAR | Age: 47
Discharge: HOME | DRG: 291 | End: 2022-08-19
Attending: INTERNAL MEDICINE
Payer: MEDICAID

## 2022-08-19 LAB
ALBUMIN SERPL-MCNC: 2.6 G/DL (ref 3.5–5)
ALBUMIN SERPL-MCNC: 2.8 G/DL (ref 3.5–5)
ALBUMIN/GLOB SERPL: 0.6 {RATIO} (ref 1.1–2.2)
ALP SERPL-CCNC: 233 U/L (ref 45–117)
ALT SERPL-CCNC: 26 U/L (ref 12–78)
ANION GAP SERPL CALC-SCNC: 10 MMOL/L (ref 5–15)
ANION GAP SERPL CALC-SCNC: 9 MMOL/L (ref 5–15)
AST SERPL-CCNC: 24 U/L (ref 15–37)
BASOPHILS # BLD: 0 K/UL (ref 0–0.1)
BASOPHILS NFR BLD: 0 % (ref 0–1)
BILIRUB SERPL-MCNC: 0.7 MG/DL (ref 0.2–1)
BUN SERPL-MCNC: 72 MG/DL (ref 6–20)
BUN SERPL-MCNC: 72 MG/DL (ref 6–20)
BUN/CREAT SERPL: 10 (ref 12–20)
BUN/CREAT SERPL: 10 (ref 12–20)
CALCIUM SERPL-MCNC: 6.5 MG/DL (ref 8.5–10.1)
CALCIUM SERPL-MCNC: 6.5 MG/DL (ref 8.5–10.1)
CHLORIDE SERPL-SCNC: 113 MMOL/L (ref 97–108)
CHLORIDE SERPL-SCNC: 113 MMOL/L (ref 97–108)
CO2 SERPL-SCNC: 16 MMOL/L (ref 21–32)
CO2 SERPL-SCNC: 17 MMOL/L (ref 21–32)
CREAT SERPL-MCNC: 6.98 MG/DL (ref 0.7–1.3)
CREAT SERPL-MCNC: 6.98 MG/DL (ref 0.7–1.3)
DIFFERENTIAL METHOD BLD: ABNORMAL
EOSINOPHIL # BLD: 0.2 K/UL (ref 0–0.4)
EOSINOPHIL NFR BLD: 2 % (ref 0–7)
ERYTHROCYTE [DISTWIDTH] IN BLOOD BY AUTOMATED COUNT: 15.9 % (ref 11.5–14.5)
EST. AVERAGE GLUCOSE BLD GHB EST-MCNC: 140 MG/DL
GLOBULIN SER CALC-MCNC: 4.4 G/DL (ref 2–4)
GLUCOSE BLD STRIP.AUTO-MCNC: 125 MG/DL (ref 65–117)
GLUCOSE BLD STRIP.AUTO-MCNC: 135 MG/DL (ref 65–117)
GLUCOSE BLD STRIP.AUTO-MCNC: 178 MG/DL (ref 65–117)
GLUCOSE BLD STRIP.AUTO-MCNC: 257 MG/DL (ref 65–117)
GLUCOSE SERPL-MCNC: 112 MG/DL (ref 65–100)
GLUCOSE SERPL-MCNC: 114 MG/DL (ref 65–100)
HBA1C MFR BLD: 6.5 % (ref 4–5.6)
HBV SURFACE AB SER QL: NONREACTIVE
HBV SURFACE AB SER-ACNC: <3.1 MIU/ML
HBV SURFACE AG SER QL: <0.1 INDEX
HBV SURFACE AG SER QL: NEGATIVE
HCT VFR BLD AUTO: 22.6 % (ref 36.6–50.3)
HGB BLD-MCNC: 7 G/DL (ref 12.1–17)
IMM GRANULOCYTES # BLD AUTO: 0 K/UL (ref 0–0.04)
IMM GRANULOCYTES NFR BLD AUTO: 0 % (ref 0–0.5)
LYMPHOCYTES # BLD: 1.9 K/UL (ref 0.8–3.5)
LYMPHOCYTES NFR BLD: 22 % (ref 12–49)
MCH RBC QN AUTO: 26.7 PG (ref 26–34)
MCHC RBC AUTO-ENTMCNC: 31 G/DL (ref 30–36.5)
MCV RBC AUTO: 86.3 FL (ref 80–99)
MONOCYTES # BLD: 0.8 K/UL (ref 0–1)
MONOCYTES NFR BLD: 10 % (ref 5–13)
NEUTS SEG # BLD: 5.8 K/UL (ref 1.8–8)
NEUTS SEG NFR BLD: 66 % (ref 32–75)
NRBC # BLD: 0 K/UL (ref 0–0.01)
NRBC BLD-RTO: 0 PER 100 WBC
PHOSPHATE SERPL-MCNC: 6.7 MG/DL (ref 2.6–4.7)
PLATELET # BLD AUTO: 289 K/UL (ref 150–400)
PMV BLD AUTO: 8.9 FL (ref 8.9–12.9)
POTASSIUM SERPL-SCNC: 5.2 MMOL/L (ref 3.5–5.1)
POTASSIUM SERPL-SCNC: 5.4 MMOL/L (ref 3.5–5.1)
PROT SERPL-MCNC: 7.2 G/DL (ref 6.4–8.2)
RBC # BLD AUTO: 2.62 M/UL (ref 4.1–5.7)
SERVICE CMNT-IMP: ABNORMAL
SODIUM SERPL-SCNC: 138 MMOL/L (ref 136–145)
SODIUM SERPL-SCNC: 140 MMOL/L (ref 136–145)
WBC # BLD AUTO: 8.7 K/UL (ref 4.1–11.1)

## 2022-08-19 PROCEDURE — 80069 RENAL FUNCTION PANEL: CPT

## 2022-08-19 PROCEDURE — 74011250636 HC RX REV CODE- 250/636: Performed by: INTERNAL MEDICINE

## 2022-08-19 PROCEDURE — 82962 GLUCOSE BLOOD TEST: CPT

## 2022-08-19 PROCEDURE — 90935 HEMODIALYSIS ONE EVALUATION: CPT

## 2022-08-19 PROCEDURE — 74011000250 HC RX REV CODE- 250: Performed by: INTERNAL MEDICINE

## 2022-08-19 PROCEDURE — 2709999900 HC NON-CHARGEABLE SUPPLY

## 2022-08-19 PROCEDURE — C1752 CATH,HEMODIALYSIS,SHORT-TERM: HCPCS

## 2022-08-19 PROCEDURE — 65270000029 HC RM PRIVATE

## 2022-08-19 PROCEDURE — 36556 INSERT NON-TUNNEL CV CATH: CPT

## 2022-08-19 PROCEDURE — 87340 HEPATITIS B SURFACE AG IA: CPT

## 2022-08-19 PROCEDURE — 80053 COMPREHEN METABOLIC PANEL: CPT

## 2022-08-19 PROCEDURE — 97530 THERAPEUTIC ACTIVITIES: CPT | Performed by: PHYSICAL THERAPIST

## 2022-08-19 PROCEDURE — C1892 INTRO/SHEATH,FIXED,PEEL-AWAY: HCPCS

## 2022-08-19 PROCEDURE — 77030002996 HC SUT SLK J&J -A

## 2022-08-19 PROCEDURE — 97161 PT EVAL LOW COMPLEX 20 MIN: CPT | Performed by: PHYSICAL THERAPIST

## 2022-08-19 PROCEDURE — 74011636637 HC RX REV CODE- 636/637: Performed by: INTERNAL MEDICINE

## 2022-08-19 PROCEDURE — 02HV33Z INSERTION OF INFUSION DEVICE INTO SUPERIOR VENA CAVA, PERCUTANEOUS APPROACH: ICD-10-PCS | Performed by: STUDENT IN AN ORGANIZED HEALTH CARE EDUCATION/TRAINING PROGRAM

## 2022-08-19 PROCEDURE — 76937 US GUIDE VASCULAR ACCESS: CPT

## 2022-08-19 PROCEDURE — 74011250637 HC RX REV CODE- 250/637: Performed by: INTERNAL MEDICINE

## 2022-08-19 PROCEDURE — 36415 COLL VENOUS BLD VENIPUNCTURE: CPT

## 2022-08-19 PROCEDURE — 83036 HEMOGLOBIN GLYCOSYLATED A1C: CPT

## 2022-08-19 PROCEDURE — 97165 OT EVAL LOW COMPLEX 30 MIN: CPT

## 2022-08-19 PROCEDURE — 85025 COMPLETE CBC W/AUTO DIFF WBC: CPT

## 2022-08-19 PROCEDURE — 71045 X-RAY EXAM CHEST 1 VIEW: CPT

## 2022-08-19 PROCEDURE — 86704 HEP B CORE ANTIBODY TOTAL: CPT

## 2022-08-19 PROCEDURE — 86706 HEP B SURFACE ANTIBODY: CPT

## 2022-08-19 RX ORDER — INSULIN GLARGINE 100 [IU]/ML
5 INJECTION, SOLUTION SUBCUTANEOUS
Status: DISCONTINUED | OUTPATIENT
Start: 2022-08-19 | End: 2022-08-25

## 2022-08-19 RX ORDER — HEPARIN SODIUM 200 [USP'U]/100ML
400 INJECTION, SOLUTION INTRAVENOUS ONCE
Status: DISCONTINUED | OUTPATIENT
Start: 2022-08-19 | End: 2022-08-19

## 2022-08-19 RX ORDER — CALCIUM ACETATE 667 MG/1
2 TABLET ORAL
Status: DISCONTINUED | OUTPATIENT
Start: 2022-08-19 | End: 2022-08-19 | Stop reason: SDUPTHER

## 2022-08-19 RX ORDER — LIDOCAINE HYDROCHLORIDE 20 MG/ML
20 INJECTION, SOLUTION INFILTRATION; PERINEURAL ONCE
Status: DISCONTINUED | OUTPATIENT
Start: 2022-08-19 | End: 2022-08-20 | Stop reason: HOSPADM

## 2022-08-19 RX ORDER — OXYCODONE AND ACETAMINOPHEN 5; 325 MG/1; MG/1
1 TABLET ORAL
Status: DISCONTINUED | OUTPATIENT
Start: 2022-08-19 | End: 2022-08-29 | Stop reason: HOSPADM

## 2022-08-19 RX ORDER — DICLOFENAC SODIUM 10 MG/G
4 GEL TOPICAL
Status: DISCONTINUED | OUTPATIENT
Start: 2022-08-19 | End: 2022-08-29 | Stop reason: HOSPADM

## 2022-08-19 RX ORDER — ALBUMIN HUMAN 250 G/1000ML
12.5 SOLUTION INTRAVENOUS
Status: DISCONTINUED | OUTPATIENT
Start: 2022-08-19 | End: 2022-08-29 | Stop reason: HOSPADM

## 2022-08-19 RX ORDER — CALCIUM GLUCONATE 20 MG/ML
2 INJECTION, SOLUTION INTRAVENOUS ONCE
Status: COMPLETED | OUTPATIENT
Start: 2022-08-19 | End: 2022-08-19

## 2022-08-19 RX ORDER — LIDOCAINE HYDROCHLORIDE 20 MG/ML
10 INJECTION, SOLUTION INFILTRATION; PERINEURAL ONCE
Status: COMPLETED | OUTPATIENT
Start: 2022-08-19 | End: 2022-08-19

## 2022-08-19 RX ORDER — CALCIUM ACETATE 667 MG/1
2 CAPSULE ORAL
Status: DISCONTINUED | OUTPATIENT
Start: 2022-08-19 | End: 2022-08-29 | Stop reason: HOSPADM

## 2022-08-19 RX ADMIN — CALCIUM GLUCONATE 2 G: 20 INJECTION, SOLUTION INTRAVENOUS at 12:46

## 2022-08-19 RX ADMIN — SODIUM CHLORIDE, PRESERVATIVE FREE 10 ML: 5 INJECTION INTRAVENOUS at 12:52

## 2022-08-19 RX ADMIN — NIFEDIPINE 90 MG: 90 TABLET, EXTENDED RELEASE ORAL at 09:11

## 2022-08-19 RX ADMIN — LIDOCAINE HYDROCHLORIDE 200 MG: 20 INJECTION, SOLUTION INFILTRATION; PERINEURAL at 15:00

## 2022-08-19 RX ADMIN — HYDRALAZINE HYDROCHLORIDE 100 MG: 50 TABLET, FILM COATED ORAL at 09:11

## 2022-08-19 RX ADMIN — Medication 3 UNITS: at 17:07

## 2022-08-19 RX ADMIN — SODIUM CHLORIDE, PRESERVATIVE FREE 10 ML: 5 INJECTION INTRAVENOUS at 21:56

## 2022-08-19 RX ADMIN — SODIUM CHLORIDE, PRESERVATIVE FREE 10 ML: 5 INJECTION INTRAVENOUS at 05:15

## 2022-08-19 RX ADMIN — HYDRALAZINE HYDROCHLORIDE 100 MG: 50 TABLET, FILM COATED ORAL at 17:07

## 2022-08-19 RX ADMIN — CALCIUM ACETATE 1334 MG: 667 CAPSULE ORAL at 12:46

## 2022-08-19 RX ADMIN — OXYCODONE AND ACETAMINOPHEN 1 TABLET: 5; 325 TABLET ORAL at 17:07

## 2022-08-19 RX ADMIN — BUMETANIDE 4 MG: 0.25 INJECTION INTRAMUSCULAR; INTRAVENOUS at 21:55

## 2022-08-19 RX ADMIN — CALCIUM ACETATE 1334 MG: 667 CAPSULE ORAL at 17:07

## 2022-08-19 RX ADMIN — INSULIN GLARGINE 5 UNITS: 100 INJECTION, SOLUTION SUBCUTANEOUS at 21:56

## 2022-08-19 RX ADMIN — IRON SUCROSE 300 MG: 20 INJECTION, SOLUTION INTRAVENOUS at 17:32

## 2022-08-19 RX ADMIN — MORPHINE SULFATE 2 MG: 2 INJECTION, SOLUTION INTRAMUSCULAR; INTRAVENOUS at 12:46

## 2022-08-19 NOTE — PROGRESS NOTES
Received notification from bedside RN about patient with regards to: phantom limb pain, requesting medication for relief and refusing Tylenol as it does not help  VS: /82, HR 98, RR 20, O2 sat 94% on RA    Intervention given: Percocet 5/325 PO x 1 dose ordered    0457: Requesting Percocet again for bilateral LE pain  VS: /53, HR 93, RR 18, O2 sat 99% on RA    - Voltaren topical cream PRN ordered

## 2022-08-19 NOTE — PROGRESS NOTES
1542 As per IR Nurse pt is allergic to Heparin so they did not flush the Hemodialysis line with Heparin. Perfect serve dr Renard Reddy to order alternative for Heparin to prevent the line from cloting he asked to flush with NS.    1715 Informed Dr Gentry Schlatter Pt's BP is 118/63, ang gave Hydralazine, she asked to [address] for 8 pm tonight    Bedside shift change report given to [address] (oncoming nurse) by Homer MELVIN RN (offgoing nurse). Report included the following information SBAR, Kardex and MAR.

## 2022-08-19 NOTE — PROGRESS NOTES
Endurance Catheter insertion note     Inserted 20 G, 6 cm long  Endurance Extended Dwell Peripheral Catheter into right forearm using ultrasound guidance and sterile technique. Positive blood return. Patient tolerated procedure well. Denies questions or concerns at this time. The Endurance catheter is an extended dwell peripheral catheter and may remain in place 29 days. Blood samples can be obtained from this catheter. To obtain labs, a tourniquet may be used, flush with 10ml NS, waste 3ml, draw labs, flush with 20ml NS. Dressings needs to be changed with central line dressing kit using bio patch every 7 days by nurse. Primary nurse        RN notified.       [facility] Vascular Access Team. PICC Nurse

## 2022-08-19 NOTE — PROGRESS NOTES
Problem: Mobility Impaired (Adult and Pediatric)  Goal: *Acute Goals and Plan of Care (Insert Text)  Outcome: Resolved/Met  Note:   PHYSICAL THERAPY EVALUATION/DISCHARGE  Patient: Esther Logan (45 y.o. male)  Date: 8/19/2022  Primary Diagnosis: PONCHO (acute kidney [facility]) ([facility] [N17.9]       Precautions: standard         ASSESSMENT  Based on the objective data described below, the patient presents with slightly decreased strength and decreased activity tolerance. Patient sitting EOB upon arrival; agreeable to limited mobility but does not understand why he must move around. Educated patient on purpose of PT. Patient able to ambulate short distance in room and perform transfers with independence. Patient steady ambulating without assistive device. Patient returned to EOB at end of session. No further PT recommended at this time. Functional Outcome Measure: The patient scored 85/100 on the Barthel Index outcome measure which is indicative of 15% decline in mobility. Other factors to consider for discharge: ARF     Further skilled acute physical therapy is not indicated at this time. PLAN :  Recommendation for discharge: (in order for the patient to meet his/her long term goals)  No skilled physical therapy/ follow up rehabilitation needs identified at this time. This discharge recommendation:  Has not yet been discussed the attending provider and/or case management    IF patient discharges home will need the following DME: patient owns DME required for discharge       SUBJECTIVE:   Patient stated Liss Chance do you want me to do?     OBJECTIVE DATA SUMMARY:   HISTORY:    No past medical history on file. No past surgical history on file.     Prior level of function: independent with cane for ambulation  Personal factors and/or comorbidities impacting plan of care: renal failure/new dialysis    Home Situation  Home Environment: Private residence  # Steps to Enter: 0 (elevator)  One/Two Story Residence: One story  Living Alone: No  Support Systems: Parent(s)  Patient Expects to be Discharged to[de-identified] Home with family assistance  Current DME Used/Available at Home: Cane, straight    EXAMINATION/PRESENTATION/DECISION MAKING:   Critical Behavior:  Neurologic State: Alert  Orientation Level: Oriented X4  Cognition: Appropriate decision making  Safety/Judgement: Awareness of environment  Hearing: Auditory  Auditory Impairment: None  Skin:  intact  Edema: feet  Range Of Motion:  AROM: Within functional limits           PROM: Within functional limits           Strength:    Strength: Within functional limits                    Tone & Sensation:   Tone: Normal              Sensation: Intact               Coordination:  Coordination: Within functional limits  Vision:   Acuity: Within Defined Limits  Functional Mobility:  Bed Mobility:  Rolling: Independent  Supine to Sit: Independent  Sit to Supine: Independent  Scooting: Independent  Transfers:  Sit to Stand: Independent  Stand to Sit: Independent        Bed to Chair: Independent              Balance:   Sitting: Intact  Standing: Intact  Ambulation/Gait Training:  Distance (ft): 24 Feet (ft)  Assistive Device:  (none)  Ambulation - Level of Assistance: Independent        Gait Abnormalities: Decreased step clearance  Right Side Weight Bearing: Full  Left Side Weight Bearing: Full  Base of Support: Widened        Step Length: Left shortened;Right shortened               Functional Measure:  Barthel Index:    Bathin  Bladder: 10  Bowels: 10  Groomin  Dressing: 10  Feeding: 10  Mobility: 5  Stairs: 5  Toilet Use: 10  Transfer (Bed to Chair and Back): 15  Total: 85/100       The Barthel ADL Index: Guidelines  1. The index should be used as a record of what a patient does, not as a record of what a patient could do. 2. The main aim is to establish degree of independence from any help, physical or verbal, however minor and for whatever reason.   3. The need for supervision renders the patient not independent. 4. A patient's performance should be established using the best available evidence. Asking the patient, friends/relatives and nurses are the usual sources, but direct observation and common sense are also important. However direct testing is not needed. 5. Usually the patient's performance over the preceding 24-48 hours is important, but occasionally longer periods will be relevant. 6. Middle categories imply that the patient supplies over 50 per cent of the effort. 7. Use of aids to be independent is allowed. Score Interpretation (from [address] [facility])    Independent   60-79 Minimally independent   40-59 Partially dependent   20-39 Very dependent   <20 Totally dependent     -Bree Murray., Barthel, DLazaroW. (1965). Functional evaluation: the Barthel Index. [address] [facility] ([address] (1997). The Barthel activities of daily living index: self-reporting versus actual performance in the old (> or = 75 years). Journal of [address] [place] [address] 45(7), [facility], JULIEN, Jocelyn Schafer., May Maggie. (1999). Measuring the change in disability after inpatient rehabilitation; comparison of the responsiveness of the Barthel Index and Functional [place] Measure. Journal of Neurology, Neurosurgery, and Psychiatry, 66(4), 961-757. Jacqui Busby, N.J.A, RAYO Thompson, & Samuel Estrada MLazaroA. (2004) Assessment of post-stroke quality of life in cost-effectiveness studies: The usefulness of the Barthel Index and the EuroQoL-5D.  Quality of Life Research, 15, 504-51            Physical Therapy Evaluation Charge Determination   History Examination Presentation Decision-Making   Mod complexity LOW Complexity : 1-2 Standardized tests and measures addressing body structure, function, activity limitation and / or participation in recreation  LOW Complexity : Stable, uncomplicated  LOW Complexity : FOTO score of       Based on the above components, the patient evaluation is determined to be of the following complexity level: LOW     Pain Rating:  No number given    Activity Tolerance:   Good      After treatment patient left in no apparent distress:   Sitting in chair (EOB)    COMMUNICATION/EDUCATION:   The patients plan of care was discussed with: Occupational therapist.     Fall prevention education was provided and the patient/caregiver indicated understanding.     Thank you for this referral.  Gini Escobar, PT   Time Calculation: 8 mins

## 2022-08-19 NOTE — PROGRESS NOTES
Bedside and Verbal shift change report given to Keisha Pablo RN(oncoming nurse) by Rossie Cabot, RN  (offgoing nurse). Report included the following information Kardex, Procedure Summary, and MAR. RN was notified to follow up with pt's iron that was not administered day shift 8/18/22 and to draw pt's lab that was not collected. Writer and another nurse tried twice to draw blood but was unable to do that. Pt will be getting central line place today.

## 2022-08-19 NOTE — PROGRESS NOTES
Patient with c/o pain to litzy feet that is amputated. PT requesting for Percocet. Refused acetaminophen prn order. NP Stoney bowens.

## 2022-08-19 NOTE — PROGRESS NOTES
Hospitalist Progress Note    NAME: Yulissa Ramsay   :  1975   MRN:  [id number]       Assessment / Plan:  PONCHO on CKD stage IV, POA  Hyperkalemia, POA, refused kayexalate   Acute on chronic diastolic heart failure, POA  Uncontrolled HTN  Chest x-ray with cardiomegaly, vascular congestions, small to moderate left pleural effusion  Echo last month with normal EF and abnormal diastolic dysfunction  IV bumex  Hydralazine, nifedipine, itrate prn  HD as per nephrology  Strict I&O, daily wt  Daily labs  Appreciate nephrology following    Chronic normocytic anemia, POA  EPO as per nephro  IV venofer  Transfuse for hbg<7    Psychiatric disorder  Cont' home meds    T2DM  Cont' lantus, titrate prn  SSI      Code Status: Full code  DVT Prophylaxis: Allergic to heparin products  GI Prophylaxis: not indicated   Baseline: Active, independent     Subjective:     Chief Complaint / Reason for Physician Visit  NAD. Complained of foot pain. Discussed with RN events overnight. Review of Systems:  Symptom Y/N Comments  Symptom Y/N Comments   Fever/Chills    Chest Pain     Poor Appetite    Edema     Cough    Abdominal Pain     Sputum    Joint Pain     SOB/DIETZ    Pruritis/Rash     Nausea/vomit    Tolerating PT/OT     Diarrhea    Tolerating Diet     Constipation    Other       Could NOT obtain due to:      Objective:     VITALS:   Last 24hrs VS reviewed since prior progress note.  Most recent are:  Patient Vitals for the past 24 hrs:   Temp Pulse Resp BP SpO2   22 1153 97 °F (36.1 °C) 88 16 (!) 111/59 96 %   22 0826 98.2 °F (36.8 °C) 87 16 121/69 96 %   22 0617 98.2 °F (36.8 °C) 84 18 (!) 112/57 96 %   22 2300 98.5 °F (36.9 °C) 93 18 (!) 132/53 99 %   22 2159 97.6 °F (36.4 °C) 98 18 (!) 153/82 93 %   22 1631 -- 96 20 (!) 159/84 --   22 1350 -- 100 19 (!) 161/76 94 %     No intake or output data in the 24 hours ending 22 1257     I had a face to face encounter and independently examined this patient on 8/19/2022, as outlined below:  PHYSICAL EXAM:  General: WD, WN. Alert, cooperative, no acute distress    EENT:  EOMI. Anicteric sclerae. MMM  Resp:  CTA bilaterally, no wheezing or rales. No accessory muscle use  CV:  Regular  rhythm,  No edema  GI:  Soft, Non distended, Non tender. +Bowel sounds  Neurologic:  Alert and oriented X 3, normal speech,   Psych:   Good insight. Not anxious nor agitated  Skin:  No rashes. No jaundice    Reviewed most current lab test results and cultures  YES  Reviewed most current radiology test results   YES  Review and summation of old records today    NO  Reviewed patient's current orders and MAR    YES  PMH/SH reviewed - no change compared to H&P  ________________________________________________________________________  Care Plan discussed with:    Comments   Patient x    Family      RN x    Care Manager     Consultant                        Multidiciplinary team rounds were held today with , nursing, pharmacist and clinical coordinator. Patient's plan of care was discussed; medications were reviewed and discharge planning was addressed. ________________________________________________________________________  Total NON critical care TIME:  35 Minutes    Total CRITICAL CARE TIME Spent:   Minutes non procedure based      Comments   >50% of visit spent in counseling and coordination of care     ________________________________________________________________________  Brittani Chow MD     Procedures: see electronic medical records for all procedures/Xrays and details which were not copied into this note but were reviewed prior to creation of Plan. LABS:  I reviewed today's most current labs and imaging studies.   Pertinent labs include:  Recent Labs     08/19/22  1047 08/18/22  1017   WBC 8.7 7.9   HGB 7.0* 7.5*   HCT 22.6* 23.7*    298     Recent Labs     08/19/22  1047 08/18/22  1020 08/18/22  1017     138  --  140   K 5.2* 5.4*  --  5.6*   *  113*  --  113*   CO2 17*  16*  --  18*   *  112*  --  198*   BUN 72*  72*  --  67*   CREA 6.98*  6.98*  --  6.51*   CA 6.5*  6.5*  --  6.5*   MG  --  1.7  --    PHOS 6.7*  --   --    ALB 2.8*  2.6*  --  2.4*   TBILI 0.7  --  0.5   ALT 26  --  23       Signed: Veronika Huang MD

## 2022-08-19 NOTE — ROUTINE PROCESS
TRANSFER - OUT REPORT:    Verbal report given to receiving nurse on Yulissa Ramsay  being transferred to room 2109 for routine progression of care       Report consisted of patients Situation, Background, Assessment and   Recommendations(SBAR). Information from the following report(s) Procedure Summary was reviewed with the receiving nurse. Opportunity for questions and clarification was provided.       Patient transported with:   Transporter via stretcher 140

## 2022-08-19 NOTE — CONSULTS
[facility]    Name:  Jacob Willoughby  MR#:  [id number]  :  1975  ACCOUNT #:  [de-identified]  DATE OF SERVICE:  2022    REFERRING PHYSICIAN:  Dr. Jaquez January:  Acute kidney injury, hyperkalemia. HISTORY OF PRESENT ILLNESS:  The patient is a 54-year-old Replaced by [facility] American male with past medical history significant for hypertension, CHF, CKD, history of diabetes who presented to ER with shortness of breath and neck swelling. The patient was discharged from [facility] in 2022 with similar problem. The patient reports that he has not been followed by any nephrologist as outpatient. The patient gets his care in [facility]. The patient has not been taking his blood pressure medication for last few days. The patient has been eating high potassium food, tomatoes at home. The patient is not taking any diuretics at home. The patient is found to have sodium of 140, potassium 5.6, bicarb 18, BUN 67 and creatinine 6.5. In 2022, his creatinine level was 5.5. The patient reports that he has missed multiple appointments with nephrologist in the past.  The patient's mother lives in [place]. The patient came back to [place] for possible start of dialysis. PAST MEDICAL HISTORY:  1.  Diabetes. 2.  CKD stage IV. 3.  Hypertension. 4.  CHF. PAST SURGICAL HISTORY:  None. REVIEW OF SYSTEM:  As per HPI. Totally 11-system reviewed, they are essentially negative. The patient complained of shortness of breath with activity, complaint of leg edema. No complaint of dysuria or hematuria. HOME MEDICATION:  1. Bumex. 2.  Iron sulfate. 3.  Xanax. 4.  Abilify. 5.  Lipitor. 6.  Hydralazine. 7.  Insulin. 8.  Janumet. 9.  Procardia XL    PHYSICAL EXAMINATION:  VITAL SIGNS:  Temperature 97.8, pulse 100, blood pressure 186/91, respiratory rate 20 per minute.   GENERAL:  The patient is lying in the bed, comfortable, not in apparent distress. LUNGS:  Decreased breath sounds bilaterally. No rales. CARDIAC:  Normal S1 and S2.  Regular rate and rhythm. NECK:  Supple. No palpable neck mass. BACK:  No spine tenderness. ABDOMEN:  Soft, nontender. No hepatosplenomegaly. EXTREMITIES:  +3 edema present. NEURO:  Nonfocal.  Normal speech. JOINT:  No joint effusion or tenderness. LABORATORY AND DIAGNOSTIC DATA:  Hemoglobin 7.6. Sodium 140, potassium 5.6, chloride 113, bicarb 18, BUN 67, creatinine 6.5. ProBNP 71826. Iron saturation 14% in 07/2022. Chest x-ray, small to moderate left pleural effusion, lower lobe atelectasis, pulmonary vascular congestion. No overt pulmonary edema. IMPRESSION:  1. Acute kidney injury likely due to progression of chronic kidney disease. 2.  Edema, shortness of breath, fluid overload. 3.  Chronic kidney disease, stage V likely due to diabetic nephropathy and hypertensive nephrosclerosis. 4.  Hyperkalemia. 5.  Metabolic acidosis. 6.  Iron deficiency anemia. 7.  Anemia of chronic kidney disease. DISCUSSION:  The patient's PONCHO is likely due to progression of CKD due to noncompliance to the blood pressure medication. PLAN:  1. Bumex 4 mg IV b.i.d.  2.  Restart hydralazine and nifedipine. 3.  Labetalol 20 mg IV q. four p.r.n. for high blood pressure. 4.  The patient refused to take Kayexalate in ER. I have ordered [facility]. Repeat BMP tonight. If patient does not respond to high dose diuretics or his potassium get worse, he will need dialysis. 5.  No plan of dialysis, no indication for dialysis at present. 6.  Avoid ACE inhibitors or ARB. 7.  Give Venofer IV. 8.  Start Procrit, Epogen. 9.  Treatment plan discussed with the ER physician.       Ian Davis MD      [id number]  D:  08/18/2022 16:50  T:  08/18/2022 20:36  JOB #:  [id number]

## 2022-08-19 NOTE — PROGRESS NOTES
OCCUPATIONAL THERAPY EVALUATION/DISCHARGE  Patient: Leonard Beltran (49 y.o. male)  Date: 8/19/2022  Primary Diagnosis: PONCHO (acute kidney injury) ([facility] [address] [N17.9]       Precautions:        ASSESSMENT  Based on the objective data described below, the patient presents with baseline abilities to complete ADLs. Patient received endorsing pain and hungry, notified RN who was attempting to obtain pain med orders from MD, reports pt is NPO in prep for possible procedure/line placement. Patient agreeable to brief eval, able to ivelisse socks and complete bedroom mobility without difficulty or AD. No evidence of fatigue or LOB. No further OT needs indicated, will sign off at this time. Current Level of Function (ADLs/self-care): independent; up ad leonardo    Functional Outcome Measure: The patient scored 85/100 on the barthel outcome measure which is indicative of independence. Other factors to consider for discharge: HD     PLAN :  Recommend with staff: up ad leonardo    Recommendation for discharge: (in order for the patient to meet his/her long term goals)  No skilled occupational therapy/ follow up rehabilitation needs identified at this time. This discharge recommendation:  Has been made in collaboration with the attending provider and/or case management    IF patient discharges home will need the following DME: patient owns DME required for discharge       SUBJECTIVE:   Patient stated I just want to be left alone, I just got here.     OBJECTIVE DATA SUMMARY:   HISTORY:   No past medical history on file. No past surgical history on file. Prior Level of Function/Environment/Context: independent, lives in [facility], now with mother in [place].    Expanded or extensive additional review of patient history:   Home Situation  Home Environment: Private residence  One/Two Story Residence: One story  Living Alone: No  Support Systems: Parent(s)  Patient Expects to be Discharged to[de-identified] Home with family assistance    EXAMINATION OF PERFORMANCE DEFICITS:  Cognitive/Behavioral Status:  Neurologic State: Alert  Orientation Level: Oriented X4  Cognition: Appropriate decision making  Perception: Appears intact  Perseveration: No perseveration noted  Safety/Judgement: Awareness of environment        Hearing: Auditory  Auditory Impairment: None    Vision/Perceptual:                           Acuity: Within Defined Limits         Range of Motion:    AROM: Within functional limits  PROM: Within functional limits                      Strength:    Strength: Within functional limits                Coordination:  Coordination: Within functional limits  Fine Motor Skills-Upper: Left Intact; Right Intact    Gross Motor Skills-Upper: Left Intact; Right Intact    Tone & Sensation:  Tone: Normal  Sensation: Intact                      Balance:  Sitting: Intact  Standing: Intact    Functional Mobility and Transfers for ADLs:  Bed Mobility:  Rolling: Independent  Supine to Sit: Independent  Sit to Supine: Independent  Scooting: Independent    Transfers:  Sit to Stand: Independent  Stand to Sit: Independent  Bed to Chair: Independent    ADL Assessment:  Feeding: Independent    Oral Facial Hygiene/Grooming: Independent    Bathing: Independent         Upper Body Dressing: Independent    Lower Body Dressing: Independent    Toileting: Independent                ADL Intervention and task modifications:     ]    Lower Body Dressing Assistance  Socks: Independent         Cognitive Retraining  Safety/Judgement: Awareness of environment      Functional Measure:    Barthel Index:  Bathin  Bladder: 10  Bowels: 10  Groomin  Dressing: 10  Feeding: 10  Mobility: 5  Stairs: 5  Toilet Use: 10  Transfer (Bed to Chair and Back): 15  Total: 85/100      The Barthel ADL Index: Guidelines  1. The index should be used as a record of what a patient does, not as a record of what a patient could do.   2. The main aim is to establish degree of independence from any help, physical or verbal, however minor and for whatever reason. 3. The need for supervision renders the patient not independent. 4. A patient's performance should be established using the best available evidence. Asking the patient, friends/relatives and nurses are the usual sources, but direct observation and common sense are also important. However direct testing is not needed. 5. Usually the patient's performance over the preceding 24-48 hours is important, but occasionally longer periods will be relevant. 6. Middle categories imply that the patient supplies over 50 per cent of the effort. 7. Use of aids to be independent is allowed. Score Interpretation (from [facility] 83)    Independent   60-79 Minimally independent   40-59 Partially dependent   20-39 Very dependent   <20 Totally dependent     -Bree Murray., Barthel, DLazaroW. (1965). Functional evaluation: the Barthel Index. [address] [facility] ([address] [facility] [address] (1997). The Barthel activities of daily living index: self-reporting versus actual performance in the old (> or = 75 years). Journal [address] 45(7), [facility], ..., Irma Steele., [facility], [place]. (1999). Measuring the change in disability after inpatient rehabilitation; comparison of the responsiveness of the Barthel Index and Functional [place] Measure. Journal of Neurology, Neurosurgery, and Psychiatry, 66(4), 331-068. ZAIRE Schwab, RAYO Thompson, & Tamir Rincon, M.A. (2004) Assessment of post-stroke quality of life in cost-effectiveness studies: The usefulness of the Barthel Index and the EuroQoL-5D.  Quality of Life Research, 15, 166-82         Occupational Therapy Evaluation Charge Determination   History Examination Decision-Making   LOW Complexity : Brief history review  LOW Complexity : 1-3 performance deficits relating to physical, cognitive , or psychosocial skils that result in activity limitations and / or participation restrictions  LOW Complexity : No comorbidities that affect functional and no verbal or physical assistance needed to complete eval tasks       Based on the above components, the patient evaluation is determined to be of the following complexity level: LOW   Pain Rating:  Pt did endorse pain, RN notified and aware. Activity Tolerance: WNL    After treatment patient left in no apparent distress:    Seated EOB    COMMUNICATION/EDUCATION:   The patients plan of care was discussed with: Physical therapist, Occupational therapist, and Registered nurse.      Thank you for this referral.  Simba Valle OT  Time Calculation: 10 mins

## 2022-08-19 NOTE — PROGRESS NOTES
Problem: Pain  Goal: *Control of Pain  Outcome: Progressing Towards Goal     Problem: Patient Education: Go to Patient Education Activity  Goal: Patient/Family Education  Outcome: Progressing Towards Goal     Problem: Chronic Renal Failure  Goal: *Fluid and electrolytes stabilized  Outcome: Progressing Towards Goal     Problem: Patient Education: Go to Patient Education Activity  Goal: Patient/Family Education  Outcome: Progressing Towards Goal

## 2022-08-19 NOTE — PROCEDURES
Interventional Radiology  Procedure Note        8/19/2022 3:48 PM    Patient: Katy Adams     Informed consent obtained    Diagnosis: PONCHO    Procedure(s): nontunneled hemodialysis catheter    Specimens removed:  none    Complications: None    Primary Physician: Cristiano Gomez MD    Recommendations: N/A    Discharge Disposition: return to floor    Full dictated report to follow    Cristiano Gomez MD  Interventional Radiology  [facility], [facility].  3:48 PM, 8/19/2022

## 2022-08-19 NOTE — PROGRESS NOTES
Nephrology Progress Note  [facility] / [address] [facility] [place], [address]  Phone - [phone]  Fax - [phone]                 Patient: Nelida Halsted                   YOB: 1975        Date- 8/19/2022                      Admit Date: 8/18/2022  CC: Follow up for acute kidney injury, hyperkalemia          IMPRESSION & PLAN:    Acute kidney injury likely due to progression of chronic kidney disease  Hyperkalemia   Metabolic acidosis   Anemia of CKD   Hypocalcemia   Iron deficiency anemia   edema, shortness of breath, fluid overload. Chronic kidney disease, stage V likely due to diabetic nephropathy and hypertensive nephrosclerosis. PLAN-  Place Jenaro catheter  Start dialysis today  Calcium gluconate 2 g IV  Start PhosLo  Continue current BP meds  Continue Epogen  Continue IV iron  Consent for dialysis and Jenaro catheter obtained from the patient  Discussed with mother at the bedside--I did discuss with patient and family regarding importance of compliance to medication and diet along with dialysis schedule  Discussed risks, benefits and alternatives to dialysis. Risks including, but not limited to dialyzer reactions, dialysis dysequlibrium syndrome, low blood pressure, leg cramps, arrythmia etc were discussed. Also explained that, refusal of dialysis (when absolutely needed) may lead to death. Patient understands the options and agrees to proceed. Subjective:   Interval History:   -Creatinine increased to 6.9 potassium 5.4  Blood pressure improved  Shortness of breath improved    Objective:   Vitals:    08/18/22 2300 08/19/22 0617 08/19/22 0826 08/19/22 1153   BP: (!) 132/53 (!) 112/57 121/69 (!) 111/59   Pulse: 93 84 87 88   Resp: 18 18 16 16   Temp: 98.5 °F (36.9 °C) 98.2 °F (36.8 °C) 98.2 °F (36.8 °C) 97 °F (36.1 °C)   SpO2: 99% 96% 96% 96%   Weight:       Height:          No intake/output data recorded. Last 3 Recorded Weights in this Encounter    08/18/22 0956   Weight: 88.9 kg (195 lb 15.8 oz)      Physical exam:    GEN: NAD  NECK- Supple, no mass  RESP: No wheezing, Clear b/l  CVS: S1,S2  RRR  NEURO: Normal speech, Non focal  EXT: +++ Edema   PSYCH: Normal Mood    Chart reviewed. Pertinent Notes reviewed. Data Review :  Recent Labs     08/19/22  1047 08/18/22  1020 08/18/22  1017     138  --  140   K 5.2*  5.4*  --  5.6*   *  113*  --  113*   CO2 17*  16*  --  18*   BUN 72*  72*  --  67*   CREA 6.98*  6.98*  --  6.51*   *  112*  --  198*   CA 6.5*  6.5*  --  6.5*   MG  --  1.7  --    PHOS 6.7*  --   --      Recent Labs     08/19/22  1047 08/18/22  1017   WBC 8.7 7.9   HGB 7.0* 7.5*   HCT 22.6* 23.7*    298     No results for input(s): FE, TIBC, PSAT, FERR in the last 72 hours.    Medication list  reviewed  Current Facility-Administered Medications   Medication Dose Route Frequency    diclofenac (VOLTAREN) 1 % topical gel 4 g  4 g Topical Q6H PRN    calcium gluconate 2 g/100 mL sodium chloride (ISO-OSM)  2 g IntraVENous ONCE    calcium acetate(phosphat bind) (PHOSLO) capsule 1,334 mg  2 Capsule Oral TID WITH MEALS    bumetanide (BUMEX) injection 4 mg  4 mg IntraVENous BID    hydrALAZINE (APRESOLINE) tablet 100 mg  100 mg Oral TID    NIFEdipine ER (PROCARDIA XL) tablet 90 mg  90 mg Oral DAILY    labetaloL (NORMODYNE;TRANDATE) injection 20 mg  20 mg IntraVENous Q4H PRN    ARIPiprazole (ABILIFY) tablet 10 mg  10 mg Oral DAILY    aspirin delayed-release tablet 81 mg  81 mg Oral DAILY    atorvastatin (LIPITOR) tablet 40 mg  40 mg Oral DAILY    ferrous sulfate tablet 325 mg  325 mg Oral ACB    fludrocortisone (FLORINEF) tablet 0.1 mg  0.1 mg Oral DAILY    sodium chloride (NS) flush 5-40 mL  5-40 mL IntraVENous Q8H    sodium chloride (NS) flush 5-40 mL  5-40 mL IntraVENous PRN    acetaminophen (TYLENOL) tablet 650 mg  650 mg Oral Q6H PRN    Or    acetaminophen (TYLENOL) suppository 650 mg  650 mg Rectal Q6H PRN    polyethylene glycol (MIRALAX) packet 17 g  17 g Oral DAILY PRN    promethazine (PHENERGAN) tablet 12.5 mg  12.5 mg Oral Q6H PRN    Or    ondansetron (ZOFRAN) injection 4 mg  4 mg IntraVENous Q6H PRN    insulin lispro (HUMALOG) injection   SubCUTAneous AC&HS    glucose chewable tablet 16 g  4 Tablet Oral PRN    glucagon (GLUCAGEN) injection 1 mg  1 mg IntraMUSCular PRN    dextrose 10% infusion 0-250 mL  0-250 mL IntraVENous PRN    epoetin ace-epbx (RETACRIT) injection 20,000 Units  20,000 Units SubCUTAneous Q TUE, THU & SAT    iron sucrose (VENOFER) 300 mg in 0.9% sodium chloride 250 mL IVPB  300 mg IntraVENous Q24H    morphine injection 2 mg  2 mg IntraVENous Q4H PRN        Brie Loo MD  8/19/2022

## 2022-08-19 NOTE — PROGRESS NOTES
Transition of Care    Reason for Admission:                     RUR Score:    16%              PCP: First and Last name:   Unknown     Name of Practice: [facility]   Are you a current patient: Yes/No: Yes   Approximate date of last visit: 6/1/2022   Can you participate in a virtual visit if needed: Unknown    Do you (patient/family) have any concerns for transition/discharge? Mr. Vivian Kussmaul is a resident of [address] [place] [address]. He lives in an apartment there. He has [address] for his health insurance. Plan for utilizing home health: To be determined. Current Advanced Directive/Advance Care Plan:  Full Code  Mr. Vivian Kussmaul does not have an advance directive. He declined to receive information regarding advance care plan. Healthcare Decision Maker:   Click here to complete [address] including selection of the Healthcare Decision Maker Relationship (ie \"Primary\")   Mr. Tavarez healthcare decision maker is his mother, Love Villatoro ([phone])        Transition of Care Plan:   Mr. Vivian Kussmaul was seen in his room. His address and phone number were verified. He lives by himself in an apartment in [address] [place] [address]. He has a wheelchair and a walker. There is elevator access to his apartment. He has [place] Medicaid for his insurance. He stays with his mother in [address] [place] [address] when he needs healthcare. He was asked if he was planning to move to [address] [place] [address]. He stated that he wanted to keep his apartment in [facility]. He was informed that dialysis could be arranged in [place], but he would need to make a decision as to where he was going to live. He uses [facility] in [address] [place] [address] for his pharmacy. Care Management Interventions  PCP Verified by CM:  Yes  Last Visit to PCP: 06/01/22  Palliative Care Criteria Met (RRAT>21 & CHF Dx)?: No  Mode of Transport at Discharge: Self  Transition of Care Consult (CM Consult): Discharge Planning  MyChart Signup: No  Discharge Durable Medical Equipment: No  Physical Therapy Consult: No  Occupational Therapy Consult: No  Speech Therapy Consult: No  Support Systems: Parent(s)  The Patient and/or Patient Representative was Provided with a Choice of Provider and Agrees with the Discharge Plan?: No  Freedom of Choice List was Provided with Basic Dialogue that Supports the Patient's Individualized Plan of Care/Goals, Treatment Preferences and Shares the Quality Data Associated with the Providers?: No  [place] Resource Information Provided?: No  Discharge Location  Patient Expects to be Discharged to[de-identified] Home with family assistance    Will continue to follow for discharge planning.   Signed By: Agustina Quiroga LCSW     August 19, 2022

## 2022-08-19 NOTE — WOUND CARE
Wound care unable to complete consult due to patient off the floor, no pictures of current wounds and staff nurse reports she did not take current dressing off.     Recommend for weekend:  Consult podiatry if urgent need, otherwise wash feet with soap and water, wipe wound clean and dry with NS and cover wounds with dry dressing - NO foam.    Dariusz Stoll RN, [employer]

## 2022-08-20 LAB
ALBUMIN SERPL-MCNC: 2.3 G/DL (ref 3.5–5)
ANION GAP SERPL CALC-SCNC: 6 MMOL/L (ref 5–15)
BASOPHILS # BLD: 0 K/UL (ref 0–0.1)
BASOPHILS NFR BLD: 0 % (ref 0–1)
BUN SERPL-MCNC: 51 MG/DL (ref 6–20)
BUN/CREAT SERPL: 10 (ref 12–20)
CALCIUM SERPL-MCNC: 7.8 MG/DL (ref 8.5–10.1)
CHLORIDE SERPL-SCNC: 108 MMOL/L (ref 97–108)
CO2 SERPL-SCNC: 25 MMOL/L (ref 21–32)
CREAT SERPL-MCNC: 5.32 MG/DL (ref 0.7–1.3)
DIFFERENTIAL METHOD BLD: ABNORMAL
EOSINOPHIL # BLD: 0.2 K/UL (ref 0–0.4)
EOSINOPHIL NFR BLD: 2 % (ref 0–7)
ERYTHROCYTE [DISTWIDTH] IN BLOOD BY AUTOMATED COUNT: 15.8 % (ref 11.5–14.5)
GLUCOSE BLD STRIP.AUTO-MCNC: 108 MG/DL (ref 65–117)
GLUCOSE BLD STRIP.AUTO-MCNC: 121 MG/DL (ref 65–117)
GLUCOSE BLD STRIP.AUTO-MCNC: 151 MG/DL (ref 65–117)
GLUCOSE BLD STRIP.AUTO-MCNC: 198 MG/DL (ref 65–117)
GLUCOSE SERPL-MCNC: 103 MG/DL (ref 65–100)
HCT VFR BLD AUTO: 20.9 % (ref 36.6–50.3)
HGB BLD-MCNC: 6.6 G/DL (ref 12.1–17)
HISTORY CHECKED?,CKHIST: NORMAL
IMM GRANULOCYTES # BLD AUTO: 0 K/UL (ref 0–0.04)
IMM GRANULOCYTES NFR BLD AUTO: 0 % (ref 0–0.5)
LYMPHOCYTES # BLD: 1.2 K/UL (ref 0.8–3.5)
LYMPHOCYTES NFR BLD: 16 % (ref 12–49)
MCH RBC QN AUTO: 26.8 PG (ref 26–34)
MCHC RBC AUTO-ENTMCNC: 31.6 G/DL (ref 30–36.5)
MCV RBC AUTO: 85 FL (ref 80–99)
MONOCYTES # BLD: 0.8 K/UL (ref 0–1)
MONOCYTES NFR BLD: 11 % (ref 5–13)
NEUTS SEG # BLD: 5.4 K/UL (ref 1.8–8)
NEUTS SEG NFR BLD: 71 % (ref 32–75)
NRBC # BLD: 0 K/UL (ref 0–0.01)
NRBC BLD-RTO: 0 PER 100 WBC
PHOSPHATE SERPL-MCNC: 4.6 MG/DL (ref 2.6–4.7)
PLATELET # BLD AUTO: 278 K/UL (ref 150–400)
PMV BLD AUTO: 9.4 FL (ref 8.9–12.9)
POTASSIUM SERPL-SCNC: 4.6 MMOL/L (ref 3.5–5.1)
RBC # BLD AUTO: 2.46 M/UL (ref 4.1–5.7)
SERVICE CMNT-IMP: ABNORMAL
SERVICE CMNT-IMP: NORMAL
SODIUM SERPL-SCNC: 139 MMOL/L (ref 136–145)
WBC # BLD AUTO: 7.6 K/UL (ref 4.1–11.1)

## 2022-08-20 PROCEDURE — 74011250636 HC RX REV CODE- 250/636: Performed by: INTERNAL MEDICINE

## 2022-08-20 PROCEDURE — 80069 RENAL FUNCTION PANEL: CPT

## 2022-08-20 PROCEDURE — 5A1D70Z PERFORMANCE OF URINARY FILTRATION, INTERMITTENT, LESS THAN 6 HOURS PER DAY: ICD-10-PCS | Performed by: INTERNAL MEDICINE

## 2022-08-20 PROCEDURE — 82962 GLUCOSE BLOOD TEST: CPT

## 2022-08-20 PROCEDURE — 65270000029 HC RM PRIVATE

## 2022-08-20 PROCEDURE — 86900 BLOOD TYPING SEROLOGIC ABO: CPT

## 2022-08-20 PROCEDURE — P9016 RBC LEUKOCYTES REDUCED: HCPCS

## 2022-08-20 PROCEDURE — 74011000250 HC RX REV CODE- 250: Performed by: INTERNAL MEDICINE

## 2022-08-20 PROCEDURE — 85025 COMPLETE CBC W/AUTO DIFF WBC: CPT

## 2022-08-20 PROCEDURE — 74011250637 HC RX REV CODE- 250/637: Performed by: INTERNAL MEDICINE

## 2022-08-20 PROCEDURE — 36415 COLL VENOUS BLD VENIPUNCTURE: CPT

## 2022-08-20 PROCEDURE — 74011636637 HC RX REV CODE- 636/637: Performed by: INTERNAL MEDICINE

## 2022-08-20 PROCEDURE — 36430 TRANSFUSION BLD/BLD COMPNT: CPT

## 2022-08-20 PROCEDURE — 74011250636 HC RX REV CODE- 250/636: Performed by: NURSE PRACTITIONER

## 2022-08-20 PROCEDURE — 90935 HEMODIALYSIS ONE EVALUATION: CPT

## 2022-08-20 PROCEDURE — 86923 COMPATIBILITY TEST ELECTRIC: CPT

## 2022-08-20 RX ORDER — SODIUM CHLORIDE 9 MG/ML
250 INJECTION, SOLUTION INTRAVENOUS AS NEEDED
Status: DISCONTINUED | OUTPATIENT
Start: 2022-08-20 | End: 2022-08-29 | Stop reason: HOSPADM

## 2022-08-20 RX ORDER — CLONIDINE HYDROCHLORIDE 0.1 MG/1
0.2 TABLET ORAL
Status: ACTIVE | OUTPATIENT
Start: 2022-08-20 | End: 2022-08-21

## 2022-08-20 RX ORDER — HYDROMORPHONE HYDROCHLORIDE 1 MG/ML
0.5 INJECTION, SOLUTION INTRAMUSCULAR; INTRAVENOUS; SUBCUTANEOUS ONCE
Status: COMPLETED | OUTPATIENT
Start: 2022-08-20 | End: 2022-08-20

## 2022-08-20 RX ADMIN — HYDRALAZINE HYDROCHLORIDE 100 MG: 50 TABLET, FILM COATED ORAL at 21:27

## 2022-08-20 RX ADMIN — NIFEDIPINE 90 MG: 90 TABLET, EXTENDED RELEASE ORAL at 15:13

## 2022-08-20 RX ADMIN — INSULIN GLARGINE 5 UNITS: 100 INJECTION, SOLUTION SUBCUTANEOUS at 21:28

## 2022-08-20 RX ADMIN — IRON SUCROSE 300 MG: 20 INJECTION, SOLUTION INTRAVENOUS at 17:26

## 2022-08-20 RX ADMIN — FERROUS SULFATE TAB 325 MG (65 MG ELEMENTAL FE) 325 MG: 325 (65 FE) TAB at 10:36

## 2022-08-20 RX ADMIN — ATORVASTATIN CALCIUM 40 MG: 40 TABLET, FILM COATED ORAL at 10:36

## 2022-08-20 RX ADMIN — EPOETIN ALFA-EPBX 20000 UNITS: 20000 INJECTION, SOLUTION INTRAVENOUS; SUBCUTANEOUS at 21:27

## 2022-08-20 RX ADMIN — SODIUM CHLORIDE, PRESERVATIVE FREE 10 ML: 5 INJECTION INTRAVENOUS at 22:29

## 2022-08-20 RX ADMIN — CALCIUM ACETATE 1334 MG: 667 CAPSULE ORAL at 11:47

## 2022-08-20 RX ADMIN — HYDRALAZINE HYDROCHLORIDE 100 MG: 50 TABLET, FILM COATED ORAL at 15:13

## 2022-08-20 RX ADMIN — LABETALOL HYDROCHLORIDE 20 MG: 5 INJECTION INTRAVENOUS at 16:39

## 2022-08-20 RX ADMIN — HYDROMORPHONE HYDROCHLORIDE 0.5 MG: 1 INJECTION, SOLUTION INTRAMUSCULAR; INTRAVENOUS; SUBCUTANEOUS at 22:26

## 2022-08-20 RX ADMIN — MORPHINE SULFATE 2 MG: 2 INJECTION, SOLUTION INTRAMUSCULAR; INTRAVENOUS at 10:36

## 2022-08-20 RX ADMIN — ASPIRIN 81 MG: 81 TABLET, COATED ORAL at 10:36

## 2022-08-20 RX ADMIN — CALCIUM ACETATE 1334 MG: 667 CAPSULE ORAL at 16:40

## 2022-08-20 RX ADMIN — BUMETANIDE 4 MG: 0.25 INJECTION INTRAMUSCULAR; INTRAVENOUS at 16:38

## 2022-08-20 RX ADMIN — OXYCODONE AND ACETAMINOPHEN 1 TABLET: 5; 325 TABLET ORAL at 18:12

## 2022-08-20 RX ADMIN — MORPHINE SULFATE 2 MG: 2 INJECTION, SOLUTION INTRAMUSCULAR; INTRAVENOUS at 15:13

## 2022-08-20 RX ADMIN — FLUDROCORTISONE ACETATE 0.1 MG: 0.1 TABLET ORAL at 10:36

## 2022-08-20 RX ADMIN — SODIUM CHLORIDE, PRESERVATIVE FREE 10 ML: 5 INJECTION INTRAVENOUS at 05:29

## 2022-08-20 RX ADMIN — CALCIUM ACETATE 1334 MG: 667 CAPSULE ORAL at 10:36

## 2022-08-20 NOTE — PROGRESS NOTES
Nephrology Progress Note  [facility] / [address], [address]  [place], [address]  Phone - [phone]  Fax - [phone]                 Patient: Raleigh Mann                   YOB: 1975        Date- 8/20/2022                      Admit Date: 8/18/2022  CC: Follow up for acute kidney injury, hyperkalemia          IMPRESSION & PLAN:   New onset ESRD  Hyperkalemia   Metabolic acidosis   Anemia of CKD   Hypocalcemia   Iron deficiency anemia   edema, shortness of breath, fluid overload. Chronic kidney disease, stage V likely due to diabetic nephropathy and hypertensive nephrosclerosis. PLAN-  HD #2 this afternoon then MWF next week  Cont LIBBY and IV iron  Cont phos low  Daily labs over the weekend     Subjective: Interval History:   Seen and examined. Feeling ok overall. Tolerated HD late last night. 2 L UF. No cp, sob, n/v/d    Objective:   Vitals:    08/20/22 0115 08/20/22 0130 08/20/22 0145 08/20/22 0300   BP: (!) 132/59 (!) 135/59 (!) 143/60 (!) 167/77   Pulse: 79 80 81 90   Resp: 20 20 20    Temp:    98.6 °F (37 °C)   SpO2:    98%   Weight:       Height:          08/19 0701 - 08/20 0700  In: 180 [P.O.:180]  Out: 2000   Last 3 Recorded Weights in this Encounter    08/18/22 0956   Weight: 88.9 kg (195 lb 15.8 oz)      Physical exam:    GEN: NAD  NECK- Supple, no mass  RESP: No wheezing, Clear b/l  CVS: S1,S2  RRR  NEURO: Normal speech, Non focal  EXT: 2+ b/l Edema   PSYCH: Normal Mood  Access:  Callyangel Gal[place] in place    Chart reviewed. Pertinent Notes reviewed.      Data Review :  Recent Labs     08/19/22  1047 08/18/22  1020 08/18/22  1017     138  --  140   K 5.2*  5.4*  --  5.6*   *  113*  --  113*   CO2 17*  16*  --  18*   BUN 72*  72*  --  67*   CREA 6.98*  6.98*  --  6.51*   *  112*  --  198*   CA 6.5*  6.5*  --  6.5*   MG  --  1.7  --    PHOS 6.7*  --   -- Recent Labs     08/19/22  1047 08/18/22  1017   WBC 8.7 7.9   HGB 7.0* 7.5*   HCT 22.6* 23.7*    298       No results for input(s): FE, TIBC, PSAT, FERR in the last 72 hours.    Medication list  reviewed  Current Facility-Administered Medications   Medication Dose Route Frequency    diclofenac (VOLTAREN) 1 % topical gel 4 g  4 g Topical Q6H PRN    calcium acetate(phosphat bind) (PHOSLO) capsule 1,334 mg  2 Capsule Oral TID WITH MEALS    insulin glargine (LANTUS) injection 5 Units  5 Units SubCUTAneous QHS    oxyCODONE-acetaminophen (PERCOCET) 5-325 mg per tablet 1 Tablet  1 Tablet Oral Q6H PRN    albumin human 25% (BUMINATE) solution 12.5 g  12.5 g IntraVENous DIALYSIS PRN    bumetanide (BUMEX) injection 4 mg  4 mg IntraVENous BID    hydrALAZINE (APRESOLINE) tablet 100 mg  100 mg Oral TID    NIFEdipine ER (PROCARDIA XL) tablet 90 mg  90 mg Oral DAILY    labetaloL (NORMODYNE;TRANDATE) injection 20 mg  20 mg IntraVENous Q4H PRN    ARIPiprazole (ABILIFY) tablet 10 mg  10 mg Oral DAILY    aspirin delayed-release tablet 81 mg  81 mg Oral DAILY    atorvastatin (LIPITOR) tablet 40 mg  40 mg Oral DAILY    ferrous sulfate tablet 325 mg  325 mg Oral ACB    fludrocortisone (FLORINEF) tablet 0.1 mg  0.1 mg Oral DAILY    sodium chloride (NS) flush 5-40 mL  5-40 mL IntraVENous Q8H    sodium chloride (NS) flush 5-40 mL  5-40 mL IntraVENous PRN    acetaminophen (TYLENOL) tablet 650 mg  650 mg Oral Q6H PRN    Or    acetaminophen (TYLENOL) suppository 650 mg  650 mg Rectal Q6H PRN    polyethylene glycol (MIRALAX) packet 17 g  17 g Oral DAILY PRN    promethazine (PHENERGAN) tablet 12.5 mg  12.5 mg Oral Q6H PRN    Or    ondansetron (ZOFRAN) injection 4 mg  4 mg IntraVENous Q6H PRN    insulin lispro (HUMALOG) injection   SubCUTAneous AC&HS    glucose chewable tablet 16 g  4 Tablet Oral PRN    glucagon (GLUCAGEN) injection 1 mg  1 mg IntraMUSCular PRN    dextrose 10% infusion 0-250 mL  0-250 mL IntraVENous PRN    epoetin ace-epbx (RETACRIT) injection 20,000 Units  20,000 Units SubCUTAneous Q TUE, THU & SAT    iron sucrose (VENOFER) 300 mg in 0.9% sodium chloride 250 mL IVPB  300 mg IntraVENous Q24H    morphine injection 2 mg  2 mg IntraVENous Q4H PRN          Vitor Cuevas MD  8/20/2022

## 2022-08-20 NOTE — PROGRESS NOTES
Patient refused getting his wound on his BL feet checked, said \" I do my wound my myself\" asked if this nurse could see and assist with wound care, but the patient said he is not Jeffrey Daring do now and will call whenever he is ready

## 2022-08-20 NOTE — PROCEDURES
Hemodialysis / [phone]    Vitals Pre Post Assessment Pre Post   BP BP: (!) 115/53 (08/20/22 0000)   143/60 LOC SBAR received from Yousuf Shafer (juliano HINTON)  Alert and oriented x 4   HR Pulse (Heart Rate): 81 (08/20/22 0000) 81 Lungs SBAR received from Yousuf Shafer (juliano RN)  Alert and oriented x 4   Resp Resp Rate: 18 (08/20/22 0000) 20 Cardiac SBAR received from Yousuf Shafer (juliano RN)  NSR    Temp Temp: 98.3 °F (36.8 °C) (08/19/22 2300)  Skin SBAR received from Yousuf Shafer (juliano HINTON)  Warm and dry    Weight  N/A  N/A  Edema SBAR received from Yousuf tsai RN)  Trace    Tele status N/a  N/a  Pain Pain Intensity 1: 3 (08/19/22 2004) 0     Orders   Duration: Start: 1848 End: 0149 Total: 2. 5HRS   Dialyzer: Dialyzer/Set Up Inspection: [place] (08/19/22 2300)   K Bath: Dialysate K (mEq/L): 2 (08/19/22 2300)   Ca Bath: Dialysate CA (mEq/L): 3.5 (08/19/22 2300)   Na: Dialysate NA (mEq/L): 140 (08/19/22 2300)   Bicarb: Dialysate HCO3 (mEq/L): 40 (08/19/22 2300)   Target Fluid Removal: Goal/Amount of Fluid to Remove (mL): 2000 mL (08/19/22 2300)     Access   Type & Location: Left CVC    Comments:                           Newly inserted              Labs   HBsAg (Antigen) / date: Negative     08/19/2022                                   HBsAb (Antibody) / date: Susceptible 08/19/2022   Source: EPIC   Obtained/Reviewed  Critical Results Called HGB   Date Value Ref Range Status   08/19/2022 7.0 (L) 12.1 - 17.0 g/dL Final     Potassium   Date Value Ref Range Status   08/19/2022 5.2 (H) 3.5 - 5.1 mmol/L Final   08/19/2022 5.4 (H) 3.5 - 5.1 mmol/L Final     Comment:     SPECIMEN HEMOLYZED, RESULTS MAY BE AFFECTED     Calcium   Date Value Ref Range Status   08/19/2022 6.5 (L) 8.5 - 10.1 MG/DL Final   08/19/2022 6.5 (L) 8.5 - 10.1 MG/DL Final     BUN   Date Value Ref Range Status   08/19/2022 72 (H) 6 - 20 MG/DL Final   08/19/2022 72 (H) 6 - 20 MG/DL Final     Creatinine   Date Value Ref Range Status   08/19/2022 6.98 (H) 0.70 - 1.30 MG/DL Final   08/19/2022 6.98 (H) 0.70 - 1.30 MG/DL Final        Meds Given   Name Dose Route   NONE  NONE  NONE                Adequacy / Fluid    Total Liters Process:    Net Fluid Removed: 2000 ML      Comments   Time Out Done:   (Time) 2258   Admitting Diagnosis: PONCHO   Consent obtained/signed: Informed Consent Verified: Yes (08/19/22 2300)   Machine / RO # Machine Number: [id number] (08/19/22 2300)   Primary Nurse Rpt Pre: Peyton Webster RN   Primary Nurse Rpt Post: Peyton Webster RN   Pt Education: Procedural education   Care Plan: Continue with HD care plan    Pts outpatient clinic: N/A     Tx Summary   Comments:        7269:Each catheter limb disinfected per p&p, caps removed, hubs disinfected per p&p. Both lines aspirated and flushed with no difficulty. Treatment initiated with 200ml nss given. CVC running well at 250. No distress reported. Will continue to monitor patient. 0149:Treatment completed with all possible blood returned. Each dialysis catheter limb disinfected per p&p, blood returned per p&p, each dialysis hub disinfected per p&p, post dialysis catheter dwell instilled per order, and caps applied. SBAR given to primary nurse. Patient bed at lowest position and call bell and patient's belongings at her reach. At baseline upon departure.

## 2022-08-20 NOTE — PROGRESS NOTES
Hospitalist Progress Note    NAME: Leonard Beltran   :  1975   MRN:  [id number]       Assessment / Plan:  PONCHO on CKD stage IV, POA  Hyperkalemia, POA, refused kayexalate   Acute on chronic diastolic heart failure, POA  Uncontrolled HTN  Chest x-ray with cardiomegaly, vascular congestions, small to moderate left pleural effusion  Echo last month with normal EF and abnormal diastolic dysfunction  IV bumex  Hydralazine, nifedipine, itrate prn  HD as per nephrology  Strict I&O, daily wt  Daily labs  Appreciate nephrology following    Chronic normocytic anemia, POA  EPO as per nephro  IV venofer  Transfuse for hbg<7. Labs pending today    Psychiatric disorder  Cont' home meds    T2DM  Cont' lantus, titrate prn  SSI    R foot wound  Wound care following. Con't daily wound care as ordered  Prn morphine/ po percocet. Code Status: Full code  DVT Prophylaxis: Allergic to heparin products  GI Prophylaxis: not indicated   Baseline: Active, independent     Subjective:     Chief Complaint / Reason for Physician Visit  NAD. Complained of foot pain. No prn meds given yet  Discussed with RN events overnight. Review of Systems:  Symptom Y/N Comments  Symptom Y/N Comments   Fever/Chills    Chest Pain     Poor Appetite    Edema     Cough    Abdominal Pain     Sputum    Joint Pain     SOB/DIETZ    Pruritis/Rash     Nausea/vomit    Tolerating PT/OT     Diarrhea    Tolerating Diet     Constipation    Other       Could NOT obtain due to:      Objective:     VITALS:   Last 24hrs VS reviewed since prior progress note.  Most recent are:  Patient Vitals for the past 24 hrs:   Temp Pulse Resp BP SpO2   22 1032 97.9 °F (36.6 °C) 91 19 (!) 194/88 97 %   22 0300 98.6 °F (37 °C) 90 -- (!) 167/77 98 %   22 0145 -- 81 20 (!) 143/60 --   22 0130 -- 80 20 (!) 135/59 --   22 0115 -- 79 20 (!) 132/59 --   22 0100 -- 78 18 132/65 --   22 0045 -- 80 18 126/60 --   22 0030 -- 78 18 129/67 --   08/20/22 0015 -- 79 18 132/66 --   08/20/22 0000 -- 81 18 (!) 115/53 --   08/19/22 2345 -- 79 17 (!) 117/59 --   08/19/22 2330 -- 79 17 (!) 126/54 --   08/19/22 2315 -- 81 18 132/70 --   08/19/22 2300 98.3 °F (36.8 °C) 82 19 131/70 --   08/19/22 2004 98.3 °F (36.8 °C) 83 19 123/73 --   08/19/22 1557 98.2 °F (36.8 °C) 88 18 118/63 94 %   08/19/22 1500 -- 88 18 (!) 116/55 96 %   08/19/22 1455 -- 86 18 (!) 117/58 95 %   08/19/22 1153 97 °F (36.1 °C) 88 16 (!) 111/59 96 %         Intake/Output Summary (Last 24 hours) at 8/20/2022 1125  Last data filed at 8/20/2022 0145  Gross per 24 hour   Intake 180 ml   Output 2000 ml   Net -1820 ml          I had a face to face encounter and independently examined this patient on 8/20/2022, as outlined below:  PHYSICAL EXAM:  General: WD, WN. Alert, cooperative, no acute distress    EENT:  EOMI. Anicteric sclerae. MMM  Resp:  CTA bilaterally, no wheezing or rales. No accessory muscle use  CV:  Regular  rhythm,  No edema  GI:  Soft, Non distended, Non tender. +Bowel sounds  Neurologic:  Alert and oriented X 3, normal speech  Psych:   Good insight. Not anxious nor agitated  Skin:  No rashes. No jaundice    Reviewed most current lab test results and cultures  YES  Reviewed most current radiology test results   YES  Review and summation of old records today    NO  Reviewed patient's current orders and MAR    YES  PMH/SH reviewed - no change compared to H&P  ________________________________________________________________________  Care Plan discussed with:    Comments   Patient x    Family      RN x    Care Manager     Consultant                        Multidiciplinary team rounds were held today with , nursing, pharmacist and clinical coordinator. Patient's plan of care was discussed; medications were reviewed and discharge planning was addressed.      ________________________________________________________________________  Total NON critical care TIME:  35 Minutes    Total CRITICAL CARE TIME Spent:   Minutes non procedure based      Comments   >50% of visit spent in counseling and coordination of care     ________________________________________________________________________  Jonathan Sosa MD     Procedures: see electronic medical records for all procedures/Xrays and details which were not copied into this note but were reviewed prior to creation of Plan. LABS:  I reviewed today's most current labs and imaging studies.   Pertinent labs include:  Recent Labs     08/19/22  1047 08/18/22  1017   WBC 8.7 7.9   HGB 7.0* 7.5*   HCT 22.6* 23.7*    298       Recent Labs     08/19/22  1047 08/18/22  1020 08/18/22  1017     138  --  140   K 5.2*  5.4*  --  5.6*   *  113*  --  113*   CO2 17*  16*  --  18*   *  112*  --  198*   BUN 72*  72*  --  67*   CREA 6.98*  6.98*  --  6.51*   CA 6.5*  6.5*  --  6.5*   MG  --  1.7  --    PHOS 6.7*  --   --    ALB 2.8*  2.6*  --  2.4*   TBILI 0.7  --  0.5   ALT 26  --  23         Signed: Jonathan Sosa MD

## 2022-08-20 NOTE — PROCEDURES
Hemodialysis / [phone]    Vitals Pre Post Assessment Pre Post   BP BP: (!) 185/91 (08/20/22 1211)   210/91 LOC A&Ox4 A&Ox4   HR Pulse (Heart Rate): 93 (08/20/22 1211) 101 Lungs clear clear   Resp Resp Rate: 18 (08/20/22 1211) 18 Cardiac RRR(NSR per report)  Hypertensive RRR (NSR per report) Hypertensive   Temp Temp: 98.1 °F (36.7 °C) (08/20/22 1211) 98.9 Skin Amputated toe dressings Amputated toe dressings   Weight  N/A N/a Edema Trace BLE Trace BLE   Tele status remote remote Pain 0 0     Orders   Duration: Start: 9920 End: 7356 Total: 2.5 hours   Dialyzer: Dialyzer/Set Up Inspection: Edie Guillermo (08/20/22 1211)   K Bath: Dialysate K (mEq/L): 2 (08/20/22 1211)   Ca Bath: Dialysate CA (mEq/L): 3.5 (08/20/22 1211)   Na: Dialysate NA (mEq/L): 140 (08/20/22 1211)   Bicarb: Dialysate HCO3 (mEq/L): 40 (08/20/22 1211)   Target Fluid Removal: Goal/Amount of Fluid to Remove (mL): 2000 mL (08/20/22 1211)     Access   Type & Location: [facility]   Comments:  Dressing changed and dated 8/20/22. No s/s of infection. Both lumens aspirate & flush well. Running well at .                                        Labs   HBsAg (Antigen) / date: Negative 8/19/22                                       HBsAb (Antibody) / date: Susceptible 8/19/22   Source: Epic   Obtained/Reviewed  Critical Results Called HGB   Date Value Ref Range Status   08/20/2022 6.6 (L) 12.1 - 17.0 g/dL Final     Potassium   Date Value Ref Range Status   08/19/2022 5.2 (H) 3.5 - 5.1 mmol/L Final   08/19/2022 5.4 (H) 3.5 - 5.1 mmol/L Final     Comment:     SPECIMEN HEMOLYZED, RESULTS MAY BE AFFECTED     Calcium   Date Value Ref Range Status   08/19/2022 6.5 (L) 8.5 - 10.1 MG/DL Final   08/19/2022 6.5 (L) 8.5 - 10.1 MG/DL Final     BUN   Date Value Ref Range Status   08/19/2022 72 (H) 6 - 20 MG/DL Final   08/19/2022 72 (H) 6 - 20 MG/DL Final     Creatinine   Date Value Ref Range Status   08/19/2022 6.98 (H) 0.70 - 1.30 MG/DL Final   08/19/2022 6.98 (H) 0.70 - 1.30 MG/DL Final        Meds Given   Name Dose Route   none                 Adequacy / Fluid    Total Liters Process: 49.1L   Net Fluid Removed: 2000ml      Comments   Time Out Done:   (Time) 1210   Admitting Diagnosis: PONCHO   Consent obtained/signed: Informed Consent Verified: Yes (08/20/22 1211)   Machine / Royetta Seats # Machine Number: [id number] (08/20/22 6508)   Primary Nurse Rpt Pre: Ijeoma Yung RN   Primary Nurse Rpt Post: Ijeoma Yung RN   Pt Education: Procedure   Care Plan: Ongoing   Pts outpatient clinic: TBD     Tx Summary   Comments:   SBAR received from Primary RN. Pt arrived to HD suite A&Ox4. Consent signed & on file. 1211: Each catheter limb disinfected per p&p, caps removed, hubs disinfected per p&p. Each lumen aspirated for blood return and flushed with Normal Saline per policy. Labs drawn per request/ order. VSS. Dialysis Tx initiated. ** Pt tolerated procedure well without complications or complaints. Remained hypertensive, Primary RN aware. All lines and connections remained visible throughout entire treatment. **    1444: Tx ended. VSS. Each dialysis catheter limb disinfected per p&p, all possible blood returned per p&p, and each dialysis hub disinfected per p&p. Each lumen flushed and caps applied. Bed locked and in the lowest position, call bell and belongings in reach. SBAR given to Primary, RN. Patient is stable at time of their departure. All Dialysis related medications have been reviewed.

## 2022-08-20 NOTE — PROGRESS NOTES
Bedside and Verbal shift change report given to Connor Corado (oncoming nurse) by Reyes Mahajan (offgoing nurse). Report included the following information SBAR, Kardex, MAR, and Cardiac Rhythm SR . Dialysis done, will need morning labs done from dialysis access later.  ENDURANCE CATH DID NOT PULL BLOOD

## 2022-08-20 NOTE — PROGRESS NOTES
1630 Patient BP still at 219/91,Md Hoffmann notified, Prn labetalol and scheduled bumex given, will recheck and will wait for the blood transfusion until the BP gets better    1800 BP down to 148/79, patient refused to take the stat clonidine    1900:Bedside shift change report given to JAMAAL choi (oncoming nurse) by Criss Sanchez (offgoing nurse). Report included the following information SBAR, Kardex, Procedure Summary, Intake/Output, MAR, and Recent Results.   On coming nurse made aware of the blood transfusion to be done tonight

## 2022-08-21 LAB
ABO + RH BLD: NORMAL
BLD PROD TYP BPU: NORMAL
BLOOD GROUP ANTIBODIES SERPL: NORMAL
BPU ID: NORMAL
CROSSMATCH RESULT,%XM: NORMAL
GLUCOSE BLD STRIP.AUTO-MCNC: 144 MG/DL (ref 65–117)
GLUCOSE BLD STRIP.AUTO-MCNC: 146 MG/DL (ref 65–117)
GLUCOSE BLD STRIP.AUTO-MCNC: 183 MG/DL (ref 65–117)
GLUCOSE BLD STRIP.AUTO-MCNC: 195 MG/DL (ref 65–117)
HCT VFR BLD AUTO: 25 % (ref 36.6–50.3)
HGB BLD-MCNC: 7.8 G/DL (ref 12.1–17)
SERVICE CMNT-IMP: ABNORMAL
SPECIMEN EXP DATE BLD: NORMAL
STATUS OF UNIT,%ST: NORMAL
UNIT DIVISION, %UDIV: 0

## 2022-08-21 PROCEDURE — 74011250637 HC RX REV CODE- 250/637: Performed by: INTERNAL MEDICINE

## 2022-08-21 PROCEDURE — 74011636637 HC RX REV CODE- 636/637: Performed by: INTERNAL MEDICINE

## 2022-08-21 PROCEDURE — 74011000250 HC RX REV CODE- 250: Performed by: INTERNAL MEDICINE

## 2022-08-21 PROCEDURE — 82962 GLUCOSE BLOOD TEST: CPT

## 2022-08-21 PROCEDURE — 85018 HEMOGLOBIN: CPT

## 2022-08-21 PROCEDURE — 36415 COLL VENOUS BLD VENIPUNCTURE: CPT

## 2022-08-21 PROCEDURE — 74011250636 HC RX REV CODE- 250/636: Performed by: INTERNAL MEDICINE

## 2022-08-21 PROCEDURE — 65270000029 HC RM PRIVATE

## 2022-08-21 RX ORDER — AMLODIPINE BESYLATE 5 MG/1
10 TABLET ORAL DAILY
Status: DISCONTINUED | OUTPATIENT
Start: 2022-08-21 | End: 2022-08-21

## 2022-08-21 RX ORDER — HYDROMORPHONE HYDROCHLORIDE 1 MG/ML
0.5 INJECTION, SOLUTION INTRAMUSCULAR; INTRAVENOUS; SUBCUTANEOUS
Status: DISCONTINUED | OUTPATIENT
Start: 2022-08-21 | End: 2022-08-26

## 2022-08-21 RX ORDER — METOPROLOL TARTRATE 25 MG/1
12.5 TABLET, FILM COATED ORAL EVERY 12 HOURS
Status: DISCONTINUED | OUTPATIENT
Start: 2022-08-21 | End: 2022-08-29 | Stop reason: HOSPADM

## 2022-08-21 RX ADMIN — ATORVASTATIN CALCIUM 40 MG: 40 TABLET, FILM COATED ORAL at 09:31

## 2022-08-21 RX ADMIN — HYDRALAZINE HYDROCHLORIDE 100 MG: 50 TABLET, FILM COATED ORAL at 17:42

## 2022-08-21 RX ADMIN — SODIUM CHLORIDE, PRESERVATIVE FREE 10 ML: 5 INJECTION INTRAVENOUS at 06:19

## 2022-08-21 RX ADMIN — OXYCODONE AND ACETAMINOPHEN 1 TABLET: 5; 325 TABLET ORAL at 07:34

## 2022-08-21 RX ADMIN — HYDRALAZINE HYDROCHLORIDE 100 MG: 50 TABLET, FILM COATED ORAL at 22:10

## 2022-08-21 RX ADMIN — HYDROMORPHONE HYDROCHLORIDE 0.5 MG: 1 INJECTION, SOLUTION INTRAMUSCULAR; INTRAVENOUS; SUBCUTANEOUS at 09:32

## 2022-08-21 RX ADMIN — HYDROMORPHONE HYDROCHLORIDE 0.5 MG: 1 INJECTION, SOLUTION INTRAMUSCULAR; INTRAVENOUS; SUBCUTANEOUS at 22:11

## 2022-08-21 RX ADMIN — HYDROMORPHONE HYDROCHLORIDE 0.5 MG: 1 INJECTION, SOLUTION INTRAMUSCULAR; INTRAVENOUS; SUBCUTANEOUS at 13:33

## 2022-08-21 RX ADMIN — METOPROLOL TARTRATE 12.5 MG: 25 TABLET, FILM COATED ORAL at 22:10

## 2022-08-21 RX ADMIN — CALCIUM ACETATE 1334 MG: 667 CAPSULE ORAL at 07:34

## 2022-08-21 RX ADMIN — METOPROLOL TARTRATE 12.5 MG: 25 TABLET, FILM COATED ORAL at 09:32

## 2022-08-21 RX ADMIN — ASPIRIN 81 MG: 81 TABLET, COATED ORAL at 09:31

## 2022-08-21 RX ADMIN — FERROUS SULFATE TAB 325 MG (65 MG ELEMENTAL FE) 325 MG: 325 (65 FE) TAB at 07:34

## 2022-08-21 RX ADMIN — CALCIUM ACETATE 1334 MG: 667 CAPSULE ORAL at 11:56

## 2022-08-21 RX ADMIN — BUMETANIDE 4 MG: 0.25 INJECTION INTRAMUSCULAR; INTRAVENOUS at 09:32

## 2022-08-21 RX ADMIN — BUMETANIDE 4 MG: 0.25 INJECTION INTRAMUSCULAR; INTRAVENOUS at 17:41

## 2022-08-21 RX ADMIN — SODIUM CHLORIDE, PRESERVATIVE FREE 10 ML: 5 INJECTION INTRAVENOUS at 13:29

## 2022-08-21 RX ADMIN — CALCIUM ACETATE 1334 MG: 667 CAPSULE ORAL at 17:42

## 2022-08-21 RX ADMIN — HYDROMORPHONE HYDROCHLORIDE 0.5 MG: 1 INJECTION, SOLUTION INTRAMUSCULAR; INTRAVENOUS; SUBCUTANEOUS at 17:41

## 2022-08-21 RX ADMIN — NIFEDIPINE 90 MG: 90 TABLET, EXTENDED RELEASE ORAL at 09:31

## 2022-08-21 RX ADMIN — SODIUM CHLORIDE, PRESERVATIVE FREE 10 ML: 5 INJECTION INTRAVENOUS at 22:10

## 2022-08-21 RX ADMIN — OXYCODONE AND ACETAMINOPHEN 1 TABLET: 5; 325 TABLET ORAL at 01:14

## 2022-08-21 RX ADMIN — HYDRALAZINE HYDROCHLORIDE 100 MG: 50 TABLET, FILM COATED ORAL at 09:31

## 2022-08-21 RX ADMIN — INSULIN GLARGINE 5 UNITS: 100 INJECTION, SOLUTION SUBCUTANEOUS at 22:11

## 2022-08-21 NOTE — PROGRESS NOTES
Hospitalist Progress Note    NAME: Hector Rios   :  1975   MRN:  [id number]       Assessment / Plan:  PONCHO on CKD stage IV, POA  Hyperkalemia, POA, refused kayexalate   Acute on chronic diastolic heart failure, POA  Uncontrolled HTN  Chest x-ray with cardiomegaly, vascular congestions, small to moderate left pleural effusion  Echo last month with normal EF and abnormal diastolic dysfunction  IV bumex  Hydralazine, nifedipine,. Adding metoprolol bid for better bp control, titrate prn  HD as per nephrology  Strict I&O, daily wt  Daily labs  Appreciate nephrology following    Chronic normocytic anemia, POA  EPO as per nephro  IV venofer  S/p 1 unit prbc for hgb 6. 6.  hgg improved to 7.8    Psychiatric disorder  Cont' home meds    T2DM  Cont' lantus, titrate prn  SSI    R foot wound  Wound care following. Wound does not look infected. Con't daily wound care as ordered  Prn dialudid/ po percocet prn    Code Status: Full code  DVT Prophylaxis: Allergic to heparin products, scd  GI Prophylaxis: not indicated   Baseline: Active, independent     Subjective:     Chief Complaint / Reason for Physician Visit  NAD. Discussed with RN events overnight. Review of Systems:  Symptom Y/N Comments  Symptom Y/N Comments   Fever/Chills    Chest Pain     Poor Appetite    Edema     Cough    Abdominal Pain     Sputum    Joint Pain     SOB/DIETZ    Pruritis/Rash     Nausea/vomit    Tolerating PT/OT     Diarrhea    Tolerating Diet     Constipation    Other       Could NOT obtain due to:      Objective:     VITALS:   Last 24hrs VS reviewed since prior progress note.  Most recent are:  Patient Vitals for the past 24 hrs:   Temp Pulse Resp BP SpO2   22 0737 -- 96 19 (!) 156/78 92 %   22 0300 98.6 °F (37 °C) 91 -- (!) 163/75 90 %   22 0243 98.1 °F (36.7 °C) 92 18 (!) 160/70 95 %   22 0148 98 °F (36.7 °C) -- 19 (!) 166/73 99 %   22 0137 98.1 °F (36.7 °C) 94 18 (!) 162/73 95 %   22 0100 98 °F (36.7 °C) 92 19 (!) 174/81 --   08/20/22 2345 98 °F (36.7 °C) 90 19 (!) 182/82 94 %   08/20/22 2315 98 °F (36.7 °C) 90 19 (!) 169/80 100 %   08/20/22 2301 98 °F (36.7 °C) 93 -- (!) 171/80 --   08/20/22 2234 97.9 °F (36.6 °C) 92 19 (!) 166/77 99 %   08/20/22 2002 -- -- -- -- 98 %   08/20/22 1905 98.4 °F (36.9 °C) (!) 110 -- (!) 184/81 90 %   08/20/22 1757 -- -- -- (!) 148/79 --   08/20/22 1636 99 °F (37.2 °C) 100 20 (!) 219/91 --   08/20/22 1444 98.9 °F (37.2 °C) (!) 101 18 (!) 210/91 --   08/20/22 1430 -- (!) 102 18 (!) 198/96 --   08/20/22 1415 -- (!) 102 18 (!) 198/96 --   08/20/22 1400 -- 98 18 (!) 199/95 --   08/20/22 1345 -- 99 18 (!) 211/97 --   08/20/22 1330 -- 96 18 (!) 213/97 --   08/20/22 1315 -- 97 18 (!) 214/100 --   08/20/22 1300 -- 97 18 (!) 216/103 --   08/20/22 1245 -- 97 18 (!) 214/100 --   08/20/22 1230 -- 97 18 (!) 209/101 --   08/20/22 1215 -- 94 18 (!) 201/99 --   08/20/22 1211 98.1 °F (36.7 °C) 93 18 (!) 185/91 --   08/20/22 1032 97.9 °F (36.6 °C) 91 19 (!) 194/88 97 %         Intake/Output Summary (Last 24 hours) at 8/21/2022 1022  Last data filed at 8/21/2022 7438  Gross per 24 hour   Intake 1145 ml   Output 2950 ml   Net -1805 ml          I had a face to face encounter and independently examined this patient on 8/21/2022, as outlined below:  PHYSICAL EXAM:  General: WD, WN. Alert, cooperative, no acute distress    EENT:  EOMI. Anicteric sclerae. MMM  Resp:  CTA bilaterally, no wheezing or rales. No accessory muscle use  CV:  Regular  rhythm,  No edema  GI:  Soft, Non distended, Non tender. +Bowel sounds  Neurologic:  Alert and oriented X 3, normal speech  Psych:   Fair insight. Not anxious nor agitated  Skin:  No rashes.   No jaundice    Reviewed most current lab test results and cultures  YES  Reviewed most current radiology test results   YES  Review and summation of old records today    NO  Reviewed patient's current orders and MAR    YES  PMH/SH reviewed - no change compared to H&P  ________________________________________________________________________  Care Plan discussed with:    Comments   Patient x    Family      RN x    Care Manager     Consultant                        Multidiciplinary team rounds were held today with , nursing, pharmacist and clinical coordinator. Patient's plan of care was discussed; medications were reviewed and discharge planning was addressed. ________________________________________________________________________  Total NON critical care TIME:  35 Minutes    Total CRITICAL CARE TIME Spent:   Minutes non procedure based      Comments   >50% of visit spent in counseling and coordination of care     ________________________________________________________________________  Osmany Lee MD     Procedures: see electronic medical records for all procedures/Xrays and details which were not copied into this note but were reviewed prior to creation of Plan. LABS:  I reviewed today's most current labs and imaging studies.   Pertinent labs include:  Recent Labs     08/21/22  1001 08/20/22  1215 08/19/22  1047   WBC  --  7.6 8.7   HGB 7.8* 6.6* 7.0*   HCT 25.0* 20.9* 22.6*   PLT  --  278 289       Recent Labs     08/20/22  1215 08/19/22  1047    140  138   K 4.6 5.2*  5.4*    113*  113*   CO2 25 17*  16*   * 114*  112*   BUN 51* 72*  72*   CREA 5.32* 6.98*  6.98*   CA 7.8* 6.5*  6.5*   PHOS 4.6 6.7*   ALB 2.3* 2.8*  2.6*   TBILI  --  0.7   ALT  --  26         Signed: Osmany Lee MD

## 2022-08-21 NOTE — PROGRESS NOTES
Text paged Carla Willams NP     Pt had been getting Morphine for pain management, this has been discontinued and he is demanding to get it, rates his pain as a 10/10 on his neck at the dialysis access site and his legs.  He does have a q6hrs Oxycodone but he says it does not work

## 2022-08-21 NOTE — PROGRESS NOTES
Received notification from bedside RN about patient with regards to: 10/10 pain not appropriately relieved by PRN Oxycodone, requesting medication for relief  VS: /81, , RR 20, O2 sat 90% on RA    Intervention given: Dilaudid 0.5 mg IV x 1 dose ordered

## 2022-08-21 NOTE — PROGRESS NOTES
Bedside and Verbal shift change report given to [facility]  RN(oncoming nurse) by Taiwo Beltran (offgoing nurse). Report included the following information SBAR, Kardex, MAR, Recent Results, and Cardiac Rhythm SR . Blood transfusion completed.  Will need H&H recheck

## 2022-08-21 NOTE — PROGRESS NOTES
1900:Bedside shift change report given to JAMAAL choi (oncoming nurse) by Heber Rivera (offgoing nurse). Report included the following information SBAR, Kardex, Procedure Summary, Intake/Output, MAR, and Recent Results.

## 2022-08-22 LAB
ANION GAP SERPL CALC-SCNC: 5 MMOL/L (ref 5–15)
BASOPHILS # BLD: 0 K/UL (ref 0–0.1)
BASOPHILS NFR BLD: 0 % (ref 0–1)
BUN SERPL-MCNC: 38 MG/DL (ref 6–20)
BUN/CREAT SERPL: 8 (ref 12–20)
CALCIUM SERPL-MCNC: 7.9 MG/DL (ref 8.5–10.1)
CHLORIDE SERPL-SCNC: 104 MMOL/L (ref 97–108)
CO2 SERPL-SCNC: 29 MMOL/L (ref 21–32)
CREAT SERPL-MCNC: 4.75 MG/DL (ref 0.7–1.3)
DIFFERENTIAL METHOD BLD: ABNORMAL
EOSINOPHIL # BLD: 0.2 K/UL (ref 0–0.4)
EOSINOPHIL NFR BLD: 3 % (ref 0–7)
ERYTHROCYTE [DISTWIDTH] IN BLOOD BY AUTOMATED COUNT: 15.7 % (ref 11.5–14.5)
GLUCOSE BLD STRIP.AUTO-MCNC: 132 MG/DL (ref 65–117)
GLUCOSE BLD STRIP.AUTO-MCNC: 154 MG/DL (ref 65–117)
GLUCOSE BLD STRIP.AUTO-MCNC: 257 MG/DL (ref 65–117)
GLUCOSE BLD STRIP.AUTO-MCNC: 91 MG/DL (ref 65–117)
GLUCOSE SERPL-MCNC: 125 MG/DL (ref 65–100)
HCT VFR BLD AUTO: 26.5 % (ref 36.6–50.3)
HGB BLD-MCNC: 8.4 G/DL (ref 12.1–17)
IMM GRANULOCYTES # BLD AUTO: 0.1 K/UL (ref 0–0.04)
IMM GRANULOCYTES NFR BLD AUTO: 1 % (ref 0–0.5)
LYMPHOCYTES # BLD: 1.8 K/UL (ref 0.8–3.5)
LYMPHOCYTES NFR BLD: 20 % (ref 12–49)
MCH RBC QN AUTO: 27.4 PG (ref 26–34)
MCHC RBC AUTO-ENTMCNC: 31.7 G/DL (ref 30–36.5)
MCV RBC AUTO: 86.3 FL (ref 80–99)
MONOCYTES # BLD: 1 K/UL (ref 0–1)
MONOCYTES NFR BLD: 12 % (ref 5–13)
NEUTS SEG # BLD: 5.9 K/UL (ref 1.8–8)
NEUTS SEG NFR BLD: 64 % (ref 32–75)
NRBC # BLD: 0 K/UL (ref 0–0.01)
NRBC BLD-RTO: 0 PER 100 WBC
PLATELET # BLD AUTO: 344 K/UL (ref 150–400)
PMV BLD AUTO: 9 FL (ref 8.9–12.9)
POTASSIUM SERPL-SCNC: 4.5 MMOL/L (ref 3.5–5.1)
RBC # BLD AUTO: 3.07 M/UL (ref 4.1–5.7)
SERVICE CMNT-IMP: ABNORMAL
SERVICE CMNT-IMP: NORMAL
SODIUM SERPL-SCNC: 138 MMOL/L (ref 136–145)
WBC # BLD AUTO: 9 K/UL (ref 4.1–11.1)

## 2022-08-22 PROCEDURE — 90935 HEMODIALYSIS ONE EVALUATION: CPT

## 2022-08-22 PROCEDURE — 82962 GLUCOSE BLOOD TEST: CPT

## 2022-08-22 PROCEDURE — 74011250637 HC RX REV CODE- 250/637: Performed by: INTERNAL MEDICINE

## 2022-08-22 PROCEDURE — 74011636637 HC RX REV CODE- 636/637: Performed by: INTERNAL MEDICINE

## 2022-08-22 PROCEDURE — 36415 COLL VENOUS BLD VENIPUNCTURE: CPT

## 2022-08-22 PROCEDURE — 74011000250 HC RX REV CODE- 250: Performed by: INTERNAL MEDICINE

## 2022-08-22 PROCEDURE — 74011250636 HC RX REV CODE- 250/636: Performed by: INTERNAL MEDICINE

## 2022-08-22 PROCEDURE — 85025 COMPLETE CBC W/AUTO DIFF WBC: CPT

## 2022-08-22 PROCEDURE — 65270000029 HC RM PRIVATE

## 2022-08-22 PROCEDURE — 80048 BASIC METABOLIC PNL TOTAL CA: CPT

## 2022-08-22 RX ORDER — FUROSEMIDE 40 MG/1
80 TABLET ORAL DAILY
Status: DISCONTINUED | OUTPATIENT
Start: 2022-08-22 | End: 2022-08-29 | Stop reason: HOSPADM

## 2022-08-22 RX ADMIN — HYDROMORPHONE HYDROCHLORIDE 0.5 MG: 1 INJECTION, SOLUTION INTRAMUSCULAR; INTRAVENOUS; SUBCUTANEOUS at 12:33

## 2022-08-22 RX ADMIN — METOPROLOL TARTRATE 12.5 MG: 25 TABLET, FILM COATED ORAL at 22:38

## 2022-08-22 RX ADMIN — METOPROLOL TARTRATE 12.5 MG: 25 TABLET, FILM COATED ORAL at 12:31

## 2022-08-22 RX ADMIN — HYDRALAZINE HYDROCHLORIDE 100 MG: 50 TABLET, FILM COATED ORAL at 12:32

## 2022-08-22 RX ADMIN — HYDROMORPHONE HYDROCHLORIDE 0.5 MG: 1 INJECTION, SOLUTION INTRAMUSCULAR; INTRAVENOUS; SUBCUTANEOUS at 17:13

## 2022-08-22 RX ADMIN — SODIUM CHLORIDE, PRESERVATIVE FREE 10 ML: 5 INJECTION INTRAVENOUS at 22:00

## 2022-08-22 RX ADMIN — LABETALOL HYDROCHLORIDE 20 MG: 5 INJECTION INTRAVENOUS at 18:06

## 2022-08-22 RX ADMIN — HYDROMORPHONE HYDROCHLORIDE 0.5 MG: 1 INJECTION, SOLUTION INTRAMUSCULAR; INTRAVENOUS; SUBCUTANEOUS at 22:38

## 2022-08-22 RX ADMIN — SODIUM CHLORIDE, PRESERVATIVE FREE 10 ML: 5 INJECTION INTRAVENOUS at 06:23

## 2022-08-22 RX ADMIN — ASPIRIN 81 MG: 81 TABLET, COATED ORAL at 12:31

## 2022-08-22 RX ADMIN — NIFEDIPINE 90 MG: 90 TABLET, EXTENDED RELEASE ORAL at 12:31

## 2022-08-22 RX ADMIN — HYDROMORPHONE HYDROCHLORIDE 0.5 MG: 1 INJECTION, SOLUTION INTRAMUSCULAR; INTRAVENOUS; SUBCUTANEOUS at 07:46

## 2022-08-22 RX ADMIN — FUROSEMIDE 80 MG: 40 TABLET ORAL at 12:31

## 2022-08-22 RX ADMIN — ATORVASTATIN CALCIUM 40 MG: 40 TABLET, FILM COATED ORAL at 12:31

## 2022-08-22 RX ADMIN — SODIUM CHLORIDE, PRESERVATIVE FREE 5 ML: 5 INJECTION INTRAVENOUS at 14:16

## 2022-08-22 RX ADMIN — HYDRALAZINE HYDROCHLORIDE 100 MG: 50 TABLET, FILM COATED ORAL at 16:47

## 2022-08-22 RX ADMIN — CALCIUM ACETATE 1334 MG: 667 CAPSULE ORAL at 12:31

## 2022-08-22 RX ADMIN — INSULIN GLARGINE 5 UNITS: 100 INJECTION, SOLUTION SUBCUTANEOUS at 22:38

## 2022-08-22 RX ADMIN — HYDROMORPHONE HYDROCHLORIDE 0.5 MG: 1 INJECTION, SOLUTION INTRAMUSCULAR; INTRAVENOUS; SUBCUTANEOUS at 02:28

## 2022-08-22 RX ADMIN — CALCIUM ACETATE 1334 MG: 667 CAPSULE ORAL at 16:47

## 2022-08-22 RX ADMIN — HYDRALAZINE HYDROCHLORIDE 100 MG: 50 TABLET, FILM COATED ORAL at 22:38

## 2022-08-22 NOTE — PROCEDURES
Hemodialysis / [phone]    Vitals Pre Post Assessment Pre Post   BP BP: 121/65 (08/22/22 0805) 188/91 LOC A&Ox4 No change   HR Pulse (Heart Rate): 75 (08/22/22 0805) 84 Lungs clear No change   Resp Resp Rate: 18 (08/22/22 0805) 18 Cardiac RRR No change   Temp Temp: 97.9 °F (36.6 °C) (08/22/22 0805) 97.8 Skin CDI No change   Weight    Edema 3+ BLE No change   Tele status   Pain Pain Intensity 1: 10 (08/22/22 0749)      Orders   Duration: Start: 0805 End: 3431 Total: 3.5hrs   Dialyzer: Dialyzer/Set Up Inspection: Gerardo Bowser (08/22/22 0805)   K Bath: Dialysate K (mEq/L): 2 (08/22/22 0805)   Ca Bath: Dialysate CA (mEq/L): 2.5 (08/22/22 0805)   Na: Dialysate NA (mEq/L): 138 (08/22/22 0805)   Bicarb: Dialysate HCO3 (mEq/L): 35 (08/22/22 0805)   Target Fluid Removal: Goal/Amount of Fluid to Remove (mL): 3000 mL (08/22/22 0805)     Access   Type & Location: [facility]: Dressing CDI. No s/s of infection. Both lumens aspirate & flush well. Running well at . SBAR received from Primary RN. Pt arrived to HD suite A&Ox4. Consent signed & on file. Each catheter limb disinfected per p&p, caps removed, hubs disinfected per p&p. Each lumen aspirated for blood return and flushed with Normal Saline per policy. VSS. Dialysis Tx initiated. Comments:  Dressing CDI. No s/s of infection noted.                                       Labs   HBsAg (Antigen) / date: Neg 8/19/22                                              HBsAb (Antibody) / date: Susc 8/19/22   Source: EPIC   Obtained/Reviewed  Critical Results Called HGB   Date Value Ref Range Status   08/22/2022 8.4 (L) 12.1 - 17.0 g/dL Final     Potassium   Date Value Ref Range Status   08/22/2022 4.5 3.5 - 5.1 mmol/L Final     Calcium   Date Value Ref Range Status   08/22/2022 7.9 (L) 8.5 - 10.1 MG/DL Final     BUN   Date Value Ref Range Status   08/22/2022 38 (H) 6 - 20 MG/DL Final     Creatinine   Date Value Ref Range Status   08/22/2022 4.75 (H) 0.70 - 1.30 MG/DL Final Meds Given   Name Dose Route   None ordered                 Adequacy / Fluid    Total Liters Process: 78.9   Net Fluid Removed: 3000mL      Comments   Time Out Done:   (Time) 0800   Admitting Diagnosis: PONCHO   Consent obtained/signed: Informed Consent Verified: Yes (08/22/22 0805)   Machine / RO # Machine Number: [id number] (08/22/22 5356)   Primary Nurse Rpt Pre: Kemar Rodriguez RN   Primary Nurse Rpt Post: Alonso Red RN   Pt Education: procedural   Care Plan: On going   Pts outpatient clinic: TBD     Tx Summary  0805:  Juan Carlos Spark: Dressing CDI. No s/s of infection. Both lumens aspirate & flush well. Running well at . SBAR received from Primary RN. Pt arrived to HD suite A&Ox4. Consent signed & on file. Each catheter limb disinfected per p&p, caps removed, hubs disinfected per p&p. Each lumen aspirated for blood return and flushed with Normal Saline per policy. VSS. Dialysis Tx initiated. 5701:  Pt resting  0830:  Pt resting  0845:  Pt resting  0900:  Pt resting  0915:  Pt resting  0930:  Pt resting  0945:  Pt resting  1000:  Pt resting  1015:  Pt resting  1030:  Pt resting  1045:  Pt resting  1100:  Pt resting  1115:  Pt resting  1130:  Pt resting  1143: Tx ended. VSS. Each dialysis catheter limb disinfected per p&p, all possible blood returned per p&p, and each dialysis hub disinfected per p&p. Each lumen flushed, post dialysis catheter Heparin dwell instilled per order, and caps applied. Bed locked and in the lowest position, call bell and belongings in reach. SBAR given to Primary, RN. Patient is stable at time of their/ my departure. All Dialysis related medications have been reviewed. Comments:   Assessment performed by RN. Procedure and documentation observed and reviewed by Anjali Esqueda RN.

## 2022-08-22 NOTE — PROGRESS NOTES
Hospitalist Progress Note    NAME: Agustin Hall   :  1975   MRN:  [id number]       Assessment / Plan:  PONCHO on CKD stage IV, POA  Hyperkalemia, POA, refused kayexalate   Acute on chronic diastolic heart failure, POA  Uncontrolled HTN  Chest x-ray with cardiomegaly, vascular congestions, small to moderate left pleural effusion  Echo last month with normal EF and abnormal diastolic dysfunction  IV bumex  Pta florinef discontinued  Hydralazine, nifedipine, metoprolol bid for better bp control, titrate prn  HD as per nephrology  Strict I&O, daily wt  Daily labs  Pt will need outpt HD slot at discharge. Should be clear to go tomorrow. CM is aware. Appreciate nephrology following    Chronic normocytic anemia, POA  EPO as per nephro  IV venofer  S/p 1 unit prbc for hgb 6. 6.  hbg stable at 8.4    Psychiatric disorder  Cont' home meds    T2DM  Cont' lantus, titrate prn  SSI    R foot wound  Wound care following. Wound does not look infected. Con't daily wound care as ordered  Prn dialudid/ po percocet prn    Code Status: Full code  DVT Prophylaxis: Allergic to heparin products, scd  GI Prophylaxis: not indicated   Baseline: Active, independent     Subjective:     Chief Complaint / Reason for Physician Visit  NAD. Discussed with RN events overnight. Review of Systems:  Symptom Y/N Comments  Symptom Y/N Comments   Fever/Chills    Chest Pain     Poor Appetite    Edema     Cough    Abdominal Pain     Sputum    Joint Pain     SOB/DIETZ    Pruritis/Rash     Nausea/vomit    Tolerating PT/OT     Diarrhea    Tolerating Diet     Constipation    Other       Could NOT obtain due to:      Objective:     VITALS:   Last 24hrs VS reviewed since prior progress note.  Most recent are:  Patient Vitals for the past 24 hrs:   Temp Pulse Resp BP SpO2   22 1235 97.7 °F (36.5 °C) 87 18 (!) 175/67 99 %   22 1143 97.8 °F (36.6 °C) 84 18 (!) 188/91 --   22 1130 -- 82 18 (!) 188/88 --   22 1115 -- 81 18 (!) 181/88 --   08/22/22 1100 -- 81 18 (!) 181/82 --   08/22/22 1045 -- 80 18 (!) 180/86 --   08/22/22 1030 -- 79 18 (!) 169/84 --   08/22/22 1015 -- 79 18 (!) 163/83 --   08/22/22 1000 -- 78 18 (!) 163/82 --   08/22/22 0945 -- 77 18 (!) 160/76 --   08/22/22 0930 -- 77 18 (!) 174/79 --   08/22/22 0915 -- 76 18 (!) 145/77 --   08/22/22 0900 -- 76 18 (!) 155/74 --   08/22/22 0845 -- 75 18 (!) 144/76 --   08/22/22 0830 -- 75 18 (!) 145/74 --   08/22/22 0821 -- 75 18 126/71 --   08/22/22 0805 97.9 °F (36.6 °C) 75 18 121/65 --   08/22/22 0751 98 °F (36.7 °C) 78 18 -- 98 %   08/22/22 0359 99 °F (37.2 °C) 76 19 (!) 142/72 96 %   08/21/22 2217 98 °F (36.7 °C) 79 20 (!) 141/70 94 %   08/21/22 2037 97.9 °F (36.6 °C) 79 19 (!) 143/76 (!) 86 %   08/21/22 1523 98 °F (36.7 °C) 74 18 (!) 156/92 90 %         Intake/Output Summary (Last 24 hours) at 8/22/2022 1338  Last data filed at 8/22/2022 1143  Gross per 24 hour   Intake 240 ml   Output 3600 ml   Net -3360 ml          I had a face to face encounter and independently examined this patient on 8/22/2022, as outlined below:  PHYSICAL EXAM:  General: WD, WN. Alert, cooperative, no acute distress    EENT:  EOMI. Anicteric sclerae. MMM  Resp:  CTA bilaterally, no wheezing or rales. No accessory muscle use  CV:  Regular  rhythm,  No edema  GI:  Soft, Non distended, Non tender. +Bowel sounds  Neurologic:  Alert and oriented X 3, normal speech  Psych:   Fair insight. Not anxious nor agitated  Skin:  No rashes.   No jaundice    Reviewed most current lab test results and cultures  YES  Reviewed most current radiology test results   YES  Review and summation of old records today    NO  Reviewed patient's current orders and MAR    YES  PMH/SH reviewed - no change compared to H&P  ________________________________________________________________________  Care Plan discussed with:    Comments   Patient x    Family      RN x    Care Manager x    Consultant  x                      Multidiciplinary team rounds were held today with , nursing, pharmacist and clinical coordinator. Patient's plan of care was discussed; medications were reviewed and discharge planning was addressed. ________________________________________________________________________  Total NON critical care TIME:  35 Minutes    Total CRITICAL CARE TIME Spent:   Minutes non procedure based      Comments   >50% of visit spent in counseling and coordination of care     ________________________________________________________________________  Cyrus Barnes MD     Procedures: see electronic medical records for all procedures/Xrays and details which were not copied into this note but were reviewed prior to creation of Plan. LABS:  I reviewed today's most current labs and imaging studies.   Pertinent labs include:  Recent Labs     08/22/22  0109 08/21/22  1001 08/20/22  1215   WBC 9.0  --  7.6   HGB 8.4* 7.8* 6.6*   HCT 26.5* 25.0* 20.9*     --  278       Recent Labs     08/22/22  0109 08/20/22  1215    139   K 4.5 4.6    108   CO2 29 25   * 103*   BUN 38* 51*   CREA 4.75* 5.32*   CA 7.9* 7.8*   PHOS  --  4.6   ALB  --  2.3*         Signed: Cyrus Branes MD

## 2022-08-22 NOTE — PROGRESS NOTES
Nephrology Progress Note  [facility] / [address], [address]  [place], [address]  Phone - [phone]  Fax - [phone]                 Patient: Parish Varghese                   YOB: 1975        Date- 8/22/2022                      Admit Date: 8/18/2022  CC: Follow up for new onset esrd        IMPRESSION & PLAN:   New onset ESRD  Hyperkalemia   Metabolic acidosis   Anemia of CKD   Hypocalcemia   Iron deficiency anemia   edema, shortness of breath, fluid overload. Chronic kidney disease, stage V likely due to diabetic nephropathy and hypertensive nephrosclerosis. PLAN-  Seen on hd today  Set up out pt hd unit  Place permcath  Cont LIBBY and IV iron  Stop florinef  Continue phoslo  Possible d/c tuesday     Subjective: Interval History:   Seen and examined. Seen on hd today  Bp still high  No sob  Objective:   Vitals:    08/22/22 0945 08/22/22 1000 08/22/22 1015 08/22/22 1030   BP: (!) 160/76 (!) 163/82 (!) 163/83 (!) 169/84   Pulse: 77 78 79 79   Resp: 18 18 18 18   Temp:       TempSrc:       SpO2:       Weight:       Height:          08/21 0701 - 08/22 0700  In: 840 [P.O.:840]  Out: 1000 [Urine:1000]  Last 3 Recorded Weights in this Encounter    08/18/22 0956   Weight: 88.9 kg (195 lb 15.8 oz)      Physical exam:    GEN:  NAD  NECK:  Supple, no thyromegaly  RESP: Clear  b/l, no  wheezing,   CVS: RRR,S1,S2   NEURO: non focal, normal speech  EXT: Edema +nt     Access:  KEE wyatt in place    Chart reviewed. Pertinent Notes reviewed.      Data Review :  Recent Labs     08/22/22  0109 08/20/22  1215 08/19/22  1047    139 140  138   K 4.5 4.6 5.2*  5.4*    108 113*  113*   CO2 29 25 17*  16*   BUN 38* 51* 72*  72*   CREA 4.75* 5.32* 6.98*  6.98*   * 103* 114*  112*   CA 7.9* 7.8* 6.5*  6.5*   PHOS  --  4.6 6.7*       Recent Labs     08/22/22  0109 08/21/22  1001 08/20/22  1213 08/19/22  1047   WBC 9.0  --  7.6 8.7   HGB 8.4* 7.8* 6.6* 7.0*   HCT 26.5* 25.0* 20.9* 22.6*     --  278 289       No results for input(s): FE, TIBC, PSAT, FERR in the last 72 hours.    Medication list  reviewed  Current Facility-Administered Medications   Medication Dose Route Frequency    HYDROmorphone (DILAUDID) injection 0.5 mg  0.5 mg IntraVENous Q4H PRN    metoprolol tartrate (LOPRESSOR) tablet 12.5 mg  12.5 mg Oral Q12H    0.9% sodium chloride infusion 250 mL  250 mL IntraVENous PRN    diclofenac (VOLTAREN) 1 % topical gel 4 g  4 g Topical Q6H PRN    calcium acetate(phosphat bind) (PHOSLO) capsule 1,334 mg  2 Capsule Oral TID WITH MEALS    insulin glargine (LANTUS) injection 5 Units  5 Units SubCUTAneous QHS    oxyCODONE-acetaminophen (PERCOCET) 5-325 mg per tablet 1 Tablet  1 Tablet Oral Q6H PRN    albumin human 25% (BUMINATE) solution 12.5 g  12.5 g IntraVENous DIALYSIS PRN    bumetanide (BUMEX) injection 4 mg  4 mg IntraVENous BID    hydrALAZINE (APRESOLINE) tablet 100 mg  100 mg Oral TID    NIFEdipine ER (PROCARDIA XL) tablet 90 mg  90 mg Oral DAILY    labetaloL (NORMODYNE;TRANDATE) injection 20 mg  20 mg IntraVENous Q4H PRN    ARIPiprazole (ABILIFY) tablet 10 mg  10 mg Oral DAILY    aspirin delayed-release tablet 81 mg  81 mg Oral DAILY    atorvastatin (LIPITOR) tablet 40 mg  40 mg Oral DAILY    ferrous sulfate tablet 325 mg  325 mg Oral ACB    [Held by provider] fludrocortisone (FLORINEF) tablet 0.1 mg  0.1 mg Oral DAILY    sodium chloride (NS) flush 5-40 mL  5-40 mL IntraVENous Q8H    sodium chloride (NS) flush 5-40 mL  5-40 mL IntraVENous PRN    acetaminophen (TYLENOL) tablet 650 mg  650 mg Oral Q6H PRN    Or    acetaminophen (TYLENOL) suppository 650 mg  650 mg Rectal Q6H PRN    polyethylene glycol (MIRALAX) packet 17 g  17 g Oral DAILY PRN    promethazine (PHENERGAN) tablet 12.5 mg  12.5 mg Oral Q6H PRN    Or    ondansetron (ZOFRAN) injection 4 mg  4 mg IntraVENous Q6H PRN    insulin lispro (HUMALOG) injection   SubCUTAneous AC&HS    glucose chewable tablet 16 g  4 Tablet Oral PRN    glucagon (GLUCAGEN) injection 1 mg  1 mg IntraMUSCular PRN    dextrose 10% infusion 0-250 mL  0-250 mL IntraVENous PRN    epoetin ace-epbx (RETACRIT) injection 20,000 Units  20,000 Units SubCUTAneous Q TUE, THU & COURT Rodriguez MD  8/22/2022

## 2022-08-22 NOTE — WOUND CARE
Wound care Nurse: consult placed for bilateral feet wounds. Patient is a cantankerous non-compliant patient seen by [facility] nurse last admission for same wounds. Patient in Dialysis and told me he did not want to be bothered in dialysis, but was able to convince him that I needed to see him now and would have staff change dressings when on the his unit. Patient is a 50 y/o AAM admitted 8/18 for PONCHO. No past medical history on file. Acute on chronic CHF  PONCHO on CKD stage IV  Chronic normocytic anemia  DM    Wounds Bilateral TMA's:    Left foot: dry, desiccated skin with small amount of drainage from one area of non-healing incision:    Right TMA: non-healing incison, moderate ampunt of tan drainage, maceration to davonte-wound skin. Patient refused to talk to me after I insisted on seeing his wound in dialysis. From previous admit and note 7/12.22 patient receives all his wound care and surgery from [facility]. Wounds appear much worse from last admission:    Recommend:    Bilateral TMA wounds: daily, paint with betadine/povidine, let air dry. Cover with an Optifoam-AG silver foam pad and secure with gauze wrap and then ace wrap.     Kasey Sheth RN, [employer]

## 2022-08-22 NOTE — PROGRESS NOTES
Transition of [facility] called from [address] [place] [address] Dialysis. She did not know if they could accept Mr. Soren Wilkes with [facility] Medicaid. She asked for [facility] to start the process to get Mr. Benny Whiteing Medicaid. Mr. Soren Wilkes was seen. He informed me that he would be living with his mother at [place] [address]. He asked what needed to happen to have his Medicaid transferred to [place]. He was informed this would need to be determined. A email was sent to [employer] to see what the process is. An email was sent to [address] [place] [address]. Will continue to follow for discharge planning.   Signed By: Nato Hollis LCSW     August 22, 2022

## 2022-08-22 NOTE — PROGRESS NOTES
Bedside and Verbal shift change report given to Andrew Weiner (oncoming nurse) by Stephie Paula (offgoing nurse). Report included the following information SBAR, Kardex, Intake/Output, MAR, Recent Results, and Cardiac Rhythm SR  . Dialysis suite called.  Report given to [facility], pt will have dialysis this AM

## 2022-08-23 ENCOUNTER — APPOINTMENT (OUTPATIENT)
Dept: INTERVENTIONAL RADIOLOGY/VASCULAR | Age: 47
DRG: 291 | End: 2022-08-23
Attending: INTERNAL MEDICINE
Payer: MEDICAID

## 2022-08-23 LAB
GLUCOSE BLD STRIP.AUTO-MCNC: 102 MG/DL (ref 65–117)
GLUCOSE BLD STRIP.AUTO-MCNC: 103 MG/DL (ref 65–117)
GLUCOSE BLD STRIP.AUTO-MCNC: 192 MG/DL (ref 65–117)
GLUCOSE BLD STRIP.AUTO-MCNC: 238 MG/DL (ref 65–117)
SERVICE CMNT-IMP: ABNORMAL
SERVICE CMNT-IMP: ABNORMAL
SERVICE CMNT-IMP: NORMAL
SERVICE CMNT-IMP: NORMAL

## 2022-08-23 PROCEDURE — 86704 HEP B CORE ANTIBODY TOTAL: CPT

## 2022-08-23 PROCEDURE — 74011250636 HC RX REV CODE- 250/636: Performed by: STUDENT IN AN ORGANIZED HEALTH CARE EDUCATION/TRAINING PROGRAM

## 2022-08-23 PROCEDURE — 65270000029 HC RM PRIVATE

## 2022-08-23 PROCEDURE — C1750 CATH, HEMODIALYSIS,LONG-TERM: HCPCS

## 2022-08-23 PROCEDURE — 74011250636 HC RX REV CODE- 250/636: Performed by: INTERNAL MEDICINE

## 2022-08-23 PROCEDURE — 2709999900 HC NON-CHARGEABLE SUPPLY

## 2022-08-23 PROCEDURE — 74011250637 HC RX REV CODE- 250/637: Performed by: INTERNAL MEDICINE

## 2022-08-23 PROCEDURE — 77001 FLUOROGUIDE FOR VEIN DEVICE: CPT

## 2022-08-23 PROCEDURE — 36415 COLL VENOUS BLD VENIPUNCTURE: CPT

## 2022-08-23 PROCEDURE — 77030010507 HC ADH SKN DERMBND J&J -B

## 2022-08-23 PROCEDURE — 0JH63XZ INSERTION OF TUNNELED VASCULAR ACCESS DEVICE INTO CHEST SUBCUTANEOUS TISSUE AND FASCIA, PERCUTANEOUS APPROACH: ICD-10-PCS | Performed by: STUDENT IN AN ORGANIZED HEALTH CARE EDUCATION/TRAINING PROGRAM

## 2022-08-23 PROCEDURE — 74011000250 HC RX REV CODE- 250: Performed by: STUDENT IN AN ORGANIZED HEALTH CARE EDUCATION/TRAINING PROGRAM

## 2022-08-23 PROCEDURE — 02HV33Z INSERTION OF INFUSION DEVICE INTO SUPERIOR VENA CAVA, PERCUTANEOUS APPROACH: ICD-10-PCS | Performed by: STUDENT IN AN ORGANIZED HEALTH CARE EDUCATION/TRAINING PROGRAM

## 2022-08-23 PROCEDURE — 82962 GLUCOSE BLOOD TEST: CPT

## 2022-08-23 PROCEDURE — 77030002996 HC SUT SLK J&J -A

## 2022-08-23 PROCEDURE — 74011000250 HC RX REV CODE- 250: Performed by: INTERNAL MEDICINE

## 2022-08-23 RX ORDER — LIDOCAINE HYDROCHLORIDE 20 MG/ML
10 INJECTION, SOLUTION INFILTRATION; PERINEURAL ONCE
Status: COMPLETED | OUTPATIENT
Start: 2022-08-23 | End: 2022-08-23

## 2022-08-23 RX ORDER — FENTANYL CITRATE 50 UG/ML
100 INJECTION, SOLUTION INTRAMUSCULAR; INTRAVENOUS
Status: DISCONTINUED | OUTPATIENT
Start: 2022-08-23 | End: 2022-08-23

## 2022-08-23 RX ORDER — SODIUM CHLORIDE 9 MG/ML
25 INJECTION, SOLUTION INTRAVENOUS CONTINUOUS
Status: DISCONTINUED | OUTPATIENT
Start: 2022-08-23 | End: 2022-08-23

## 2022-08-23 RX ORDER — MIDAZOLAM HYDROCHLORIDE 1 MG/ML
0-10 INJECTION, SOLUTION INTRAMUSCULAR; INTRAVENOUS
Status: DISCONTINUED | OUTPATIENT
Start: 2022-08-23 | End: 2022-08-23

## 2022-08-23 RX ADMIN — HYDROMORPHONE HYDROCHLORIDE 0.5 MG: 1 INJECTION, SOLUTION INTRAMUSCULAR; INTRAVENOUS; SUBCUTANEOUS at 20:07

## 2022-08-23 RX ADMIN — OXYCODONE AND ACETAMINOPHEN 1 TABLET: 5; 325 TABLET ORAL at 08:23

## 2022-08-23 RX ADMIN — ASPIRIN 81 MG: 81 TABLET, COATED ORAL at 14:55

## 2022-08-23 RX ADMIN — NIFEDIPINE 90 MG: 90 TABLET, EXTENDED RELEASE ORAL at 14:55

## 2022-08-23 RX ADMIN — MIDAZOLAM HYDROCHLORIDE 1 MG: 1 INJECTION, SOLUTION INTRAMUSCULAR; INTRAVENOUS at 13:50

## 2022-08-23 RX ADMIN — CEFAZOLIN 2 G: 1 INJECTION, POWDER, FOR SOLUTION INTRAMUSCULAR; INTRAVENOUS at 13:41

## 2022-08-23 RX ADMIN — OXYCODONE AND ACETAMINOPHEN 1 TABLET: 5; 325 TABLET ORAL at 15:04

## 2022-08-23 RX ADMIN — HYDRALAZINE HYDROCHLORIDE 100 MG: 50 TABLET, FILM COATED ORAL at 15:04

## 2022-08-23 RX ADMIN — FUROSEMIDE 80 MG: 40 TABLET ORAL at 14:55

## 2022-08-23 RX ADMIN — EPOETIN ALFA-EPBX 20000 UNITS: 20000 INJECTION, SOLUTION INTRAVENOUS; SUBCUTANEOUS at 21:29

## 2022-08-23 RX ADMIN — METOPROLOL TARTRATE 12.5 MG: 25 TABLET, FILM COATED ORAL at 21:22

## 2022-08-23 RX ADMIN — SODIUM CHLORIDE, PRESERVATIVE FREE 10 ML: 5 INJECTION INTRAVENOUS at 22:55

## 2022-08-23 RX ADMIN — IRON SUCROSE 200 MG: 20 INJECTION, SOLUTION INTRAVENOUS at 08:27

## 2022-08-23 RX ADMIN — FENTANYL CITRATE 50 MCG: 50 INJECTION, SOLUTION INTRAMUSCULAR; INTRAVENOUS at 14:00

## 2022-08-23 RX ADMIN — SODIUM CHLORIDE, PRESERVATIVE FREE 10 ML: 5 INJECTION INTRAVENOUS at 05:56

## 2022-08-23 RX ADMIN — LABETALOL HYDROCHLORIDE 20 MG: 5 INJECTION INTRAVENOUS at 20:17

## 2022-08-23 RX ADMIN — HYDROMORPHONE HYDROCHLORIDE 0.5 MG: 1 INJECTION, SOLUTION INTRAMUSCULAR; INTRAVENOUS; SUBCUTANEOUS at 16:14

## 2022-08-23 RX ADMIN — HYDROMORPHONE HYDROCHLORIDE 0.5 MG: 1 INJECTION, SOLUTION INTRAMUSCULAR; INTRAVENOUS; SUBCUTANEOUS at 06:52

## 2022-08-23 RX ADMIN — CALCIUM ACETATE 1334 MG: 667 CAPSULE ORAL at 18:38

## 2022-08-23 RX ADMIN — HYDRALAZINE HYDROCHLORIDE 100 MG: 50 TABLET, FILM COATED ORAL at 22:51

## 2022-08-23 RX ADMIN — LIDOCAINE HYDROCHLORIDE 200 MG: 20 INJECTION, SOLUTION INFILTRATION; PERINEURAL at 14:00

## 2022-08-23 RX ADMIN — SODIUM CHLORIDE, PRESERVATIVE FREE 10 ML: 5 INJECTION INTRAVENOUS at 15:12

## 2022-08-23 RX ADMIN — HYDROMORPHONE HYDROCHLORIDE 0.5 MG: 1 INJECTION, SOLUTION INTRAMUSCULAR; INTRAVENOUS; SUBCUTANEOUS at 11:43

## 2022-08-23 RX ADMIN — HYDROMORPHONE HYDROCHLORIDE 0.5 MG: 1 INJECTION, SOLUTION INTRAMUSCULAR; INTRAVENOUS; SUBCUTANEOUS at 02:49

## 2022-08-23 RX ADMIN — ATORVASTATIN CALCIUM 40 MG: 40 TABLET, FILM COATED ORAL at 14:55

## 2022-08-23 NOTE — PROGRESS NOTES
Nephrology Progress Note  [facility] / [address] , [facility]  [facility], [address]  Phone - [phone]  Fax - [phone]                 Patient: Maury Sandhoff                   YOB: 1975        Date- 8/23/2022                      Admit Date: 8/18/2022  CC: Follow up for new onset ESRD  IMPRESSION & PLAN:   New onset ESRD  Hyperkalemia   Metabolic acidosis   Anemia of CKD   Hypocalcemia   Iron deficiency anemia   edema, shortness of breath, fluid overload. Chronic kidney disease, stage V likely due to diabetic nephropathy and hypertensive nephrosclerosis. PLAN-  NO DIALYSIS TODAY  HD MWF SCHEDULE  Set up out pt hd unit  Permacath placement today  Check hep b total core ab  Cont LIBBY and IV iron  Stop florinef  Continue phoslo     Subjective: Interval History:   Seen and examined. No new issues  Objective:   Vitals:    08/23/22 0807 08/23/22 0836 08/23/22 1108 08/23/22 1311   BP: (!) 145/77 (!) 145/77 (!) 102/53 (!) 133/59   Pulse: 80 80 79 81   Resp:    16   Temp:  97.6 °F (36.4 °C) 97.8 °F (36.6 °C)    TempSrc:       SpO2:  93%  95%   Weight:       Height:          08/22 0701 - 08/23 0700  In: 480 [P.O.:480]  Out: 3000   Last 3 Recorded Weights in this Encounter    08/18/22 0956   Weight: 88.9 kg (195 lb 15.8 oz)      Physical exam:    GEN:  NAD  NECK:  Supple, no thyromegaly  RESP: Clear  b/l, no  wheezing,   CVS: RRR,S1,S2   NEURO: non focal, normal speech  EXT: Edema +nt       Access:  KEE wyatt in place    Chart reviewed. Pertinent Notes reviewed. Data Review :  Recent Labs     08/22/22  0109      K 4.5      CO2 29   BUN 38*   CREA 4.75*   *   CA 7.9*       Recent Labs     08/22/22  0109 08/21/22  1001   WBC 9.0  --    HGB 8.4* 7.8*   HCT 26.5* 25.0*     --        No results for input(s): FE, TIBC, PSAT, FERR in the last 72 hours.    Medication list reviewed  Current Facility-Administered Medications   Medication Dose Route Frequency    midazolam (VERSED) injection 0-10 mg  0-10 mg IntraVENous Multiple    0.9% sodium chloride infusion  25 mL/hr IntraVENous CONTINUOUS    fentaNYL citrate (PF) injection 100 mcg  100 mcg IntraVENous Multiple    lidocaine (XYLOCAINE) 20 mg/mL (2 %) injection 200 mg  10 mL SubCUTAneous ONCE    lidocaine (XYLOCAINE) 20 mg/mL (2 %) injection 200 mg  10 mL SubCUTAneous ONCE    furosemide (LASIX) tablet 80 mg  80 mg Oral DAILY    HYDROmorphone (DILAUDID) injection 0.5 mg  0.5 mg IntraVENous Q4H PRN    metoprolol tartrate (LOPRESSOR) tablet 12.5 mg  12.5 mg Oral Q12H    0.9% sodium chloride infusion 250 mL  250 mL IntraVENous PRN    diclofenac (VOLTAREN) 1 % topical gel 4 g  4 g Topical Q6H PRN    calcium acetate(phosphat bind) (PHOSLO) capsule 1,334 mg  2 Capsule Oral TID WITH MEALS    insulin glargine (LANTUS) injection 5 Units  5 Units SubCUTAneous QHS    oxyCODONE-acetaminophen (PERCOCET) 5-325 mg per tablet 1 Tablet  1 Tablet Oral Q6H PRN    albumin human 25% (BUMINATE) solution 12.5 g  12.5 g IntraVENous DIALYSIS PRN    hydrALAZINE (APRESOLINE) tablet 100 mg  100 mg Oral TID    NIFEdipine ER (PROCARDIA XL) tablet 90 mg  90 mg Oral DAILY    labetaloL (NORMODYNE;TRANDATE) injection 20 mg  20 mg IntraVENous Q4H PRN    ARIPiprazole (ABILIFY) tablet 10 mg  10 mg Oral DAILY    aspirin delayed-release tablet 81 mg  81 mg Oral DAILY    atorvastatin (LIPITOR) tablet 40 mg  40 mg Oral DAILY    sodium chloride (NS) flush 5-40 mL  5-40 mL IntraVENous Q8H    sodium chloride (NS) flush 5-40 mL  5-40 mL IntraVENous PRN    acetaminophen (TYLENOL) tablet 650 mg  650 mg Oral Q6H PRN    Or    acetaminophen (TYLENOL) suppository 650 mg  650 mg Rectal Q6H PRN    polyethylene glycol (MIRALAX) packet 17 g  17 g Oral DAILY PRN    promethazine (PHENERGAN) tablet 12.5 mg  12.5 mg Oral Q6H PRN    Or    ondansetron (ZOFRAN) injection 4 mg  4 mg IntraVENous Q6H PRN    insulin lispro (HUMALOG) injection   SubCUTAneous AC&HS    glucose chewable tablet 16 g  4 Tablet Oral PRN    glucagon (GLUCAGEN) injection 1 mg  1 mg IntraMUSCular PRN    dextrose 10% infusion 0-250 mL  0-250 mL IntraVENous PRN    epoetin ace-epbx (RETACRIT) injection 20,000 Units  20,000 Units SubCUTAneous Q TUE, THU & SAT          Haven Ceballos MD  8/23/2022

## 2022-08-23 NOTE — PROGRESS NOTES
Hospitalist Progress Note    NAME: Talbert Brittle   :  1975   MRN:  [id number]       Assessment / Plan:  PONCHO on CKD stage IV, POA  Hyperkalemia, POA, refused kayexalate   Acute on chronic diastolic heart failure, POA  Uncontrolled HTN  Chest x-ray with cardiomegaly, vascular congestions, small to moderate left pleural effusion  Echo last month with normal EF and abnormal diastolic dysfunction  IV bumex, transitioned to PO lasix  Pta florinef discontinued  Hydralazine, nifedipine, metoprolol bid , titrate prn  HD as per nephrology. Permacath placement today  Pt will need outpt HD slot at discharge. Stable for discharge when outpt HD slot is secured. Discussed with nephrology and CM. Chronic normocytic anemia, POA  EPO as per nephro  S/p IV venofer  S/p 1 unit prbc for hgb 6. 6.  hbg stable at 8.4    Psychiatric disorder  Cont' home meds    T2DM  Cont' lower dose lantus, titrate prn  SSI    R foot wound  Wound care following. Wound does not look infected. Con't daily wound care as ordered  Prn dialudid/ po percocet prn      SAMM:, pending permacath today. Needs outpt HD slot      Code Status: Full code  DVT Prophylaxis: Allergic to heparin products, scd  GI Prophylaxis: not indicated   Baseline: Active, independent     Subjective:     Chief Complaint / Reason for Physician Visit  NAD. Discussed with RN events overnight. Review of Systems:  Symptom Y/N Comments  Symptom Y/N Comments   Fever/Chills    Chest Pain     Poor Appetite    Edema     Cough    Abdominal Pain     Sputum    Joint Pain     SOB/DIETZ    Pruritis/Rash     Nausea/vomit    Tolerating PT/OT     Diarrhea    Tolerating Diet     Constipation    Other       Could NOT obtain due to:      Objective:     VITALS:   Last 24hrs VS reviewed since prior progress note.  Most recent are:  Patient Vitals for the past 24 hrs:   Temp Pulse Resp BP SpO2   22 1108 97.8 °F (36.6 °C) 79 -- (!) 102/53 --   22 0836 97.6 °F (36.4 °C) 80 -- (!) 145/77 93 %   08/23/22 0807 -- 80 -- (!) 145/77 --   08/23/22 0315 -- -- -- (!) 161/84 --   08/23/22 0252 98.5 °F (36.9 °C) 86 16 (!) 174/83 100 %   08/22/22 2145 98.2 °F (36.8 °C) 84 16 (!) 148/81 98 %   08/22/22 1806 -- 89 -- (!) 180/92 --   08/22/22 1541 97.8 °F (36.6 °C) 88 18 (!) 170/77 95 %   08/22/22 1315 -- 88 -- (!) 158/62 --   08/22/22 1235 97.7 °F (36.5 °C) 87 18 (!) 175/67 99 %         Intake/Output Summary (Last 24 hours) at 8/23/2022 1146  Last data filed at 8/22/2022 1806  Gross per 24 hour   Intake 480 ml   Output --   Net 480 ml          I had a face to face encounter and independently examined this patient on 8/23/2022, as outlined below:  PHYSICAL EXAM:  General: WD, WN. Alert, cooperative, no acute distress    EENT:  EOMI. Anicteric sclerae. MMM  Resp:  CTA bilaterally, no wheezing or rales. No accessory muscle use  CV:  Regular  rhythm,  No edema  GI:  Soft, Non distended, Non tender. +Bowel sounds  Neurologic:  Alert and oriented X 3, normal speech  Psych:   Fair insight. Not anxious nor agitated  Skin:  No rashes. No jaundice    Reviewed most current lab test results and cultures  YES  Reviewed most current radiology test results   YES  Review and summation of old records today    NO  Reviewed patient's current orders and MAR    YES  PMH/SH reviewed - no change compared to H&P  ________________________________________________________________________  Care Plan discussed with:    Comments   Patient x    Family      RN x    Care Manager x    Consultant  x nephro                     Multidiciplinary team rounds were held today with , nursing, pharmacist and clinical coordinator. Patient's plan of care was discussed; medications were reviewed and discharge planning was addressed.      ________________________________________________________________________  Total NON critical care TIME:  35 Minutes    Total CRITICAL CARE TIME Spent:   Minutes non procedure based      Comments >50% of visit spent in counseling and coordination of care     ________________________________________________________________________  Joselin Hernandez MD     Procedures: see electronic medical records for all procedures/Xrays and details which were not copied into this note but were reviewed prior to creation of Plan. LABS:  I reviewed today's most current labs and imaging studies.   Pertinent labs include:  Recent Labs     08/22/22  0109 08/21/22  1001 08/20/22  1215   WBC 9.0  --  7.6   HGB 8.4* 7.8* 6.6*   HCT 26.5* 25.0* 20.9*     --  278       Recent Labs     08/22/22  0109 08/20/22  1215    139   K 4.5 4.6    108   CO2 29 25   * 103*   BUN 38* 51*   CREA 4.75* 5.32*   CA 7.9* 7.8*   PHOS  --  4.6   ALB  --  2.3*         Signed: Joselin Hernandez MD

## 2022-08-23 NOTE — PROGRESS NOTES
Conscious sedation used for placement of tunneled dialysis catheter  Versed 1 mg  Fentanyl 50 mcg  Start time 1350 pm  End time 1402 pm

## 2022-08-23 NOTE — PROGRESS NOTES
Transition of care    RUR: 15%    A referral was sent to [facility][1] through [employer]. Remi Borrego was called. They needed the chest xray, 3 days for dialysis notes, and H&P sent to them. These were sent through [employer]. Will continue to follow for discharge planning.   Signed By: Shania Hinojosa LCSW     August 23, 2022

## 2022-08-23 NOTE — PROGRESS NOTES
Overnight at 2045 nursing supervisor called stating security found pt downstairs sitting in a wheelchair in the lobby. Pt had previously been up ad leonardo and walking around the unit. Pt did not have permission nor notified RN of going off unit. When pt was brought back to the unit by another RN, night RN discussed with pt safety concerns in regard to leaving the unit and asked the patient to please not do that again in concern for his safety (tele monitor not picking up while he was downstairs, etc.) Pt was pleasant, understanding and agreeable and had no issues the rest of the shift.

## 2022-08-23 NOTE — PROGRESS NOTES
Bedside and Verbal shift change report given to Germaine Hough RN (oncoming nurse) by Kee Mcqueen RN (offgoing nurse). Report included the following information SBAR, Kardex, Intake/Output, MAR, Recent Results, and Cardiac Rhythm SR . End of Shift Note    Bedside shift change report given to Indra Bustillo RN (oncoming nurse) by Louis Senior RN (offgoing nurse). Report included the following information SBAR, Kardex, Procedure Summary, Intake/Output, MAR, Recent Results, and Cardiac Rhythm SR    Shift worked:  7A-7P     Shift summary and any significant changes:    Had dialysis today and 3L of fluid was removed, tolerated procedure well. Patient had PRN Dilaudid x 3 the whole shift. Continues to have elevated BP, PRN labetalol given at 1806. NPO at midnight for dialysis access placement tomorrow. Concerns for physician to address:       Zone phone for oncoming shift:          Activity:  Activity Level: Up ad leonardo  Number times ambulated in hallways past shift: 2  Number of times OOB to chair past shift: 2    Cardiac:   Cardiac Monitoring: Yes      Cardiac Rhythm: Sinus Rhythm    Access:  Current line(s): Extended dwell, permacath     Genitourinary:   Urinary status: voiding    Respiratory:   O2 Device: None (Room air)  Chronic home O2 use?: NO  Incentive spirometer at bedside: NO       GI:  Last Bowel Movement Date: 08/22/22  Current diet:  ADULT DIET Regular; 4 carb choices (60 gm/meal); Low Sodium (2 gm); Low Potassium (Less than 3000 mg/day)  DIET ONE TIME MESSAGE  DIET NPO  Passing flatus: YES  Tolerating current diet: YES       Pain Management:   Patient states pain is manageable on current regimen: YES    Skin:  Dao Score: 22  Interventions: increase time out of bed    Patient Safety:  Fall Score:  Total Score: 1  Interventions: gripper socks       Length of Stay:  Expected LOS: 3d 19h  Actual LOS: Derek Jordan RN

## 2022-08-23 NOTE — PROGRESS NOTES
Attempted to call report to unit no answer. Patient tolerated permcath placement well, see mar for medications given, patient at increased fall risk due to medications given and no complaint of pain at this time. Permcath is in good placement per MD Harry Reid. Patient awake and ready to eat.

## 2022-08-24 LAB
GLUCOSE BLD STRIP.AUTO-MCNC: 206 MG/DL (ref 65–117)
GLUCOSE BLD STRIP.AUTO-MCNC: 213 MG/DL (ref 65–117)
GLUCOSE BLD STRIP.AUTO-MCNC: 288 MG/DL (ref 65–117)
SERVICE CMNT-IMP: ABNORMAL

## 2022-08-24 PROCEDURE — 74011636637 HC RX REV CODE- 636/637: Performed by: INTERNAL MEDICINE

## 2022-08-24 PROCEDURE — 82962 GLUCOSE BLOOD TEST: CPT

## 2022-08-24 PROCEDURE — 74011250637 HC RX REV CODE- 250/637: Performed by: INTERNAL MEDICINE

## 2022-08-24 PROCEDURE — 90935 HEMODIALYSIS ONE EVALUATION: CPT

## 2022-08-24 PROCEDURE — 74011000250 HC RX REV CODE- 250: Performed by: INTERNAL MEDICINE

## 2022-08-24 PROCEDURE — 74011250636 HC RX REV CODE- 250/636: Performed by: INTERNAL MEDICINE

## 2022-08-24 PROCEDURE — 65270000029 HC RM PRIVATE

## 2022-08-24 RX ADMIN — ASPIRIN 81 MG: 81 TABLET, COATED ORAL at 12:21

## 2022-08-24 RX ADMIN — HYDROMORPHONE HYDROCHLORIDE 0.5 MG: 1 INJECTION, SOLUTION INTRAMUSCULAR; INTRAVENOUS; SUBCUTANEOUS at 00:38

## 2022-08-24 RX ADMIN — LABETALOL HYDROCHLORIDE 20 MG: 5 INJECTION INTRAVENOUS at 05:35

## 2022-08-24 RX ADMIN — SODIUM CHLORIDE, PRESERVATIVE FREE 10 ML: 5 INJECTION INTRAVENOUS at 05:03

## 2022-08-24 RX ADMIN — HYDROMORPHONE HYDROCHLORIDE 0.5 MG: 1 INJECTION, SOLUTION INTRAMUSCULAR; INTRAVENOUS; SUBCUTANEOUS at 16:34

## 2022-08-24 RX ADMIN — HYDRALAZINE HYDROCHLORIDE 100 MG: 50 TABLET, FILM COATED ORAL at 12:21

## 2022-08-24 RX ADMIN — CALCIUM ACETATE 1334 MG: 667 CAPSULE ORAL at 12:21

## 2022-08-24 RX ADMIN — HYDRALAZINE HYDROCHLORIDE 100 MG: 50 TABLET, FILM COATED ORAL at 17:44

## 2022-08-24 RX ADMIN — INSULIN GLARGINE 5 UNITS: 100 INJECTION, SOLUTION SUBCUTANEOUS at 23:19

## 2022-08-24 RX ADMIN — SODIUM CHLORIDE, PRESERVATIVE FREE 10 ML: 5 INJECTION INTRAVENOUS at 23:11

## 2022-08-24 RX ADMIN — HYDROMORPHONE HYDROCHLORIDE 0.5 MG: 1 INJECTION, SOLUTION INTRAMUSCULAR; INTRAVENOUS; SUBCUTANEOUS at 11:59

## 2022-08-24 RX ADMIN — NIFEDIPINE 90 MG: 90 TABLET, EXTENDED RELEASE ORAL at 12:21

## 2022-08-24 RX ADMIN — CALCIUM ACETATE 1334 MG: 667 CAPSULE ORAL at 17:45

## 2022-08-24 RX ADMIN — HYDROMORPHONE HYDROCHLORIDE 0.5 MG: 1 INJECTION, SOLUTION INTRAMUSCULAR; INTRAVENOUS; SUBCUTANEOUS at 04:44

## 2022-08-24 RX ADMIN — FUROSEMIDE 80 MG: 40 TABLET ORAL at 12:21

## 2022-08-24 RX ADMIN — SODIUM CHLORIDE, PRESERVATIVE FREE 10 ML: 5 INJECTION INTRAVENOUS at 17:47

## 2022-08-24 RX ADMIN — METOPROLOL TARTRATE 12.5 MG: 25 TABLET, FILM COATED ORAL at 12:21

## 2022-08-24 RX ADMIN — Medication 2 UNITS: at 17:45

## 2022-08-24 RX ADMIN — HYDROMORPHONE HYDROCHLORIDE 0.5 MG: 1 INJECTION, SOLUTION INTRAMUSCULAR; INTRAVENOUS; SUBCUTANEOUS at 20:36

## 2022-08-24 RX ADMIN — METOPROLOL TARTRATE 12.5 MG: 25 TABLET, FILM COATED ORAL at 20:36

## 2022-08-24 RX ADMIN — ATORVASTATIN CALCIUM 40 MG: 40 TABLET, FILM COATED ORAL at 12:21

## 2022-08-24 RX ADMIN — HYDRALAZINE HYDROCHLORIDE 100 MG: 50 TABLET, FILM COATED ORAL at 23:19

## 2022-08-24 NOTE — PROGRESS NOTES
Bedside shift change report given to Talbot Runner RN (oncoming nurse) by Ruiz Michel LPN (offgoing nurse). Report included the following information SBAR, Kardex, and MAR.

## 2022-08-24 NOTE — PROGRESS NOTES
Bedside shift change report given to [address] [place] [address] LPN (oncoming nurse) by Rosmery Christina RN (offgoing nurse).  Report included the following information SBAR, Kardex, Intake/Output, MAR, and Cardiac Rhythm SR .

## 2022-08-24 NOTE — PROGRESS NOTES
Offered to change patient's dressing a second time. He refused and stated \"I will change it later on. \"

## 2022-08-24 NOTE — PROGRESS NOTES
Received call from [facility] in Dialysis, SBAR report given to dialysis RN [facility].

## 2022-08-24 NOTE — PROCEDURES
Hemodialysis / [phone]    Vitals Pre Post Assessment Pre Post   BP BP: (!) 169/82 (08/24/22 0747)   141/71 LOC A&Ox4 No change   HR Pulse (Heart Rate): 87 (08/24/22 0747) 90 Lungs clear No change   Resp Resp Rate: 18 (08/24/22 0747) 18 Cardiac RRR No change   Temp Temp: 98.1 °F (36.7 °C) (08/24/22 0747) 98.3 Skin CDI No change   Weight    Edema 3+BLE No change   Tele status   Pain Pain Intensity 1: 9 (08/24/22 0446)      Orders   Duration: Start: 6145 End: 1121 Total: 3.5hrs   Dialyzer: Dialyzer/Set Up Inspection: Gerardo Bowser (08/24/22 0747)   Lluvia Waters Bath: Dialysate K (mEq/L): 2 (08/24/22 0747)   Ca Bath: Dialysate CA (mEq/L): 2.5 (08/24/22 0747)   Na: Dialysate NA (mEq/L): 138 (08/24/22 0747)   Bicarb: Dialysate HCO3 (mEq/L): 35 (08/24/22 0747)   Target Fluid Removal: Goal/Amount of Fluid to Remove (mL): 3000 mL (08/24/22 0747)     Access   Type & Location: RPC : Dressing changed. No s/s of infection. Both lumens aspirate & flush well. Running well at . SBAR received from Primary RN. Pt arrived to HD suite A&Ox4. Consent signed & on file. Each catheter limb disinfected per p&p, caps removed, hubs disinfected per p&p. Each lumen aspirated for blood return and flushed with Normal Saline per policy. VSS. Dialysis Tx initiated. Comments:  Dressing changed. No s/s of infection noted.                                       Labs   HBsAg (Antigen) / date: Neg 8/19/22                                              HBsAb (Antibody) / date: Susc 8/19/22   Source: EPIC   Obtained/Reviewed  Critical Results Called HGB   Date Value Ref Range Status   08/22/2022 8.4 (L) 12.1 - 17.0 g/dL Final     Potassium   Date Value Ref Range Status   08/22/2022 4.5 3.5 - 5.1 mmol/L Final     Calcium   Date Value Ref Range Status   08/22/2022 7.9 (L) 8.5 - 10.1 MG/DL Final     BUN   Date Value Ref Range Status   08/22/2022 38 (H) 6 - 20 MG/DL Final     Creatinine   Date Value Ref Range Status   08/22/2022 4.75 (H) 0.70 - 1.30 MG/DL Final        Meds Given   Name Dose Route   None ordered                 Adequacy / Fluid    Total Liters Process: 78.9   Net Fluid Removed: 3000mL      Comments   Time Out Done:   (Time) 9936   Admitting Diagnosis: PONCHO   Consent obtained/signed: Informed Consent Verified: Yes (08/24/22 2033)   Machine / Fajardo Rings # Machine Number: [id number] (08/24/22 8667)   Primary Nurse Rpt Pre: Kamilah Rodriguez RN   Primary Nurse Rpt Post: Juventino Vivas RN   Pt Education: Procedural / CVC site care / infection control   Care Plan: On going   Pts outpatient clinic: TBD     Tx Summary  [phone]:  RPC : Dressing changed. No s/s of infection. Both lumens aspirate & flush well. Running well at . SBAR received from Primary RN. Pt arrived to HD suite A&Ox4. Consent signed & on file. Each catheter limb disinfected per p&p, caps removed, hubs disinfected per p&p. Each lumen aspirated for blood return and flushed with Normal Saline per policy. VSS. Dialysis Tx initiated. 0800:  Pt resting; on telephone  36:  Pt resting  0830:  Pt resting  0845:  Pt resting  0900:  Pt resting  0909:  Tele called, pt had 7 beats of VTach. RN and MD notified. Pt asymptomatic, verbally states he feels fine. 0915:  Pt resting  0930:  Pt resting  0945:  Pt resting  1000:  Pt resting  1015:  Pt resting  1030:  Pt resting  1045:  Pt resting  1100:  Pt resting  1115:  Pt resting  1121: Tx ended. VSS. Each dialysis catheter limb disinfected per p&p, all possible blood returned per p&p, and each dialysis hub disinfected per p&p. Each lumen flushed, post dialysis catheter Heparin dwell instilled per order, and caps applied. Bed locked and in the lowest position, call bell and belongings in reach. SBAR given to Primary, RN. Patient is stable at time of their/ my departure. All Dialysis related medications have been reviewed. Comments:   Assessment performed by RN. Procedure and documentation observed and reviewed by Pilar Lamas RN.

## 2022-08-24 NOTE — PROGRESS NOTES
Transition of Care    RUR 16%    Mr. Vicenta Boyd was seen. He was informed that an application was being made for him to get [place] Medicaid. He stated that he was ready for discharge. He was informed that he needed a dialysis chair before he could discharge. Will continue to follow for discharge planning.   Signed By: Mary Vivas LCSW     August 24, 2022

## 2022-08-24 NOTE — PROGRESS NOTES
Hospitalist Progress Note    NAME: Sheree Castro   :  1975   MRN:  [id number]       Assessment / Plan:  JAYLON on CKD stage IV, POA  Hyperkalemia, POA, refused kayexalate   Acute on chronic diastolic heart failure, POA  Uncontrolled HTN  Chest x-ray with cardiomegaly, vascular congestions, small to moderate left pleural effusion  Echo last month with normal EF and abnormal diastolic dysfunction  IV bumex, transitioned to PO lasix  Pta florinef discontinued  Hydralazine, nifedipine, metoprolol bid , titrate prn  HD as per nephrology. Permacath placement today  Pt will need outpt HD slot at discharge. Stable for discharge when outpt HD slot is secured. Discussed with nephrology and CM. Chronic normocytic anemia, POA  EPO as per nephro  S/p IV venofer  S/p 1 unit prbc for hgb 6. 6.  hbg stable at 8.4    Psychiatric disorder  Cont' home meds    T2DM  Cont' lower dose lantus, titrate prn  SSI    R foot wound  Wound care following. Wound does not look infected. Con't daily wound care as ordered  Prn dialudid/ po percocet prn      SMAM:, pending permacath today. Needs outpt HD slot      Code Status: Full code  DVT Prophylaxis: Allergic to heparin products, scd  GI Prophylaxis: not indicated   Baseline: Active, independent     Subjective:     Chief Complaint / Reason for Physician Visit  Fu jaylon on ckd    No Acute issues  Discussed with RN events overnight. Review of Systems:  Symptom Y/N Comments  Symptom Y/N Comments   Fever/Chills n   Chest Pain n    Poor Appetite    Edema     Cough n   Abdominal Pain n    Sputum    Joint Pain     SOB/DIETZ n   Pruritis/Rash     Nausea/vomit n   Tolerating PT/OT     Diarrhea    Tolerating Diet y    Constipation    Other       Could NOT obtain due to:      Objective:     VITALS:   Last 24hrs VS reviewed since prior progress note.  Most recent are:  Patient Vitals for the past 24 hrs:   Temp Pulse Resp BP SpO2   22 0830 -- 80 18 110/61 --   22 0815 -- 85 18 (!) 141/70 --   08/24/22 0800 -- 87 18 (!) 157/81 --   08/24/22 0747 98.1 °F (36.7 °C) 87 18 (!) 169/82 --   08/24/22 0625 -- -- -- (!) 165/87 --   08/24/22 0535 -- 85 -- (!) 177/86 --   08/24/22 0446 98.3 °F (36.8 °C) 87 18 (!) 173/85 95 %   08/24/22 0430 -- 87 -- -- --   08/23/22 2342 -- 87 -- -- --   08/23/22 2257 97.9 °F (36.6 °C) 88 -- (!) 165/75 96 %   08/23/22 2122 -- 86 -- (!) 177/80 --   08/23/22 2112 -- -- -- (!) 177/80 (!) 86 %   08/23/22 2017 97.8 °F (36.6 °C) 84 18 (!) 179/95 96 %   08/23/22 2007 -- -- 18 -- --   08/23/22 1627 97.6 °F (36.4 °C) 80 20 (!) 142/69 95 %   08/23/22 1415 -- 76 16 (!) 142/64 96 %   08/23/22 1410 -- 76 16 (!) 148/67 94 %   08/23/22 1405 -- 78 16 (!) 141/65 96 %   08/23/22 1400 -- 79 16 (!) 146/74 95 %   08/23/22 1355 -- 80 16 138/65 98 %   08/23/22 1350 -- 78 16 (!) 140/50 98 %   08/23/22 1311 -- 81 16 (!) 133/59 95 %   08/23/22 1108 97.8 °F (36.6 °C) 79 -- (!) 102/53 --         Intake/Output Summary (Last 24 hours) at 8/24/2022 9106  Last data filed at 8/23/2022 2007  Gross per 24 hour   Intake 100 ml   Output --   Net 100 ml          I had a face to face encounter and independently examined this patient on 8/24/2022, as outlined below:  PHYSICAL EXAM:  General: WD, WN. Alert, cooperative, no acute distress    EENT:  EOMI. Anicteric sclerae. MMM  Resp:  CTA bilaterally, no wheezing or rales. No accessory muscle use  CV:  Regular  rhythm,  No edema  GI:  Soft, Non distended, Non tender. +Bowel sounds  Neurologic:  Alert and oriented X 3, normal speech  Psych:   Fair insight. Not anxious nor agitated  Skin:  No rashes.   No jaundice    Reviewed most current lab test results and cultures  YES  Reviewed most current radiology test results   YES  Review and summation of old records today    NO  Reviewed patient's current orders and MAR    YES  PMH/SH reviewed - no change compared to H&P  ________________________________________________________________________  Care Plan discussed with:    Comments   Patient x    Family      RN x    Care Manager x    Consultant  x nephro                     Multidiciplinary team rounds were held today with , nursing, pharmacist and clinical coordinator. Patient's plan of care was discussed; medications were reviewed and discharge planning was addressed. ________________________________________________________________________  Total NON critical care TIME:  35 Minutes    Total CRITICAL CARE TIME Spent:   Minutes non procedure based      Comments   >50% of visit spent in counseling and coordination of care     ________________________________________________________________________  Ed Hamilton MD     Procedures: see electronic medical records for all procedures/Xrays and details which were not copied into this note but were reviewed prior to creation of Plan. LABS:  I reviewed today's most current labs and imaging studies.   Pertinent labs include:  Recent Labs     08/22/22  0109 08/21/22  1001   WBC 9.0  --    HGB 8.4* 7.8*   HCT 26.5* 25.0*     --        Recent Labs     08/22/22  0109      K 4.5      CO2 29   *   BUN 38*   CREA 4.75*   CA 7.9*         Signed: Ed Hamilton MD

## 2022-08-24 NOTE — PROGRESS NOTES
Bedside shift change report given to Nani Lantigua RN (oncoming nurse) by Zain Pope LPN (offgoing nurse). Report included the following information SBAR, Kardex, and MAR.

## 2022-08-24 NOTE — PROGRESS NOTES
Problem: Pain  Goal: *Control of Pain  Outcome: Progressing Towards Goal     Problem: Falls - Risk of  Goal: *Absence of Falls  Description: Document Carlos Fall Risk and appropriate interventions in the flowsheet.   Outcome: Progressing Towards Goal  Note: Fall Risk Interventions:            Medication Interventions: Patient to call before getting OOB    Elimination Interventions: Bed/chair exit alarm    History of Falls Interventions: Bed/chair exit alarm

## 2022-08-24 NOTE — PROGRESS NOTES
Nephrology Progress Note  New [facility] / [address], [address]  [place], [address]  Phone - [phone]  Fax - [phone]                 Patient: Sheree Castro                   YOB: 1975        Date- 8/24/2022                      Admit Date: 8/18/2022  CC: Follow up for new onset ESRD    IMPRESSION & PLAN:   New onset ESRD  Hyperkalemia   Metabolic acidosis   Anemia of CKD   Hypocalcemia   Iron deficiency anemia   edema, shortness of breath, fluid overload. Chronic kidney disease, stage V likely due to diabetic nephropathy and hypertensive nephrosclerosis. PLAN-  SEEN ON HD TODAY  Set up out pt hd unit  S/P permcath  Cont LIBBY and IV iron  Continue phoslo  OKAY TO D/C - IF out pt hd unit is set up     Subjective: Interval History:   Seen and examined on dialysis  No new issues  Waiting for placement  S/p permacath placement  No c/o sob,  No c/o chest pain,   No c/o nausea or vomiting, No c/o  fever. Objective:   Vitals:    08/24/22 0945 08/24/22 1000 08/24/22 1015 08/24/22 1030   BP: (!) 146/70 (!) 143/68 (!) 140/69 (!) 160/74   Pulse: 88 88 89 91   Resp: 18 18 18 18   Temp:       TempSrc:       SpO2:       Weight:       Height:          08/23 0701 - 08/24 0700  In: 100 [P.O.:100]  Out: -   Last 3 Recorded Weights in this Encounter    08/18/22 0956 08/24/22 0457   Weight: 88.9 kg (195 lb 15.8 oz) 82.8 kg (182 lb 8.7 oz)      Physical exam:    GEN:  nad  NECK:  Supple, no thyromegaly  RESP: clear b/l, no  wheezing,   CVS: RRR,S1,S2   NEURO: non focal, normal speech  EXT: Edema +nt     Access:  KEE wyatt in place    Chart reviewed. Pertinent Notes reviewed.      Data Review :  Recent Labs     08/22/22  0109      K 4.5      CO2 29   BUN 38*   CREA 4.75*   *   CA 7.9*       Recent Labs     08/22/22  0109   WBC 9.0   HGB 8.4*   HCT 26.5*          No results for input(s): FE, TIBC, PSAT, FERR in the last 72 hours.    Medication list  reviewed  Current Facility-Administered Medications   Medication Dose Route Frequency    furosemide (LASIX) tablet 80 mg  80 mg Oral DAILY    HYDROmorphone (DILAUDID) injection 0.5 mg  0.5 mg IntraVENous Q4H PRN    metoprolol tartrate (LOPRESSOR) tablet 12.5 mg  12.5 mg Oral Q12H    0.9% sodium chloride infusion 250 mL  250 mL IntraVENous PRN    diclofenac (VOLTAREN) 1 % topical gel 4 g  4 g Topical Q6H PRN    calcium acetate(phosphat bind) (PHOSLO) capsule 1,334 mg  2 Capsule Oral TID WITH MEALS    insulin glargine (LANTUS) injection 5 Units  5 Units SubCUTAneous QHS    oxyCODONE-acetaminophen (PERCOCET) 5-325 mg per tablet 1 Tablet  1 Tablet Oral Q6H PRN    albumin human 25% (BUMINATE) solution 12.5 g  12.5 g IntraVENous DIALYSIS PRN    hydrALAZINE (APRESOLINE) tablet 100 mg  100 mg Oral TID    NIFEdipine ER (PROCARDIA XL) tablet 90 mg  90 mg Oral DAILY    labetaloL (NORMODYNE;TRANDATE) injection 20 mg  20 mg IntraVENous Q4H PRN    ARIPiprazole (ABILIFY) tablet 10 mg  10 mg Oral DAILY    aspirin delayed-release tablet 81 mg  81 mg Oral DAILY    atorvastatin (LIPITOR) tablet 40 mg  40 mg Oral DAILY    sodium chloride (NS) flush 5-40 mL  5-40 mL IntraVENous Q8H    sodium chloride (NS) flush 5-40 mL  5-40 mL IntraVENous PRN    acetaminophen (TYLENOL) tablet 650 mg  650 mg Oral Q6H PRN    Or    acetaminophen (TYLENOL) suppository 650 mg  650 mg Rectal Q6H PRN    polyethylene glycol (MIRALAX) packet 17 g  17 g Oral DAILY PRN    promethazine (PHENERGAN) tablet 12.5 mg  12.5 mg Oral Q6H PRN    Or    ondansetron (ZOFRAN) injection 4 mg  4 mg IntraVENous Q6H PRN    insulin lispro (HUMALOG) injection   SubCUTAneous AC&HS    glucose chewable tablet 16 g  4 Tablet Oral PRN    glucagon (GLUCAGEN) injection 1 mg  1 mg IntraMUSCular PRN    dextrose 10% infusion 0-250 mL  0-250 mL IntraVENous PRN    epoetin ace-epbx (RETACRIT) injection 20,000 Units  20,000 Units SubCUTAneous Q Lynnette Ríos MD  8/24/2022

## 2022-08-25 LAB
ERYTHROCYTE [DISTWIDTH] IN BLOOD BY AUTOMATED COUNT: 15.8 % (ref 11.5–14.5)
GLUCOSE BLD STRIP.AUTO-MCNC: 119 MG/DL (ref 65–117)
GLUCOSE BLD STRIP.AUTO-MCNC: 149 MG/DL (ref 65–117)
GLUCOSE BLD STRIP.AUTO-MCNC: 205 MG/DL (ref 65–117)
GLUCOSE BLD STRIP.AUTO-MCNC: 266 MG/DL (ref 65–117)
HBV CORE AB SERPL QL IA: NEGATIVE
HBV CORE AB SERPL QL IA: NEGATIVE
HCT VFR BLD AUTO: 26 % (ref 36.6–50.3)
HGB BLD-MCNC: 8 G/DL (ref 12.1–17)
MCH RBC QN AUTO: 27.5 PG (ref 26–34)
MCHC RBC AUTO-ENTMCNC: 30.8 G/DL (ref 30–36.5)
MCV RBC AUTO: 89.3 FL (ref 80–99)
NRBC # BLD: 0 K/UL (ref 0–0.01)
NRBC BLD-RTO: 0 PER 100 WBC
PLATELET # BLD AUTO: 322 K/UL (ref 150–400)
PMV BLD AUTO: 8.4 FL (ref 8.9–12.9)
RBC # BLD AUTO: 2.91 M/UL (ref 4.1–5.7)
SERVICE CMNT-IMP: ABNORMAL
WBC # BLD AUTO: 9.6 K/UL (ref 4.1–11.1)

## 2022-08-25 PROCEDURE — 74011636637 HC RX REV CODE- 636/637: Performed by: INTERNAL MEDICINE

## 2022-08-25 PROCEDURE — 65270000029 HC RM PRIVATE

## 2022-08-25 PROCEDURE — 74011250636 HC RX REV CODE- 250/636: Performed by: INTERNAL MEDICINE

## 2022-08-25 PROCEDURE — 85027 COMPLETE CBC AUTOMATED: CPT

## 2022-08-25 PROCEDURE — 74011000250 HC RX REV CODE- 250: Performed by: INTERNAL MEDICINE

## 2022-08-25 PROCEDURE — 74011250637 HC RX REV CODE- 250/637: Performed by: INTERNAL MEDICINE

## 2022-08-25 PROCEDURE — 82962 GLUCOSE BLOOD TEST: CPT

## 2022-08-25 PROCEDURE — 36415 COLL VENOUS BLD VENIPUNCTURE: CPT

## 2022-08-25 RX ORDER — INSULIN GLARGINE 100 [IU]/ML
7 INJECTION, SOLUTION SUBCUTANEOUS
Status: DISCONTINUED | OUTPATIENT
Start: 2022-08-25 | End: 2022-08-29 | Stop reason: HOSPADM

## 2022-08-25 RX ADMIN — CALCIUM ACETATE 1334 MG: 667 CAPSULE ORAL at 17:21

## 2022-08-25 RX ADMIN — HYDROMORPHONE HYDROCHLORIDE 0.5 MG: 1 INJECTION, SOLUTION INTRAMUSCULAR; INTRAVENOUS; SUBCUTANEOUS at 21:31

## 2022-08-25 RX ADMIN — METOPROLOL TARTRATE 12.5 MG: 25 TABLET, FILM COATED ORAL at 08:58

## 2022-08-25 RX ADMIN — HYDROMORPHONE HYDROCHLORIDE 0.5 MG: 1 INJECTION, SOLUTION INTRAMUSCULAR; INTRAVENOUS; SUBCUTANEOUS at 08:57

## 2022-08-25 RX ADMIN — SODIUM CHLORIDE, PRESERVATIVE FREE 40 ML: 5 INJECTION INTRAVENOUS at 05:00

## 2022-08-25 RX ADMIN — Medication 2 UNITS: at 11:57

## 2022-08-25 RX ADMIN — METOPROLOL TARTRATE 12.5 MG: 25 TABLET, FILM COATED ORAL at 21:31

## 2022-08-25 RX ADMIN — FUROSEMIDE 80 MG: 40 TABLET ORAL at 08:58

## 2022-08-25 RX ADMIN — HYDROMORPHONE HYDROCHLORIDE 0.5 MG: 1 INJECTION, SOLUTION INTRAMUSCULAR; INTRAVENOUS; SUBCUTANEOUS at 17:21

## 2022-08-25 RX ADMIN — NIFEDIPINE 90 MG: 90 TABLET, EXTENDED RELEASE ORAL at 08:58

## 2022-08-25 RX ADMIN — SODIUM CHLORIDE, PRESERVATIVE FREE 10 ML: 5 INJECTION INTRAVENOUS at 22:00

## 2022-08-25 RX ADMIN — HYDRALAZINE HYDROCHLORIDE 100 MG: 50 TABLET, FILM COATED ORAL at 21:31

## 2022-08-25 RX ADMIN — ASPIRIN 81 MG: 81 TABLET, COATED ORAL at 08:58

## 2022-08-25 RX ADMIN — CALCIUM ACETATE 1334 MG: 667 CAPSULE ORAL at 11:58

## 2022-08-25 RX ADMIN — HYDROMORPHONE HYDROCHLORIDE 0.5 MG: 1 INJECTION, SOLUTION INTRAMUSCULAR; INTRAVENOUS; SUBCUTANEOUS at 04:39

## 2022-08-25 RX ADMIN — CALCIUM ACETATE 1334 MG: 667 CAPSULE ORAL at 08:58

## 2022-08-25 RX ADMIN — ATORVASTATIN CALCIUM 40 MG: 40 TABLET, FILM COATED ORAL at 08:58

## 2022-08-25 RX ADMIN — INSULIN GLARGINE 7 UNITS: 100 INJECTION, SOLUTION SUBCUTANEOUS at 21:46

## 2022-08-25 RX ADMIN — EPOETIN ALFA-EPBX 20000 UNITS: 20000 INJECTION, SOLUTION INTRAVENOUS; SUBCUTANEOUS at 21:46

## 2022-08-25 RX ADMIN — HYDRALAZINE HYDROCHLORIDE 100 MG: 50 TABLET, FILM COATED ORAL at 17:21

## 2022-08-25 RX ADMIN — Medication 2 UNITS: at 21:46

## 2022-08-25 RX ADMIN — HYDROMORPHONE HYDROCHLORIDE 0.5 MG: 1 INJECTION, SOLUTION INTRAMUSCULAR; INTRAVENOUS; SUBCUTANEOUS at 00:29

## 2022-08-25 RX ADMIN — SODIUM CHLORIDE, PRESERVATIVE FREE 10 ML: 5 INJECTION INTRAVENOUS at 15:58

## 2022-08-25 RX ADMIN — HYDROMORPHONE HYDROCHLORIDE 0.5 MG: 1 INJECTION, SOLUTION INTRAMUSCULAR; INTRAVENOUS; SUBCUTANEOUS at 13:04

## 2022-08-25 RX ADMIN — HYDRALAZINE HYDROCHLORIDE 100 MG: 50 TABLET, FILM COATED ORAL at 08:58

## 2022-08-25 NOTE — PROGRESS NOTES
Problem: Pain  Goal: *Control of Pain  Outcome: Progressing Towards Goal     Problem: Falls - Risk of  Goal: *Absence of Falls  Description: Document Carlos Fall Risk and appropriate interventions in the flowsheet.   Outcome: Progressing Towards Goal  Note: Fall Risk Interventions:            Medication Interventions: Teach patient to arise slowly    Elimination Interventions: Bed/chair exit alarm, Call light in reach    History of Falls Interventions: Bed/chair exit alarm

## 2022-08-25 NOTE — PROGRESS NOTES
Nephrology Progress Note  [facility] / [address], [address]  [place], [address]  Phone - [phone]  Fax - [phone]                 Patient: Yulissa Ramsay                   YOB: 1975        Date- 8/25/2022                      Admit Date: 8/18/2022  CC: Follow up for new onset esrd    IMPRESSION & PLAN:   New onset ESRD  Hyperkalemia   Metabolic acidosis   Anemia of CKD   Hypocalcemia   Iron deficiency anemia   edema, shortness of breath, fluid overload. Chronic kidney disease, stage V likely due to diabetic nephropathy and hypertensive nephrosclerosis. PLAN-  No dialysis TODAY  Continue hd mwf schedule. S/P permcath  Cont LIBBY and IV iron  Continue phoslo  OKAY TO D/C - IF out pt hd unit is set up     Subjective: Interval History:   S/p hd yesterday  Waiting for placement      Objective:   Vitals:    08/25/22 0029 08/25/22 0429 08/25/22 0439 08/25/22 0758   BP:  138/79  (!) 161/74   Pulse: 87 89 91 88   Resp: 18 17 18 18   Temp:  98.3 °F (36.8 °C)  98.5 °F (36.9 °C)   TempSrc:       SpO2:  98%  97%   Weight:       Height:          08/24 0701 - 08/25 0700  In: 100 [P.O.:100]  Out: 3000   Last 3 Recorded Weights in this Encounter    08/18/22 0956 08/24/22 0457   Weight: 88.9 kg (195 lb 15.8 oz) 82.8 kg (182 lb 8.7 oz)      Physical exam:    GEN:  nad  NECK:  Supple, no thyromegaly  RESP: clear b/l, no  wheezing,   CVS: RRR,S1,S2   NEURO: non focal, normal speech  EXT: Edema +nt     Access:  KEE wyatt in place    Chart reviewed. Pertinent Notes reviewed. Data Review :  No results for input(s): NA, K, CL, CO2, BUN, CREA, GLU, CA, MG, PHOS, URICA in the last 72 hours. Recent Labs     08/25/22  0453   WBC 9.6   HGB 8.0*   HCT 26.0*          No results for input(s): FE, TIBC, PSAT, FERR in the last 72 hours.    Medication list  reviewed  Current Facility-Administered Medications Medication Dose Route Frequency    furosemide (LASIX) tablet 80 mg  80 mg Oral DAILY    HYDROmorphone (DILAUDID) injection 0.5 mg  0.5 mg IntraVENous Q4H PRN    metoprolol tartrate (LOPRESSOR) tablet 12.5 mg  12.5 mg Oral Q12H    0.9% sodium chloride infusion 250 mL  250 mL IntraVENous PRN    diclofenac (VOLTAREN) 1 % topical gel 4 g  4 g Topical Q6H PRN    calcium acetate(phosphat bind) (PHOSLO) capsule 1,334 mg  2 Capsule Oral TID WITH MEALS    insulin glargine (LANTUS) injection 5 Units  5 Units SubCUTAneous QHS    oxyCODONE-acetaminophen (PERCOCET) 5-325 mg per tablet 1 Tablet  1 Tablet Oral Q6H PRN    albumin human 25% (BUMINATE) solution 12.5 g  12.5 g IntraVENous DIALYSIS PRN    hydrALAZINE (APRESOLINE) tablet 100 mg  100 mg Oral TID    NIFEdipine ER (PROCARDIA XL) tablet 90 mg  90 mg Oral DAILY    labetaloL (NORMODYNE;TRANDATE) injection 20 mg  20 mg IntraVENous Q4H PRN    ARIPiprazole (ABILIFY) tablet 10 mg  10 mg Oral DAILY    aspirin delayed-release tablet 81 mg  81 mg Oral DAILY    atorvastatin (LIPITOR) tablet 40 mg  40 mg Oral DAILY    sodium chloride (NS) flush 5-40 mL  5-40 mL IntraVENous Q8H    sodium chloride (NS) flush 5-40 mL  5-40 mL IntraVENous PRN    acetaminophen (TYLENOL) tablet 650 mg  650 mg Oral Q6H PRN    Or    acetaminophen (TYLENOL) suppository 650 mg  650 mg Rectal Q6H PRN    polyethylene glycol (MIRALAX) packet 17 g  17 g Oral DAILY PRN    promethazine (PHENERGAN) tablet 12.5 mg  12.5 mg Oral Q6H PRN    Or    ondansetron (ZOFRAN) injection 4 mg  4 mg IntraVENous Q6H PRN    insulin lispro (HUMALOG) injection   SubCUTAneous AC&HS    glucose chewable tablet 16 g  4 Tablet Oral PRN    glucagon (GLUCAGEN) injection 1 mg  1 mg IntraMUSCular PRN    dextrose 10% infusion 0-250 mL  0-250 mL IntraVENous PRN    epoetin ace-epbx (RETACRIT) injection 20,000 Units  20,000 Units SubCUTAneous Q TONYAE, THU & SAT          Sumanth Garcia MD  8/25/2022

## 2022-08-25 NOTE — PROGRESS NOTES
Hospitalist Progress Note    NAME: Rubbie Closs   :  1975   MRN:  [id number]       Assessment / Plan:  PONCHO on CKD stage IV, POA  Hyperkalemia, POA, refused kayexalate   Acute on chronic diastolic heart failure, POA  Uncontrolled HTN  Chest x-ray with cardiomegaly, vascular congestions, small to moderate left pleural effusion  Echo last month with normal EF and abnormal diastolic dysfunction  IV bumex, transitioned to PO lasix  Pta florinef discontinued  Hydralazine, nifedipine, metoprolol bid , titrate prn  HD as per nephrology. S/ post permacath placement  Pt will need outpt HD slot at discharge. Stable for discharge when outpt HD slot is secured. Discussed with nephrology and CM. Chronic normocytic anemia, POA  EPO as per nephro  S/p IV venofer  S/p 1 unit prbc for hgb 6. 6.  hbg stable at 8.4    Psychiatric disorder  Cont' home meds    T2DM  Cont' lower dose lantus, titrate prn  SSI    R foot wound  Wound care following. Wound does not look infected. Con't daily wound care as ordered  Prn dialudid/ po percocet prn      SAMM:, pending permacath today. Needs outpt HD slot      Code Status: Full code  DVT Prophylaxis: Allergic to heparin products, scd  GI Prophylaxis: not indicated   Baseline: Active, independent     Subjective:     Chief Complaint / Reason for Physician Visit  Fu poncho on ckd    No Acute issues, eager to go home  Discussed with RN events overnight. Review of Systems:  Symptom Y/N Comments  Symptom Y/N Comments   Fever/Chills n   Chest Pain n    Poor Appetite    Edema     Cough n   Abdominal Pain n    Sputum    Joint Pain     SOB/DIETZ n   Pruritis/Rash     Nausea/vomit n   Tolerating PT/OT     Diarrhea    Tolerating Diet y    Constipation    Other       Could NOT obtain due to:      Objective:     VITALS:   Last 24hrs VS reviewed since prior progress note.  Most recent are:  Patient Vitals for the past 24 hrs:   Temp Pulse Resp BP SpO2   22 0439 -- 91 18 -- -- 08/25/22 0429 98.3 °F (36.8 °C) 89 17 138/79 98 %   08/25/22 0029 -- 87 18 -- --   08/24/22 2319 98.2 °F (36.8 °C) 95 18 (!) 159/72 97 %   08/24/22 2036 -- 87 -- (!) 151/77 --   08/24/22 1937 98.4 °F (36.9 °C) 90 18 (!) 162/74 97 %   08/24/22 1646 98.3 °F (36.8 °C) 89 19 (!) 155/72 96 %   08/24/22 1210 98.4 °F (36.9 °C) 89 18 135/62 94 %   08/24/22 1121 98.3 °F (36.8 °C) 90 18 (!) 141/71 --   08/24/22 1115 -- 90 18 (!) 147/69 --   08/24/22 1100 -- 90 18 136/63 --   08/24/22 1045 -- 90 18 (!) 142/69 --   08/24/22 1030 -- 91 18 (!) 160/74 --   08/24/22 1015 -- 89 18 (!) 140/69 --   08/24/22 1000 -- 88 18 (!) 143/68 --   08/24/22 0945 -- 88 18 (!) 146/70 --   08/24/22 0930 -- 87 18 (!) 141/75 --   08/24/22 0915 -- 86 18 139/72 --   08/24/22 0900 -- 84 18 130/67 --   08/24/22 0845 -- 82 18 122/64 --   08/24/22 0830 -- 80 18 110/61 --   08/24/22 0815 -- 85 18 (!) 141/70 --   08/24/22 0800 -- 87 18 (!) 157/81 --         Intake/Output Summary (Last 24 hours) at 8/25/2022 0751  Last data filed at 8/24/2022 2036  Gross per 24 hour   Intake 100 ml   Output 3000 ml   Net -2900 ml          I had a face to face encounter and independently examined this patient on 8/25/2022, as outlined below:  PHYSICAL EXAM:  General: WD, WN. Alert, cooperative, no acute distress    EENT:  EOMI. Anicteric sclerae. MMM  Resp:  CTA bilaterally, no wheezing or rales. No accessory muscle use  CV:  Regular  rhythm,  No edema  GI:  Soft, Non distended, Non tender. +Bowel sounds  Neurologic:  Alert and oriented X 3, normal speech  Psych:   Fair insight. Not anxious nor agitated  Skin:  No rashes.   No jaundice    Reviewed most current lab test results and cultures  YES  Reviewed most current radiology test results   YES  Review and summation of old records today    NO  Reviewed patient's current orders and MAR    YES  PMH/SH reviewed - no change compared to H&P  ________________________________________________________________________  Care Plan discussed with:    Comments   Patient x    Family      RN x    Care Manager x    Consultant  x nephro                     Multidiciplinary team rounds were held today with , nursing, pharmacist and clinical coordinator. Patient's plan of care was discussed; medications were reviewed and discharge planning was addressed. ________________________________________________________________________  Total NON critical care TIME:  35 Minutes    Total CRITICAL CARE TIME Spent:   Minutes non procedure based      Comments   >50% of visit spent in counseling and coordination of care     ________________________________________________________________________  Keyshawn Johnson MD     Procedures: see electronic medical records for all procedures/Xrays and details which were not copied into this note but were reviewed prior to creation of Plan. LABS:  I reviewed today's most current labs and imaging studies. Pertinent labs include:  Recent Labs     08/25/22  0453   WBC 9.6   HGB 8.0*   HCT 26.0*          No results for input(s): NA, K, CL, CO2, GLU, BUN, CREA, CA, MG, PHOS, ALB, TBIL, TBILI, ALT, INR, INREXT, INREXT in the last 72 hours.     No lab exists for component: SGOT      Signed: Keyshawn Johnson MD

## 2022-08-25 NOTE — PROGRESS NOTES
CM contacted [facility] in [facility], they will look to see what else is needed to accept patient and call CM back. CM spoke with [facility], said they are still waiting on financial clearance and they believe they have all the paperwork needed to accept patient. [facility] questions or concerns needed to accept patient.       Natalee Mckeon, GAGEN, RN    Care Management  [phone]

## 2022-08-25 NOTE — PROGRESS NOTES
Bedside shift change report given to Violeta RN (oncoming nurse) by Tony White RN (offgoing nurse).  Report included the following information SBAR, Kardex, Intake/Output, MAR, and Cardiac Rhythm SR .

## 2022-08-25 NOTE — PROGRESS NOTES
Spiritual Care Assessment/Progress Note  [facility]      NAME: Hector Rios      MRN: [id number]  AGE: 49 y.o.  SEX: male  Sikh Affiliation: Church   Language: English     8/25/2022     Total Time (in minutes): 32     Spiritual Assessment begun in [facility] 2 MED TELE through conversation with:         [x]Patient        [] Family    [] Friend(s)        Reason for Consult: Initial/Spiritual assessment, patient floor     Spiritual beliefs: (Please include comment if needed)     [x] Identifies with a brandee tradition:  Anabaptist       [] Supported by a brandee community:            [] Claims no spiritual orientation:           [] Seeking spiritual identity:                [] Adheres to an individual form of spirituality:           [] Not able to assess:                           Identified resources for coping:      [x] Prayer                               [] Music                  [] Guided Imagery     [x] Family/friends                 [] Pet visits     [] Devotional reading                         [] Unknown     [] Other:                                                Interventions offered during this visit: (See comments for more details)    Patient Interventions: Coping skills reviewed/reinforced, Initial/Spiritual assessment, patient floor, Sikh beliefs/image of God discussed, Affirmation of brandee, Integration of medical assessment with existing values and beliefs           Plan of Care:     [] Support spiritual and/or cultural needs    [] Support AMD and/or advance care planning process      [] Support grieving process   [] Coordinate Rites and/or Rituals    [] Coordination with community clergy   [] No spiritual needs identified at this time   [] Detailed Plan of Care below (See Comments)  [] Make referral to Music Therapy  [] Make referral to Pet Therapy     [] Make referral to Addiction services  [] Make referral to [facility]  [] Make referral to Spiritual Care Partner  [] No future visits requested        [x] Contact Spiritual Care for further referrals     Comments:  Mr. Jeff Shane was standing at his door when  visited for initial spiritual assessment. Patient received visit kindly and engaged in conversation. He indicated he was doing well today. He has a supportive family and his brandee also helps in coping. He stated that he is Temple and would appreciate having a prayer rag and a Quran. Britton Emmonak was in use when  went by, [address] [place] [address] Road went back to assure patient that items would be brought to him later. He was listed as [place], and  informed him about that. He requested to have that updated.  called registration to have that updated. Patient was thankful for the visit.        Visited by: Tex Graves  To debora lubin: Guilherme Becerra  ([phone])

## 2022-08-26 LAB
ALBUMIN SERPL-MCNC: 2.4 G/DL (ref 3.5–5)
ANION GAP SERPL CALC-SCNC: 4 MMOL/L (ref 5–15)
BASOPHILS # BLD: 0 K/UL (ref 0–0.1)
BASOPHILS NFR BLD: 0 % (ref 0–1)
BUN SERPL-MCNC: 29 MG/DL (ref 6–20)
BUN/CREAT SERPL: 7 (ref 12–20)
CALCIUM SERPL-MCNC: 7.5 MG/DL (ref 8.5–10.1)
CHLORIDE SERPL-SCNC: 106 MMOL/L (ref 97–108)
CO2 SERPL-SCNC: 30 MMOL/L (ref 21–32)
CREAT SERPL-MCNC: 4.27 MG/DL (ref 0.7–1.3)
DIFFERENTIAL METHOD BLD: ABNORMAL
EOSINOPHIL # BLD: 0.3 K/UL (ref 0–0.4)
EOSINOPHIL NFR BLD: 3 % (ref 0–7)
ERYTHROCYTE [DISTWIDTH] IN BLOOD BY AUTOMATED COUNT: 16.3 % (ref 11.5–14.5)
GLUCOSE BLD STRIP.AUTO-MCNC: 190 MG/DL (ref 65–117)
GLUCOSE BLD STRIP.AUTO-MCNC: 215 MG/DL (ref 65–117)
GLUCOSE BLD STRIP.AUTO-MCNC: 94 MG/DL (ref 65–117)
GLUCOSE SERPL-MCNC: 167 MG/DL (ref 65–100)
HCT VFR BLD AUTO: 27 % (ref 36.6–50.3)
HGB BLD-MCNC: 8.1 G/DL (ref 12.1–17)
IMM GRANULOCYTES # BLD AUTO: 0.1 K/UL (ref 0–0.04)
IMM GRANULOCYTES NFR BLD AUTO: 1 % (ref 0–0.5)
LYMPHOCYTES # BLD: 2.4 K/UL (ref 0.8–3.5)
LYMPHOCYTES NFR BLD: 24 % (ref 12–49)
MCH RBC QN AUTO: 27.4 PG (ref 26–34)
MCHC RBC AUTO-ENTMCNC: 30 G/DL (ref 30–36.5)
MCV RBC AUTO: 91.2 FL (ref 80–99)
MONOCYTES # BLD: 1.4 K/UL (ref 0–1)
MONOCYTES NFR BLD: 13 % (ref 5–13)
NEUTS SEG # BLD: 6.2 K/UL (ref 1.8–8)
NEUTS SEG NFR BLD: 59 % (ref 32–75)
NRBC # BLD: 0 K/UL (ref 0–0.01)
NRBC BLD-RTO: 0 PER 100 WBC
PHOSPHATE SERPL-MCNC: 3.1 MG/DL (ref 2.6–4.7)
PLATELET # BLD AUTO: 328 K/UL (ref 150–400)
PMV BLD AUTO: 8.4 FL (ref 8.9–12.9)
POTASSIUM SERPL-SCNC: 4.4 MMOL/L (ref 3.5–5.1)
RBC # BLD AUTO: 2.96 M/UL (ref 4.1–5.7)
SERVICE CMNT-IMP: ABNORMAL
SERVICE CMNT-IMP: ABNORMAL
SERVICE CMNT-IMP: NORMAL
SODIUM SERPL-SCNC: 140 MMOL/L (ref 136–145)
WBC # BLD AUTO: 10.3 K/UL (ref 4.1–11.1)

## 2022-08-26 PROCEDURE — 74011250637 HC RX REV CODE- 250/637: Performed by: INTERNAL MEDICINE

## 2022-08-26 PROCEDURE — 74011636637 HC RX REV CODE- 636/637: Performed by: INTERNAL MEDICINE

## 2022-08-26 PROCEDURE — 80069 RENAL FUNCTION PANEL: CPT

## 2022-08-26 PROCEDURE — 36415 COLL VENOUS BLD VENIPUNCTURE: CPT

## 2022-08-26 PROCEDURE — 74011250636 HC RX REV CODE- 250/636: Performed by: INTERNAL MEDICINE

## 2022-08-26 PROCEDURE — 90935 HEMODIALYSIS ONE EVALUATION: CPT

## 2022-08-26 PROCEDURE — 74011250636 HC RX REV CODE- 250/636: Performed by: NURSE PRACTITIONER

## 2022-08-26 PROCEDURE — 82962 GLUCOSE BLOOD TEST: CPT

## 2022-08-26 PROCEDURE — 65270000029 HC RM PRIVATE

## 2022-08-26 PROCEDURE — 74011000250 HC RX REV CODE- 250: Performed by: INTERNAL MEDICINE

## 2022-08-26 PROCEDURE — 85025 COMPLETE CBC W/AUTO DIFF WBC: CPT

## 2022-08-26 RX ORDER — FENTANYL CITRATE 50 UG/ML
25 INJECTION, SOLUTION INTRAMUSCULAR; INTRAVENOUS ONCE
Status: COMPLETED | OUTPATIENT
Start: 2022-08-26 | End: 2022-08-26

## 2022-08-26 RX ADMIN — SODIUM CHLORIDE, PRESERVATIVE FREE 10 ML: 5 INJECTION INTRAVENOUS at 22:44

## 2022-08-26 RX ADMIN — ATORVASTATIN CALCIUM 40 MG: 40 TABLET, FILM COATED ORAL at 13:02

## 2022-08-26 RX ADMIN — HYDROMORPHONE HYDROCHLORIDE 0.5 MG: 1 INJECTION, SOLUTION INTRAMUSCULAR; INTRAVENOUS; SUBCUTANEOUS at 01:26

## 2022-08-26 RX ADMIN — NIFEDIPINE 90 MG: 90 TABLET, EXTENDED RELEASE ORAL at 13:02

## 2022-08-26 RX ADMIN — HYDROMORPHONE HYDROCHLORIDE 0.5 MG: 1 INJECTION, SOLUTION INTRAMUSCULAR; INTRAVENOUS; SUBCUTANEOUS at 12:59

## 2022-08-26 RX ADMIN — FENTANYL CITRATE 25 MCG: 0.05 INJECTION, SOLUTION INTRAMUSCULAR; INTRAVENOUS at 22:56

## 2022-08-26 RX ADMIN — METOPROLOL TARTRATE 12.5 MG: 25 TABLET, FILM COATED ORAL at 22:30

## 2022-08-26 RX ADMIN — SODIUM CHLORIDE, PRESERVATIVE FREE 10 ML: 5 INJECTION INTRAVENOUS at 06:23

## 2022-08-26 RX ADMIN — Medication 3 UNITS: at 22:35

## 2022-08-26 RX ADMIN — INSULIN GLARGINE 7 UNITS: 100 INJECTION, SOLUTION SUBCUTANEOUS at 22:35

## 2022-08-26 RX ADMIN — OXYCODONE AND ACETAMINOPHEN 1 TABLET: 5; 325 TABLET ORAL at 18:26

## 2022-08-26 RX ADMIN — METOPROLOL TARTRATE 12.5 MG: 25 TABLET, FILM COATED ORAL at 13:03

## 2022-08-26 RX ADMIN — HYDRALAZINE HYDROCHLORIDE 100 MG: 50 TABLET, FILM COATED ORAL at 18:26

## 2022-08-26 RX ADMIN — CALCIUM ACETATE 1334 MG: 667 CAPSULE ORAL at 13:01

## 2022-08-26 RX ADMIN — HYDROMORPHONE HYDROCHLORIDE 0.5 MG: 1 INJECTION, SOLUTION INTRAMUSCULAR; INTRAVENOUS; SUBCUTANEOUS at 05:37

## 2022-08-26 RX ADMIN — SODIUM CHLORIDE, PRESERVATIVE FREE 10 ML: 5 INJECTION INTRAVENOUS at 22:47

## 2022-08-26 RX ADMIN — FUROSEMIDE 80 MG: 40 TABLET ORAL at 13:02

## 2022-08-26 RX ADMIN — ASPIRIN 81 MG: 81 TABLET, COATED ORAL at 13:01

## 2022-08-26 RX ADMIN — CALCIUM ACETATE 1334 MG: 667 CAPSULE ORAL at 18:26

## 2022-08-26 RX ADMIN — HYDRALAZINE HYDROCHLORIDE 100 MG: 50 TABLET, FILM COATED ORAL at 13:02

## 2022-08-26 RX ADMIN — HYDRALAZINE HYDROCHLORIDE 100 MG: 50 TABLET, FILM COATED ORAL at 22:30

## 2022-08-26 NOTE — PROGRESS NOTES
8/26/2022  4:56 PM  CM faxed New Admission paperwork to [facility] ([phone]). Requested [facility] call [phone] to reach weekday CM.    3:17 PM  CM spoke with [facility] ([phone]) to determine chair availability near patient's home address in [place], [place] [place] [facility]"" ([address], [place] [address]). Closest facilities with availability: [address] [facility], [place], and [place]. [facility] stated they would be faxing necessary paperwork to CM office. CM met with patient to discuss above options, his preference is for the [address] [facility] facility with a [id number]-[id number] chair time available. [facility] requested that this preference be stated on the paperwork when faxed back to their organization.     Nima Chambers RN

## 2022-08-26 NOTE — PROCEDURES
Hemodialysis / [phone]    Vitals Pre Post Assessment Pre Post   BP BP: (!) 162/87 (08/26/22 0827)   148/73 LOC A&Ox4 No change   HR Pulse (Heart Rate): 92 (08/26/22 0827) 91 Lungs clear No change   Resp Resp Rate: 18 (08/26/22 0827) 18 Cardiac RRR No change   Temp Temp: 98.1 °F (36.7 °C) (08/26/22 0827) 98.8 Skin CDI No change   Weight    Edema 3+ BLE No change   Tele status   Pain Pain Intensity 1: 9 (08/25/22 1722)      Orders   Duration: Start: 0827 End: 1200 Total: 3.5hrs   Dialyzer: Dialyzer/Set Up Inspection: Gavin Sauceda (08/26/22 0827)   K Bath: Dialysate K (mEq/L): 2 (08/26/22 0827)   Ca Bath: Dialysate CA (mEq/L): 2.5 (08/26/22 0827)   Na: Dialysate NA (mEq/L): 138 (08/26/22 0827)   Bicarb: Dialysate HCO3 (mEq/L): 35 (08/26/22 0827)   Target Fluid Removal: Goal/Amount of Fluid to Remove (mL): 3000 mL (08/26/22 0827)     Access   Type & Location: RPC : Dressing CDI. No s/s of infection. Both lumens aspirate & flush well. Running well at . SBAR received from Primary RN. Pt arrived to HD suite A&Ox4. Consent signed & on file. Each catheter limb disinfected per p&p, caps removed, hubs disinfected per p&p. Each lumen aspirated for blood return and flushed with Normal Saline per policy. Labs drawn per request/ order. VSS. Dialysis Tx initiated. Comments:   Dressing CDI. No s/s of infection noted.                                      Labs   HBsAg (Antigen) / date: Neg 8/19/22                                              HBsAb (Antibody) / date: Imm 8/19/22   Source: EPIC   Obtained/Reviewed  Critical Results Called HGB   Date Value Ref Range Status   08/26/2022 8.1 (L) 12.1 - 17.0 g/dL Final     Potassium   Date Value Ref Range Status   08/26/2022 4.4 3.5 - 5.1 mmol/L Final     Calcium   Date Value Ref Range Status   08/26/2022 7.5 (L) 8.5 - 10.1 MG/DL Final     BUN   Date Value Ref Range Status   08/26/2022 29 (H) 6 - 20 MG/DL Final     Creatinine   Date Value Ref Range Status   08/26/2022 4.27 (H) 0.70 - 1.30 MG/DL Final        Meds Given   Name Dose Route   None ordered                 Adequacy / Fluid    Total Liters Process: 81.0   Net Fluid Removed: 3000mL      Comments   Time Out Done:   (Time) 0222   Admitting Diagnosis: PONCHO   Consent obtained/signed: Informed Consent Verified: Yes (08/26/22 0827)   Machine / RO # Machine Number: [id number] (08/26/22 0827)   Primary Nurse Rpt Pre: Ziggy Hahn (deirdre), RN   Primary Nurse Rpt Post: Deedee Suárez RN   Pt Education: procedural   Care Plan: On going   Pts outpatient clinic: TBD     Tx Summary  0827:  RPC : Dressing CDI. No s/s of infection. Both lumens aspirate & flush well. Running well at . SBAR received from Primary RN. Pt arrived to HD suite A&Ox4. Consent signed & on file. Each catheter limb disinfected per p&p, caps removed, hubs disinfected per p&p. Each lumen aspirated for blood return and flushed with Normal Saline per policy. Labs drawn per request/ order. VSS. Dialysis Tx initiated. 0845:  Pt resting  0900:  Pt resting  0915:  Pt resting  0930:  Pt resting  0945:  Pt resting  1000:  Pt resting  1015:  Pt resting  1030:  Pt resting  1045:  Pt resting  1100:  Pt resting  1115:  Pt resting  1130:  Pt resting  1145:  Pt resting  1200: Tx ended. VSS. Each dialysis catheter limb disinfected per p&p, all possible blood returned per p&p, and each dialysis hub disinfected per p&p. Each lumen flushed, post dialysis catheter Heparin dwell instilled per order, and caps applied. Bed locked and in the lowest position, call bell and belongings in reach. SBAR given to Primary, RN. Patient is stable at time of their/ my departure. All Dialysis related medications have been reviewed. Comments:   Assessment performed by RN. Procedure and documentation observed and reviewed by Flower Bray RN.

## 2022-08-26 NOTE — PROGRESS NOTES
Bedside shift change report given to Ilene Rodriguez (oncoming nurse) by Vimal Roach RN (offgoing nurse).   Report included the following information SBAR, Kardex, Intake/Output, MAR, and Recent Results    Shift worked:  7a-7p     Shift summary and any significant changes:     none     Concerns for physician to address:  none     Zone phone for oncoming shift:   none           Violeta Cooley RN

## 2022-08-26 NOTE — PROGRESS NOTES
Hospitalist Progress Note    NAME: Amada Stearns   :  1975   MRN:  [id number]       Assessment / Plan:  PONCHO on CKD stage IV, POA  Hyperkalemia, POA, refused kayexalate   Acute on chronic diastolic heart failure, POA  Uncontrolled HTN  Chest x-ray with cardiomegaly, vascular congestions, small to moderate left pleural effusion  Echo last month with normal EF and abnormal diastolic dysfunction  IV bumex, transitioned to PO lasix  Pta florinef discontinued  Hydralazine, nifedipine, metoprolol bid , titrate prn  HD as per nephrology. S/ post permacath placement  Pt will need outpt HD slot at discharge. Stable for discharge when outpt HD slot is secured. Discussed with nephrology and CM. Chronic normocytic anemia, POA  EPO as per nephro  S/p IV venofer  S/p 1 unit prbc for hgb 6. 6.  hbg stable at 8.4    Psychiatric disorder  Cont' home meds    T2DM  Cont' lower dose lantus, titrate prn  SSI    R foot wound  Wound care following. Wound does not look infected. Con't daily wound care as ordered  Prn dialudid/ po percocet prn      SAMM:      Code Status: Full code  DVT Prophylaxis: Allergic to heparin products, scd  GI Prophylaxis: not indicated   Baseline: Active, independent     Subjective:     Chief Complaint / Reason for Physician Visit  Fu poncho on ckd    No Acute issues, eager to go home  Discussed with RN events overnight. Review of Systems:  Symptom Y/N Comments  Symptom Y/N Comments   Fever/Chills n   Chest Pain n    Poor Appetite    Edema     Cough n   Abdominal Pain n    Sputum    Joint Pain     SOB/DIETZ n   Pruritis/Rash     Nausea/vomit n   Tolerating PT/OT     Diarrhea    Tolerating Diet y    Constipation    Other       Could NOT obtain due to:      Objective:     VITALS:   Last 24hrs VS reviewed since prior progress note.  Most recent are:  Patient Vitals for the past 24 hrs:   Temp Pulse Resp BP SpO2   22 0540 99.3 °F (37.4 °C) 93 18 (!) 158/71 97 %   22 2315 98.2 °F (36.8 °C) 88 18 (!) 148/74 99 %   08/25/22 2038 98.1 °F (36.7 °C) 93 18 (!) 172/86 95 %   08/25/22 1531 98 °F (36.7 °C) 85 18 121/61 90 %   08/25/22 1111 98.2 °F (36.8 °C) 84 18 (!) 156/76 97 %         Intake/Output Summary (Last 24 hours) at 8/26/2022 0805  Last data filed at 8/25/2022 1400  Gross per 24 hour   Intake 120 ml   Output --   Net 120 ml          I had a face to face encounter and independently examined this patient on 8/26/2022, as outlined below:  PHYSICAL EXAM:  General: WD, WN. Alert, cooperative, no acute distress    EENT:  EOMI. Anicteric sclerae. MMM  Resp:  CTA bilaterally, no wheezing or rales. No accessory muscle use  CV:  Regular  rhythm,  No edema  GI:  Soft, Non distended, Non tender. +Bowel sounds  Neurologic:  Alert and oriented X 3, normal speech  Psych:   Fair insight. Not anxious nor agitated  Skin:  No rashes. No jaundice    Reviewed most current lab test results and cultures  YES  Reviewed most current radiology test results   YES  Review and summation of old records today    NO  Reviewed patient's current orders and MAR    YES  PMH/ reviewed - no change compared to H&P  ________________________________________________________________________  Care Plan discussed with:    Comments   Patient x    Family      RN x    Care Manager x    Consultant  x nephro                     Multidiciplinary team rounds were held today with , nursing, pharmacist and clinical coordinator. Patient's plan of care was discussed; medications were reviewed and discharge planning was addressed.      ________________________________________________________________________  Total NON critical care TIME:  35 Minutes    Total CRITICAL CARE TIME Spent:   Minutes non procedure based      Comments   >50% of visit spent in counseling and coordination of care     ________________________________________________________________________  Franck Francisco MD     Procedures: see electronic medical records for all procedures/Xrays and details which were not copied into this note but were reviewed prior to creation of Plan. LABS:  I reviewed today's most current labs and imaging studies. Pertinent labs include:  Recent Labs     08/25/22  0453   WBC 9.6   HGB 8.0*   HCT 26.0*          No results for input(s): NA, K, CL, CO2, GLU, BUN, CREA, CA, MG, PHOS, ALB, TBIL, TBILI, ALT, INR, INREXT, INREXT in the last 72 hours.     No lab exists for component: SGOT      Signed: Patricia Guevara MD

## 2022-08-26 NOTE — PROGRESS NOTES
Nephrology Progress Note  [facility] / [address] , [place], [address]  Phone - [phone]  Fax - [phone]                 Patient: Agustin Hall                   YOB: 1975        Date- 8/26/2022                      Admit Date: 8/18/2022  CC: Follow up for new onset esrd   IMPRESSION & PLAN:   New onset ESRD  Hyperkalemia   Metabolic acidosis   Anemia of CKD   Hypocalcemia   Iron deficiency anemia   edema, shortness of breath, fluid overload. Chronic kidney disease, stage V likely due to diabetic nephropathy and hypertensive nephrosclerosis. PLAN-  SEEN ON HD TODAY  Set up out pt hd unit  S/P permcath  Cont LIBBY and IV iron  Continue phoslo  OKAY TO D/C - IF out pt hd unit is set up     Subjective: Interval History:   SEEN on dialysis  No new issues  Waiting for placement      Objective:   Vitals:    08/26/22 1100 08/26/22 1115 08/26/22 1130 08/26/22 1145   BP: (!) 143/72 (!) 142/71 139/69 (!) 142/70   Pulse: 89 90 90 89   Resp: 18 18 18 18   Temp:       TempSrc:       SpO2:       Weight:       Height:          08/25 0701 - 08/26 0700  In: 120 [P.O.:120]  Out: -   Last 3 Recorded Weights in this Encounter    08/18/22 0956 08/24/22 0457   Weight: 88.9 kg (195 lb 15.8 oz) 82.8 kg (182 lb 8.7 oz)      Physical exam:    GEN:  nad  NECK:  Supple, no thyromegaly  RESP: clear b/l, no  wheezing,   CVS: RRR,S1,S2   NEURO: non focal, normal speech  EXT: Edema +nt     Access:  KEE wyatt in place    Chart reviewed. Pertinent Notes reviewed. Data Review :  Recent Labs     08/26/22  0832      K 4.4      CO2 30   BUN 29*   CREA 4.27*   *   CA 7.5*   PHOS 3.1       Recent Labs     08/26/22  0832 08/25/22  0453   WBC 10.3 9.6   HGB 8.1* 8.0*   HCT 27.0* 26.0*    322       No results for input(s): FE, TIBC, PSAT, FERR in the last 72 hours.    Medication list reviewed  Current Facility-Administered Medications   Medication Dose Route Frequency    insulin glargine (LANTUS) injection 7 Units  7 Units SubCUTAneous QHS    furosemide (LASIX) tablet 80 mg  80 mg Oral DAILY    HYDROmorphone (DILAUDID) injection 0.5 mg  0.5 mg IntraVENous Q4H PRN    metoprolol tartrate (LOPRESSOR) tablet 12.5 mg  12.5 mg Oral Q12H    0.9% sodium chloride infusion 250 mL  250 mL IntraVENous PRN    diclofenac (VOLTAREN) 1 % topical gel 4 g  4 g Topical Q6H PRN    calcium acetate(phosphat bind) (PHOSLO) capsule 1,334 mg  2 Capsule Oral TID WITH MEALS    oxyCODONE-acetaminophen (PERCOCET) 5-325 mg per tablet 1 Tablet  1 Tablet Oral Q6H PRN    albumin human 25% (BUMINATE) solution 12.5 g  12.5 g IntraVENous DIALYSIS PRN    hydrALAZINE (APRESOLINE) tablet 100 mg  100 mg Oral TID    NIFEdipine ER (PROCARDIA XL) tablet 90 mg  90 mg Oral DAILY    labetaloL (NORMODYNE;TRANDATE) injection 20 mg  20 mg IntraVENous Q4H PRN    ARIPiprazole (ABILIFY) tablet 10 mg  10 mg Oral DAILY    aspirin delayed-release tablet 81 mg  81 mg Oral DAILY    atorvastatin (LIPITOR) tablet 40 mg  40 mg Oral DAILY    sodium chloride (NS) flush 5-40 mL  5-40 mL IntraVENous Q8H    sodium chloride (NS) flush 5-40 mL  5-40 mL IntraVENous PRN    acetaminophen (TYLENOL) tablet 650 mg  650 mg Oral Q6H PRN    Or    acetaminophen (TYLENOL) suppository 650 mg  650 mg Rectal Q6H PRN    polyethylene glycol (MIRALAX) packet 17 g  17 g Oral DAILY PRN    promethazine (PHENERGAN) tablet 12.5 mg  12.5 mg Oral Q6H PRN    Or    ondansetron (ZOFRAN) injection 4 mg  4 mg IntraVENous Q6H PRN    insulin lispro (HUMALOG) injection   SubCUTAneous AC&HS    glucose chewable tablet 16 g  4 Tablet Oral PRN    glucagon (GLUCAGEN) injection 1 mg  1 mg IntraMUSCular PRN    dextrose 10% infusion 0-250 mL  0-250 mL IntraVENous PRN    epoetin ace-epbx (RETACRIT) injection 20,000 Units  20,000 Units SubCUTAneous Q TUE, THU & SAT          Elvia Mcfadden, MD  8/26/2022

## 2022-08-26 NOTE — PROGRESS NOTES
provided a prayer rug to patient, per his request the previous day. He thanked  for the support.       Visited by: Darrel lubin: [phone])

## 2022-08-27 LAB
ERYTHROCYTE [DISTWIDTH] IN BLOOD BY AUTOMATED COUNT: 16.7 % (ref 11.5–14.5)
GLUCOSE BLD STRIP.AUTO-MCNC: 164 MG/DL (ref 65–117)
GLUCOSE BLD STRIP.AUTO-MCNC: 180 MG/DL (ref 65–117)
GLUCOSE BLD STRIP.AUTO-MCNC: 279 MG/DL (ref 65–117)
GLUCOSE BLD STRIP.AUTO-MCNC: 87 MG/DL (ref 65–117)
HCT VFR BLD AUTO: 25.5 % (ref 36.6–50.3)
HGB BLD-MCNC: 7.8 G/DL (ref 12.1–17)
MCH RBC QN AUTO: 27.8 PG (ref 26–34)
MCHC RBC AUTO-ENTMCNC: 30.6 G/DL (ref 30–36.5)
MCV RBC AUTO: 90.7 FL (ref 80–99)
NRBC # BLD: 0.03 K/UL (ref 0–0.01)
NRBC BLD-RTO: 0.3 PER 100 WBC
PLATELET # BLD AUTO: 292 K/UL (ref 150–400)
PMV BLD AUTO: 8.2 FL (ref 8.9–12.9)
RBC # BLD AUTO: 2.81 M/UL (ref 4.1–5.7)
SERVICE CMNT-IMP: ABNORMAL
SERVICE CMNT-IMP: NORMAL
WBC # BLD AUTO: 10.3 K/UL (ref 4.1–11.1)

## 2022-08-27 PROCEDURE — 74011636637 HC RX REV CODE- 636/637: Performed by: INTERNAL MEDICINE

## 2022-08-27 PROCEDURE — 74011250637 HC RX REV CODE- 250/637: Performed by: INTERNAL MEDICINE

## 2022-08-27 PROCEDURE — 36415 COLL VENOUS BLD VENIPUNCTURE: CPT

## 2022-08-27 PROCEDURE — 82962 GLUCOSE BLOOD TEST: CPT

## 2022-08-27 PROCEDURE — 85027 COMPLETE CBC AUTOMATED: CPT

## 2022-08-27 PROCEDURE — 65270000029 HC RM PRIVATE

## 2022-08-27 PROCEDURE — 74011000250 HC RX REV CODE- 250: Performed by: INTERNAL MEDICINE

## 2022-08-27 RX ADMIN — CALCIUM ACETATE 1334 MG: 667 CAPSULE ORAL at 10:06

## 2022-08-27 RX ADMIN — OXYCODONE AND ACETAMINOPHEN 1 TABLET: 5; 325 TABLET ORAL at 12:43

## 2022-08-27 RX ADMIN — NIFEDIPINE 90 MG: 90 TABLET, EXTENDED RELEASE ORAL at 10:07

## 2022-08-27 RX ADMIN — FUROSEMIDE 80 MG: 40 TABLET ORAL at 10:07

## 2022-08-27 RX ADMIN — CALCIUM ACETATE 1334 MG: 667 CAPSULE ORAL at 12:43

## 2022-08-27 RX ADMIN — HYDRALAZINE HYDROCHLORIDE 100 MG: 50 TABLET, FILM COATED ORAL at 22:28

## 2022-08-27 RX ADMIN — CALCIUM ACETATE 1334 MG: 667 CAPSULE ORAL at 18:33

## 2022-08-27 RX ADMIN — SODIUM CHLORIDE, PRESERVATIVE FREE 10 ML: 5 INJECTION INTRAVENOUS at 22:35

## 2022-08-27 RX ADMIN — ATORVASTATIN CALCIUM 40 MG: 40 TABLET, FILM COATED ORAL at 10:07

## 2022-08-27 RX ADMIN — METOPROLOL TARTRATE 12.5 MG: 25 TABLET, FILM COATED ORAL at 22:28

## 2022-08-27 RX ADMIN — SODIUM CHLORIDE, PRESERVATIVE FREE 10 ML: 5 INJECTION INTRAVENOUS at 05:31

## 2022-08-27 RX ADMIN — Medication 3 UNITS: at 10:07

## 2022-08-27 RX ADMIN — OXYCODONE AND ACETAMINOPHEN 1 TABLET: 5; 325 TABLET ORAL at 18:34

## 2022-08-27 RX ADMIN — METOPROLOL TARTRATE 12.5 MG: 25 TABLET, FILM COATED ORAL at 10:06

## 2022-08-27 RX ADMIN — ASPIRIN 81 MG: 81 TABLET, COATED ORAL at 10:07

## 2022-08-27 RX ADMIN — INSULIN GLARGINE 7 UNITS: 100 INJECTION, SOLUTION SUBCUTANEOUS at 22:29

## 2022-08-27 RX ADMIN — OXYCODONE AND ACETAMINOPHEN 1 TABLET: 5; 325 TABLET ORAL at 04:55

## 2022-08-27 RX ADMIN — HYDRALAZINE HYDROCHLORIDE 100 MG: 50 TABLET, FILM COATED ORAL at 18:34

## 2022-08-27 RX ADMIN — HYDRALAZINE HYDROCHLORIDE 100 MG: 50 TABLET, FILM COATED ORAL at 10:07

## 2022-08-27 RX ADMIN — ARIPIPRAZOLE 10 MG: 5 TABLET ORAL at 10:07

## 2022-08-27 NOTE — PROGRESS NOTES
Bedside and Verbal shift change report given to Connie (oncoming nurse) by Wilma Soliz (offgoing nurse). Report included the following information SBAR, Kardex, Intake/Output, MAR, and Recent Results.

## 2022-08-27 NOTE — PROGRESS NOTES
Hospitalist Progress Note    NAME: Parish Varghese   :  1975   MRN:  [id number]       Assessment / Plan:  JAYLON on CKD stage IV, POA  Hyperkalemia, POA, refused kayexalate   Acute on chronic diastolic heart failure, POA  Uncontrolled HTN  Chest x-ray with cardiomegaly, vascular congestions, small to moderate left pleural effusion  Echo last month with normal EF and abnormal diastolic dysfunction  IV bumex, transitioned to PO lasix  Pta florinef discontinued  Hydralazine, nifedipine, metoprolol bid , titrate prn  HD as per nephrology. S/ post permacath placement  Pt will need outpt HD slot at discharge. Stable for discharge when outpt HD slot is secured. Discussed with nephrology and CM. Chronic normocytic anemia, POA  EPO as per nephro  S/p IV venofer  S/p 1 unit prbc for hgb 6. 6.  hbg stable at 8.4    Psychiatric disorder  Cont' home meds    T2DM  Cont' lower dose lantus, titrate prn  SSI    R foot wound  Wound care following. Wound does not look infected. Con't daily wound care as ordered  Stop IV Dilaudid, do not give any IV pain medication  Continue with Percocet      SAMM:      Code Status: Full code  DVT Prophylaxis: Allergic to heparin products, scd  GI Prophylaxis: not indicated   Baseline: Active, independent     Subjective:     Chief Complaint / Reason for Physician Visit  Fu jaylon on ckd    No Acute issues, upset about not getting IV Dilaudid  Discussed with RN events overnight. Review of Systems:  Symptom Y/N Comments  Symptom Y/N Comments   Fever/Chills n   Chest Pain n    Poor Appetite    Edema     Cough n   Abdominal Pain n    Sputum    Joint Pain     SOB/DIETZ n   Pruritis/Rash     Nausea/vomit n   Tolerating PT/OT     Diarrhea    Tolerating Diet y    Constipation    Other       Could NOT obtain due to:      Objective:     VITALS:   Last 24hrs VS reviewed since prior progress note.  Most recent are:  Patient Vitals for the past 24 hrs:   Temp Pulse Resp BP SpO2   22 0448 99 °F (37.2 °C) 92 17 (!) 147/61 95 %   08/26/22 2226 99 °F (37.2 °C) (!) 106 18 (!) 185/70 98 %   08/26/22 1200 98.8 °F (37.1 °C) 91 18 (!) 148/73 --   08/26/22 1145 -- 89 18 (!) 142/70 --   08/26/22 1130 -- 90 18 139/69 --   08/26/22 1115 -- 90 18 (!) 142/71 --   08/26/22 1100 -- 89 18 (!) 143/72 --   08/26/22 1045 -- 89 18 (!) 142/75 --   08/26/22 1030 -- 93 18 (!) 154/76 --   08/26/22 1015 -- 96 18 (!) 172/90 --   08/26/22 1000 -- 96 18 (!) 183/91 --   08/26/22 0945 -- 94 18 (!) 178/91 --   08/26/22 0930 -- 94 18 (!) 173/91 --   08/26/22 0915 -- 93 18 (!) 178/83 --   08/26/22 0900 -- 93 18 (!) 180/90 --   08/26/22 0845 -- 94 18 (!) 155/86 --         Intake/Output Summary (Last 24 hours) at 8/27/2022 0835  Last data filed at 8/26/2022 1200  Gross per 24 hour   Intake --   Output 3000 ml   Net -3000 ml          I had a face to face encounter and independently examined this patient on 8/27/2022, as outlined below:  PHYSICAL EXAM:  General: WD, WN. Alert, cooperative, no acute distress    EENT:  EOMI. Anicteric sclerae. MMM  Resp:  CTA bilaterally, no wheezing or rales. No accessory muscle use  CV:  Regular  rhythm,  No edema  GI:  Soft, Non distended, Non tender. +Bowel sounds  Neurologic:  Alert and oriented X 3, normal speech  Psych:   Fair insight. Not anxious nor agitated  Skin:  No rashes. No jaundice    Reviewed most current lab test results and cultures  YES  Reviewed most current radiology test results   YES  Review and summation of old records today    NO  Reviewed patient's current orders and MAR    YES  PMH/SH reviewed - no change compared to H&P  ________________________________________________________________________  Care Plan discussed with:    Comments   Patient x    Family      RN x    Care Manager x    Consultant  x nephro                     Multidiciplinary team rounds were held today with , nursing, pharmacist and clinical coordinator.   Patient's plan of care was discussed; medications were reviewed and discharge planning was addressed. ________________________________________________________________________  Total NON critical care TIME:  35 Minutes    Total CRITICAL CARE TIME Spent:   Minutes non procedure based      Comments   >50% of visit spent in counseling and coordination of care     ________________________________________________________________________  Tristen Campos MD     Procedures: see electronic medical records for all procedures/Xrays and details which were not copied into this note but were reviewed prior to creation of Plan. LABS:  I reviewed today's most current labs and imaging studies.   Pertinent labs include:  Recent Labs     08/27/22  0426 08/26/22  0832 08/25/22  0453   WBC 10.3 10.3 9.6   HGB 7.8* 8.1* 8.0*   HCT 25.5* 27.0* 26.0*    328 322       Recent Labs     08/26/22  0832      K 4.4      CO2 30   *   BUN 29*   CREA 4.27*   CA 7.5*   PHOS 3.1   ALB 2.4*         Signed: Tristen Campos MD

## 2022-08-27 NOTE — PROGRESS NOTES
Comprehensive Nutrition Assessment    Type and Reason for Visit: Initial, RD nutrition re-screen/LOS    Nutrition Recommendations/Plan:   Continue diet as tolerated  RD to add Nepro daily      Malnutrition Assessment:  Malnutrition Status:  No malnutrition (08/27/22 1219)      Nutrition Assessment:  Pt admitted with PNOCHO. PMH: CKD, CHF, HTN, DM. Chart reviewed for LOS. MST negative for any recent wt loss or poor appetite. Awaiting disposition, outpt HD setup. Appetite good per RN flowsheet's. BG . K 4.4 and phos 3.1. Pt's last HD tx was yesterday. Current needs unlikely to be met by standard diet alone, will add PO supplement to better meet needs. Patient Vitals for the past 168 hrs:   % Diet Eaten   08/25/22 1400 26 - 50%   08/23/22 0901 0%   08/22/22 1806 76 - 100%   08/22/22 1235 76 - 100%   08/21/22 1845 76 - 100%   08/21/22 1333 76 - 100%   08/21/22 0932 76 - 100%   08/20/22 1757 76 - 100%          Nutrition Related Findings:    Meds: phoslo, lasix, lantus, humalog. Edema: +2-BLE. BM 8/22 Wound Type: Surgical incision    Current Nutrition Intake & Therapies:  Average Meal Intake: 51-75%  Average Supplement Intake: None ordered  DIET ONE TIME MESSAGE  ADULT DIET Regular; 3 carb choices (45 gm/meal); Low Potassium (Less than 3000 mg/day)  ADULT ORAL NUTRITION SUPPLEMENT Lunch; Renal Supplement    Anthropometric Measures:  Height: 6' (182.9 cm)  Ideal Body Weight (IBW): 178 lbs (81 kg)     Current Body Wt:  82.8 kg (182 lb 8.7 oz), 102.6 % IBW. Standing scale  Current BMI (kg/m2): 24.8                          BMI Category: Normal weight (BMI 18.5-24. 9)    Estimated Daily Nutrient Needs:  Energy Requirements Based On: Formula  Weight Used for Energy Requirements: Current  Energy (kcal/day): MSJ 2100 (1736 x 1.2)  Weight Used for Protein Requirements: Current  Protein (g/day): 99g (1.2gPro/kg)  Method Used for Fluid Requirements: Standard renal  Fluid (ml/day): 1800mL or per MD    Nutrition Diagnosis:   Increased nutrient needs related to renal dysfunction as evidenced by  (est protein needs)    Nutrition Interventions:   Food and/or Nutrient Delivery: Continue current diet, Start oral nutrition supplement  Nutrition Education/Counseling: No recommendations at this time  Coordination of Nutrition Care: Continue to monitor while inpatient       Goals:     Goals: PO intake 75% or greater, by next RD assessment       Nutrition Monitoring and Evaluation:   Behavioral-Environmental Outcomes: None identified  Food/Nutrient Intake Outcomes: Food and nutrient intake, Supplement intake  Physical Signs/Symptoms Outcomes: Biochemical data, Nutrition focused physical findings, Skin, Weight    Discharge Planning:    Continue oral nutrition supplement, Continue current diet    Damaris Gomez RD, CNSC  Contact: ext [phone]

## 2022-08-27 NOTE — PROGRESS NOTES
Received notification from bedside RN about patient with regards to: 10/10 pain, not yet due for PRN Percocet and requesting medication for relief  VS: /70, , RR 18, O2 sat 98% on RA    Intervention given: Fentanyl 25 mcg IV x 1 dose ordered

## 2022-08-27 NOTE — PROGRESS NOTES
Bedside shift change report given to Billie Cardoza RN (oncoming nurse) by Anand Lockhart RN (offgoing nurse).   Report included the following information SBAR, Kardex, Intake/Output, MAR, and Recent Results

## 2022-08-28 LAB
GLUCOSE BLD STRIP.AUTO-MCNC: 115 MG/DL (ref 65–117)
GLUCOSE BLD STRIP.AUTO-MCNC: 91 MG/DL (ref 65–117)
SERVICE CMNT-IMP: NORMAL
SERVICE CMNT-IMP: NORMAL

## 2022-08-28 PROCEDURE — 65270000029 HC RM PRIVATE

## 2022-08-28 PROCEDURE — 82962 GLUCOSE BLOOD TEST: CPT

## 2022-08-28 PROCEDURE — 74011250637 HC RX REV CODE- 250/637: Performed by: INTERNAL MEDICINE

## 2022-08-28 PROCEDURE — 74011000250 HC RX REV CODE- 250: Performed by: INTERNAL MEDICINE

## 2022-08-28 RX ADMIN — ATORVASTATIN CALCIUM 40 MG: 40 TABLET, FILM COATED ORAL at 10:09

## 2022-08-28 RX ADMIN — OXYCODONE AND ACETAMINOPHEN 1 TABLET: 5; 325 TABLET ORAL at 10:09

## 2022-08-28 RX ADMIN — SODIUM CHLORIDE, PRESERVATIVE FREE 10 ML: 5 INJECTION INTRAVENOUS at 13:18

## 2022-08-28 RX ADMIN — SODIUM CHLORIDE, PRESERVATIVE FREE 10 ML: 5 INJECTION INTRAVENOUS at 05:28

## 2022-08-28 RX ADMIN — HYDRALAZINE HYDROCHLORIDE 100 MG: 50 TABLET, FILM COATED ORAL at 10:10

## 2022-08-28 RX ADMIN — NIFEDIPINE 90 MG: 90 TABLET, EXTENDED RELEASE ORAL at 10:09

## 2022-08-28 RX ADMIN — FUROSEMIDE 80 MG: 40 TABLET ORAL at 10:09

## 2022-08-28 RX ADMIN — CALCIUM ACETATE 1334 MG: 667 CAPSULE ORAL at 13:20

## 2022-08-28 RX ADMIN — HYDRALAZINE HYDROCHLORIDE 100 MG: 50 TABLET, FILM COATED ORAL at 17:57

## 2022-08-28 RX ADMIN — CALCIUM ACETATE 1334 MG: 667 CAPSULE ORAL at 10:09

## 2022-08-28 RX ADMIN — ASPIRIN 81 MG: 81 TABLET, COATED ORAL at 10:09

## 2022-08-28 RX ADMIN — CALCIUM ACETATE 1334 MG: 667 CAPSULE ORAL at 17:57

## 2022-08-28 RX ADMIN — OXYCODONE AND ACETAMINOPHEN 1 TABLET: 5; 325 TABLET ORAL at 02:16

## 2022-08-28 RX ADMIN — OXYCODONE AND ACETAMINOPHEN 1 TABLET: 5; 325 TABLET ORAL at 17:57

## 2022-08-28 RX ADMIN — SODIUM CHLORIDE, PRESERVATIVE FREE 10 ML: 5 INJECTION INTRAVENOUS at 23:24

## 2022-08-28 RX ADMIN — METOPROLOL TARTRATE 12.5 MG: 25 TABLET, FILM COATED ORAL at 10:10

## 2022-08-28 NOTE — PROGRESS NOTES
Bedside and Verbal shift change report given to Elham(oncoming nurse) by Connie(offgoing nurse). Report included the following information SBAR, Kardex, Intake/Output, MAR, and Recent Results.

## 2022-08-28 NOTE — PROGRESS NOTES
Hospitalist Progress Note    NAME: Esther Logan   :  1975   MRN:  [id number]       Assessment / Plan:  PONCHO on CKD stage IV, POA  Hyperkalemia, POA, refused kayexalate   Acute on chronic diastolic heart failure, POA  Uncontrolled HTN  Chest x-ray with cardiomegaly, vascular congestions, small to moderate left pleural effusion  Echo last month with normal EF and abnormal diastolic dysfunction  IV bumex, transitioned to PO lasix  Pta florinef discontinued  Hydralazine, nifedipine, metoprolol bid , titrate prn  HD as per nephrology. S/ post permacath placement  Pt will need outpt HD slot at discharge. Stable for discharge when outpt HD slot is secured. Discussed with nephrology and CM. Chronic normocytic anemia, POA  EPO as per nephro  S/p IV venofer  S/p 1 unit prbc for hgb 6. 6.  hbg stable at 8.4    Psychiatric disorder  Cont' home meds    T2DM  Cont' lower dose lantus, titrate prn  SSI    R foot wound  Wound care following. Wound does not look infected. Con't daily wound care as ordered  Stop IV Dilaudid, do not give any IV pain medication  Continue with Percocet      SAMM:      Code Status: Full code  DVT Prophylaxis: Allergic to heparin products, scd  GI Prophylaxis: not indicated   Baseline: Active, independent     Subjective:     Chief Complaint / Reason for Physician Visit  Fu poncho on ckd    No Acute issues,   Discussed with RN events overnight. Review of Systems:  Symptom Y/N Comments  Symptom Y/N Comments   Fever/Chills n   Chest Pain n    Poor Appetite    Edema     Cough n   Abdominal Pain n    Sputum    Joint Pain     SOB/DIETZ n   Pruritis/Rash     Nausea/vomit n   Tolerating PT/OT     Diarrhea    Tolerating Diet y    Constipation    Other       Could NOT obtain due to:      Objective:     VITALS:   Last 24hrs VS reviewed since prior progress note.  Most recent are:  Patient Vitals for the past 24 hrs:   Temp Pulse Resp BP SpO2   22 0218 97.9 °F (36.6 °C) 85 17 (!) 146/70 100 %   08/27/22 2215 98.5 °F (36.9 °C) 87 16 (!) 141/67 99 %   08/27/22 1625 98.8 °F (37.1 °C) 83 16 (!) 104/56 97 %   08/27/22 1224 98 °F (36.7 °C) 84 17 (!) 140/59 98 %   08/27/22 0847 97 °F (36.1 °C) 89 17 (!) 135/56 97 %       No intake or output data in the 24 hours ending 08/28/22 0759       I had a face to face encounter and independently examined this patient on 8/28/2022, as outlined below:  PHYSICAL EXAM:  General: WD, WN. Alert, cooperative, no acute distress    EENT:  EOMI. Anicteric sclerae. MMM  Resp:  CTA bilaterally, no wheezing or rales. No accessory muscle use  CV:  Regular  rhythm,  No edema  GI:  Soft, Non distended, Non tender. +Bowel sounds  Neurologic:  Alert and oriented X 3, normal speech  Psych:   Fair insight. Not anxious nor agitated  Skin:  No rashes. No jaundice    Reviewed most current lab test results and cultures  YES  Reviewed most current radiology test results   YES  Review and summation of old records today    NO  Reviewed patient's current orders and MAR    YES  PMH/SH reviewed - no change compared to H&P  ________________________________________________________________________  Care Plan discussed with:    Comments   Patient x    Family      RN x    Care Manager x    Consultant  x nephro                     Multidiciplinary team rounds were held today with , nursing, pharmacist and clinical coordinator. Patient's plan of care was discussed; medications were reviewed and discharge planning was addressed.      ________________________________________________________________________  Total NON critical care TIME:  35 Minutes    Total CRITICAL CARE TIME Spent:   Minutes non procedure based      Comments   >50% of visit spent in counseling and coordination of care     ________________________________________________________________________  Jairon Mitchell MD     Procedures: see electronic medical records for all procedures/Xrays and details which were not copied into this note but were reviewed prior to creation of Plan. LABS:  I reviewed today's most current labs and imaging studies.   Pertinent labs include:  Recent Labs     08/27/22  0426 08/26/22  0832   WBC 10.3 10.3   HGB 7.8* 8.1*   HCT 25.5* 27.0*    328       Recent Labs     08/26/22  0832      K 4.4      CO2 30   *   BUN 29*   CREA 4.27*   CA 7.5*   PHOS 3.1   ALB 2.4*         Signed: Claudia Montes MD

## 2022-08-29 VITALS
RESPIRATION RATE: 18 BRPM | BODY MASS INDEX: 24.72 KG/M2 | TEMPERATURE: 99.6 F | SYSTOLIC BLOOD PRESSURE: 182 MMHG | WEIGHT: 182.54 LBS | OXYGEN SATURATION: 95 % | HEART RATE: 100 BPM | DIASTOLIC BLOOD PRESSURE: 92 MMHG | HEIGHT: 72 IN

## 2022-08-29 LAB
ALBUMIN SERPL-MCNC: 2.2 G/DL (ref 3.5–5)
ANION GAP SERPL CALC-SCNC: 4 MMOL/L (ref 5–15)
BUN SERPL-MCNC: 31 MG/DL (ref 6–20)
BUN/CREAT SERPL: 6 (ref 12–20)
CALCIUM SERPL-MCNC: 7.9 MG/DL (ref 8.5–10.1)
CHLORIDE SERPL-SCNC: 103 MMOL/L (ref 97–108)
CO2 SERPL-SCNC: 29 MMOL/L (ref 21–32)
CREAT SERPL-MCNC: 4.95 MG/DL (ref 0.7–1.3)
ERYTHROCYTE [DISTWIDTH] IN BLOOD BY AUTOMATED COUNT: 16.6 % (ref 11.5–14.5)
GLUCOSE SERPL-MCNC: 135 MG/DL (ref 65–100)
HCT VFR BLD AUTO: 26.7 % (ref 36.6–50.3)
HGB BLD-MCNC: 8.1 G/DL (ref 12.1–17)
MCH RBC QN AUTO: 27.6 PG (ref 26–34)
MCHC RBC AUTO-ENTMCNC: 30.3 G/DL (ref 30–36.5)
MCV RBC AUTO: 91.1 FL (ref 80–99)
NRBC # BLD: 0 K/UL (ref 0–0.01)
NRBC BLD-RTO: 0 PER 100 WBC
PHOSPHATE SERPL-MCNC: 3.4 MG/DL (ref 2.6–4.7)
PLATELET # BLD AUTO: 275 K/UL (ref 150–400)
PMV BLD AUTO: 7.9 FL (ref 8.9–12.9)
POTASSIUM SERPL-SCNC: 4.3 MMOL/L (ref 3.5–5.1)
RBC # BLD AUTO: 2.93 M/UL (ref 4.1–5.7)
SODIUM SERPL-SCNC: 136 MMOL/L (ref 136–145)
WBC # BLD AUTO: 8.7 K/UL (ref 4.1–11.1)

## 2022-08-29 PROCEDURE — 74011250637 HC RX REV CODE- 250/637: Performed by: INTERNAL MEDICINE

## 2022-08-29 PROCEDURE — 74011000250 HC RX REV CODE- 250: Performed by: INTERNAL MEDICINE

## 2022-08-29 PROCEDURE — 36415 COLL VENOUS BLD VENIPUNCTURE: CPT

## 2022-08-29 PROCEDURE — 74011000272 HC RX REV CODE- 272: Performed by: NURSE PRACTITIONER

## 2022-08-29 PROCEDURE — 80069 RENAL FUNCTION PANEL: CPT

## 2022-08-29 PROCEDURE — 85027 COMPLETE CBC AUTOMATED: CPT

## 2022-08-29 PROCEDURE — 90935 HEMODIALYSIS ONE EVALUATION: CPT

## 2022-08-29 RX ORDER — CALCIUM ACETATE 667 MG/1
2 CAPSULE ORAL
Qty: 180 CAPSULE | Refills: 0 | Status: SHIPPED | OUTPATIENT
Start: 2022-08-29 | End: 2022-09-28

## 2022-08-29 RX ADMIN — METOPROLOL TARTRATE 12.5 MG: 25 TABLET, FILM COATED ORAL at 12:51

## 2022-08-29 RX ADMIN — ASPIRIN 81 MG: 81 TABLET, COATED ORAL at 12:51

## 2022-08-29 RX ADMIN — ATORVASTATIN CALCIUM 40 MG: 40 TABLET, FILM COATED ORAL at 12:51

## 2022-08-29 RX ADMIN — NIFEDIPINE 90 MG: 90 TABLET, EXTENDED RELEASE ORAL at 12:51

## 2022-08-29 RX ADMIN — Medication 1 PAD: at 02:00

## 2022-08-29 RX ADMIN — CALCIUM ACETATE 1334 MG: 667 CAPSULE ORAL at 12:51

## 2022-08-29 RX ADMIN — SODIUM CHLORIDE, PRESERVATIVE FREE 10 ML: 5 INJECTION INTRAVENOUS at 05:02

## 2022-08-29 RX ADMIN — HYDRALAZINE HYDROCHLORIDE 100 MG: 50 TABLET, FILM COATED ORAL at 09:14

## 2022-08-29 RX ADMIN — FUROSEMIDE 80 MG: 40 TABLET ORAL at 12:50

## 2022-08-29 NOTE — PROGRESS NOTES
Nephrology Progress Note  Formerly [facility] / [address], [place], [address]  Phone - [phone]  Fax - [phone]                 Patient: Rani Alfonso                   YOB: 1975        Date- 8/29/2022                      Admit Date: 8/18/2022  CC: Follow up for new onset ESRD   IMPRESSION & PLAN:   New onset ESRD  Hyperkalemia   Metabolic acidosis   Anemia of CKD   Hypocalcemia   Iron deficiency anemia   edema, shortness of breath, fluid overload. Chronic kidney disease, stage V likely due to diabetic nephropathy and hypertensive nephrosclerosis. PLAN-  SEEN ON HD TODAY  Set up out pt hd unit  S/P permcath  Cont LIBBY  Continue phoslo  OKAY TO D/C - IF out pt hd unit is set up     Subjective: Interval History:   SEEN on dialysis  HE REFUSED HIS BP MEDS./ BP HIGH TODAY  Waiting for placement      Objective:   Vitals:    08/29/22 1015 08/29/22 1030 08/29/22 1045 08/29/22 1100   BP: (!) 202/102 (!) 204/101 (!) 205/100 (!) 206/101   Pulse: 94 96 94 94   Resp: 18 18 18 18   Temp:       TempSrc:       SpO2:       Weight:       Height:          No intake/output data recorded. Last 3 Recorded Weights in this Encounter    08/18/22 0956 08/24/22 0457   Weight: 88.9 kg (195 lb 15.8 oz) 82.8 kg (182 lb 8.7 oz)      Physical exam:    GEN: nad  NECK:  Supple, no thyromegaly  RESP: CLEARb/l, no  wheezing,   CVS: RRR,S1,S2   NEURO: non focal, normal speech  EXT: Edema +nt     Access:  RIJ permacath in place    Chart reviewed. Pertinent Notes reviewed. Data Review :  Recent Labs     08/29/22  0751      K 4.3      CO2 29   BUN 31*   CREA 4.95*   *   CA 7.9*   PHOS 3.4       Recent Labs     08/29/22  0751 08/27/22  0426   WBC 8.7 10.3   HGB 8.1* 7.8*   HCT 26.7* 25.5*    292       No results for input(s): FE, TIBC, PSAT, FERR in the last 72 hours.    Medication list reviewed  Current Facility-Administered Medications   Medication Dose Route Frequency    insulin glargine (LANTUS) injection 7 Units  7 Units SubCUTAneous QHS    furosemide (LASIX) tablet 80 mg  80 mg Oral DAILY    metoprolol tartrate (LOPRESSOR) tablet 12.5 mg  12.5 mg Oral Q12H    0.9% sodium chloride infusion 250 mL  250 mL IntraVENous PRN    diclofenac (VOLTAREN) 1 % topical gel 4 g  4 g Topical Q6H PRN    calcium acetate(phosphat bind) (PHOSLO) capsule 1,334 mg  2 Capsule Oral TID WITH MEALS    oxyCODONE-acetaminophen (PERCOCET) 5-325 mg per tablet 1 Tablet  1 Tablet Oral Q6H PRN    albumin human 25% (BUMINATE) solution 12.5 g  12.5 g IntraVENous DIALYSIS PRN    hydrALAZINE (APRESOLINE) tablet 100 mg  100 mg Oral TID    NIFEdipine ER (PROCARDIA XL) tablet 90 mg  90 mg Oral DAILY    labetaloL (NORMODYNE;TRANDATE) injection 20 mg  20 mg IntraVENous Q4H PRN    ARIPiprazole (ABILIFY) tablet 10 mg  10 mg Oral DAILY    aspirin delayed-release tablet 81 mg  81 mg Oral DAILY    atorvastatin (LIPITOR) tablet 40 mg  40 mg Oral DAILY    sodium chloride (NS) flush 5-40 mL  5-40 mL IntraVENous Q8H    sodium chloride (NS) flush 5-40 mL  5-40 mL IntraVENous PRN    acetaminophen (TYLENOL) tablet 650 mg  650 mg Oral Q6H PRN    Or    acetaminophen (TYLENOL) suppository 650 mg  650 mg Rectal Q6H PRN    polyethylene glycol (MIRALAX) packet 17 g  17 g Oral DAILY PRN    promethazine (PHENERGAN) tablet 12.5 mg  12.5 mg Oral Q6H PRN    Or    ondansetron (ZOFRAN) injection 4 mg  4 mg IntraVENous Q6H PRN    insulin lispro (HUMALOG) injection   SubCUTAneous AC&HS    glucose chewable tablet 16 g  4 Tablet Oral PRN    glucagon (GLUCAGEN) injection 1 mg  1 mg IntraMUSCular PRN    dextrose 10% infusion 0-250 mL  0-250 mL IntraVENous PRN    epoetin ace-epbx (RETACRIT) injection 20,000 Units  20,000 Units SubCUTAneous Q TUE, THU & SAT          Lucian Ford MD  8/29/2022

## 2022-08-29 NOTE — PROGRESS NOTES
CM trying to find patient to obtain his Medicaid Number or copy of his card for [place] Medicaid. Patient currently in cafeteria with his mother. He will obtain information and return phone call to CM. CM received return phone call from patient with [place] Medicaid # [id number]. He does not have a copy of the card available. Information called to [facility] - [phone]. ANA LAURA still awaiting approval for new dialysis chair - prefers [address] [place] [address] Location. UPDATE:  3:15PM - patient requesting to leave the hospital to return to [place] home. States he will go to hospital in [facility] for dialysis arrangements if he has not heard from [facility] with confirmation. Patient wants to [address]!!!  Perfect Serve message sent to Dr. Beatrice Rodriguez.     Nish Brunner, RN, BSN, [address] [place] [address]  Manager of Case Management  [phone]

## 2022-08-29 NOTE — PROGRESS NOTES
Bedside and Verbal shift change report given to JAMAAL Mcdaniel (oncoming nurse) by Miguelina Rodrigues RN (offgoing nurse). Report included the following information SBAR, Kardex, Intake/Output, and MAR. Spoke with pt's daughter, Elisabeth, regarding medication instructions for Lucian.  Pt to increase Tacrolimus dose to 4mg in the AM and 3mg in the PM and recheck a level on Monday morning at 9:30am.   Pt will go to lab at [facility] prior to Cardiac rehab.  Elisabeth states understanding instructions and plan of care.

## 2022-08-29 NOTE — PROGRESS NOTES
Patient manual b/p 98/52. PT has scheduled Lopressor 12.5mg and hydralazine.  PAULA Leonard Sales perfectserve

## 2022-08-29 NOTE — PROGRESS NOTES
Pt notified that NP said he should get his two b/p medications (lopressor and hydralazine) at this time. Patient refused and said he will not take it because his b/p is at a good level now.

## 2022-08-29 NOTE — PROGRESS NOTES
B/P rechecked at 2350 (manual) to be 118/68. NP [facility] notified.  NP said to both medication (hydralazine and lopressor)

## 2022-08-29 NOTE — PROGRESS NOTES
Patient having incontrollable bleeding to the wound to his left under feet. Pressure has been applied and wound dressing done and wrapped but patient continues to bleed. PAULA bowens.

## 2022-08-29 NOTE — PROCEDURES
Hemodialysis / [phone]    Vitals Pre Post Assessment Pre Post   BP BP: (!) 187/100 (08/29/22 0748)   182/92 LOC A&Ox4 No change   HR Pulse (Heart Rate): 94 (08/29/22 0748) 100 Lungs clear No change   Resp Resp Rate: 18 (08/29/22 0748) 18 Cardiac RRR No change   Temp Temp: 98.6 °F (37 °C) (08/29/22 0748) 99.6 Skin CDI No change   Weight    Edema 2+ BLE No change   Tele status   Pain Pain Intensity 1: 0 (08/28/22 2113)      Orders   Duration: Start: 0034 End: 1125 Total: 3.5hrs   Dialyzer: Dialyzer/Set Up Inspection: [place] (08/29/22 0748)   K Bath: Dialysate K (mEq/L): 2 (08/29/22 0748)   Ca Bath: Dialysate CA (mEq/L): 2.5 (08/29/22 0748)   Na: Dialysate NA (mEq/L): 138 (08/29/22 0748)   Bicarb: Dialysate HCO3 (mEq/L): 35 (08/29/22 0748)   Target Fluid Removal: Goal/Amount of Fluid to Remove (mL): 3000 mL (08/29/22 0748)     Access   Type & Location: RPC : Dressing CDI. No s/s of infection. Both lumens aspirate & flush well. Running well at . SBAR received from Primary RN. Pt arrived to HD suite A&Ox4. Consent signed & on file. Each catheter limb disinfected per p&p, caps removed, hubs disinfected per p&p. Each lumen aspirated for blood return and flushed with Normal Saline per policy. Labs drawn per request/ order. VSS. Dialysis Tx initiated. Comments:    Dressing dated 8/24/22 CDI. No s/s of infection noted.                                     Labs   HBsAg (Antigen) / date: Neg  8/19/22                                              HBsAb (Antibody) / date: Imm 8/19/22   Source: EPIC   Obtained/Reviewed  Critical Results Called HGB   Date Value Ref Range Status   08/29/2022 8.1 (L) 12.1 - 17.0 g/dL Final     Potassium   Date Value Ref Range Status   08/29/2022 4.3 3.5 - 5.1 mmol/L Final     Calcium   Date Value Ref Range Status   08/29/2022 7.9 (L) 8.5 - 10.1 MG/DL Final     BUN   Date Value Ref Range Status   08/29/2022 31 (H) 6 - 20 MG/DL Final     Creatinine   Date Value Ref Range Status 08/29/2022 4.95 (H) 0.70 - 1.30 MG/DL Final        Meds Given   Name Dose Route   Hydralazine 100mg [email or web address]               Adequacy / Fluid    Total Liters Process: 78.2   Net Fluid Removed: 3000mL      Comments   Time Out Done:   (Time) 3929   Admitting Diagnosis: PONCHO   Consent obtained/signed: Informed Consent Verified: Yes (08/29/22 0788)   Machine / [facility] # Machine Number: [id number] (08/29/22 6092)   Primary Nurse Rpt Pre: Sue Villalpando RN   Primary Nurse Rpt Post: Violeta (name)JAMAAL   Pt Education: procedural   Care Plan: On going   Pts outpatient clinic: TBD     Tx Summary  0748:  RPC : Dressing CDI. No s/s of infection. Both lumens aspirate & flush well. Running well at . SBAR received from Primary RN. Pt arrived to HD suite A&Ox4. Consent signed & on file. Each catheter limb disinfected per p&p, caps removed, hubs disinfected per p&p. Each lumen aspirated for blood return and flushed with Normal Saline per policy. Labs drawn per request/ order. VSS. Dialysis Tx initiated. 0800:  Pt resting  0815:  Pt resting  0830:  Pt resting  0845:  Pt resting  0900:  Pt resting  0915:  Pt alert, Hydralazine given for BP  0930:  Pt resting  0945:  Pt resting  1000:  Pt resting  1015:  Pt resting  1030:  Pt resting  1045:  Pt resting  1100:  Dr Norma Cobb at bedside; pt alert  [phone]:  Pt alert  1125: Tx ended. VSS. Each dialysis catheter limb disinfected per p&p, all possible blood returned per p&p, and each dialysis hub disinfected per p&p. Each lumen flushed, post dialysis catheter Heparin dwell instilled per order, and caps applied. Bed locked and in the lowest position, call bell and belongings in reach. SBAR given to Primary, RN. Patient is stable at time of their/ my departure. All Dialysis related medications have been reviewed. Comments:  Assessment performed by RN. Procedure and documentation observed and reviewed by Sailaja Meyer RN.

## 2022-08-29 NOTE — PROGRESS NOTES
PAtient was very anxious to leave the hospital. Dr. Kena Andujar and CM involved. Patient was advice to wait until CM confirms the dialysis chair. He was not ready to wait at all. He left without getting the discharge paper or instructions. IV was removed.

## 2022-08-29 NOTE — PROGRESS NOTES
Patient refused to take vital or blood sugar check. He also refused wound care and stated he does his own Wound care.

## 2022-08-29 NOTE — PROGRESS NOTES
Received notification from bedside RN about patient with regards to: left foot wound persistent bleeding  VS: /68, HR 82, RR 18, O2 sat 95%     Intervention given: Helistat sponge pad x 1 ordered from pharmacy. Old dressing removed and applied sponge directly over wound and supplemented with gauze and elizabeth wrap.  Instructed patient to keep foot elevated

## 2022-09-07 NOTE — DISCHARGE SUMMARY
Hospitalist AMA  Discharge Summary     Patient ID:  Yohan Brown  [id number]  49 y.o.  1975 8/18/2022    PCP on record: None    Admit date: 8/18/2022  Discharge date and time: 9/7/2022    DISCHARGE DIAGNOSIS:  PONCHO on CKD stage IV, POA  New HD patient   Hyperkalemia, POA,  Acute on chronic diastolic heart failure, POA  Uncontrolled HTN  Chronic normocytic anemia, POA  Psychiatric disorder  T2DM  R foot woun      CONSULTATIONS:  IP CONSULT TO NEPHROLOGY    Excerpted HPI from H&P of Bianca Esquivel MD:  CHIEF COMPLAINT: Shortness of breath, leg swellings     HISTORY OF PRESENT ILLNESS:        The patient is 49years old man with past medical history significant for diastolic heart failure, CKD stage IV, chronic anemia presented emergency department due to shortness of breath and leg swelling started few days ago. Patient reports that he has been getting short of breath every time he moves around he has been noticing swelling in his legs. He denied any chest pain, dizziness, abdominal pain, nausea, vomiting, diarrhea. In the emergency department, his creatinine was found to be 6.5 and his potassium was found to be around 5.5. Nephrology was called and recommended IV diuresis for now. We were asked to admit for work up and evaluation of the above problems. No past medical history on file. No past surgical history on file.    ______________________________________________________________________  DISCHARGE SUMMARY/HOSPITAL COURSE:  for full details see H&P, daily progress notes, labs, consult notes. This is a 79-year-old male with history of uncontrolled hypertension chronic diastolic CHF, diabetes mellitus initially presented with shortness of breath and lower extremity swelling. He was initially treated with IV Bumex, ended up being started on hemodialysis due to worsening creatinine.   Patient decided to leave [place] while waiting for outpatient HD slot to be secured by case manager. RIsks Of leaving AMA were  explained to the patient, he manifested understanding but still decided to leave AMA. He reported that he will go to the ED to get his hemodialysis. _______________________________________________________________________  Patient seen and examined by me on discharge day. Pertinent Findings:  Gen:    Not in distress  Chest: Clear lungs  CVS:   Regular rhythm. No edema  Abd:  Soft, not distended, not tender  Neuro:  Alert,   _______________________________________________________________________  DISCHARGE MEDICATIONS:   Discharge Medication List as of 8/29/2022  4:21 PM        START taking these medications    Details   calcium acetate,phosphat bind, (PHOSLO) 667 mg cap Take 2 Capsules by mouth three (3) times daily (with meals) for 30 days. , Print, Disp-180 Capsule, R-0           CONTINUE these medications which have NOT CHANGED    Details   bumetanide (BUMEX) 2 mg tablet Take 1 Tablet by mouth daily. , Print, Disp-60 Tablet, R-0      ferrous sulfate 325 mg (65 mg iron) tablet Take 1 Tablet by mouth Daily (before breakfast). , Print, Disp-30 Tablet, R-0      ALPRAZolam (Xanax) 0.5 mg tablet Take 1 Tablet by mouth two (2) times daily as needed., Historical Med      ARIPiprazole (ABILIFY) 10 mg tablet Take 1 Tablet by mouth daily. , Historical Med      aspirin delayed-release (Adult Low Dose Aspirin) 81 mg tablet Take 1 Tablet by mouth daily. , Historical Med      atorvastatin (Lipitor) 40 mg tablet Take 1 Tablet by mouth daily. , Historical Med      fludrocortisone (FLORINEF) 0.1 mg tablet Take 1 Tablet by mouth daily. , Historical Med      hydrALAZINE (APRESOLINE) 100 mg tablet Take 1 Tablet by mouth three (3) times daily. , Historical Med      insulin glargine-yfgn 100 unit/mL soln 15 Units by SubCUTAneous route nightly., Historical Med      insulin lispro-aabc 100 unit/mL soln 5 Units by SubCUTAneous route three (3) times daily. , Historical Med      SITagliptin-metFORMIN (Janumet)  mg per tablet Take 1 Tablet by mouth daily. , Historical Med      NIFEdipine ER (Procardia XL) 90 mg ER tablet Take 90 mg by mouth daily. , Historical Med               Patient Follow Up Instructions:    Activity: Activity as tolerated  Diet: Renal Diet  Wound Care: None needed      Follow-up Information       Follow up With Specialties Details Why Contact Info    None    None (395) Patient stated that they have no PCP            ________________________________________________________________    Risk of deterioration: High    Condition at Discharge:  Stable  __________________________________________________________________    Disposition  Left AMA    ____________________________________________________________________    Code Status: Full Code  ___________________________________________________________________      Total time in minutes spent coordinating this discharge (includes going over instructions, follow-up, prescriptions, and preparing report for sign off to her PCP) :  >30 minutes    Signed:  Jairon Mitchell MD

## 2022-11-01 ENCOUNTER — HOSPITAL ENCOUNTER (EMERGENCY)
Age: 47
Discharge: HOME | End: 2022-11-01
Attending: EMERGENCY MEDICINE
Payer: MEDICAID

## 2022-11-01 VITALS
RESPIRATION RATE: 16 BRPM | OXYGEN SATURATION: 99 % | HEIGHT: 72 IN | TEMPERATURE: 98.3 F | DIASTOLIC BLOOD PRESSURE: 93 MMHG | HEART RATE: 99 BPM | BODY MASS INDEX: 24.93 KG/M2 | SYSTOLIC BLOOD PRESSURE: 189 MMHG | WEIGHT: 184.08 LBS

## 2022-11-01 DIAGNOSIS — N18.6 ESRD (END STAGE RENAL DISEASE) ON DIALYSIS (HCC): Primary | ICD-10-CM

## 2022-11-01 DIAGNOSIS — Z99.2 ESRD (END STAGE RENAL DISEASE) ON DIALYSIS (HCC): Primary | ICD-10-CM

## 2022-11-01 LAB
ALBUMIN SERPL-MCNC: 2.8 G/DL (ref 3.5–5)
ALBUMIN/GLOB SERPL: 0.5 {RATIO} (ref 1.1–2.2)
ALP SERPL-CCNC: 337 U/L (ref 45–117)
ALT SERPL-CCNC: 21 U/L (ref 12–78)
ANION GAP SERPL CALC-SCNC: 9 MMOL/L (ref 5–15)
AST SERPL-CCNC: 21 U/L (ref 15–37)
BASOPHILS # BLD: 0 K/UL (ref 0–0.1)
BASOPHILS NFR BLD: 0 % (ref 0–1)
BILIRUB SERPL-MCNC: 0.5 MG/DL (ref 0.2–1)
BUN SERPL-MCNC: 45 MG/DL (ref 6–20)
BUN/CREAT SERPL: 7 (ref 12–20)
CALCIUM SERPL-MCNC: 7.7 MG/DL (ref 8.5–10.1)
CHLORIDE SERPL-SCNC: 101 MMOL/L (ref 97–108)
CO2 SERPL-SCNC: 27 MMOL/L (ref 21–32)
CREAT SERPL-MCNC: 6.61 MG/DL (ref 0.7–1.3)
DIFFERENTIAL METHOD BLD: ABNORMAL
EOSINOPHIL # BLD: 0.2 K/UL (ref 0–0.4)
EOSINOPHIL NFR BLD: 2 % (ref 0–7)
ERYTHROCYTE [DISTWIDTH] IN BLOOD BY AUTOMATED COUNT: 16.3 % (ref 11.5–14.5)
GLOBULIN SER CALC-MCNC: 5.4 G/DL (ref 2–4)
GLUCOSE SERPL-MCNC: 247 MG/DL (ref 65–100)
HBV SURFACE AB SER QL: NONREACTIVE
HBV SURFACE AB SER-ACNC: <3.1 MIU/ML
HBV SURFACE AG SER QL: <0.1 INDEX
HBV SURFACE AG SER QL: NEGATIVE
HCT VFR BLD AUTO: 32.5 % (ref 36.6–50.3)
HGB BLD-MCNC: 9.9 G/DL (ref 12.1–17)
IMM GRANULOCYTES # BLD AUTO: 0 K/UL (ref 0–0.04)
IMM GRANULOCYTES NFR BLD AUTO: 0 % (ref 0–0.5)
LYMPHOCYTES # BLD: 2.3 K/UL (ref 0.8–3.5)
LYMPHOCYTES NFR BLD: 24 % (ref 12–49)
MCH RBC QN AUTO: 26.2 PG (ref 26–34)
MCHC RBC AUTO-ENTMCNC: 30.5 G/DL (ref 30–36.5)
MCV RBC AUTO: 86 FL (ref 80–99)
MONOCYTES # BLD: 0.9 K/UL (ref 0–1)
MONOCYTES NFR BLD: 9 % (ref 5–13)
NEUTS SEG # BLD: 6.1 K/UL (ref 1.8–8)
NEUTS SEG NFR BLD: 65 % (ref 32–75)
NRBC # BLD: 0 K/UL (ref 0–0.01)
NRBC BLD-RTO: 0 PER 100 WBC
PLATELET # BLD AUTO: 429 K/UL (ref 150–400)
PMV BLD AUTO: 8.1 FL (ref 8.9–12.9)
POTASSIUM SERPL-SCNC: 3.8 MMOL/L (ref 3.5–5.1)
PROT SERPL-MCNC: 8.2 G/DL (ref 6.4–8.2)
RBC # BLD AUTO: 3.78 M/UL (ref 4.1–5.7)
SODIUM SERPL-SCNC: 137 MMOL/L (ref 136–145)
WBC # BLD AUTO: 9.5 K/UL (ref 4.1–11.1)

## 2022-11-01 PROCEDURE — 80053 COMPREHEN METABOLIC PANEL: CPT

## 2022-11-01 PROCEDURE — 36415 COLL VENOUS BLD VENIPUNCTURE: CPT

## 2022-11-01 PROCEDURE — 99283 EMERGENCY DEPT VISIT LOW MDM: CPT

## 2022-11-01 PROCEDURE — 86706 HEP B SURFACE ANTIBODY: CPT

## 2022-11-01 PROCEDURE — 85025 COMPLETE CBC W/AUTO DIFF WBC: CPT

## 2022-11-01 PROCEDURE — 87340 HEPATITIS B SURFACE AG IA: CPT

## 2022-11-01 PROCEDURE — 90935 HEMODIALYSIS ONE EVALUATION: CPT

## 2022-11-01 NOTE — PROGRESS NOTES
Nephrology Progress Note  [facility] / [address] [facility], [address]  Phone - [phone]  Fax - [phone]                 Patient: Dianna Cardoza                   YOB: 1975        Date- 11/1/2022                      Admit Date: 11/1/2022  CC: Follow up for ESRD          IMPRESSION & PLAN:   ESRD, TTS,[facility] near [address] [place] [address]  Hypertension  Anemia    PLAN-  Plan for hemodialysis today  Spoke to the  in ED for getting him set up for outpatient chair with [facility] here in [place]. Patient already has an AV fistula in left upper arm which is not mature yet  Will use right chest permacath for HD today  [facility] has been notified for HD today     Subjective: Interval History:   -Patient was seen and examined in the ED this morning. He has ESRD and goes to dialysis TTS. He gets dialyzed at [facility] unit near [address] [place] [address]. He used to be a native in [place] but moved to [address] [place] [address] now he is relocating back. He has not set up for any hemodialysis in the city. And just came to the ED for hemodialysis this morning. Very poor historian. Objective:   Vitals:    11/01/22 1415 11/01/22 1430 11/01/22 1445 11/01/22 1500   BP: (!) 200/115 (!) 216/122 (!) 213/117 (!) 211/112   Pulse: 97 98 (!) 101 (!) 101   Resp:       Temp:       TempSrc:       SpO2:       Weight:       Height:          No intake/output data recorded. Last 3 Recorded Weights in this Encounter    11/01/22 0738   Weight: 83.5 kg (184 lb 1.4 oz)        Physical exam:    GEN: NAD  NECK- no mass  RESP: No wheezing, decreased BS b/l  CVS: S1,S2  RRR  NEURO: Normal speech, Non focal  EXT: No Edema   HD access: Right chest permacath, left upper extremity AV fistula    Chart reviewed. Pertinent Notes reviewed.      Data Review :  Recent Labs     11/01/22  0750      K 3.8      CO2 27   BUN 45*   CREA 6.61* *   CA 7.7*     Recent Labs     11/01/22  0750   WBC 9.5   HGB 9.9*   HCT 32.5*   *     No results for input(s): FE, TIBC, PSAT, FERR in the last 72 hours. Lab Results   Component Value Date/Time    Hemoglobin A1c 6.5 (H) 08/19/2022 10:47 AM      No results found for: MCACR, MCA1, MCA2, MCA3, MCAU, MCAU2, MCALPOCT  Lab Results   Component Value Date/Time    NT pro-BNP 12,475 (H) 08/18/2022 10:17 AM    NT pro-BNP 9,330 (H) 07/11/2022 10:31 AM     US Results (most recent):  Medication list  reviewed  No current facility-administered medications for this encounter. Current Outpatient Medications   Medication Sig    bumetanide (BUMEX) 2 mg tablet Take 1 Tablet by mouth daily. ferrous sulfate 325 mg (65 mg iron) tablet Take 1 Tablet by mouth Daily (before breakfast). ALPRAZolam (Xanax) 0.5 mg tablet Take 1 Tablet by mouth two (2) times daily as needed. ARIPiprazole (ABILIFY) 10 mg tablet Take 1 Tablet by mouth daily. aspirin delayed-release (Adult Low Dose Aspirin) 81 mg tablet Take 1 Tablet by mouth daily. atorvastatin (Lipitor) 40 mg tablet Take 1 Tablet by mouth daily. fludrocortisone (FLORINEF) 0.1 mg tablet Take 1 Tablet by mouth daily. hydrALAZINE (APRESOLINE) 100 mg tablet Take 1 Tablet by mouth three (3) times daily. insulin glargine-yfgn 100 unit/mL soln 15 Units by SubCUTAneous route nightly. insulin lispro-aabc 100 unit/mL soln 5 Units by SubCUTAneous route three (3) times daily. SITagliptin-metFORMIN (Janumet)  mg per tablet Take 1 Tablet by mouth daily. (Patient not taking: Reported on 7/12/2022)    NIFEdipine ER (Procardia XL) 90 mg ER tablet Take 90 mg by mouth daily.         Ashley Rdz MD  11/1/2022

## 2022-11-01 NOTE — ED NOTES
This rn at bedside for dc, went over all dc papers with  pt prior to dc, pt verbalized understanding. Iv removed tip intact.

## 2022-11-01 NOTE — PROGRESS NOTES
3:38 PM  CM talked to pt at bedside following his return to ED from HD. Pt reports that he will be coming back to [facility] on Thursday to dialyze again prior to returning home to [place] on Friday. Pt will be back in his home clinic of [facility] in [facility] for Saturday's HD. Pt reports he plans a move back to [facility] at a later time, but at this time will be returning to [place]. Pt is just visiting in [place] for a few days, therefore CM unable to coordinate new chair. MD notified of update, bedside RN notified that pt is clear for d/c from CM standpoint. CM will sign off.    12:31 PM  CM asked by nephrologist to coordinate at [facility]. CM left VM with [facility] [phone] to inquire if pt was previously established with another [facility] location in [facility]. Awaiting return call. 12:06 PM  CM acknowledges consult for OP HD chair placement. Staffed case with ED MD who states that pt was previously established in [place] but reportedly is relocating to this area and will need new chair. No additional details. Typically OP HD centers coordinate transition to new location. CM has sent [employer] message to nephrologist asking additional clarifying details. Will assist as able to facilitate new HD chair.     Clearance Cathi Soliz [address], [address]

## 2022-11-01 NOTE — PROCEDURES
Hemodialysis / [phone]    Vitals Pre Post Assessment Pre Post   BP BP: (!) 175/100 (11/01/22 1139)   189/83 LOC A&OX4 A&OX4   HR Pulse (Heart Rate): (!) 104 (11/01/22 1139)   99 Lungs CLEAR CLEAR   Resp Resp Rate: 18 (11/01/22 0738)  Cardiac WNL WNL   Temp Temp: 98.6 °F (37 °C) (11/01/22 1139) 98.3 Skin WARM AND DRY WARM   Weight  N/A N/A Edema NONE NONE   Tele status N/A N/A Pain Pain Intensity 1: 8 (\"I'm always in pain\") (11/01/22 0738) NONE     Orders   Duration: Start: 0620 End: 8408 Total: 3.5HR   Dialyzer:  REVACLEAR   K Bath:  2K   Ca Bath:  2.5CA   Na: Bicarb:  35   Target Fluid Removal:  2000mL     Access   Type & Location: R CVC   Comments:  PC : Pre- Assessment: Dressing CDI. No s/s of infection. Both lumens aspirate & flush well. Running well at . Post assessment: Dressing CDI. No changes.                                        Labs   HBsAg (Antigen) / date:  NEG                                       HBsAb (Antibody) / date: DAVIE   Source: Epic 11/01/2022   Obtained/Reviewed  Critical Results Called HGB   Date Value Ref Range Status   11/01/2022 9.9 (L) 12.1 - 17.0 g/dL Final     Potassium   Date Value Ref Range Status   11/01/2022 3.8 3.5 - 5.1 mmol/L Final     Calcium   Date Value Ref Range Status   11/01/2022 7.7 (L) 8.5 - 10.1 MG/DL Final     BUN   Date Value Ref Range Status   11/01/2022 45 (H) 6 - 20 MG/DL Final     Creatinine   Date Value Ref Range Status   11/01/2022 6.61 (H) 0.70 - 1.30 MG/DL Final        Meds Given   Name Dose Route   NONE                 Adequacy / Fluid    Total Liters Process: 76.4   Net Fluid Removed: 2000mL      Comments   Time Out Done:   (Time) 1140   Admitting Diagnosis: ROUTINE DIALYSIS TX NEEDED   Consent obtained/signed:  CHRONIC PT   Machine / RO #  [id number]   Primary Nurse Rpt Pre: ED CHARGE RN   Primary Nurse Rpt Post: ED CHARGE RN   Pt Education: HD TREATMENT PROCESS   Care Plan: CONTINUE HD AS ORDERED   P[facility]     Tx Summary   Comments:      SBAR received from Primary RN. Pt arrived to HD suite A&Ox4. Chronic consent applies. 1146: Each catheter limb disinfected per p&p, caps removed, hubs disinfected per p&p. Each lumen aspirated for blood return and flushed with Normal Saline per policy. Labs drawn per request/ order. VSS. Dialysis Tx initiated. 1516: Tx ended. VSS. Each dialysis catheter limb disinfected per p&p, all possible blood returned per p&p, and each dialysis hub disinfected per p&p. Each lumen flushed and caps applied. Bed locked and in the lowest position, call bell and belongings in reach. SBAR given to Primary, JAMAAL. Patient is stable at time of their departure. All Dialysis related medications have been reviewed.

## 2022-11-01 NOTE — ED PROVIDER NOTES
EMERGENCY DEPARTMENT HISTORY AND PHYSICAL EXAM      Date: 11/1/2022  Patient Name: Adri Jimenez    History of Presenting Illness     Chief Complaint   Patient presents with    Vascular Access Problem    Other     Pt is here today as he needs his dialysis; last day he had dialysis in [place] pt is [place]       History Provided By: Patient    HPI: Adri Jimenez, 49 y.o. male  presents to the ED with cc of needing dialysis. Patient states he gets dialyzed on a Tuesday Thursday Saturday schedule. He was last dialyzed on Saturday. Patient presents to the ER today because he is traveling. [place] is from the [place] area. He states his breathing is \"a little off this morning\", and he does have leg swelling. When he used to live in this area he was followed by [facility]. Past History     Past Medical History:  No past medical history on file. Past Surgical History:  Past Surgical History:   Procedure Laterality Date    IR INSERT NON TUNL CVC OVER 5 YRS  8/19/2022    IR INSERT TUNL CVC W/O PORT OVER 5 YR  8/23/2022       Medications:  No current facility-administered medications on file prior to encounter. Current Outpatient Medications on File Prior to Encounter   Medication Sig Dispense Refill    bumetanide (BUMEX) 2 mg tablet Take 1 Tablet by mouth daily. 60 Tablet 0    ferrous sulfate 325 mg (65 mg iron) tablet Take 1 Tablet by mouth Daily (before breakfast). 30 Tablet 0    ALPRAZolam (Xanax) 0.5 mg tablet Take 1 Tablet by mouth two (2) times daily as needed. ARIPiprazole (ABILIFY) 10 mg tablet Take 1 Tablet by mouth daily. aspirin delayed-release (Adult Low Dose Aspirin) 81 mg tablet Take 1 Tablet by mouth daily. atorvastatin (Lipitor) 40 mg tablet Take 1 Tablet by mouth daily. fludrocortisone (FLORINEF) 0.1 mg tablet Take 1 Tablet by mouth daily. hydrALAZINE (APRESOLINE) 100 mg tablet Take 1 Tablet by mouth three (3) times daily.       insulin glargine-yfgn 100 unit/mL soln 15 Units by SubCUTAneous route nightly. insulin lispro-aabc 100 unit/mL soln 5 Units by SubCUTAneous route three (3) times daily. SITagliptin-metFORMIN (Janumet)  mg per tablet Take 1 Tablet by mouth daily. (Patient not taking: Reported on 7/12/2022)      NIFEdipine ER (Procardia XL) 90 mg ER tablet Take 90 mg by mouth daily. Family History:  No family history on file. Social History: Allergies: Allergies   Allergen Reactions    Heparin Other (comments)     NO PORK    Pork Derived (Porcine) Hives     Do not send pork to the patient please    Tramadol Hives       All the above components of the past  history are auto-populated from the electronic record. They have been reviewed and the patient has been interviewed for any pertinent past history that pertains to the patient's chief complaint and reason for visit. Not all pre-populated components may be accurate at the time this note was generated. Review of Systems   Review of Systems   Constitutional:  Negative for chills and fever. HENT:  Negative for congestion, ear pain, rhinorrhea, sore throat and trouble swallowing. Eyes:  Negative for visual disturbance. Respiratory:  Positive for shortness of breath. Negative for cough and chest tightness. Cardiovascular:  Positive for leg swelling. Negative for chest pain and palpitations. Gastrointestinal:  Negative for abdominal pain, blood in stool, constipation, diarrhea, nausea and vomiting. Genitourinary:  Negative for decreased urine volume, difficulty urinating, dysuria and frequency. Musculoskeletal:  Negative for back pain and neck pain. Skin:  Negative for color change and rash. Neurological:  Negative for dizziness, weakness, light-headedness and headaches. Physical Exam   Physical Exam  Vitals and nursing note reviewed. Constitutional:       General: He is not in acute distress. Appearance: He is well-developed.  He is not ill-appearing. HENT:      Head: Normocephalic. Eyes:      Conjunctiva/sclera: Conjunctivae normal.   Cardiovascular:      Rate and Rhythm: Normal rate and regular rhythm. Pulmonary:      Effort: Pulmonary effort is normal. No accessory muscle usage or respiratory distress. Abdominal:      General: There is no distension. Musculoskeletal:      Cervical back: Normal range of motion. Right lower leg: Edema present. Left lower leg: Edema present. Skin:     General: Skin is warm and dry. Neurological:      Mental Status: He is alert and oriented to person, place, and time. Diagnostic Study Results     Labs -     Recent Results (from the past 24 hour(s))   CBC WITH AUTOMATED DIFF    Collection Time: 11/01/22  7:50 AM   Result Value Ref Range    WBC 9.5 4.1 - 11.1 K/uL    RBC 3.78 (L) 4.10 - 5.70 M/uL    HGB 9.9 (L) 12.1 - 17.0 g/dL    HCT 32.5 (L) 36.6 - 50.3 %    MCV 86.0 80.0 - 99.0 FL    MCH 26.2 26.0 - 34.0 PG    MCHC 30.5 30.0 - 36.5 g/dL    RDW 16.3 (H) 11.5 - 14.5 %    PLATELET 087 (H) 947 - 400 K/uL    MPV 8.1 (L) 8.9 - 12.9 FL    NRBC 0.0 0  WBC    ABSOLUTE NRBC 0.00 0.00 - 0.01 K/uL    NEUTROPHILS 65 32 - 75 %    LYMPHOCYTES 24 12 - 49 %    MONOCYTES 9 5 - 13 %    EOSINOPHILS 2 0 - 7 %    BASOPHILS 0 0 - 1 %    IMMATURE GRANULOCYTES 0 0.0 - 0.5 %    ABS. NEUTROPHILS 6.1 1.8 - 8.0 K/UL    ABS. LYMPHOCYTES 2.3 0.8 - 3.5 K/UL    ABS. MONOCYTES 0.9 0.0 - 1.0 K/UL    ABS. EOSINOPHILS 0.2 0.0 - 0.4 K/UL    ABS. BASOPHILS 0.0 0.0 - 0.1 K/UL    ABS. IMM.  GRANS. 0.0 0.00 - 0.04 K/UL    DF AUTOMATED     METABOLIC PANEL, COMPREHENSIVE    Collection Time: 11/01/22  7:50 AM   Result Value Ref Range    Sodium 137 136 - 145 mmol/L    Potassium 3.8 3.5 - 5.1 mmol/L    Chloride 101 97 - 108 mmol/L    CO2 27 21 - 32 mmol/L    Anion gap 9 5 - 15 mmol/L    Glucose 247 (H) 65 - 100 mg/dL    BUN 45 (H) 6 - 20 MG/DL    Creatinine 6.61 (H) 0.70 - 1.30 MG/DL    BUN/Creatinine ratio 7 (L) 12 - 20      eGFR 10 (L) >60 ml/min/1.73m2    Calcium 7.7 (L) 8.5 - 10.1 MG/DL    Bilirubin, total 0.5 0.2 - 1.0 MG/DL    ALT (SGPT) 21 12 - 78 U/L    AST (SGOT) 21 15 - 37 U/L    Alk. phosphatase 337 (H) 45 - 117 U/L    Protein, total 8.2 6.4 - 8.2 g/dL    Albumin 2.8 (L) 3.5 - 5.0 g/dL    Globulin 5.4 (H) 2.0 - 4.0 g/dL    A-G Ratio 0.5 (L) 1.1 - 2.2         Radiologic Studies -   No orders to display     CT Results  (Last 48 hours)      None          CXR Results  (Last 48 hours)      None              Medical Decision Making     I reviewed the vital signs, available nursing notes, past medical history, past surgical history, family history and social history. Vital Signs-I have reviewed the vital signs that have been made available during the patient's emergency department visit. The vital signs auto-populated below are obtained mostly by electronic means through monitoring devices that have been downloaded into the patient's chart by the nursing staff. Some vital signs are not downloaded into the chart until after the patient has been discharged and this note has been completed, therefore some vital signs may not be available to the physician for review prior to patient's discharge or admission. The physician has reviewed the patient's triage vital signs, monitored the electronic monitoring devices remotely for any significant vital sign abnormalities, and have reviewed vital signs prior to discharge. Some vital signs reviewed at bedside or remotely utilizing electronic monitoring devices may be different than the vital signs downloaded into the electronic medical record. Some vital signs may be erroneous and inaccurate since they are obtained by electronic monitoring devices, and not all vital signs are verified for accuracy by nursing staff prior to downloading into the patient's chart.   Patient Vitals for the past 24 hrs:   Temp Pulse Resp BP SpO2   11/01/22 0738 97.9 °F (36.6 °C) (!) 102 18 (!) 195/91 99 %         Records Reviewed: Nursing notes for today's visit have been reviewed. I have also reviewed most recent medical records pertinent to today's complaints, if available in our medical record system. I have also reviewed all labs and imaging results from previous results in comparison to results obtained today. If an EKG was obtained today, it has been compared to previous EKGs, if available. If arriving via EMS, the EMS report has been reviewed if made available to us within the patient's time in the emergency department. ED Course and Medical Decision Making:       MDM  Number of Diagnoses or Management Options  Diagnosis management comments: Patient presents stable requesting his routine dialysis today. He states he is traveling but later determined he is actually moving back to the area. Previously established with [facility]. We will dialyze him here today in the hospital and then discharged. Will discuss with case management the need for dialysis center here in the area. Patient will likely have to present to the ER for his routine dialysis over the next week while he is established with a dialysis center here locally. Amount and/or Complexity of Data Reviewed  Clinical lab tests: ordered and reviewed    Risk of Complications, Morbidity, and/or Mortality  Presenting problems: low  Diagnostic procedures: low  Management options: low    Patient Progress  Patient progress: stable           Orders Placed This Encounter    CBC WITH AUTOMATED DIFF    METABOLIC PANEL, COMPREHENSIVE    HEP B SURFACE AB    HEP B SURFACE AG    IP CONSULT TO NEPHROLOGY    INSERT PERIPHERAL IV ONE TIME STAT    HEMODIALYSIS INPATIENT Duration (hr): 3.5; Dialyzer: Revaclear; Dialysate Bath:  K: 3; Dialysate Bath:  CA: 2.5; Dialysate Bath: Bicarb: 35; Sodium Profiling/Modeling: No; Weight Loss (kg): 2; Target Fluid Removal (mL): 2,000; Access: Fist[place]. ..     IP CONSULT TO CASE MANAGEMENT Procedures      Critical Care Time:   0    Disposition:  Discharge    The patient's emergency department evaluation is now complete. I have reviewed all labs, imaging, and pertinent information. I have discussed all results with the patient and/or family. Based on our evaluation today I do believe that the patient is safe to be discharged home. The patient has been provided with at home instructions that are pertinent to their complaint today, although these may not be specific to the exact diagnosis. I have reviewed the patient's home medications and attempted to reconcile if not already done so by pharmacy or nursing staff. I have discussed all new prescriptions with the patient. The patient has been encouraged to follow-up with primary care doctor and/or specialist, and these have been discussed with the patient. The patient has been advised that they may return to the emergency department if they have any worsening symptoms and or new symptoms that are of concern to them. Verbal discharge instructions may have also been provided to the patient that may not be specifically contained in the written discharge instructions. The patient has been given opportunity to ask questions prior to discharge. PLAN:  1. Current Discharge Medication List        2. Follow-up Information       Follow up With Specialties Details Why Ana Clemons MD Nephrology Schedule an appointment as soon as possible for a visit   [place] [id number]  [facility]  [phone]            Return to ED if worse     Diagnosis     Clinical Impression:   1.  ES[facility] (end stage renal disease) [facility]

## 2022-11-01 NOTE — DISCHARGE INSTRUCTIONS
It was a pleasure taking care of you in our Emergency Department today. We know that when you come to [facility], you are entrusting us with your health, comfort, and safety. Our physicians and nurses honor that trust, and truly appreciate the opportunity to care for you and your loved ones. We also value your feedback. If you receive a survey about your Emergency Department experience today, please fill it out. We care about our patients' feedback, and we listen to what you have to say. Please read over your discharge instructions as these contain pertinent information to help you in the healing process. These instructions include a list of prescriptions you were given today. Follow-up information is also noted on your discharge papers. There are attached instructions and information pertaining to the reason why you were seen in the emergency department today. These discharge instructions may not be for exactly why you were here, but may be the closest available instructions that we have. These include important advice for things that you can do at home to feel better, and reasons to return to the emergency department. The evaluation and treatment you received in the emergency department is not always definitive care. If follow-up with your primary care doctor or specialist was recommended, it is important that you make these appointments for follow-up care. You may need further testing, procedures, and/or medications to help you feel better. Further tests may be required that are not available in the emergency department. Failure to make these follow-up appointments may jeopardize your health. The emergency department is here for emergent stabilization and evaluation of life and limb threatening illness and/or injuries.   Further care through a specialist or primary care doctor may be required to assist in your healing and complete your treatment and/or evaluation. We may not always be able to make a diagnosis in the emergency department, or things may change that will alter your diagnosis. Our primary goal is to ensure that nothing serious is occurring and that you are stable to continue your treatment and evaluation at home as an outpatient. Of course, if things change, and you feel worse, you are always encouraged to return to the emergency department for re-evaluation. Lab Results Today:  Recent Results (from the past 8 hour(s))   CBC WITH AUTOMATED DIFF    Collection Time: 11/01/22  7:50 AM   Result Value Ref Range    WBC 9.5 4.1 - 11.1 K/uL    RBC 3.78 (L) 4.10 - 5.70 M/uL    HGB 9.9 (L) 12.1 - 17.0 g/dL    HCT 32.5 (L) 36.6 - 50.3 %    MCV 86.0 80.0 - 99.0 FL    MCH 26.2 26.0 - 34.0 PG    MCHC 30.5 30.0 - 36.5 g/dL    RDW 16.3 (H) 11.5 - 14.5 %    PLATELET 965 (H) 445 - 400 K/uL    MPV 8.1 (L) 8.9 - 12.9 FL    NRBC 0.0 0  WBC    ABSOLUTE NRBC 0.00 0.00 - 0.01 K/uL    NEUTROPHILS 65 32 - 75 %    LYMPHOCYTES 24 12 - 49 %    MONOCYTES 9 5 - 13 %    EOSINOPHILS 2 0 - 7 %    BASOPHILS 0 0 - 1 %    IMMATURE GRANULOCYTES 0 0.0 - 0.5 %    ABS. NEUTROPHILS 6.1 1.8 - 8.0 K/UL    ABS. LYMPHOCYTES 2.3 0.8 - 3.5 K/UL    ABS. MONOCYTES 0.9 0.0 - 1.0 K/UL    ABS. EOSINOPHILS 0.2 0.0 - 0.4 K/UL    ABS. BASOPHILS 0.0 0.0 - 0.1 K/UL    ABS. IMM. GRANS. 0.0 0.00 - 0.04 K/UL    DF AUTOMATED     METABOLIC PANEL, COMPREHENSIVE    Collection Time: 11/01/22  7:50 AM   Result Value Ref Range    Sodium 137 136 - 145 mmol/L    Potassium 3.8 3.5 - 5.1 mmol/L    Chloride 101 97 - 108 mmol/L    CO2 27 21 - 32 mmol/L    Anion gap 9 5 - 15 mmol/L    Glucose 247 (H) 65 - 100 mg/dL    BUN 45 (H) 6 - 20 MG/DL    Creatinine 6.61 (H) 0.70 - 1.30 MG/DL    BUN/Creatinine ratio 7 (L) 12 - 20      eGFR 10 (L) >60 ml/min/1.73m2    Calcium 7.7 (L) 8.5 - 10.1 MG/DL    Bilirubin, total 0.5 0.2 - 1.0 MG/DL    ALT (SGPT) 21 12 - 78 U/L    AST (SGOT) 21 15 - 37 U/L    Alk.  phosphatase 337 (H) 45 - 117 U/L    Protein, total 8.2 6.4 - 8.2 g/dL    Albumin 2.8 (L) 3.5 - 5.0 g/dL    Globulin 5.4 (H) 2.0 - 4.0 g/dL    A-G Ratio 0.5 (L) 1.1 - 2.2          Radiology Results Today:  No results found.

## 2022-11-24 ENCOUNTER — HOSPITAL ENCOUNTER (EMERGENCY)
Age: 47
Discharge: HOME | End: 2022-11-24
Attending: EMERGENCY MEDICINE
Payer: MEDICAID

## 2022-11-24 VITALS
TEMPERATURE: 98.6 F | RESPIRATION RATE: 16 BRPM | OXYGEN SATURATION: 100 % | SYSTOLIC BLOOD PRESSURE: 179 MMHG | DIASTOLIC BLOOD PRESSURE: 97 MMHG | HEART RATE: 95 BPM

## 2022-11-24 DIAGNOSIS — Z99.2 END STAGE RENAL DISEASE ON DIALYSIS (HCC): Primary | ICD-10-CM

## 2022-11-24 DIAGNOSIS — N18.6 END STAGE RENAL DISEASE ON DIALYSIS (HCC): Primary | ICD-10-CM

## 2022-11-24 LAB
ANION GAP SERPL CALC-SCNC: 8 MMOL/L (ref 5–15)
BUN SERPL-MCNC: 49 MG/DL (ref 6–20)
BUN/CREAT SERPL: 7 (ref 12–20)
CALCIUM SERPL-MCNC: 7.6 MG/DL (ref 8.5–10.1)
CHLORIDE SERPL-SCNC: 107 MMOL/L (ref 97–108)
CO2 SERPL-SCNC: 23 MMOL/L (ref 21–32)
CREAT SERPL-MCNC: 6.96 MG/DL (ref 0.7–1.3)
GLUCOSE SERPL-MCNC: 215 MG/DL (ref 65–100)
POTASSIUM SERPL-SCNC: 4.5 MMOL/L (ref 3.5–5.1)
SODIUM SERPL-SCNC: 138 MMOL/L (ref 136–145)

## 2022-11-24 PROCEDURE — 80048 BASIC METABOLIC PNL TOTAL CA: CPT

## 2022-11-24 PROCEDURE — 99283 EMERGENCY DEPT VISIT LOW MDM: CPT

## 2022-11-24 PROCEDURE — 36415 COLL VENOUS BLD VENIPUNCTURE: CPT

## 2022-11-24 NOTE — DISCHARGE INSTRUCTIONS
It was a pleasure taking care of you at [facility] Emergency Department today. We know that when you come to [facility], you are entrusting us with your health, comfort, and safety. Our physicians and nurses honor that trust, and we truly appreciate the opportunity to care for you and your loved ones. We also value your feedback. If you receive a survey about your Emergency Department experience today, please fill it out. We care about our patients' feedback, and we listen to what you have to say. Thank you! no back pain

## 2022-11-24 NOTE — ED PROVIDER NOTES
EMERGENCY DEPARTMENT HISTORY AND PHYSICAL EXAM      [facility] EMERGENCY DEPT: EMERGENCY DEPARTMENT ENCOUNTER     Pt Name: Kimberlee Nunez  MRN: [id number]  [place] 1975  Date of evaluation: 11/24/2022  Provider: HENRY Austin   Attending: Ric Shaffer MD   PCP: None  Note Started: 8:40 AM     TRIAGE CHIEF COMPLAINT   Chief Complaint   Patient presents with    Other     Pt is from out of town visiting family and reports need to get dialysis; normally Tu/Thurs/Sat pt, last dialysis was Tuesday; denies any other sxs, complaints       History Provided By: Patient    HISTORY OF PRESENT ILLNESS  (Location, Timing/Onset, Context/Setting, Quality, Duration, Modifying Factors, Severity, Associated Signs and Symptoms) Note limiting factors. Chief Complaint: Needing Dialysis  Kimberlee Nunez is a 49 y.o. male who presents by POV without complaint. He is here today out of abundance of concern for needing dialysis. He has been on dialysis Tuesday, Thursday, Saturday for many years secondary to renal failure due to diabetes. He lives in [address] [place] [address] but rushed to [place] because his mother was in the hospital.  When he heard that his mom was sick he was not considering the fact that he may need dialysis and is unfortunately missing his appointment today. Patient denies all complaints. Nursing Notes were all reviewed and agreed with or any disagreements were addressed in the HPI. REVIEW OF SYSTEMS   (2-9 systems for level 4, 10 or more for level 5)  Review of Systems   Constitutional:  Negative for chills, diaphoresis and fever. HENT:  Negative for congestion, ear pain, rhinorrhea and sore throat. Respiratory:  Negative for cough and shortness of breath. Cardiovascular:  Negative for chest pain. Gastrointestinal:  Negative for abdominal pain, constipation, diarrhea, nausea and vomiting. Genitourinary:  Negative for difficulty urinating, dysuria, frequency and hematuria.    Musculoskeletal: Negative for arthralgias and myalgias. Neurological:  Negative for headaches. All other systems reviewed and are negative. Positives and Pertinent negatives as per HPI. Except as noted above in the ROS, all other systems were reviewed and negative. PAST MEDICAL HISTORY  No past medical history on file. SURGICAL HISTORY  Past Surgical History:   Procedure Laterality Date    IR INSERT NON TUNL CVC OVER 5 YRS  8/19/2022    IR INSERT TUNL CVC W/O PORT OVER 5 YR  8/23/2022       CURRENTMEDICATIONS   Previous Medications    ALPRAZOLAM (XANAX) 0.5 MG TABLET    Take 1 Tablet by mouth two (2) times daily as needed. ARIPIPRAZOLE (ABILIFY) 10 MG TABLET    Take 1 Tablet by mouth daily. ASPIRIN DELAYED-RELEASE (ADULT LOW DOSE ASPIRIN) 81 MG TABLET    Take 1 Tablet by mouth daily. ATORVASTATIN (LIPITOR) 40 MG TABLET    Take 1 Tablet by mouth daily. BUMETANIDE (BUMEX) 2 MG TABLET    Take 1 Tablet by mouth daily. FERROUS SULFATE 325 MG (65 MG IRON) TABLET    Take 1 Tablet by mouth Daily (before breakfast). FLUDROCORTISONE (FLORINEF) 0.1 MG TABLET    Take 1 Tablet by mouth daily. HYDRALAZINE (APRESOLINE) 100 MG TABLET    Take 1 Tablet by mouth three (3) times daily. INSULIN GLARGINE-YFGN 100 UNIT/ML SOLN    15 Units by SubCUTAneous route nightly. INSULIN LISPRO-AABC 100 UNIT/ML SOLN    5 Units by SubCUTAneous route three (3) times daily. NIFEDIPINE ER (PROCARDIA XL) 90 MG ER TABLET    Take 90 mg by mouth daily. SITAGLIPTIN-METFORMIN (JANUMET)  MG PER TABLET    Take 1 Tablet by mouth daily. ALLERGIES   Heparin, Pork derived (porcine), and Tramadol    FAMILYHISTORY   No family history on file. SOCIAL HISTORY        PHYSICAL EXAM   (up to 7 for level 4, 8 or more for level 5)  Patient Vitals for the past 12 hrs:   Temp Pulse Resp BP SpO2   11/24/22 0753 98.6 °F (37 °C) 95 16 (!) 179/97 100 %      Physical Exam  Vitals and nursing note reviewed.    Constitutional:       General: He is not in acute distress. Appearance: He is well-developed. He is not diaphoretic. Comments: 49 y.o. -American male    HENT:      Head: Normocephalic and atraumatic. Eyes:      General:         Right eye: No discharge. Left eye: No discharge. Conjunctiva/sclera: Conjunctivae normal.   Cardiovascular:      Rate and Rhythm: Normal rate and regular rhythm. Heart sounds: Normal heart sounds. No murmur heard. Pulmonary:      Effort: Pulmonary effort is normal. No respiratory distress. Breath sounds: Normal breath sounds. Musculoskeletal:      Cervical back: Normal range of motion and neck supple. Skin:     General: Skin is warm and dry. Neurological:      Mental Status: He is alert and oriented to person, place, and time. Psychiatric:         Behavior: Behavior normal.        DIAGNOSTIC RESULTS  LABS:  Recent Results (from the past 12 hour(s))   METABOLIC PANEL, BASIC    Collection Time: 11/24/22  8:34 AM   Result Value Ref Range    Sodium 138 136 - 145 mmol/L    Potassium 4.5 3.5 - 5.1 mmol/L    Chloride 107 97 - 108 mmol/L    CO2 23 21 - 32 mmol/L    Anion gap 8 5 - 15 mmol/L    Glucose 215 (H) 65 - 100 mg/dL    BUN 49 (H) 6 - 20 MG/DL    Creatinine 6.96 (H) 0.70 - 1.30 MG/DL    BUN/Creatinine ratio 7 (L) 12 - 20      eGFR 9 (L) >60 ml/min/1.73m2    Calcium 7.6 (L) 8.5 - 10.1 MG/DL        EKG: When ordered, EKG's are interpreted by the Emergency Department Physician in the absence of a cardiologist.  Please see their note for interpretation of EKG. RADIOLOGY: All images such as plain films, CT, Ultrasound and MRI are read by the radiologist. Interpretation per the Radiologist below, if available at the time of this note:  No results found.      PROCEDURES - Unless otherwise noted below, none  Procedures    CRITICAL CARE TIME: none    CONSULTS:  None    DIFFERENTIAL DIAGNOSIS/MDM:  I reviewed the vital signs, available nursing notes, past medical history, past surgical history, family history and social history. Records Reviewed: Nursing Notes    Provider Notes (Medical Decision Making):   Patient presents the ED with still vital signs because he is out of town and missed his dialysis today. He is concerned that he may need dialysis today. He has no complaints. Metabolic panel checked. Potassium normal.  Patient can wait until his next scheduled dialysis appointment on Saturday for his routine dialysis. Emergent dialysis not needed at this time. Patient to follow-up with primary care medicine or nephrology. He can always return to the ED for deterioration. EMERGENCY DEPARTMENT COURSE:   I am the first provider for this patient. Initial assessment performed. The patients presenting problems have been discussed, and they are in agreement with the care plan formulated and outlined with them. I have encouraged them to ask questions as they arise throughout their visit. ED Course as of 11/24/22 0929   Thu Nov 24, 2022   1882 Tobacco Counseling  Discussed the risks of smoking and the benefits of smoking cessation as well as the long term sequelae of smoking with the patient. The patient verbalized their understanding. Counseled patient for approximately 3 minutes. [TK]      ED Course User Index  [TK] [facility]       Vitals:   Patient Vitals for the past 12 hrs:   Temp Pulse Resp BP SpO2   11/24/22 0753 98.6 °F (37 °C) 95 16 (!) 179/97 100 %        Patient was given the following medications:  Medications - No data to display    DISCHARGE NOTE:  9:29 AM  The pt is ready for discharge. The pt's signs, symptoms, diagnosis, and discharge instructions have been discussed and pt has conveyed their understanding. The pt is to follow up as recommended or return to ER should their symptoms worsen. Plan has been discussed and pt is in agreement. FINAL IMPRESSION  1.  End stage renal disease on dialysis [facility])         DISPOSITION/PLAN  Discharged    PATIENT REFERRED TO:  Follow-up Information       Follow up With Specialties Details Why Contact Info    Your PCP or Nephrologist   As needed     Dialysis   as scheduled on Saturday     [facility] EMERGENCY DEPT Emergency Medicine  If symptoms worsen [address] [place] [address]  [phone]          DISCHARGE MEDICATIONS:  Current Discharge Medication List             I have seen and evaluated the patient in conjunction with my supervising physician. HENRY Haas (Electronic Signature)    Please note that this dictation was completed with Deemelo, the computer voice recognition software. Quite often unanticipated grammatical, syntax, homophones, and other interpretive errors are inadvertently transcribed by the computer software. Please disregards these errors. Please excuse any errors that have escaped final proofreading.

## 2022-11-26 ENCOUNTER — APPOINTMENT (OUTPATIENT)
Dept: GENERAL RADIOLOGY | Age: 47
End: 2022-11-26
Attending: STUDENT IN AN ORGANIZED HEALTH CARE EDUCATION/TRAINING PROGRAM
Payer: MEDICAID

## 2022-11-26 ENCOUNTER — HOSPITAL ENCOUNTER (EMERGENCY)
Age: 47
Discharge: HOME | End: 2022-11-26
Attending: STUDENT IN AN ORGANIZED HEALTH CARE EDUCATION/TRAINING PROGRAM
Payer: MEDICAID

## 2022-11-26 VITALS
TEMPERATURE: 98 F | RESPIRATION RATE: 16 BRPM | SYSTOLIC BLOOD PRESSURE: 166 MMHG | WEIGHT: 184.53 LBS | DIASTOLIC BLOOD PRESSURE: 98 MMHG | OXYGEN SATURATION: 95 % | HEART RATE: 98 BPM | HEIGHT: 72 IN | BODY MASS INDEX: 24.99 KG/M2

## 2022-11-26 DIAGNOSIS — N18.6 ESRD (END STAGE RENAL DISEASE) (HCC): Primary | ICD-10-CM

## 2022-11-26 DIAGNOSIS — R06.02 SOB (SHORTNESS OF BREATH): ICD-10-CM

## 2022-11-26 LAB
ALBUMIN SERPL-MCNC: 2.8 G/DL (ref 3.5–5)
ALBUMIN/GLOB SERPL: 0.6 {RATIO} (ref 1.1–2.2)
ALP SERPL-CCNC: 308 U/L (ref 45–117)
ALT SERPL-CCNC: 24 U/L (ref 12–78)
ANION GAP SERPL CALC-SCNC: 10 MMOL/L (ref 5–15)
AST SERPL-CCNC: 24 U/L (ref 15–37)
ATRIAL RATE: 104 BPM
ATRIAL RATE: 98 BPM
BASOPHILS # BLD: 0 K/UL (ref 0–0.1)
BASOPHILS NFR BLD: 1 % (ref 0–1)
BILIRUB SERPL-MCNC: 0.4 MG/DL (ref 0.2–1)
BNP SERPL-MCNC: ABNORMAL PG/ML
BUN SERPL-MCNC: 54 MG/DL (ref 6–20)
BUN/CREAT SERPL: 7 (ref 12–20)
CALCIUM SERPL-MCNC: 7 MG/DL (ref 8.5–10.1)
CALCULATED P AXIS, ECG09: 53 DEGREES
CALCULATED P AXIS, ECG09: 62 DEGREES
CALCULATED R AXIS, ECG10: 14 DEGREES
CALCULATED R AXIS, ECG10: 7 DEGREES
CALCULATED T AXIS, ECG11: 72 DEGREES
CALCULATED T AXIS, ECG11: 74 DEGREES
CHLORIDE SERPL-SCNC: 105 MMOL/L (ref 97–108)
CO2 SERPL-SCNC: 22 MMOL/L (ref 21–32)
COMMENT, HOLDF: NORMAL
CREAT SERPL-MCNC: 7.93 MG/DL (ref 0.7–1.3)
DIAGNOSIS, 93000: NORMAL
DIAGNOSIS, 93000: NORMAL
DIFFERENTIAL METHOD BLD: ABNORMAL
EOSINOPHIL # BLD: 0.2 K/UL (ref 0–0.4)
EOSINOPHIL NFR BLD: 2 % (ref 0–7)
ERYTHROCYTE [DISTWIDTH] IN BLOOD BY AUTOMATED COUNT: 16.7 % (ref 11.5–14.5)
GLOBULIN SER CALC-MCNC: 5 G/DL (ref 2–4)
GLUCOSE SERPL-MCNC: 188 MG/DL (ref 65–100)
HCT VFR BLD AUTO: 29.5 % (ref 36.6–50.3)
HGB BLD-MCNC: 8.9 G/DL (ref 12.1–17)
IMM GRANULOCYTES # BLD AUTO: 0 K/UL (ref 0–0.04)
IMM GRANULOCYTES NFR BLD AUTO: 0 % (ref 0–0.5)
LYMPHOCYTES # BLD: 1.5 K/UL (ref 0.8–3.5)
LYMPHOCYTES NFR BLD: 18 % (ref 12–49)
MCH RBC QN AUTO: 24.9 PG (ref 26–34)
MCHC RBC AUTO-ENTMCNC: 30.2 G/DL (ref 30–36.5)
MCV RBC AUTO: 82.4 FL (ref 80–99)
MONOCYTES # BLD: 0.7 K/UL (ref 0–1)
MONOCYTES NFR BLD: 8 % (ref 5–13)
NEUTS SEG # BLD: 6.3 K/UL (ref 1.8–8)
NEUTS SEG NFR BLD: 71 % (ref 32–75)
NRBC # BLD: 0 K/UL (ref 0–0.01)
NRBC BLD-RTO: 0 PER 100 WBC
P-R INTERVAL, ECG05: 170 MS
P-R INTERVAL, ECG05: 170 MS
PLATELET # BLD AUTO: 471 K/UL (ref 150–400)
PMV BLD AUTO: 8.4 FL (ref 8.9–12.9)
POTASSIUM SERPL-SCNC: 4.5 MMOL/L (ref 3.5–5.1)
PROT SERPL-MCNC: 7.8 G/DL (ref 6.4–8.2)
Q-T INTERVAL, ECG07: 378 MS
Q-T INTERVAL, ECG07: 386 MS
QRS DURATION, ECG06: 86 MS
QRS DURATION, ECG06: 88 MS
QTC CALCULATION (BEZET), ECG08: 492 MS
QTC CALCULATION (BEZET), ECG08: 497 MS
RBC # BLD AUTO: 3.58 M/UL (ref 4.1–5.7)
SAMPLES BEING HELD,HOLD: NORMAL
SODIUM SERPL-SCNC: 137 MMOL/L (ref 136–145)
TROPONIN-HIGH SENSITIVITY: 37 NG/L (ref 0–76)
VENTRICULAR RATE, ECG03: 104 BPM
VENTRICULAR RATE, ECG03: 98 BPM
WBC # BLD AUTO: 8.7 K/UL (ref 4.1–11.1)

## 2022-11-26 PROCEDURE — 85025 COMPLETE CBC W/AUTO DIFF WBC: CPT

## 2022-11-26 PROCEDURE — 80053 COMPREHEN METABOLIC PANEL: CPT

## 2022-11-26 PROCEDURE — 71046 X-RAY EXAM CHEST 2 VIEWS: CPT

## 2022-11-26 PROCEDURE — 84484 ASSAY OF TROPONIN QUANT: CPT

## 2022-11-26 PROCEDURE — 90935 HEMODIALYSIS ONE EVALUATION: CPT

## 2022-11-26 PROCEDURE — 36415 COLL VENOUS BLD VENIPUNCTURE: CPT

## 2022-11-26 PROCEDURE — 99285 EMERGENCY DEPT VISIT HI MDM: CPT

## 2022-11-26 PROCEDURE — 83880 ASSAY OF NATRIURETIC PEPTIDE: CPT

## 2022-11-26 PROCEDURE — 93005 ELECTROCARDIOGRAM TRACING: CPT

## 2022-11-26 NOTE — ED PROVIDER NOTES
EMERGENCY DEPARTMENT HISTORY AND PHYSICAL EXAM      Date: 11/26/2022  Patient Name: Jacqueline Beebe    History of Presenting Illness     Chief Complaint   Patient presents with    Shortness of Breath     Pt arrives ambulatory to triage for SOB that began yesterday. Pt receives dialysis T,TH,Sat and when he came for dialysis on TH they said he \"did not need it. \" Pt denies cp, N/V/D. History Provided By: Patient    Jacqueline Beebe, 49 y.o. male     Is a 41-year-old male with history of hypertension, diabetes, ESRD, on Tuesday Thursday Saturday dialysis presenting with concern of shortness of breath, patient states that he missed a Thursday session of dialysis secondary to presenting on a holiday and not needing acute dialysis, patient states that yesterday he began feeling short of breath consistent with prior episodes of missing a dialysis session. Patient states that he is been taking all his other medications as directed, also states that he developed cough yesterday, denies any fevers, chills, other URI symptoms, nausea, vomiting, abdominal pain, states that he makes little urine, no changes to bowel habits, no other complaints today. There are no other complaints, changes, or physical findings at this time. PCP: None    No current facility-administered medications on file prior to encounter. Current Outpatient Medications on File Prior to Encounter   Medication Sig Dispense Refill    bumetanide (BUMEX) 2 mg tablet Take 1 Tablet by mouth daily. 60 Tablet 0    ferrous sulfate 325 mg (65 mg iron) tablet Take 1 Tablet by mouth Daily (before breakfast). 30 Tablet 0    ALPRAZolam (Xanax) 0.5 mg tablet Take 1 Tablet by mouth two (2) times daily as needed. ARIPiprazole (ABILIFY) 10 mg tablet Take 1 Tablet by mouth daily. aspirin delayed-release (Adult Low Dose Aspirin) 81 mg tablet Take 1 Tablet by mouth daily. atorvastatin (Lipitor) 40 mg tablet Take 1 Tablet by mouth daily. fludrocortisone (FLORINEF) 0.1 mg tablet Take 1 Tablet by mouth daily. hydrALAZINE (APRESOLINE) 100 mg tablet Take 1 Tablet by mouth three (3) times daily. insulin glargine-yfgn 100 unit/mL soln 15 Units by SubCUTAneous route nightly. insulin lispro-aabc 100 unit/mL soln 5 Units by SubCUTAneous route three (3) times daily. SITagliptin-metFORMIN (Janumet)  mg per tablet Take 1 Tablet by mouth daily. (Patient not taking: Reported on 7/12/2022)      NIFEdipine ER (Procardia XL) 90 mg ER tablet Take 90 mg by mouth daily. Past History     Past Medical History:  Past Medical History:   Diagnosis Date    Bulging eyes     Diabetes ([facility]     Dialysis patient ([facility]     End stage renal disease ([facility]     Hypertension        Past Surgical History:  Past Surgical History:   Procedure Laterality Date    IR INSERT NON TUNL CVC OVER 5 YRS  8/19/2022    IR INSERT TUNL CVC W/O PORT OVER 5 YR  8/23/2022       Family History:  Family History   Problem Relation Age of Onset    No Known Problems Mother     No Known Problems Father        Social History:  Social History     Tobacco Use    Smoking status: Every Day     Packs/day: 0.50     Years: 30.00     Pack years: 15.00     Types: Cigarettes    Smokeless tobacco: Never   Vaping Use    Vaping Use: Never used   Substance Use Topics    Alcohol use: Never    Drug use: Yes     Types: Marijuana       Allergies: Allergies   Allergen Reactions    Heparin Other (comments)     NO PORK    Pork Derived (Porcine) Hives     Do not send pork to the patient please    Tramadol Hives         Review of Systems   Review of Systems   Constitutional:  Negative for activity change, chills, fatigue and fever. HENT:  Negative for congestion and sneezing. Respiratory:  Positive for cough and shortness of breath. Cardiovascular:  Positive for leg swelling. Negative for chest pain.    Gastrointestinal:  Negative for abdominal pain, constipation, diarrhea, nausea and vomiting. Genitourinary:  Positive for difficulty urinating (Chronic). Musculoskeletal:  Negative for arthralgias and myalgias. Skin:  Negative for rash. Allergic/Immunologic: Negative for immunocompromised state. Neurological:  Negative for headaches. Hematological:  Does not bruise/bleed easily. Psychiatric/Behavioral:  Negative for confusion. Physical Exam   Physical Exam  Vitals reviewed. Constitutional:       General: He is not in acute distress. Appearance: He is not ill-appearing, toxic-appearing or diaphoretic. HENT:      Head: Normocephalic and atraumatic. Mouth/Throat:      Mouth: Mucous membranes are moist.   Cardiovascular:      Rate and Rhythm: Normal rate. Pulmonary:      Effort: Pulmonary effort is normal. No tachypnea, accessory muscle usage or respiratory distress. Breath sounds: No decreased breath sounds, wheezing, rhonchi or rales. Abdominal:      Palpations: Abdomen is soft. Musculoskeletal:      Cervical back: Neck supple. Right lower leg: No edema. Left lower leg: No edema. Skin:     General: Skin is warm and dry. Neurological:      General: No focal deficit present. Mental Status: He is alert.    Psychiatric:         Mood and Affect: Mood normal.       Diagnostic Study Results     Labs -     Recent Results (from the past 24 hour(s))   EKG, 12 LEAD, INITIAL    Collection Time: 11/26/22  6:29 AM   Result Value Ref Range    Ventricular Rate 104 BPM    Atrial Rate 104 BPM    P-R Interval 170 ms    QRS Duration 88 ms    Q-T Interval 378 ms    QTC Calculation (Bezet) 497 ms    Calculated P Axis 62 degrees    Calculated R Axis 7 degrees    Calculated T Axis 72 degrees    Diagnosis       Sinus tachycardia  Nonspecific T wave abnormality  When compared with ECG of 18-AUG-2022 10:04,  No significant change was found     CBC WITH AUTOMATED DIFF    Collection Time: 11/26/22  6:30 AM   Result Value Ref Range    WBC 8.7 4.1 - 11.1 K/uL    RBC 3.58 (L) 4.10 - 5.70 M/uL    HGB 8.9 (L) 12.1 - 17.0 g/dL    HCT 29.5 (L) 36.6 - 50.3 %    MCV 82.4 80.0 - 99.0 FL    MCH 24.9 (L) 26.0 - 34.0 PG    MCHC 30.2 30.0 - 36.5 g/dL    RDW 16.7 (H) 11.5 - 14.5 %    PLATELET 053 (H) 220 - 400 K/uL    MPV 8.4 (L) 8.9 - 12.9 FL    NRBC 0.0 0  WBC    ABSOLUTE NRBC 0.00 0.00 - 0.01 K/uL    NEUTROPHILS 71 32 - 75 %    LYMPHOCYTES 18 12 - 49 %    MONOCYTES 8 5 - 13 %    EOSINOPHILS 2 0 - 7 %    BASOPHILS 1 0 - 1 %    IMMATURE GRANULOCYTES 0 0.0 - 0.5 %    ABS. NEUTROPHILS 6.3 1.8 - 8.0 K/UL    ABS. LYMPHOCYTES 1.5 0.8 - 3.5 K/UL    ABS. MONOCYTES 0.7 0.0 - 1.0 K/UL    ABS. EOSINOPHILS 0.2 0.0 - 0.4 K/UL    ABS. BASOPHILS 0.0 0.0 - 0.1 K/UL    ABS. IMM. GRANS. 0.0 0.00 - 0.04 K/UL    DF AUTOMATED     METABOLIC PANEL, COMPREHENSIVE    Collection Time: 11/26/22  6:30 AM   Result Value Ref Range    Sodium 137 136 - 145 mmol/L    Potassium 4.5 3.5 - 5.1 mmol/L    Chloride 105 97 - 108 mmol/L    CO2 22 21 - 32 mmol/L    Anion gap 10 5 - 15 mmol/L    Glucose 188 (H) 65 - 100 mg/dL    BUN 54 (H) 6 - 20 MG/DL    Creatinine 7.93 (H) 0.70 - 1.30 MG/DL    BUN/Creatinine ratio 7 (L) 12 - 20      eGFR 8 (L) >60 ml/min/1.73m2    Calcium 7.0 (L) 8.5 - 10.1 MG/DL    Bilirubin, total 0.4 0.2 - 1.0 MG/DL    ALT (SGPT) 24 12 - 78 U/L    AST (SGOT) 24 15 - 37 U/L    Alk.  phosphatase 308 (H) 45 - 117 U/L    Protein, total 7.8 6.4 - 8.2 g/dL    Albumin 2.8 (L) 3.5 - 5.0 g/dL    Globulin 5.0 (H) 2.0 - 4.0 g/dL    A-G Ratio 0.6 (L) 1.1 - 2.2     NT-PRO BNP    Collection Time: 11/26/22  6:30 AM   Result Value Ref Range    NT pro-BNP 21,108 (H) <125 PG/ML   TROPONIN-HIGH SENSITIVITY    Collection Time: 11/26/22  7:42 AM   Result Value Ref Range    Troponin-High Sensitivity 37 0 - 76 ng/L   SAMPLES BEING HELD    Collection Time: 11/26/22  7:42 AM   Result Value Ref Range    SAMPLES BEING HELD BLUE, RED     COMMENT        Add-on orders for these samples will be processed based on acceptable specimen integrity and analyte stability, which may vary by analyte. EKG, 12 LEAD, INITIAL    Collection Time: 11/26/22  7:53 AM   Result Value Ref Range    Ventricular Rate 98 BPM    Atrial Rate 98 BPM    P-R Interval 170 ms    QRS Duration 86 ms    Q-T Interval 386 ms    QTC Calculation (Bezet) 492 ms    Calculated P Axis 53 degrees    Calculated R Axis 14 degrees    Calculated T Axis 74 degrees    Diagnosis       Normal sinus rhythm  Prolonged QT  When compared with ECG of 26-NOV-2022 06:29,  MANUAL COMPARISON REQUIRED, DATA IS UNCONFIRMED         Radiologic Studies -   XR CHEST PA LAT   Final Result   Moderate-sized left pleural effusion and left basilar consolidation. CT Results  (Last 48 hours)      None          CXR Results  (Last 48 hours)                 11/26/22 0641  XR CHEST PA LAT Final result    Impression:  Moderate-sized left pleural effusion and left basilar consolidation. Narrative: Indication:  Shortness of Breath        Exam: PA and lateral views of the chest.       Direct comparison is made to prior CXR dated August 2022. Findings: Cardiomediastinal silhouette is stable, but is partially obscured by a   moderate-sized left pleural effusion and left basilar consolidation. There is   minimal right basilar atelectasis. There is no pleural fluid. There is no   pneumothorax. Dual-lumen central catheter extends to the SVC. Medical Decision Making   I am the first provider for this patient. I reviewed the vital signs, available nursing notes, past medical history, past surgical history, family history and social history. Vital Signs-Reviewed the patient's vital signs.   Patient Vitals for the past 12 hrs:   Temp Pulse Resp BP SpO2   11/26/22 0905 -- (!) 102 28 (!) 179/95 95 %   11/26/22 0815 -- 99 18 (!) 195/108 97 %   11/26/22 0655 -- 100 26 -- 100 %   11/26/22 0622 97 °F (36.1 °C) (!) 103 24 (!) 190/99 100 %       Records Reviewed: Nursing records and medical records reviewed    Ddx: ESRD, shortness of breath, dyspnea on exertion    Initial assessment performed. The patients presenting problems have been discussed, and they are in agreement with the care plan formulated and outlined with them. I have encouraged them to ask questions as they arise throughout their visit. MDM  Number of Diagnoses or Management Options  ESRD (end stage renal disease) ([facility]  SOB (shortness of breath)  Diagnosis management comments: Patient is a 50-year-old male with history of ESRD, on dialysis, Tuesday Thursday Saturday presenting with concern of shortness of breath since yesterday, cough, states that he misses dialysis secondary to him presenting but not being able to receive it, states that he is traveling from out of town. Patient states that yesterday began having dry cough and shortness of breath, states that this is consistent with prior episodes of missing dialysis. Denies any chest pain, infectious symptoms, has had no other recent changes such as abdominal pain, nausea or vomiting. Patient overall well-appearing on arrival although appears anxious and has mild increased work of breathing, otherwise vital signs are within normal limits, lungs are clear and patient is in no apparent distress. We will plan on discussion with nephrology for dialysis today and lab work including electrolytes and chest x-ray and reevaluation after HD                 Procedures    Critical Care: none    Disposition: dc    DISCHARGE PLAN:  1. Current Discharge Medication List        2. Follow-up Information       Follow up With Specialties Details Why Contact Info    [facility] EMERGENCY DEPT Emergency Medicine   [address] [facility] [address]   [phone]          3. Return to ED if worse     Diagnosis     Clinical Impression:   1. ESRD (end stage renal disease) ([facility]    2.  SOB (shortness of breath)        Attestations:    Anisha Macedo MD    Please note that this dictation was completed with Dragon, the computer voice recognition software. Quite often unanticipated grammatical, syntax, homophones, and other interpretive errors are inadvertently transcribed by the computer software. Please disregard these errors. Please excuse any errors that have escaped final proofreading. Thank you.

## 2022-11-26 NOTE — ED NOTES
Pt needs dialysis t-th-sat.  Pt was here on thanksgiving and was told he did not need it, pt is back to get receive dialysis

## 2022-11-26 NOTE — PROGRESS NOTES
Nephrology Progress Note  [facility]  Date of Admission : 11/26/2022    CC:  Follow up for ESRD       Assessment and Plan     ESRD TTS in [place], [place]  Hypervolemia  HTN  Anemia of CKD      Plan:  HD today x 3.5 hrs  UF 4kg  Should be ok for d/c post dialysis       Interval History:  50 yo AAM w/ a hx of ESRD on HD TTS, missed his Thursday treatment. From out of town, visiting his mother in the hospital over thanksgiving. Showed up to ER on Thursday, unable to get his treatment so he is back today. SOB, hypertensive. Large pleural effusion on CXR. Has new AVF that is maturing. PC in place. No cp, n/v/d, fevers or chills reported. Current Medications: all current  Medications have been eviewed in EPIC  Review of Systems: Pertinent items are noted in HPI. Objective:  Vitals:    Vitals:    11/26/22 0622 11/26/22 0655 11/26/22 0815 11/26/22 0905   BP: (!) 190/99  (!) 195/108 (!) 179/95   Pulse: (!) 103 100 99 (!) 102   Resp: 24 26 18 28   Temp: 97 °F (36.1 °C)      SpO2: 100% 100% 97% 95%   Weight: 83.7 kg (184 lb 8.4 oz)      Height: 6' (1.829 m)        Intake and Output:  No intake/output data recorded. No intake/output data recorded. Physical Examination:  General: Labored breathing  Neck:  Supple, no mass  Resp:  Bibasilar crackles  CV:  RRR,  no murmur or rub, trace LE edema  GI:  Soft, NT, + Bowel sounds, no hepatosplenomegaly  Neurologic:  Non focal  Psych:             AAO x 3 appropriate affect   Skin:  No Rash  Access:           RIJ PC, LUE AVF    [x]    High complexity decision making was performed  []    Patient is at high-risk of decompensation with multiple organ involvement    Lab Data Personally Reviewed: I have reviewed all the pertinent labs, microbiology data and radiology studies during assessment.     Recent Labs     11/26/22  0630 11/24/22  0834    138   K 4.5 4.5    107   CO2 22 23   * 215*   BUN 54* 49*   CREA 7.93* 6.96*   CA 7.0* 7.6*   ALB 2.8*  -- ALT 24  --      Recent Labs     11/26/22  0630   WBC 8.7   HGB 8.9*   HCT 29.5*   *     No results found for: SDES  No results found for: CULT  Recent Results (from the past 24 hour(s))   EKG, 12 LEAD, INITIAL    Collection Time: 11/26/22  6:29 AM   Result Value Ref Range    Ventricular Rate 104 BPM    Atrial Rate 104 BPM    P-R Interval 170 ms    QRS Duration 88 ms    Q-T Interval 378 ms    QTC Calculation (Bezet) 497 ms    Calculated P Axis 62 degrees    Calculated R Axis 7 degrees    Calculated T Axis 72 degrees    Diagnosis       Sinus tachycardia  Nonspecific T wave abnormality  When compared with ECG of 18-AUG-2022 10:04,  No significant change was found     CBC WITH AUTOMATED DIFF    Collection Time: 11/26/22  6:30 AM   Result Value Ref Range    WBC 8.7 4.1 - 11.1 K/uL    RBC 3.58 (L) 4.10 - 5.70 M/uL    HGB 8.9 (L) 12.1 - 17.0 g/dL    HCT 29.5 (L) 36.6 - 50.3 %    MCV 82.4 80.0 - 99.0 FL    MCH 24.9 (L) 26.0 - 34.0 PG    MCHC 30.2 30.0 - 36.5 g/dL    RDW 16.7 (H) 11.5 - 14.5 %    PLATELET 347 (H) 324 - 400 K/uL    MPV 8.4 (L) 8.9 - 12.9 FL    NRBC 0.0 0  WBC    ABSOLUTE NRBC 0.00 0.00 - 0.01 K/uL    NEUTROPHILS 71 32 - 75 %    LYMPHOCYTES 18 12 - 49 %    MONOCYTES 8 5 - 13 %    EOSINOPHILS 2 0 - 7 %    BASOPHILS 1 0 - 1 %    IMMATURE GRANULOCYTES 0 0.0 - 0.5 %    ABS. NEUTROPHILS 6.3 1.8 - 8.0 K/UL    ABS. LYMPHOCYTES 1.5 0.8 - 3.5 K/UL    ABS. MONOCYTES 0.7 0.0 - 1.0 K/UL    ABS. EOSINOPHILS 0.2 0.0 - 0.4 K/UL    ABS. BASOPHILS 0.0 0.0 - 0.1 K/UL    ABS. IMM.  GRANS. 0.0 0.00 - 0.04 K/UL    DF AUTOMATED     METABOLIC PANEL, COMPREHENSIVE    Collection Time: 11/26/22  6:30 AM   Result Value Ref Range    Sodium 137 136 - 145 mmol/L    Potassium 4.5 3.5 - 5.1 mmol/L    Chloride 105 97 - 108 mmol/L    CO2 22 21 - 32 mmol/L    Anion gap 10 5 - 15 mmol/L    Glucose 188 (H) 65 - 100 mg/dL    BUN 54 (H) 6 - 20 MG/DL    Creatinine 7.93 (H) 0.70 - 1.30 MG/DL    BUN/Creatinine ratio 7 (L) 12 - 20 eGFR 8 (L) >60 ml/min/1.73m2    Calcium 7.0 (L) 8.5 - 10.1 MG/DL    Bilirubin, total 0.4 0.2 - 1.0 MG/DL    ALT (SGPT) 24 12 - 78 U/L    AST (SGOT) 24 15 - 37 U/L    Alk. phosphatase 308 (H) 45 - 117 U/L    Protein, total 7.8 6.4 - 8.2 g/dL    Albumin 2.8 (L) 3.5 - 5.0 g/dL    Globulin 5.0 (H) 2.0 - 4.0 g/dL    A-G Ratio 0.6 (L) 1.1 - 2.2     NT-PRO BNP    Collection Time: 11/26/22  6:30 AM   Result Value Ref Range    NT pro-BNP 21,108 (H) <125 PG/ML   TROPONIN-HIGH SENSITIVITY    Collection Time: 11/26/22  7:42 AM   Result Value Ref Range    Troponin-High Sensitivity 37 0 - 76 ng/L   SAMPLES BEING HELD    Collection Time: 11/26/22  7:42 AM   Result Value Ref Range    SAMPLES BEING HELD BLUE, RED     COMMENT        Add-on orders for these samples will be processed based on acceptable specimen integrity and analyte stability, which may vary by analyte. EKG, 12 LEAD, INITIAL    Collection Time: 11/26/22  7:53 AM   Result Value Ref Range    Ventricular Rate 98 BPM    Atrial Rate 98 BPM    P-R Interval 170 ms    QRS Duration 86 ms    Q-T Interval 386 ms    QTC Calculation (Bezet) 492 ms    Calculated P Axis 53 degrees    Calculated R Axis 14 degrees    Calculated T Axis 74 degrees    Diagnosis       Normal sinus rhythm  Prolonged QT  When compared with ECG of 26-NOV-2022 06:29,  MANUAL COMPARISON REQUIRED, DATA IS UNCONFIRMED                 Jason Ordoñez MD  [facility]   [address] [place] [address]  Phone - [phone]   Fax - [phone]  [email or web address]

## 2022-11-26 NOTE — PROCEDURES
Hemodialysis / [phone]    Vitals Pre Post Assessment Pre Post   /102   166/98 LOC A&OX4 A&OX4    98 Lungs DYSPNEA AT REST, USE OF ACCESSORY MUSCLES CLEAR   Resp 24 16 Cardiac WNL WNL   Temp Temp: 97.9 °F (36.6 °C) (11/26/22 1220) 98.0F Skin WARM AND DRY WARM AND DRY    Weight  N/A N/A Edema BLE TRACE EDEMA BLE TRACE EDEMA   Tele status REMOTE REMOTE Pain Pain Intensity 1: 0 (11/26/22 0622) NONE     Orders   Duration: Start: 5813 End: 1545 Total: 3HR   Dialyzer: Dialyzer/Set Up Inspection: Julieta Rattler (11/26/22 1236)   K Bath: Dialysate K (mEq/L): 2 (11/26/22 1236)   Ca Bath: Dialysate CA (mEq/L): 2.5 (11/26/22 1236)   Na:     Bicarb: Dialysate HCO3 (mEq/L): 35 (11/26/22 1236)   Target Fluid Removal:       Access   Type & Location: R PERMACATH   Comments:   PC : Pre- Assessment: Dressing CDI. No s/s of infection. Both lumens aspirate & flush well. Running well at . Post assessment: Dressing CDI. No changes.                                       Labs   HBsAg (Antigen) / date:   NEG                                         HBsAb (Antibody) / date: DAVIE   Source: Epic 11/01/2022   Obtained/Reviewed  Critical Results Called HGB   Date Value Ref Range Status   11/26/2022 8.9 (L) 12.1 - 17.0 g/dL Final     Potassium   Date Value Ref Range Status   11/26/2022 4.5 3.5 - 5.1 mmol/L Final     Calcium   Date Value Ref Range Status   11/26/2022 7.0 (L) 8.5 - 10.1 MG/DL Final     BUN   Date Value Ref Range Status   11/26/2022 54 (H) 6 - 20 MG/DL Final     Creatinine   Date Value Ref Range Status   11/26/2022 7.93 (H) 0.70 - 1.30 MG/DL Final        Meds Given   Name Dose Route   NONE                 Adequacy / Fluid    Total Liters Process: 61.5L   Net Fluid Removed: 3510mL      Comments   Time Out Done:   (Time) 1230   Admitting Diagnosis: ESRD   Consent obtained/signed: Informed Consent Verified: Yes (11/26/22 2946)   Machine / RO # Machine Number: [id number] (11/26/22 9391)   Primary Nurse Rpt Pre: Carrie Bloom JAMAAL PROCTOR   Primary Nurse Rpt Post: Miranda Perkins RN   Pt Education: HD TREATMENT PROCESS, NUTRITION   Care Plan: CONTINUE HD AS ORDERED   Pts outpatient clinic: [facility]     Tx Summary   Comments:   SBAR received from Primary RN. Pt arrived to HD suite A&Ox4. Chronic consent applies. 1236: Each catheter limb disinfected per p&p, caps removed, hubs disinfected per p&p. Each lumen aspirated for blood return and flushed with Normal Saline per policy. Labs drawn per request/ order. VSS. Dialysis Tx initiated. 1245:Pt reports, \"my breathing is better already\". Respirations have improved and pt is requiring less effort to breathe. 1545: Tx ended. Dialysis stopped 30 min early d/t circuit clotting. No blood loss. Dr. Blair Stokes was made aware. VSS. Each dialysis catheter limb disinfected per p&p, all possible blood returned per p&p, and each dialysis hub disinfected per p&p. Each lumen flushed and caps applied. Bed locked and in the lowest position, call bell and belongings in reach. Dressing changed per P&P. SBAR given to Primary, JAMAAL. Patient is stable at time of their departure. All Dialysis related medications have been reviewed.

## 2022-11-26 NOTE — ED NOTES
Patient here from out of town visiting mother who was in hospital    Dialysis patient  Tues-Thurs-sat schedule    Here Thursday--they would not do the dialysis    Here today--short of breath--needs dialysis

## 2022-11-26 NOTE — Clinical Note
[address]  [facility] EMERGENCY DEPT  [address] [facility]  [place] [id number] [phone]    Work/School Note    Date: 11/26/2022    To Whom It May concern:    José Antonio Schwartz was seen and treated today in the emergency room by the following provider(s):  Attending Provider: Josefina Adams MD.      José Antonio Schwartz is excused from work/school on 11/26/22 and 11/27/22. He is medically clear to return to work/school on 11/28/2022.        Sincerely,          Whit Leonard MD

## 2023-01-01 ENCOUNTER — HOSPITAL ENCOUNTER (EMERGENCY)
Facility: HOSPITAL | Age: 48
End: 2023-11-06
Attending: STUDENT IN AN ORGANIZED HEALTH CARE EDUCATION/TRAINING PROGRAM
Payer: COMMERCIAL

## 2023-01-01 ENCOUNTER — OFFICE VISIT (OUTPATIENT)
Age: 48
End: 2023-01-01
Payer: COMMERCIAL

## 2023-01-01 ENCOUNTER — TELEPHONE (OUTPATIENT)
Age: 48
End: 2023-01-01

## 2023-01-01 VITALS
HEART RATE: 71 BPM | TEMPERATURE: 98.5 F | WEIGHT: 180 LBS | DIASTOLIC BLOOD PRESSURE: 72 MMHG | SYSTOLIC BLOOD PRESSURE: 155 MMHG | RESPIRATION RATE: 18 BRPM | OXYGEN SATURATION: 99 % | BODY MASS INDEX: 24.41 KG/M2

## 2023-01-01 VITALS
RESPIRATION RATE: 16 BRPM | HEART RATE: 37 BPM | SYSTOLIC BLOOD PRESSURE: 64 MMHG | OXYGEN SATURATION: 93 % | DIASTOLIC BLOOD PRESSURE: 37 MMHG

## 2023-01-01 DIAGNOSIS — M54.42 CHRONIC BILATERAL LOW BACK PAIN WITH BILATERAL SCIATICA: ICD-10-CM

## 2023-01-01 DIAGNOSIS — I46.9 CARDIAC ARREST (HCC): Primary | ICD-10-CM

## 2023-01-01 DIAGNOSIS — Z09 HOSPITAL DISCHARGE FOLLOW-UP: ICD-10-CM

## 2023-01-01 DIAGNOSIS — S98.131A AMPUTATED TOE, RIGHT (HCC): ICD-10-CM

## 2023-01-01 DIAGNOSIS — G89.29 CHRONIC BILATERAL LOW BACK PAIN WITH BILATERAL SCIATICA: ICD-10-CM

## 2023-01-01 DIAGNOSIS — S98.131A AMPUTATION OF TOE OF RIGHT FOOT (HCC): ICD-10-CM

## 2023-01-01 DIAGNOSIS — M54.41 CHRONIC BILATERAL LOW BACK PAIN WITH BILATERAL SCIATICA: ICD-10-CM

## 2023-01-01 DIAGNOSIS — G89.4 PAIN SYNDROME, CHRONIC: Primary | ICD-10-CM

## 2023-01-01 LAB
ANION GAP BLD CALC-SCNC: ABNORMAL (ref 10–20)
CA-I BLD-MCNC: >3.6 MMOL/L (ref 1.12–1.32)
CHLORIDE BLD-SCNC: 107 MMOL/L (ref 100–108)
CREAT UR-MCNC: 6.1 MG/DL (ref 0.6–1.3)
GLUCOSE BLD STRIP.AUTO-MCNC: 340 MG/DL (ref 74–106)
LACTATE BLD-SCNC: 5.99 MMOL/L (ref 0.4–2)
PCO2 BLDV: >110 MMHG (ref 41–51)
PH BLDV: 6.79 (ref 7.32–7.42)
PO2 BLDV: <27 MMHG (ref 25–40)
POTASSIUM BLD-SCNC: 6 MMOL/L (ref 3.5–5.5)
SERVICE CMNT-IMP: ABNORMAL
SODIUM BLD-SCNC: 130 MMOL/L (ref 136–145)
SPECIMEN SITE: ABNORMAL

## 2023-01-01 PROCEDURE — 84295 ASSAY OF SERUM SODIUM: CPT

## 2023-01-01 PROCEDURE — 1111F DSCHRG MED/CURRENT MED MERGE: CPT | Performed by: FAMILY MEDICINE

## 2023-01-01 PROCEDURE — 99215 OFFICE O/P EST HI 40 MIN: CPT | Performed by: FAMILY MEDICINE

## 2023-01-01 PROCEDURE — 84132 ASSAY OF SERUM POTASSIUM: CPT

## 2023-01-01 PROCEDURE — 82947 ASSAY GLUCOSE BLOOD QUANT: CPT

## 2023-01-01 PROCEDURE — 99283 EMERGENCY DEPT VISIT LOW MDM: CPT

## 2023-01-01 PROCEDURE — 99496 TRANSJ CARE MGMT HIGH F2F 7D: CPT | Performed by: FAMILY MEDICINE

## 2023-01-01 PROCEDURE — 82803 BLOOD GASES ANY COMBINATION: CPT

## 2023-01-01 PROCEDURE — 82330 ASSAY OF CALCIUM: CPT

## 2023-01-01 RX ORDER — PREGABALIN 75 MG/1
75 CAPSULE ORAL 2 TIMES DAILY
Qty: 60 CAPSULE | Refills: 1 | Status: SHIPPED | OUTPATIENT
Start: 2023-01-01 | End: 2023-11-07

## 2023-01-01 RX ORDER — CARVEDILOL 25 MG/1
25 TABLET ORAL 2 TIMES DAILY
COMMUNITY
Start: 2023-01-01

## 2023-01-01 ASSESSMENT — PATIENT HEALTH QUESTIONNAIRE - PHQ9
SUM OF ALL RESPONSES TO PHQ QUESTIONS 1-9: 0
SUM OF ALL RESPONSES TO PHQ9 QUESTIONS 1 & 2: 0
SUM OF ALL RESPONSES TO PHQ QUESTIONS 1-9: 0
1. LITTLE INTEREST OR PLEASURE IN DOING THINGS: 0
2. FEELING DOWN, DEPRESSED OR HOPELESS: 0

## 2023-01-21 LAB — HBA1C MFR BLD HPLC: 8.6 %

## 2023-04-16 ENCOUNTER — APPOINTMENT (OUTPATIENT)
Dept: ULTRASOUND IMAGING | Age: 48
End: 2023-04-16
Attending: PHYSICIAN ASSISTANT
Payer: MEDICAID

## 2023-04-16 ENCOUNTER — HOSPITAL ENCOUNTER (EMERGENCY)
Age: 48
Discharge: HOME | End: 2023-04-16
Attending: EMERGENCY MEDICINE
Payer: MEDICAID

## 2023-04-16 VITALS
BODY MASS INDEX: 23.74 KG/M2 | HEIGHT: 72 IN | HEART RATE: 90 BPM | TEMPERATURE: 97.5 F | SYSTOLIC BLOOD PRESSURE: 184 MMHG | RESPIRATION RATE: 14 BRPM | DIASTOLIC BLOOD PRESSURE: 112 MMHG | OXYGEN SATURATION: 97 % | WEIGHT: 175.27 LBS

## 2023-04-16 DIAGNOSIS — N64.4 BREAST TENDERNESS IN MALE: Primary | ICD-10-CM

## 2023-04-16 DIAGNOSIS — I10 ESSENTIAL HYPERTENSION: ICD-10-CM

## 2023-04-16 PROCEDURE — 76642 ULTRASOUND BREAST LIMITED: CPT

## 2023-04-16 PROCEDURE — 99284 EMERGENCY DEPT VISIT MOD MDM: CPT

## 2023-04-16 PROCEDURE — 74011250637 HC RX REV CODE- 250/637: Performed by: PHYSICIAN ASSISTANT

## 2023-04-16 RX ORDER — OXYCODONE AND ACETAMINOPHEN 5; 325 MG/1; MG/1
1 TABLET ORAL
Qty: 6 TABLET | Refills: 0 | Status: SHIPPED | OUTPATIENT
Start: 2023-04-16 | End: 2023-04-18

## 2023-04-16 RX ORDER — OXYCODONE HYDROCHLORIDE 5 MG/1
5 TABLET ORAL
Status: COMPLETED | OUTPATIENT
Start: 2023-04-16 | End: 2023-04-16

## 2023-04-16 RX ADMIN — OXYCODONE 5 MG: 5 TABLET ORAL at 19:50

## 2023-04-17 NOTE — ED NOTES
PA / MD reviewed discharge instructions with the patient. The patient verbalized understanding. All questions and concerns were addressed. The patient is discharged via wheelchair with instructions and prescriptions in hand. Pt is alert and oriented x 4. Respirations are clear and unlabored.

## 2023-05-16 ENCOUNTER — OFFICE VISIT (OUTPATIENT)
Age: 48
End: 2023-05-16
Payer: MEDICAID

## 2023-05-16 ENCOUNTER — HOME HEALTH ADMISSION (OUTPATIENT)
Dept: HOME HEALTH SERVICES | Facility: HOME HEALTH | Age: 48
End: 2023-05-16
Payer: MEDICAID

## 2023-05-16 VITALS
WEIGHT: 180.8 LBS | DIASTOLIC BLOOD PRESSURE: 83 MMHG | HEART RATE: 91 BPM | OXYGEN SATURATION: 93 % | TEMPERATURE: 98.7 F | HEIGHT: 72 IN | RESPIRATION RATE: 16 BRPM | BODY MASS INDEX: 24.49 KG/M2 | SYSTOLIC BLOOD PRESSURE: 193 MMHG

## 2023-05-16 DIAGNOSIS — E11.9 DIET-CONTROLLED TYPE 2 DIABETES MELLITUS (HCC): ICD-10-CM

## 2023-05-16 DIAGNOSIS — L97.922 ULCER OF LEFT LOWER EXTREMITY WITH FAT LAYER EXPOSED (HCC): ICD-10-CM

## 2023-05-16 DIAGNOSIS — Z99.2 ESRD (END STAGE RENAL DISEASE) ON DIALYSIS (HCC): ICD-10-CM

## 2023-05-16 DIAGNOSIS — R73.09 ELEVATED GLUCOSE: Primary | ICD-10-CM

## 2023-05-16 DIAGNOSIS — N18.6 ESRD (END STAGE RENAL DISEASE) ON DIALYSIS (HCC): ICD-10-CM

## 2023-05-16 DIAGNOSIS — I63.50 CEREBROVASCULAR ACCIDENT (CVA) DUE TO OCCLUSION OF CEREBRAL ARTERY (HCC): ICD-10-CM

## 2023-05-16 DIAGNOSIS — R60.9 EDEMA, PERIPHERAL: ICD-10-CM

## 2023-05-16 DIAGNOSIS — R73.09 ELEVATED GLUCOSE: ICD-10-CM

## 2023-05-16 LAB — HBA1C MFR BLD: 6.6 %

## 2023-05-16 PROCEDURE — 3074F SYST BP LT 130 MM HG: CPT | Performed by: FAMILY MEDICINE

## 2023-05-16 PROCEDURE — 99204 OFFICE O/P NEW MOD 45 MIN: CPT | Performed by: FAMILY MEDICINE

## 2023-05-16 PROCEDURE — 3078F DIAST BP <80 MM HG: CPT | Performed by: FAMILY MEDICINE

## 2023-05-16 PROCEDURE — PBSHW AMB POC HEMOGLOBIN A1C: Performed by: FAMILY MEDICINE

## 2023-05-16 PROCEDURE — 83036 HEMOGLOBIN GLYCOSYLATED A1C: CPT | Performed by: FAMILY MEDICINE

## 2023-05-16 RX ORDER — ATORVASTATIN CALCIUM 40 MG/1
40 TABLET, FILM COATED ORAL DAILY
Qty: 30 TABLET | Refills: 5 | Status: SHIPPED | OUTPATIENT
Start: 2023-05-16

## 2023-05-16 RX ORDER — AMLODIPINE BESYLATE 10 MG/1
10 TABLET ORAL DAILY
COMMUNITY
Start: 2023-01-30

## 2023-05-16 RX ORDER — FERROUS SULFATE 325(65) MG
325 TABLET ORAL
Qty: 30 TABLET | Refills: 3 | Status: SHIPPED | OUTPATIENT
Start: 2023-05-16

## 2023-05-16 RX ORDER — HYDRALAZINE HYDROCHLORIDE 100 MG/1
100 TABLET, FILM COATED ORAL 3 TIMES DAILY
Qty: 90 TABLET | Refills: 0 | Status: SHIPPED | OUTPATIENT
Start: 2023-05-16

## 2023-05-16 RX ORDER — NIFEDIPINE 90 MG/1
90 TABLET, EXTENDED RELEASE ORAL DAILY
Qty: 30 TABLET | Refills: 3 | Status: SHIPPED | OUTPATIENT
Start: 2023-05-16

## 2023-05-16 RX ORDER — DIVALPROEX SODIUM 125 MG/1
500 CAPSULE, COATED PELLETS ORAL 2 TIMES DAILY
COMMUNITY
Start: 2023-01-30

## 2023-05-16 RX ORDER — ATENOLOL 25 MG/1
25 TABLET ORAL DAILY
COMMUNITY
Start: 2023-02-04

## 2023-05-16 RX ORDER — BLOOD-GLUCOSE METER
1 KIT MISCELLANEOUS DAILY
Qty: 1 KIT | Refills: 0 | Status: SHIPPED | OUTPATIENT
Start: 2023-05-16

## 2023-05-16 RX ORDER — ASPIRIN 81 MG/1
81 TABLET ORAL DAILY
Qty: 30 TABLET | Refills: 3 | Status: SHIPPED | OUTPATIENT
Start: 2023-05-16

## 2023-05-16 SDOH — SOCIAL STABILITY: SOCIAL NETWORK
IN A TYPICAL WEEK, HOW MANY TIMES DO YOU TALK ON THE PHONE WITH FAMILY, FRIENDS, OR NEIGHBORS?: MORE THAN THREE TIMES A WEEK

## 2023-05-16 SDOH — HEALTH STABILITY: PHYSICAL HEALTH: ON AVERAGE, HOW MANY DAYS PER WEEK DO YOU ENGAGE IN MODERATE TO STRENUOUS EXERCISE (LIKE A BRISK WALK)?: 0 DAYS

## 2023-05-16 SDOH — HEALTH STABILITY: MENTAL HEALTH: HOW MANY STANDARD DRINKS CONTAINING ALCOHOL DO YOU HAVE ON A TYPICAL DAY?: PATIENT DOES NOT DRINK

## 2023-05-16 SDOH — SOCIAL STABILITY: SOCIAL INSECURITY: WITHIN THE LAST YEAR, HAVE YOU BEEN HUMILIATED OR EMOTIONALLY ABUSED IN OTHER WAYS BY YOUR PARTNER OR EX-PARTNER?: NO

## 2023-05-16 SDOH — ECONOMIC STABILITY: HOUSING INSECURITY: IN THE LAST 12 MONTHS, HOW MANY PLACES HAVE YOU LIVED?: 2

## 2023-05-16 SDOH — ECONOMIC STABILITY: HOUSING INSECURITY
IN THE LAST 12 MONTHS, WAS THERE A TIME WHEN YOU DID NOT HAVE A STEADY PLACE TO SLEEP OR SLEPT IN A SHELTER (INCLUDING NOW)?: NO

## 2023-05-16 SDOH — SOCIAL STABILITY: SOCIAL NETWORK: HOW OFTEN DO YOU ATTEND CHURCH OR RELIGIOUS SERVICES?: NEVER

## 2023-05-16 SDOH — ECONOMIC STABILITY: FOOD INSECURITY: WITHIN THE PAST 12 MONTHS, THE FOOD YOU BOUGHT JUST DIDN'T LAST AND YOU DIDN'T HAVE MONEY TO GET MORE.: NEVER TRUE

## 2023-05-16 SDOH — SOCIAL STABILITY: SOCIAL NETWORK: HOW OFTEN DO YOU ATTENT MEETINGS OF THE CLUB OR ORGANIZATION YOU BELONG TO?: NEVER

## 2023-05-16 SDOH — SOCIAL STABILITY: SOCIAL NETWORK: ARE YOU MARRIED, WIDOWED, DIVORCED, SEPARATED, NEVER MARRIED, OR LIVING WITH A PARTNER?: NEVER MARRIED

## 2023-05-16 SDOH — ECONOMIC STABILITY: INCOME INSECURITY: IN THE LAST 12 MONTHS, WAS THERE A TIME WHEN YOU WERE NOT ABLE TO PAY THE MORTGAGE OR RENT ON TIME?: NO

## 2023-05-16 SDOH — SOCIAL STABILITY: SOCIAL NETWORK: HOW OFTEN DO YOU GET TOGETHER WITH FRIENDS OR RELATIVES?: ONCE A WEEK

## 2023-05-16 SDOH — ECONOMIC STABILITY: FOOD INSECURITY: WITHIN THE PAST 12 MONTHS, YOU WORRIED THAT YOUR FOOD WOULD RUN OUT BEFORE YOU GOT MONEY TO BUY MORE.: NEVER TRUE

## 2023-05-16 SDOH — HEALTH STABILITY: MENTAL HEALTH
STRESS IS WHEN SOMEONE FEELS TENSE, NERVOUS, ANXIOUS, OR CAN'T SLEEP AT NIGHT BECAUSE THEIR MIND IS TROUBLED. HOW STRESSED ARE YOU?: RATHER MUCH

## 2023-05-16 SDOH — SOCIAL STABILITY: SOCIAL NETWORK
DO YOU BELONG TO ANY CLUBS OR ORGANIZATIONS SUCH AS CHURCH GROUPS UNIONS, FRATERNAL OR ATHLETIC GROUPS, OR SCHOOL GROUPS?: NO

## 2023-05-16 SDOH — ECONOMIC STABILITY: TRANSPORTATION INSECURITY
IN THE PAST 12 MONTHS, HAS THE LACK OF TRANSPORTATION KEPT YOU FROM MEDICAL APPOINTMENTS OR FROM GETTING MEDICATIONS?: NO

## 2023-05-16 SDOH — SOCIAL STABILITY: SOCIAL INSECURITY: WITHIN THE LAST YEAR, HAVE YOU BEEN AFRAID OF YOUR PARTNER OR EX-PARTNER?: NO

## 2023-05-16 SDOH — ECONOMIC STABILITY: TRANSPORTATION INSECURITY
IN THE PAST 12 MONTHS, HAS LACK OF TRANSPORTATION KEPT YOU FROM MEETINGS, WORK, OR FROM GETTING THINGS NEEDED FOR DAILY LIVING?: NO

## 2023-05-16 SDOH — HEALTH STABILITY: MENTAL HEALTH: HOW OFTEN DO YOU HAVE A DRINK CONTAINING ALCOHOL?: NEVER

## 2023-05-16 SDOH — SOCIAL STABILITY: SOCIAL INSECURITY
WITHIN THE LAST YEAR, HAVE TO BEEN RAPED OR FORCED TO HAVE ANY KIND OF SEXUAL ACTIVITY BY YOUR PARTNER OR EX-PARTNER?: NO

## 2023-05-16 SDOH — SOCIAL STABILITY: SOCIAL INSECURITY
WITHIN THE LAST YEAR, HAVE YOU BEEN KICKED, HIT, SLAPPED, OR OTHERWISE PHYSICALLY HURT BY YOUR PARTNER OR EX-PARTNER?: NO

## 2023-05-16 SDOH — HEALTH STABILITY: PHYSICAL HEALTH: ON AVERAGE, HOW MANY MINUTES DO YOU ENGAGE IN EXERCISE AT THIS LEVEL?: 0 MIN

## 2023-05-16 SDOH — ECONOMIC STABILITY: INCOME INSECURITY: HOW HARD IS IT FOR YOU TO PAY FOR THE VERY BASICS LIKE FOOD, HOUSING, MEDICAL CARE, AND HEATING?: NOT VERY HARD

## 2023-05-16 ASSESSMENT — PATIENT HEALTH QUESTIONNAIRE - PHQ9
SUM OF ALL RESPONSES TO PHQ QUESTIONS 1-9: 2
SUM OF ALL RESPONSES TO PHQ9 QUESTIONS 1 & 2: 2
2. FEELING DOWN, DEPRESSED OR HOPELESS: 1
1. LITTLE INTEREST OR PLEASURE IN DOING THINGS: 1
SUM OF ALL RESPONSES TO PHQ QUESTIONS 1-9: 2

## 2023-05-16 NOTE — PROGRESS NOTES
Amandeep Agarwal (:  1975) is a 49 y.o. male, New patient, here for evaluation of the following chief complaint(s):  New Patient (Here to establish care,  no previous pcp,  mother present,  c/o chronic pain and lump in left breast x 1-2 months, getting larger  painful to touch, no discharge, )    Patient present as a new patient with significant comorbid medical history in troponin cerebrovascular accident congestive heart failure end-stage renal disease  Controlled DMtype II, on HD x3 +cigs, with b/l foot apmpute, no prosthetic\, toes amputation which was done in , significant peripheral edema up to the groin bilaterally wheelchair dependent presented with the mother and at the very debilitated state, stating that he has not taken any of his medication over the last 5 months requesting refill for all his  medications, including insulin today A1c was checked which was at the target range, but patient insisting to have his insulin to be refilled to be taken on a daily basis significant to note that the patient has not take any diabetic medication over the last 5 months with hemoglobin A1c of 6.6% today     With multiple wounds on the lower extremity mostly stage II, has had no wound care over the last 5 months he recently moved from [address] [place] [address] to [place], stating that he is unable to do any activity of daily living feeling weak and heavy extremities or swelled up 3+ pitting edema all the way up today's groin bilaterally with leg ulceration could be due to diabetic ulcer or venous stasis unable to do in the instrumental of his daily living requesting help with at his house stating that he has had homemade from [facility] while he was resident in [address] [place] [address] requesting the same kind of 8 to be provided in the state [facility] for him otherwise: Stable and has been compliant with his dialysis 3 times daily seeing a nephrologist on regular basis, also present for Bp check , with blood pressure

## 2023-05-16 NOTE — PROGRESS NOTES
Chief Complaint   Patient presents with    New Patient     Here to establish care,  no previous pcp,  mother present,  c/o chronic pain and lump in left breast x 1-2 months, getting larger  painful to touch, no discharge,        1. \"Have you been to the ER, urgent care clinic since your last visit? Hospitalized since your last visit? \" Yes  [facility]OS[facility],  2023- chest pain    2. \"Have you seen or consulted any other health care providers outside of the [address] [place] [address] since your last visit? \" Yes- dialysis on M, W, F     3. For patients aged 39-70: Has the patient had a colonoscopy / FIT/ Cologuard? No      If the patient is female:    4. For patients aged 41-77: Has the patient had a mammogram within the past 2 years? Not based on sex      5. For patients aged 21-65: Has the patient had a pap smear?  Not based on sex      Health Maintenance Due   Topic Date Due    COVID-19 Vaccine (1) Never done    Pneumococcal 0-64 years Vaccine (1 - PCV) Never done    Lipids  Never done    Depression Screen  Never done    HIV screen  Never done    Hepatitis C screen  Never done    DTaP/Tdap/Td vaccine (1 - Tdap) Never done    Shingles vaccine (1 of 2) Never done    Hepatitis B vaccine (1 of 3 - Risk Dialysis 4-dose series) Never done    Colorectal Cancer Screen  Never done      Identified pt with two pt identifiers(name and ) , Patient is accompanied by mother  , verbal consent received  to discuss any/all medical information

## 2023-05-18 ENCOUNTER — HOME CARE VISIT (OUTPATIENT)
Facility: HOME HEALTH | Age: 48
End: 2023-05-18
Payer: MEDICAID

## 2023-05-18 DIAGNOSIS — R73.09 ELEVATED GLUCOSE: ICD-10-CM

## 2023-05-18 DIAGNOSIS — I63.50 CEREBROVASCULAR ACCIDENT (CVA) DUE TO OCCLUSION OF CEREBRAL ARTERY (HCC): ICD-10-CM

## 2023-05-18 DIAGNOSIS — R60.9 EDEMA, PERIPHERAL: ICD-10-CM

## 2023-05-18 DIAGNOSIS — E11.9 DIET-CONTROLLED TYPE 2 DIABETES MELLITUS (HCC): ICD-10-CM

## 2023-05-18 DIAGNOSIS — L97.922 ULCER OF LEFT LOWER EXTREMITY WITH FAT LAYER EXPOSED (HCC): ICD-10-CM

## 2023-05-18 PROCEDURE — G0299 HHS/HOSPICE OF RN EA 15 MIN: HCPCS

## 2023-05-18 ASSESSMENT — ENCOUNTER SYMPTOMS: HEMOPTYSIS: 0

## 2023-05-19 ENCOUNTER — TELEPHONE (OUTPATIENT)
Age: 48
End: 2023-05-19

## 2023-05-19 LAB
BACTERIA SPEC CULT: ABNORMAL
SERVICE CMNT-IMP: ABNORMAL

## 2023-05-20 ENCOUNTER — HOME CARE VISIT (OUTPATIENT)
Facility: HOME HEALTH | Age: 48
End: 2023-05-20
Payer: MEDICAID

## 2023-05-22 VITALS
OXYGEN SATURATION: 96 % | DIASTOLIC BLOOD PRESSURE: 76 MMHG | RESPIRATION RATE: 16 BRPM | WEIGHT: 180.78 LBS | BODY MASS INDEX: 24.49 KG/M2 | HEIGHT: 72 IN | HEART RATE: 88 BPM | TEMPERATURE: 97.7 F | SYSTOLIC BLOOD PRESSURE: 162 MMHG

## 2023-05-22 ASSESSMENT — ENCOUNTER SYMPTOMS
DYSPNEA ACTIVITY LEVEL: AFTER AMBULATING LESS THAN 10 FT
SKIN LESIONS: 1

## 2023-05-23 ENCOUNTER — TELEPHONE (OUTPATIENT)
Age: 48
End: 2023-05-23

## 2023-05-23 NOTE — TELEPHONE ENCOUNTER
Call placed to [facility] spine, spoke with Clementine Said,  stated pt has not called nor scheduled an appointment. No referral needed at this time. Call placed to pt, left voice message.

## 2023-05-23 NOTE — TELEPHONE ENCOUNTER
----- Message from Candice Pamella sent at 5/23/2023  1:35 PM EDT -----  Subject: Message to Provider    QUESTIONS  Information for Provider? Pt was seen on 5/16/23 and he states that he was   supposed to have been given an antibiotic, but he never received it. Please send to [facility] on [facility].  ---------------------------------------------------------------------------  --------------  Karlynn Lanes INFO  [phone]; OK to leave message on voicemail  ---------------------------------------------------------------------------  --------------  SCRIPT ANSWERS  Relationship to Patient? Parent  Representative Name? Charis Norman  Patient is under 25 and the Parent has custody? No  Is the representative on the Communication Release of Information (LAZARO)   form in Epic?  Yes

## 2023-05-23 NOTE — TELEPHONE ENCOUNTER
----- Message from Leigh Jacobsen sent at 5/23/2023  1:38 PM EDT -----  Subject: Referral Request    Reason for referral request? Pain management  Provider patient wants to be referred to(if known):     Provider Phone Number(if known):[phone]    Additional Information for Provider? Address for pain management clinic is   [address] [place] [address]. [place], [address] [place] [address].  Please call pt when referral   is complete.  ---------------------------------------------------------------------------  --------------  Kevin Tay INFO    [phone]; OK to leave message on voicemail  ---------------------------------------------------------------------------  --------------

## 2023-05-25 ENCOUNTER — HOME CARE VISIT (OUTPATIENT)
Facility: HOME HEALTH | Age: 48
End: 2023-05-25
Payer: MEDICAID

## 2023-05-25 PROCEDURE — G0300 HHS/HOSPICE OF LPN EA 15 MIN: HCPCS

## 2023-05-25 RX ORDER — DOXYCYCLINE HYCLATE 100 MG
100 TABLET ORAL 2 TIMES DAILY
Qty: 20 TABLET | Refills: 0 | Status: SHIPPED | OUTPATIENT
Start: 2023-05-25 | End: 2023-06-04

## 2023-05-29 VITALS
OXYGEN SATURATION: 97 % | DIASTOLIC BLOOD PRESSURE: 88 MMHG | RESPIRATION RATE: 16 BRPM | TEMPERATURE: 98.3 F | HEART RATE: 82 BPM | SYSTOLIC BLOOD PRESSURE: 142 MMHG

## 2023-05-29 NOTE — HOME HEALTH
Subjective: \" I went to the eye doctor this morning, they said my eye site in my right eye is gone\"  Falls since last visit No(if yes complete the Fall Tracking Form and include bsrifallreport):   Caregiver involvement changes: none  Home health supplies by type and quantity ordered/delivered this visit include: none    Clinician asked if patient has had any physician contact since last home care visit and patient states: NO  Clinician asked if patient has any new or changed medications and patient states:  N/A   If Yes, were medications reconciled? N/A   Was the certifying physician notified of changes in medications? N/A     Clinical assessment (what this visit means for the patient overall and need for ongoing skilled care) and progress or lack of progress towards SPECIFIC goals: Patient remains at risk/hospitalization due to infection due to wound. Continued need of wound care. Written Teaching Material Utilized: N/A    Interdisciplinary communication with: N/A     Discharge planning as follows:  When wound is 100% healed    Specific plan for next visit: Educate on DM disease process

## 2023-06-08 ENCOUNTER — HOME CARE VISIT (OUTPATIENT)
Facility: HOME HEALTH | Age: 48
End: 2023-06-08
Payer: MEDICAID

## 2023-06-08 ENCOUNTER — TELEPHONE (OUTPATIENT)
Age: 48
End: 2023-06-08

## 2023-06-08 VITALS
DIASTOLIC BLOOD PRESSURE: 82 MMHG | RESPIRATION RATE: 20 BRPM | OXYGEN SATURATION: 96 % | SYSTOLIC BLOOD PRESSURE: 160 MMHG | TEMPERATURE: 98.5 F | HEART RATE: 92 BPM
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
## 2023-06-08 PROCEDURE — G0299 HHS/HOSPICE OF RN EA 15 MIN: HCPCS

## 2023-06-08 ASSESSMENT — ENCOUNTER SYMPTOMS: PAIN LOCATION - PAIN QUALITY: ACHE

## 2023-06-08 NOTE — TELEPHONE ENCOUNTER
Patient mother would like for Donnie Tobias to give her son a referral to see a psychiatrist she can be reached @ [phone]

## 2023-06-08 NOTE — TELEPHONE ENCOUNTER
Abisai ( nurse ) with [facility] would like for Roya Okeefe to know patient had a fall on 6/6 hit his head no injuries she can be reached @ [phone]

## 2023-06-09 NOTE — TELEPHONE ENCOUNTER
Omid placed to Karli Todd,  advised per Dr Elaina Rasmussen that pt needs ct head, she stated understanding. Order placed for head ct  from provider Dr Elaina Rasmussen, verified by Verbal Order Read Back with provider. Call placed to pt,  advised per Dr Elaina Rasmussen to go to Er for fall. Hit head on sidewalk,  pt refused. Stated he \"don't feel like doing all of that\".

## 2023-06-12 ENCOUNTER — TELEPHONE (OUTPATIENT)
Age: 48
End: 2023-06-12

## 2023-06-12 NOTE — TELEPHONE ENCOUNTER
Returned call to pt, spoke with mother,  requesting order for wheelchair , informed pt will need an appt,  appt scheduled for 7/6/23

## 2023-06-12 NOTE — TELEPHONE ENCOUNTER
Patient mother Uli Asencio states that her son need an order for a wheelchair please give her a call @ [phone]

## 2023-06-22 ENCOUNTER — APPOINTMENT (OUTPATIENT)
Facility: HOSPITAL | Age: 48
DRG: 314 | End: 2023-06-22
Payer: COMMERCIAL

## 2023-06-22 ENCOUNTER — HOME CARE VISIT (OUTPATIENT)
Dept: HOME HEALTH SERVICES | Facility: HOME HEALTH | Age: 48
End: 2023-06-22

## 2023-06-22 ENCOUNTER — HOSPITAL ENCOUNTER (INPATIENT)
Facility: HOSPITAL | Age: 48
LOS: 9 days | Discharge: HOME | DRG: 314 | End: 2023-07-01
Attending: EMERGENCY MEDICINE | Admitting: HOSPITALIST
Payer: COMMERCIAL

## 2023-06-22 DIAGNOSIS — N18.6 END STAGE RENAL DISEASE (HCC): ICD-10-CM

## 2023-06-22 DIAGNOSIS — E87.70 HYPERVOLEMIA, UNSPECIFIED HYPERVOLEMIA TYPE: ICD-10-CM

## 2023-06-22 DIAGNOSIS — E13.621 DIABETIC ULCER OF RIGHT MIDFOOT ASSOCIATED WITH DIABETES MELLITUS OF OTHER TYPE, WITH FAT LAYER EXPOSED (HCC): Primary | ICD-10-CM

## 2023-06-22 DIAGNOSIS — M86.671 CHRONIC OSTEOMYELITIS OF RIGHT FOOT (HCC): ICD-10-CM

## 2023-06-22 DIAGNOSIS — M79.605 LEG PAIN, BILATERAL: ICD-10-CM

## 2023-06-22 DIAGNOSIS — M79.604 LEG PAIN, BILATERAL: ICD-10-CM

## 2023-06-22 DIAGNOSIS — L97.412 DIABETIC ULCER OF RIGHT MIDFOOT ASSOCIATED WITH DIABETES MELLITUS OF OTHER TYPE, WITH FAT LAYER EXPOSED (HCC): Primary | ICD-10-CM

## 2023-06-22 LAB
ANION GAP SERPL CALC-SCNC: 8 MMOL/L (ref 5–15)
BASOPHILS # BLD: 0 K/UL (ref 0–0.1)
BASOPHILS NFR BLD: 0 % (ref 0–1)
BUN SERPL-MCNC: 34 MG/DL (ref 6–20)
BUN/CREAT SERPL: 6 (ref 12–20)
CALCIUM SERPL-MCNC: 7.9 MG/DL (ref 8.5–10.1)
CHLORIDE SERPL-SCNC: 98 MMOL/L (ref 97–108)
CO2 SERPL-SCNC: 25 MMOL/L (ref 21–32)
CREAT SERPL-MCNC: 5.57 MG/DL (ref 0.7–1.3)
DIFFERENTIAL METHOD BLD: ABNORMAL
EOSINOPHIL # BLD: 0.2 K/UL (ref 0–0.4)
EOSINOPHIL NFR BLD: 2 % (ref 0–7)
ERYTHROCYTE [DISTWIDTH] IN BLOOD BY AUTOMATED COUNT: 16.6 % (ref 11.5–14.5)
GLUCOSE BLD STRIP.AUTO-MCNC: 325 MG/DL (ref 65–117)
GLUCOSE BLD STRIP.AUTO-MCNC: 352 MG/DL (ref 65–117)
GLUCOSE BLD STRIP.AUTO-MCNC: 364 MG/DL (ref 65–117)
GLUCOSE SERPL-MCNC: 239 MG/DL (ref 65–100)
HCT VFR BLD AUTO: 32.3 % (ref 36.6–50.3)
HGB BLD-MCNC: 10.1 G/DL (ref 12.1–17)
IMM GRANULOCYTES # BLD AUTO: 0.1 K/UL (ref 0–0.04)
IMM GRANULOCYTES NFR BLD AUTO: 1 % (ref 0–0.5)
LACTATE BLD-SCNC: 0.51 MMOL/L (ref 0.4–2)
LYMPHOCYTES # BLD: 1.4 K/UL (ref 0.8–3.5)
LYMPHOCYTES NFR BLD: 14 % (ref 12–49)
MCH RBC QN AUTO: 27.7 PG (ref 26–34)
MCHC RBC AUTO-ENTMCNC: 31.3 G/DL (ref 30–36.5)
MCV RBC AUTO: 88.5 FL (ref 80–99)
MONOCYTES # BLD: 1 K/UL (ref 0–1)
MONOCYTES NFR BLD: 11 % (ref 5–13)
NEUTS SEG # BLD: 7.2 K/UL (ref 1.8–8)
NEUTS SEG NFR BLD: 72 % (ref 32–75)
NRBC # BLD: 0 K/UL (ref 0–0.01)
NRBC BLD-RTO: 0 PER 100 WBC
PLATELET # BLD AUTO: 339 K/UL (ref 150–400)
PMV BLD AUTO: 8.2 FL (ref 8.9–12.9)
POTASSIUM SERPL-SCNC: 3.7 MMOL/L (ref 3.5–5.1)
RBC # BLD AUTO: 3.65 M/UL (ref 4.1–5.7)
SERVICE CMNT-IMP: ABNORMAL
SODIUM SERPL-SCNC: 131 MMOL/L (ref 136–145)
WBC # BLD AUTO: 9.9 K/UL (ref 4.1–11.1)

## 2023-06-22 PROCEDURE — 73620 X-RAY EXAM OF FOOT: CPT

## 2023-06-22 PROCEDURE — 80048 BASIC METABOLIC PNL TOTAL CA: CPT

## 2023-06-22 PROCEDURE — 6370000000 HC RX 637 (ALT 250 FOR IP): Performed by: HOSPITALIST

## 2023-06-22 PROCEDURE — 73700 CT LOWER EXTREMITY W/O DYE: CPT

## 2023-06-22 PROCEDURE — 6360000002 HC RX W HCPCS: Performed by: HOSPITALIST

## 2023-06-22 PROCEDURE — 99285 EMERGENCY DEPT VISIT HI MDM: CPT

## 2023-06-22 PROCEDURE — 2580000003 HC RX 258: Performed by: EMERGENCY MEDICINE

## 2023-06-22 PROCEDURE — 82962 GLUCOSE BLOOD TEST: CPT

## 2023-06-22 PROCEDURE — 83605 ASSAY OF LACTIC ACID: CPT

## 2023-06-22 PROCEDURE — 71045 X-RAY EXAM CHEST 1 VIEW: CPT

## 2023-06-22 PROCEDURE — 85025 COMPLETE CBC W/AUTO DIFF WBC: CPT

## 2023-06-22 PROCEDURE — 1100000000 HC RM PRIVATE

## 2023-06-22 PROCEDURE — 87040 BLOOD CULTURE FOR BACTERIA: CPT

## 2023-06-22 PROCEDURE — 96374 THER/PROPH/DIAG INJ IV PUSH: CPT

## 2023-06-22 PROCEDURE — 6370000000 HC RX 637 (ALT 250 FOR IP): Performed by: STUDENT IN AN ORGANIZED HEALTH CARE EDUCATION/TRAINING PROGRAM

## 2023-06-22 PROCEDURE — 6360000002 HC RX W HCPCS: Performed by: EMERGENCY MEDICINE

## 2023-06-22 PROCEDURE — 36415 COLL VENOUS BLD VENIPUNCTURE: CPT

## 2023-06-22 RX ORDER — HYDROXYZINE HYDROCHLORIDE 10 MG/1
10 TABLET, FILM COATED ORAL 3 TIMES DAILY PRN
Status: DISCONTINUED | OUTPATIENT
Start: 2023-06-22 | End: 2023-07-01 | Stop reason: HOSPADM

## 2023-06-22 RX ORDER — INSULIN LISPRO 100 [IU]/ML
0-4 INJECTION, SOLUTION INTRAVENOUS; SUBCUTANEOUS
Status: DISCONTINUED | OUTPATIENT
Start: 2023-06-22 | End: 2023-07-01 | Stop reason: HOSPADM

## 2023-06-22 RX ORDER — ONDANSETRON 4 MG/1
4 TABLET, ORALLY DISINTEGRATING ORAL EVERY 8 HOURS PRN
Status: DISCONTINUED | OUTPATIENT
Start: 2023-06-22 | End: 2023-07-01 | Stop reason: HOSPADM

## 2023-06-22 RX ORDER — FLUDROCORTISONE ACETATE 0.1 MG/1
100 TABLET ORAL DAILY
Status: DISCONTINUED | OUTPATIENT
Start: 2023-06-22 | End: 2023-07-01 | Stop reason: HOSPADM

## 2023-06-22 RX ORDER — FENTANYL CITRATE 50 UG/ML
50 INJECTION, SOLUTION INTRAMUSCULAR; INTRAVENOUS
Status: COMPLETED | OUTPATIENT
Start: 2023-06-22 | End: 2023-06-22

## 2023-06-22 RX ORDER — ATORVASTATIN CALCIUM 40 MG/1
40 TABLET, FILM COATED ORAL DAILY
Status: DISCONTINUED | OUTPATIENT
Start: 2023-06-22 | End: 2023-07-01 | Stop reason: HOSPADM

## 2023-06-22 RX ORDER — LANOLIN ALCOHOL/MO/W.PET/CERES
3 CREAM (GRAM) TOPICAL NIGHTLY PRN
Status: DISCONTINUED | OUTPATIENT
Start: 2023-06-22 | End: 2023-07-01 | Stop reason: HOSPADM

## 2023-06-22 RX ORDER — OXYCODONE HYDROCHLORIDE AND ACETAMINOPHEN 5; 325 MG/1; MG/1
1 TABLET ORAL EVERY 6 HOURS PRN
Status: DISCONTINUED | OUTPATIENT
Start: 2023-06-22 | End: 2023-06-23

## 2023-06-22 RX ORDER — INSULIN LISPRO 100 [IU]/ML
0-4 INJECTION, SOLUTION INTRAVENOUS; SUBCUTANEOUS NIGHTLY
Status: DISCONTINUED | OUTPATIENT
Start: 2023-06-22 | End: 2023-07-01 | Stop reason: HOSPADM

## 2023-06-22 RX ORDER — HYDRALAZINE HYDROCHLORIDE 20 MG/ML
5 INJECTION INTRAMUSCULAR; INTRAVENOUS EVERY 6 HOURS PRN
Status: DISCONTINUED | OUTPATIENT
Start: 2023-06-22 | End: 2023-07-01 | Stop reason: HOSPADM

## 2023-06-22 RX ORDER — ALUMINA, MAGNESIA, AND SIMETHICONE 2400; 2400; 240 MG/30ML; MG/30ML; MG/30ML
15 SUSPENSION ORAL EVERY 6 HOURS PRN
Status: DISCONTINUED | OUTPATIENT
Start: 2023-06-22 | End: 2023-07-01 | Stop reason: HOSPADM

## 2023-06-22 RX ORDER — ACETAMINOPHEN 325 MG/1
650 TABLET ORAL EVERY 4 HOURS PRN
Status: DISCONTINUED | OUTPATIENT
Start: 2023-06-22 | End: 2023-07-01 | Stop reason: HOSPADM

## 2023-06-22 RX ORDER — ACETAMINOPHEN 650 MG/1
650 SUPPOSITORY RECTAL EVERY 4 HOURS PRN
Status: DISCONTINUED | OUTPATIENT
Start: 2023-06-22 | End: 2023-07-01 | Stop reason: HOSPADM

## 2023-06-22 RX ORDER — INSULIN GLARGINE 100 [IU]/ML
15 INJECTION, SOLUTION SUBCUTANEOUS NIGHTLY
Status: DISCONTINUED | OUTPATIENT
Start: 2023-06-22 | End: 2023-06-26

## 2023-06-22 RX ORDER — ONDANSETRON 2 MG/ML
4 INJECTION INTRAMUSCULAR; INTRAVENOUS EVERY 6 HOURS PRN
Status: DISCONTINUED | OUTPATIENT
Start: 2023-06-22 | End: 2023-07-01 | Stop reason: HOSPADM

## 2023-06-22 RX ORDER — ASPIRIN 81 MG/1
81 TABLET ORAL DAILY
Status: DISCONTINUED | OUTPATIENT
Start: 2023-06-22 | End: 2023-07-01 | Stop reason: HOSPADM

## 2023-06-22 RX ORDER — AMLODIPINE BESYLATE 5 MG/1
10 TABLET ORAL DAILY
Status: DISCONTINUED | OUTPATIENT
Start: 2023-06-22 | End: 2023-06-23

## 2023-06-22 RX ORDER — FUROSEMIDE 10 MG/ML
60 INJECTION INTRAMUSCULAR; INTRAVENOUS ONCE
Status: DISCONTINUED | OUTPATIENT
Start: 2023-06-22 | End: 2023-07-01 | Stop reason: HOSPADM

## 2023-06-22 RX ORDER — ARIPIPRAZOLE 5 MG/1
10 TABLET ORAL DAILY
Status: DISCONTINUED | OUTPATIENT
Start: 2023-06-22 | End: 2023-07-01 | Stop reason: HOSPADM

## 2023-06-22 RX ORDER — OXYCODONE HYDROCHLORIDE AND ACETAMINOPHEN 5; 325 MG/1; MG/1
1 TABLET ORAL EVERY 6 HOURS PRN
Status: DISCONTINUED | OUTPATIENT
Start: 2023-06-22 | End: 2023-06-22

## 2023-06-22 RX ORDER — HYDROMORPHONE HYDROCHLORIDE 1 MG/ML
1 SOLUTION ORAL EVERY 4 HOURS PRN
Status: DISCONTINUED | OUTPATIENT
Start: 2023-06-22 | End: 2023-06-23

## 2023-06-22 RX ORDER — ATENOLOL 25 MG/1
25 TABLET ORAL DAILY
Status: DISCONTINUED | OUTPATIENT
Start: 2023-06-22 | End: 2023-07-01 | Stop reason: HOSPADM

## 2023-06-22 RX ORDER — DIVALPROEX SODIUM 125 MG/1
500 CAPSULE, COATED PELLETS ORAL 2 TIMES DAILY
Status: DISCONTINUED | OUTPATIENT
Start: 2023-06-22 | End: 2023-07-01 | Stop reason: HOSPADM

## 2023-06-22 RX ORDER — FERROUS SULFATE 325(65) MG
325 TABLET ORAL
Status: DISCONTINUED | OUTPATIENT
Start: 2023-06-23 | End: 2023-07-01 | Stop reason: HOSPADM

## 2023-06-22 RX ADMIN — HYDRALAZINE HYDROCHLORIDE 5 MG: 20 INJECTION INTRAMUSCULAR; INTRAVENOUS at 18:18

## 2023-06-22 RX ADMIN — OXYCODONE HYDROCHLORIDE AND ACETAMINOPHEN 1 TABLET: 5; 325 TABLET ORAL at 18:18

## 2023-06-22 RX ADMIN — VANCOMYCIN HYDROCHLORIDE 2000 MG: 10 INJECTION, POWDER, LYOPHILIZED, FOR SOLUTION INTRAVENOUS at 17:10

## 2023-06-22 RX ADMIN — DIVALPROEX SODIUM 500 MG: 125 CAPSULE, COATED PELLETS ORAL at 21:10

## 2023-06-22 RX ADMIN — HYDRALAZINE HYDROCHLORIDE 5 MG: 20 INJECTION INTRAMUSCULAR; INTRAVENOUS at 21:10

## 2023-06-22 RX ADMIN — Medication 1 MG: at 21:06

## 2023-06-22 RX ADMIN — MELATONIN 3 MG: at 21:09

## 2023-06-22 RX ADMIN — FENTANYL CITRATE 50 MCG: 50 INJECTION, SOLUTION INTRAMUSCULAR; INTRAVENOUS at 14:42

## 2023-06-22 RX ADMIN — INSULIN GLARGINE 15 UNITS: 100 INJECTION, SOLUTION SUBCUTANEOUS at 21:29

## 2023-06-22 RX ADMIN — PIPERACILLIN AND TAZOBACTAM 4500 MG: 4; .5 INJECTION, POWDER, FOR SOLUTION INTRAVENOUS at 16:26

## 2023-06-22 ASSESSMENT — PAIN DESCRIPTION - FREQUENCY: FREQUENCY: CONTINUOUS

## 2023-06-22 ASSESSMENT — PAIN DESCRIPTION - DESCRIPTORS: DESCRIPTORS: ACHING

## 2023-06-22 ASSESSMENT — PAIN DESCRIPTION - ORIENTATION
ORIENTATION: RIGHT
ORIENTATION: RIGHT

## 2023-06-22 ASSESSMENT — PAIN DESCRIPTION - DIRECTION: RADIATING_TOWARDS: LEG

## 2023-06-22 ASSESSMENT — PAIN DESCRIPTION - LOCATION
LOCATION: FOOT;THROAT
LOCATION: FOOT

## 2023-06-22 ASSESSMENT — PAIN - FUNCTIONAL ASSESSMENT: PAIN_FUNCTIONAL_ASSESSMENT: PREVENTS OR INTERFERES SOME ACTIVE ACTIVITIES AND ADLS

## 2023-06-22 ASSESSMENT — PAIN SCALES - GENERAL
PAINLEVEL_OUTOF10: 10
PAINLEVEL_OUTOF10: 9
PAINLEVEL_OUTOF10: 8

## 2023-06-22 ASSESSMENT — PAIN DESCRIPTION - PAIN TYPE: TYPE: CHRONIC PAIN

## 2023-06-22 ASSESSMENT — PAIN DESCRIPTION - ONSET: ONSET: ON-GOING

## 2023-06-22 ASSESSMENT — LIFESTYLE VARIABLES
HOW OFTEN DO YOU HAVE A DRINK CONTAINING ALCOHOL: NEVER
HOW MANY STANDARD DRINKS CONTAINING ALCOHOL DO YOU HAVE ON A TYPICAL DAY: PATIENT DOES NOT DRINK

## 2023-06-23 ENCOUNTER — HOME CARE VISIT (OUTPATIENT)
Dept: HOME HEALTH SERVICES | Facility: HOME HEALTH | Age: 48
End: 2023-06-23

## 2023-06-23 LAB
ANION GAP SERPL CALC-SCNC: 8 MMOL/L (ref 5–15)
BUN SERPL-MCNC: 42 MG/DL (ref 6–20)
BUN/CREAT SERPL: 7 (ref 12–20)
CALCIUM SERPL-MCNC: 8.1 MG/DL (ref 8.5–10.1)
CHLORIDE SERPL-SCNC: 101 MMOL/L (ref 97–108)
CO2 SERPL-SCNC: 25 MMOL/L (ref 21–32)
CREAT SERPL-MCNC: 6.13 MG/DL (ref 0.7–1.3)
EKG ATRIAL RATE: 88 BPM
EKG DIAGNOSIS: NORMAL
EKG P AXIS: 74 DEGREES
EKG P-R INTERVAL: 172 MS
EKG Q-T INTERVAL: 382 MS
EKG QRS DURATION: 90 MS
EKG QTC CALCULATION (BAZETT): 462 MS
EKG R AXIS: -60 DEGREES
EKG T AXIS: 79 DEGREES
EKG VENTRICULAR RATE: 88 BPM
ERYTHROCYTE [DISTWIDTH] IN BLOOD BY AUTOMATED COUNT: 16.7 % (ref 11.5–14.5)
GLUCOSE BLD STRIP.AUTO-MCNC: 164 MG/DL (ref 65–117)
GLUCOSE BLD STRIP.AUTO-MCNC: 213 MG/DL (ref 65–117)
GLUCOSE BLD STRIP.AUTO-MCNC: 326 MG/DL (ref 65–117)
GLUCOSE BLD STRIP.AUTO-MCNC: 65 MG/DL (ref 65–117)
GLUCOSE BLD STRIP.AUTO-MCNC: 93 MG/DL (ref 65–117)
GLUCOSE SERPL-MCNC: 122 MG/DL (ref 65–100)
HCT VFR BLD AUTO: 32.5 % (ref 36.6–50.3)
HGB BLD-MCNC: 10 G/DL (ref 12.1–17)
MCH RBC QN AUTO: 27.6 PG (ref 26–34)
MCHC RBC AUTO-ENTMCNC: 30.8 G/DL (ref 30–36.5)
MCV RBC AUTO: 89.8 FL (ref 80–99)
NRBC # BLD: 0 K/UL (ref 0–0.01)
NRBC BLD-RTO: 0 PER 100 WBC
PLATELET # BLD AUTO: 366 K/UL (ref 150–400)
PMV BLD AUTO: 8.4 FL (ref 8.9–12.9)
POTASSIUM SERPL-SCNC: 3.6 MMOL/L (ref 3.5–5.1)
PROCALCITONIN SERPL-MCNC: 0.07 NG/ML
RBC # BLD AUTO: 3.62 M/UL (ref 4.1–5.7)
SERVICE CMNT-IMP: ABNORMAL
SERVICE CMNT-IMP: NORMAL
SERVICE CMNT-IMP: NORMAL
SODIUM SERPL-SCNC: 134 MMOL/L (ref 136–145)
VANCOMYCIN SERPL-MCNC: 23.9 UG/ML
WBC # BLD AUTO: 8.8 K/UL (ref 4.1–11.1)

## 2023-06-23 PROCEDURE — 80202 ASSAY OF VANCOMYCIN: CPT

## 2023-06-23 PROCEDURE — 90935 HEMODIALYSIS ONE EVALUATION: CPT

## 2023-06-23 PROCEDURE — 80048 BASIC METABOLIC PNL TOTAL CA: CPT

## 2023-06-23 PROCEDURE — 6370000000 HC RX 637 (ALT 250 FOR IP): Performed by: STUDENT IN AN ORGANIZED HEALTH CARE EDUCATION/TRAINING PROGRAM

## 2023-06-23 PROCEDURE — 6370000000 HC RX 637 (ALT 250 FOR IP): Performed by: INTERNAL MEDICINE

## 2023-06-23 PROCEDURE — 82962 GLUCOSE BLOOD TEST: CPT

## 2023-06-23 PROCEDURE — 76937 US GUIDE VASCULAR ACCESS: CPT

## 2023-06-23 PROCEDURE — 2580000003 HC RX 258: Performed by: INTERNAL MEDICINE

## 2023-06-23 PROCEDURE — 1100000000 HC RM PRIVATE

## 2023-06-23 PROCEDURE — 99222 1ST HOSP IP/OBS MODERATE 55: CPT | Performed by: PODIATRIST

## 2023-06-23 PROCEDURE — 6370000000 HC RX 637 (ALT 250 FOR IP): Performed by: HOSPITALIST

## 2023-06-23 PROCEDURE — 36415 COLL VENOUS BLD VENIPUNCTURE: CPT

## 2023-06-23 PROCEDURE — 92610 EVALUATE SWALLOWING FUNCTION: CPT

## 2023-06-23 PROCEDURE — 6360000002 HC RX W HCPCS: Performed by: INTERNAL MEDICINE

## 2023-06-23 PROCEDURE — 5A1D70Z PERFORMANCE OF URINARY FILTRATION, INTERMITTENT, LESS THAN 6 HOURS PER DAY: ICD-10-PCS | Performed by: INTERNAL MEDICINE

## 2023-06-23 PROCEDURE — 85027 COMPLETE CBC AUTOMATED: CPT

## 2023-06-23 PROCEDURE — 84145 PROCALCITONIN (PCT): CPT

## 2023-06-23 RX ORDER — DEXTROSE MONOHYDRATE 100 MG/ML
INJECTION, SOLUTION INTRAVENOUS CONTINUOUS PRN
Status: DISCONTINUED | OUTPATIENT
Start: 2023-06-23 | End: 2023-07-01 | Stop reason: HOSPADM

## 2023-06-23 RX ORDER — OXYCODONE HYDROCHLORIDE AND ACETAMINOPHEN 5; 325 MG/1; MG/1
2 TABLET ORAL EVERY 6 HOURS PRN
Status: DISCONTINUED | OUTPATIENT
Start: 2023-06-23 | End: 2023-07-01 | Stop reason: HOSPADM

## 2023-06-23 RX ORDER — NIFEDIPINE 30 MG/1
30 TABLET, EXTENDED RELEASE ORAL DAILY
Status: DISCONTINUED | OUTPATIENT
Start: 2023-06-23 | End: 2023-06-27

## 2023-06-23 RX ADMIN — FERROUS SULFATE TAB 325 MG (65 MG ELEMENTAL FE) 325 MG: 325 (65 FE) TAB at 05:59

## 2023-06-23 RX ADMIN — Medication 1 MG: at 04:42

## 2023-06-23 RX ADMIN — OXYCODONE HYDROCHLORIDE AND ACETAMINOPHEN 2 TABLET: 5; 325 TABLET ORAL at 18:28

## 2023-06-23 RX ADMIN — OXYCODONE HYDROCHLORIDE AND ACETAMINOPHEN 2 TABLET: 5; 325 TABLET ORAL at 12:16

## 2023-06-23 RX ADMIN — PIPERACILLIN AND TAZOBACTAM 3375 MG: 3; .375 INJECTION, POWDER, LYOPHILIZED, FOR SOLUTION INTRAVENOUS at 23:14

## 2023-06-23 RX ADMIN — ASPIRIN 81 MG: 81 TABLET, COATED ORAL at 12:17

## 2023-06-23 RX ADMIN — ATORVASTATIN CALCIUM 40 MG: 40 TABLET, FILM COATED ORAL at 12:17

## 2023-06-23 RX ADMIN — INSULIN GLARGINE 15 UNITS: 100 INJECTION, SOLUTION SUBCUTANEOUS at 23:00

## 2023-06-23 RX ADMIN — ATENOLOL 25 MG: 25 TABLET ORAL at 12:17

## 2023-06-23 RX ADMIN — DIVALPROEX SODIUM 500 MG: 125 CAPSULE, COATED PELLETS ORAL at 12:16

## 2023-06-23 RX ADMIN — NIFEDIPINE 30 MG: 30 TABLET, EXTENDED RELEASE ORAL at 12:17

## 2023-06-23 RX ADMIN — Medication 2 UNITS: at 22:59

## 2023-06-23 RX ADMIN — VANCOMYCIN HYDROCHLORIDE 750 MG: 750 INJECTION, POWDER, LYOPHILIZED, FOR SOLUTION INTRAVENOUS at 23:17

## 2023-06-23 ASSESSMENT — PAIN DESCRIPTION - PAIN TYPE: TYPE: CHRONIC PAIN

## 2023-06-23 ASSESSMENT — PAIN SCALES - GENERAL
PAINLEVEL_OUTOF10: 10
PAINLEVEL_OUTOF10: 4
PAINLEVEL_OUTOF10: 10
PAINLEVEL_OUTOF10: 10

## 2023-06-23 ASSESSMENT — PAIN DESCRIPTION - DESCRIPTORS
DESCRIPTORS: ACHING
DESCRIPTORS: ACHING;THROBBING

## 2023-06-23 ASSESSMENT — PAIN DESCRIPTION - LOCATION
LOCATION: LEG;FOOT
LOCATION: FOOT

## 2023-06-23 ASSESSMENT — ENCOUNTER SYMPTOMS
DIARRHEA: 0
ABDOMINAL PAIN: 0
VOMITING: 0
SHORTNESS OF BREATH: 0

## 2023-06-23 ASSESSMENT — PAIN DESCRIPTION - ORIENTATION
ORIENTATION: DISTAL
ORIENTATION: LEFT;RIGHT
ORIENTATION: RIGHT
ORIENTATION: RIGHT

## 2023-06-24 ENCOUNTER — APPOINTMENT (OUTPATIENT)
Facility: HOSPITAL | Age: 48
DRG: 314 | End: 2023-06-24
Payer: COMMERCIAL

## 2023-06-24 LAB
ALBUMIN SERPL-MCNC: 2.5 G/DL (ref 3.5–5)
ALBUMIN/GLOB SERPL: 0.4 (ref 1.1–2.2)
ALP SERPL-CCNC: 884 U/L (ref 45–117)
ALT SERPL-CCNC: 43 U/L (ref 12–78)
ANION GAP SERPL CALC-SCNC: 4 MMOL/L (ref 5–15)
AST SERPL-CCNC: 34 U/L (ref 15–37)
BILIRUB SERPL-MCNC: 0.9 MG/DL (ref 0.2–1)
BUN SERPL-MCNC: 28 MG/DL (ref 6–20)
BUN/CREAT SERPL: 6 (ref 12–20)
CALCIUM SERPL-MCNC: 8.4 MG/DL (ref 8.5–10.1)
CHLORIDE SERPL-SCNC: 98 MMOL/L (ref 97–108)
CO2 SERPL-SCNC: 30 MMOL/L (ref 21–32)
CREAT SERPL-MCNC: 4.57 MG/DL (ref 0.7–1.3)
GLOBULIN SER CALC-MCNC: 6.1 G/DL (ref 2–4)
GLUCOSE BLD STRIP.AUTO-MCNC: 193 MG/DL (ref 65–117)
GLUCOSE BLD STRIP.AUTO-MCNC: 193 MG/DL (ref 65–117)
GLUCOSE BLD STRIP.AUTO-MCNC: 217 MG/DL (ref 65–117)
GLUCOSE BLD STRIP.AUTO-MCNC: 231 MG/DL (ref 65–117)
GLUCOSE SERPL-MCNC: 313 MG/DL (ref 65–100)
POTASSIUM SERPL-SCNC: 3.7 MMOL/L (ref 3.5–5.1)
PROT SERPL-MCNC: 8.6 G/DL (ref 6.4–8.2)
SERVICE CMNT-IMP: ABNORMAL
SODIUM SERPL-SCNC: 132 MMOL/L (ref 136–145)

## 2023-06-24 PROCEDURE — 80053 COMPREHEN METABOLIC PANEL: CPT

## 2023-06-24 PROCEDURE — 6370000000 HC RX 637 (ALT 250 FOR IP): Performed by: HOSPITALIST

## 2023-06-24 PROCEDURE — 6360000002 HC RX W HCPCS: Performed by: INTERNAL MEDICINE

## 2023-06-24 PROCEDURE — 73718 MRI LOWER EXTREMITY W/O DYE: CPT

## 2023-06-24 PROCEDURE — 82962 GLUCOSE BLOOD TEST: CPT

## 2023-06-24 PROCEDURE — 2580000003 HC RX 258: Performed by: INTERNAL MEDICINE

## 2023-06-24 PROCEDURE — 6360000002 HC RX W HCPCS: Performed by: HOSPITALIST

## 2023-06-24 PROCEDURE — 6370000000 HC RX 637 (ALT 250 FOR IP): Performed by: INTERNAL MEDICINE

## 2023-06-24 PROCEDURE — 1100000000 HC RM PRIVATE

## 2023-06-24 PROCEDURE — 36415 COLL VENOUS BLD VENIPUNCTURE: CPT

## 2023-06-24 RX ORDER — LORAZEPAM 2 MG/ML
1 INJECTION INTRAMUSCULAR
Status: COMPLETED | OUTPATIENT
Start: 2023-06-24 | End: 2023-06-24

## 2023-06-24 RX ADMIN — OXYCODONE HYDROCHLORIDE AND ACETAMINOPHEN 2 TABLET: 5; 325 TABLET ORAL at 22:08

## 2023-06-24 RX ADMIN — OXYCODONE HYDROCHLORIDE AND ACETAMINOPHEN 2 TABLET: 5; 325 TABLET ORAL at 03:00

## 2023-06-24 RX ADMIN — NIFEDIPINE 30 MG: 30 TABLET, EXTENDED RELEASE ORAL at 08:23

## 2023-06-24 RX ADMIN — PIPERACILLIN AND TAZOBACTAM 3375 MG: 3; .375 INJECTION, POWDER, LYOPHILIZED, FOR SOLUTION INTRAVENOUS at 22:09

## 2023-06-24 RX ADMIN — OXYCODONE HYDROCHLORIDE AND ACETAMINOPHEN 2 TABLET: 5; 325 TABLET ORAL at 09:09

## 2023-06-24 RX ADMIN — ATENOLOL 25 MG: 25 TABLET ORAL at 08:23

## 2023-06-24 RX ADMIN — Medication 1 UNITS: at 08:23

## 2023-06-24 RX ADMIN — HYDRALAZINE HYDROCHLORIDE 5 MG: 20 INJECTION INTRAMUSCULAR; INTRAVENOUS at 23:42

## 2023-06-24 RX ADMIN — DIVALPROEX SODIUM 500 MG: 125 CAPSULE, COATED PELLETS ORAL at 08:22

## 2023-06-24 RX ADMIN — ATORVASTATIN CALCIUM 40 MG: 40 TABLET, FILM COATED ORAL at 08:23

## 2023-06-24 RX ADMIN — LORAZEPAM 1 MG: 2 INJECTION INTRAMUSCULAR; INTRAVENOUS at 13:25

## 2023-06-24 RX ADMIN — DIVALPROEX SODIUM 500 MG: 125 CAPSULE, COATED PELLETS ORAL at 22:08

## 2023-06-24 ASSESSMENT — PAIN SCALES - GENERAL
PAINLEVEL_OUTOF10: 10
PAINLEVEL_OUTOF10: 6
PAINLEVEL_OUTOF10: 0
PAINLEVEL_OUTOF10: 10
PAINLEVEL_OUTOF10: 10
PAINLEVEL_OUTOF10: 9
PAINLEVEL_OUTOF10: 8
PAINLEVEL_OUTOF10: 5

## 2023-06-24 ASSESSMENT — PAIN - FUNCTIONAL ASSESSMENT: PAIN_FUNCTIONAL_ASSESSMENT: ACTIVITIES ARE NOT PREVENTED

## 2023-06-24 ASSESSMENT — PAIN DESCRIPTION - DESCRIPTORS
DESCRIPTORS: SHARP;ACHING
DESCRIPTORS: ACHING
DESCRIPTORS: ACHING;STABBING
DESCRIPTORS: ACHING;STABBING
DESCRIPTORS: ACHING

## 2023-06-24 ASSESSMENT — PAIN DESCRIPTION - PAIN TYPE
TYPE: ACUTE PAIN

## 2023-06-24 ASSESSMENT — PAIN DESCRIPTION - ONSET
ONSET: PROGRESSIVE
ONSET: GRADUAL
ONSET: PROGRESSIVE

## 2023-06-24 ASSESSMENT — PAIN DESCRIPTION - ORIENTATION
ORIENTATION: RIGHT;LEFT
ORIENTATION: LEFT;RIGHT
ORIENTATION: LEFT;RIGHT
ORIENTATION: RIGHT;LEFT

## 2023-06-24 ASSESSMENT — PAIN DESCRIPTION - FREQUENCY
FREQUENCY: CONTINUOUS
FREQUENCY: INTERMITTENT

## 2023-06-24 ASSESSMENT — PAIN DESCRIPTION - LOCATION
LOCATION: FOOT

## 2023-06-24 ASSESSMENT — PAIN DESCRIPTION - DIRECTION: RADIATING_TOWARDS: LEG

## 2023-06-25 ENCOUNTER — APPOINTMENT (OUTPATIENT)
Facility: HOSPITAL | Age: 48
DRG: 314 | End: 2023-06-25
Payer: COMMERCIAL

## 2023-06-25 LAB
GLUCOSE BLD STRIP.AUTO-MCNC: 224 MG/DL (ref 65–117)
GLUCOSE BLD STRIP.AUTO-MCNC: 339 MG/DL (ref 65–117)
SERVICE CMNT-IMP: ABNORMAL
SERVICE CMNT-IMP: ABNORMAL

## 2023-06-25 PROCEDURE — 6370000000 HC RX 637 (ALT 250 FOR IP): Performed by: INTERNAL MEDICINE

## 2023-06-25 PROCEDURE — 82962 GLUCOSE BLOOD TEST: CPT

## 2023-06-25 PROCEDURE — 93970 EXTREMITY STUDY: CPT

## 2023-06-25 PROCEDURE — 2580000003 HC RX 258: Performed by: INTERNAL MEDICINE

## 2023-06-25 PROCEDURE — 6370000000 HC RX 637 (ALT 250 FOR IP): Performed by: HOSPITALIST

## 2023-06-25 PROCEDURE — 6360000002 HC RX W HCPCS: Performed by: INTERNAL MEDICINE

## 2023-06-25 PROCEDURE — 1100000000 HC RM PRIVATE

## 2023-06-25 RX ADMIN — PIPERACILLIN AND TAZOBACTAM 3375 MG: 3; .375 INJECTION, POWDER, LYOPHILIZED, FOR SOLUTION INTRAVENOUS at 09:38

## 2023-06-25 RX ADMIN — ATORVASTATIN CALCIUM 40 MG: 40 TABLET, FILM COATED ORAL at 09:35

## 2023-06-25 RX ADMIN — OXYCODONE HYDROCHLORIDE AND ACETAMINOPHEN 2 TABLET: 5; 325 TABLET ORAL at 22:14

## 2023-06-25 RX ADMIN — OXYCODONE HYDROCHLORIDE AND ACETAMINOPHEN 2 TABLET: 5; 325 TABLET ORAL at 15:59

## 2023-06-25 RX ADMIN — OXYCODONE HYDROCHLORIDE AND ACETAMINOPHEN 2 TABLET: 5; 325 TABLET ORAL at 03:28

## 2023-06-25 RX ADMIN — FERROUS SULFATE TAB 325 MG (65 MG ELEMENTAL FE) 325 MG: 325 (65 FE) TAB at 06:56

## 2023-06-25 RX ADMIN — ASPIRIN 81 MG: 81 TABLET, COATED ORAL at 09:34

## 2023-06-25 RX ADMIN — NIFEDIPINE 30 MG: 30 TABLET, EXTENDED RELEASE ORAL at 09:35

## 2023-06-25 RX ADMIN — PIPERACILLIN AND TAZOBACTAM 3375 MG: 3; .375 INJECTION, POWDER, LYOPHILIZED, FOR SOLUTION INTRAVENOUS at 22:15

## 2023-06-25 RX ADMIN — Medication 3 UNITS: at 12:25

## 2023-06-25 RX ADMIN — DIVALPROEX SODIUM 500 MG: 125 CAPSULE, COATED PELLETS ORAL at 22:14

## 2023-06-25 RX ADMIN — DIVALPROEX SODIUM 500 MG: 125 CAPSULE, COATED PELLETS ORAL at 09:34

## 2023-06-25 RX ADMIN — ATENOLOL 25 MG: 25 TABLET ORAL at 09:35

## 2023-06-25 ASSESSMENT — PAIN SCALES - GENERAL
PAINLEVEL_OUTOF10: 0
PAINLEVEL_OUTOF10: 10
PAINLEVEL_OUTOF10: 8
PAINLEVEL_OUTOF10: 10
PAINLEVEL_OUTOF10: 5

## 2023-06-25 ASSESSMENT — PAIN DESCRIPTION - LOCATION
LOCATION: FOOT

## 2023-06-25 ASSESSMENT — PAIN DESCRIPTION - DESCRIPTORS
DESCRIPTORS: ACHING
DESCRIPTORS: ACHING;SHARP
DESCRIPTORS: ACHING
DESCRIPTORS: ACHING
DESCRIPTORS: ACHING;THROBBING

## 2023-06-25 ASSESSMENT — PAIN DESCRIPTION - ORIENTATION
ORIENTATION: RIGHT;LEFT
ORIENTATION: RIGHT;LEFT
ORIENTATION: LEFT;RIGHT
ORIENTATION: RIGHT;LEFT
ORIENTATION: RIGHT;LEFT
ORIENTATION: LEFT;RIGHT

## 2023-06-25 ASSESSMENT — PAIN DESCRIPTION - FREQUENCY
FREQUENCY: INTERMITTENT
FREQUENCY: CONTINUOUS
FREQUENCY: CONTINUOUS

## 2023-06-25 ASSESSMENT — PAIN DESCRIPTION - PAIN TYPE: TYPE: ACUTE PAIN

## 2023-06-25 ASSESSMENT — PAIN DESCRIPTION - ONSET: ONSET: GRADUAL

## 2023-06-26 LAB
ALBUMIN SERPL-MCNC: 2.1 G/DL (ref 3.5–5)
ANION GAP SERPL CALC-SCNC: 7 MMOL/L (ref 5–15)
BASOPHILS # BLD: 0 K/UL (ref 0–0.1)
BASOPHILS NFR BLD: 0 % (ref 0–1)
BUN SERPL-MCNC: 46 MG/DL (ref 6–20)
BUN/CREAT SERPL: 7 (ref 12–20)
CALCIUM SERPL-MCNC: 7.4 MG/DL (ref 8.5–10.1)
CHLORIDE SERPL-SCNC: 102 MMOL/L (ref 97–108)
CO2 SERPL-SCNC: 25 MMOL/L (ref 21–32)
CREAT SERPL-MCNC: 6.47 MG/DL (ref 0.7–1.3)
DIFFERENTIAL METHOD BLD: ABNORMAL
ECHO BSA: 2.04 M2
EOSINOPHIL # BLD: 0.1 K/UL (ref 0–0.4)
EOSINOPHIL NFR BLD: 2 % (ref 0–7)
ERYTHROCYTE [DISTWIDTH] IN BLOOD BY AUTOMATED COUNT: 17.1 % (ref 11.5–14.5)
GLUCOSE BLD STRIP.AUTO-MCNC: 123 MG/DL (ref 65–117)
GLUCOSE BLD STRIP.AUTO-MCNC: 75 MG/DL (ref 65–117)
GLUCOSE BLD STRIP.AUTO-MCNC: 99 MG/DL (ref 65–117)
GLUCOSE SERPL-MCNC: 122 MG/DL (ref 65–100)
HCT VFR BLD AUTO: 30 % (ref 36.6–50.3)
HGB BLD-MCNC: 9.5 G/DL (ref 12.1–17)
IMM GRANULOCYTES # BLD AUTO: 0.1 K/UL (ref 0–0.04)
IMM GRANULOCYTES NFR BLD AUTO: 1 % (ref 0–0.5)
LYMPHOCYTES # BLD: 1 K/UL (ref 0.8–3.5)
LYMPHOCYTES NFR BLD: 13 % (ref 12–49)
MCH RBC QN AUTO: 28.8 PG (ref 26–34)
MCHC RBC AUTO-ENTMCNC: 31.7 G/DL (ref 30–36.5)
MCV RBC AUTO: 90.9 FL (ref 80–99)
MONOCYTES # BLD: 0.9 K/UL (ref 0–1)
MONOCYTES NFR BLD: 12 % (ref 5–13)
NEUTS SEG # BLD: 5.2 K/UL (ref 1.8–8)
NEUTS SEG NFR BLD: 72 % (ref 32–75)
NRBC # BLD: 0 K/UL (ref 0–0.01)
NRBC BLD-RTO: 0 PER 100 WBC
PHOSPHATE SERPL-MCNC: 6.1 MG/DL (ref 2.6–4.7)
PLATELET # BLD AUTO: 256 K/UL (ref 150–400)
PMV BLD AUTO: 8.1 FL (ref 8.9–12.9)
POTASSIUM SERPL-SCNC: 5.4 MMOL/L (ref 3.5–5.1)
RBC # BLD AUTO: 3.3 M/UL (ref 4.1–5.7)
SERVICE CMNT-IMP: ABNORMAL
SERVICE CMNT-IMP: NORMAL
SERVICE CMNT-IMP: NORMAL
SODIUM SERPL-SCNC: 134 MMOL/L (ref 136–145)
WBC # BLD AUTO: 7.2 K/UL (ref 4.1–11.1)

## 2023-06-26 PROCEDURE — 80069 RENAL FUNCTION PANEL: CPT

## 2023-06-26 PROCEDURE — 6370000000 HC RX 637 (ALT 250 FOR IP): Performed by: STUDENT IN AN ORGANIZED HEALTH CARE EDUCATION/TRAINING PROGRAM

## 2023-06-26 PROCEDURE — 36415 COLL VENOUS BLD VENIPUNCTURE: CPT

## 2023-06-26 PROCEDURE — 2580000003 HC RX 258: Performed by: INTERNAL MEDICINE

## 2023-06-26 PROCEDURE — 6370000000 HC RX 637 (ALT 250 FOR IP): Performed by: INTERNAL MEDICINE

## 2023-06-26 PROCEDURE — 82962 GLUCOSE BLOOD TEST: CPT

## 2023-06-26 PROCEDURE — 90935 HEMODIALYSIS ONE EVALUATION: CPT

## 2023-06-26 PROCEDURE — 6370000000 HC RX 637 (ALT 250 FOR IP): Performed by: HOSPITALIST

## 2023-06-26 PROCEDURE — 6360000002 HC RX W HCPCS: Performed by: INTERNAL MEDICINE

## 2023-06-26 PROCEDURE — 85025 COMPLETE CBC W/AUTO DIFF WBC: CPT

## 2023-06-26 PROCEDURE — 1100000000 HC RM PRIVATE

## 2023-06-26 RX ORDER — INSULIN GLARGINE 100 [IU]/ML
7 INJECTION, SOLUTION SUBCUTANEOUS NIGHTLY
Status: DISCONTINUED | OUTPATIENT
Start: 2023-06-26 | End: 2023-07-01 | Stop reason: HOSPADM

## 2023-06-26 RX ADMIN — ATENOLOL 25 MG: 25 TABLET ORAL at 15:16

## 2023-06-26 RX ADMIN — OXYCODONE HYDROCHLORIDE AND ACETAMINOPHEN 2 TABLET: 5; 325 TABLET ORAL at 21:51

## 2023-06-26 RX ADMIN — OXYCODONE HYDROCHLORIDE AND ACETAMINOPHEN 2 TABLET: 5; 325 TABLET ORAL at 15:43

## 2023-06-26 RX ADMIN — ASPIRIN 81 MG: 81 TABLET, COATED ORAL at 15:16

## 2023-06-26 RX ADMIN — NIFEDIPINE 30 MG: 30 TABLET, EXTENDED RELEASE ORAL at 15:20

## 2023-06-26 RX ADMIN — OXYCODONE HYDROCHLORIDE AND ACETAMINOPHEN 2 TABLET: 5; 325 TABLET ORAL at 06:16

## 2023-06-26 RX ADMIN — PIPERACILLIN AND TAZOBACTAM 3375 MG: 3; .375 INJECTION, POWDER, LYOPHILIZED, FOR SOLUTION INTRAVENOUS at 15:18

## 2023-06-26 RX ADMIN — DIVALPROEX SODIUM 500 MG: 125 CAPSULE, COATED PELLETS ORAL at 21:42

## 2023-06-26 RX ADMIN — ACETAMINOPHEN 650 MG: 325 TABLET ORAL at 18:50

## 2023-06-26 RX ADMIN — DIVALPROEX SODIUM 500 MG: 125 CAPSULE, COATED PELLETS ORAL at 15:16

## 2023-06-26 RX ADMIN — ATORVASTATIN CALCIUM 40 MG: 40 TABLET, FILM COATED ORAL at 15:16

## 2023-06-26 RX ADMIN — FERROUS SULFATE TAB 325 MG (65 MG ELEMENTAL FE) 325 MG: 325 (65 FE) TAB at 06:16

## 2023-06-26 ASSESSMENT — PAIN DESCRIPTION - ORIENTATION
ORIENTATION: RIGHT;LEFT

## 2023-06-26 ASSESSMENT — PAIN SCALES - GENERAL
PAINLEVEL_OUTOF10: 8
PAINLEVEL_OUTOF10: 8
PAINLEVEL_OUTOF10: 9
PAINLEVEL_OUTOF10: 10
PAINLEVEL_OUTOF10: 8
PAINLEVEL_OUTOF10: 7

## 2023-06-26 ASSESSMENT — PAIN DESCRIPTION - LOCATION
LOCATION: LEG;FOOT
LOCATION: FOOT
LOCATION: FOOT
LOCATION: LEG;FOOT
LOCATION: FOOT;LEG
LOCATION: FOOT

## 2023-06-26 ASSESSMENT — PAIN DESCRIPTION - FREQUENCY
FREQUENCY: CONTINUOUS

## 2023-06-26 ASSESSMENT — PAIN DESCRIPTION - DESCRIPTORS
DESCRIPTORS: ACHING
DESCRIPTORS: SHARP
DESCRIPTORS: ACHING
DESCRIPTORS: ACHING

## 2023-06-27 LAB
ALBUMIN SERPL-MCNC: 2.1 G/DL (ref 3.5–5)
ALBUMIN/GLOB SERPL: 0.4 (ref 1.1–2.2)
ALP SERPL-CCNC: 818 U/L (ref 45–117)
ALT SERPL-CCNC: 42 U/L (ref 12–78)
ANION GAP SERPL CALC-SCNC: 7 MMOL/L (ref 5–15)
AST SERPL-CCNC: 29 U/L (ref 15–37)
BILIRUB SERPL-MCNC: 1.2 MG/DL (ref 0.2–1)
BUN SERPL-MCNC: 50 MG/DL (ref 6–20)
BUN/CREAT SERPL: 8 (ref 12–20)
CA-I BLD-SCNC: 0.91 MMOL/L (ref 1.12–1.32)
CALCIUM SERPL-MCNC: 7.7 MG/DL (ref 8.5–10.1)
CHLORIDE SERPL-SCNC: 101 MMOL/L (ref 97–108)
CO2 SERPL-SCNC: 27 MMOL/L (ref 21–32)
CREAT SERPL-MCNC: 6.37 MG/DL (ref 0.7–1.3)
ERYTHROCYTE [DISTWIDTH] IN BLOOD BY AUTOMATED COUNT: 16.9 % (ref 11.5–14.5)
GLOBULIN SER CALC-MCNC: 5.8 G/DL (ref 2–4)
GLUCOSE BLD STRIP.AUTO-MCNC: 178 MG/DL (ref 65–117)
GLUCOSE BLD STRIP.AUTO-MCNC: 212 MG/DL (ref 65–117)
GLUCOSE BLD STRIP.AUTO-MCNC: 214 MG/DL (ref 65–117)
GLUCOSE BLD STRIP.AUTO-MCNC: 216 MG/DL (ref 65–117)
GLUCOSE SERPL-MCNC: 246 MG/DL (ref 65–100)
HCT VFR BLD AUTO: 31.7 % (ref 36.6–50.3)
HGB BLD-MCNC: 9.9 G/DL (ref 12.1–17)
MCH RBC QN AUTO: 27.9 PG (ref 26–34)
MCHC RBC AUTO-ENTMCNC: 31.2 G/DL (ref 30–36.5)
MCV RBC AUTO: 89.3 FL (ref 80–99)
NRBC # BLD: 0 K/UL (ref 0–0.01)
NRBC BLD-RTO: 0 PER 100 WBC
PLATELET # BLD AUTO: 270 K/UL (ref 150–400)
PMV BLD AUTO: 8.8 FL (ref 8.9–12.9)
POTASSIUM SERPL-SCNC: 4.8 MMOL/L (ref 3.5–5.1)
PROT SERPL-MCNC: 7.9 G/DL (ref 6.4–8.2)
RBC # BLD AUTO: 3.55 M/UL (ref 4.1–5.7)
SERVICE CMNT-IMP: ABNORMAL
SODIUM SERPL-SCNC: 135 MMOL/L (ref 136–145)
WBC # BLD AUTO: 8.8 K/UL (ref 4.1–11.1)

## 2023-06-27 PROCEDURE — 99233 SBSQ HOSP IP/OBS HIGH 50: CPT | Performed by: PODIATRIST

## 2023-06-27 PROCEDURE — 80047 BASIC METABLC PNL IONIZED CA: CPT

## 2023-06-27 PROCEDURE — 80053 COMPREHEN METABOLIC PANEL: CPT

## 2023-06-27 PROCEDURE — 6360000002 HC RX W HCPCS: Performed by: INTERNAL MEDICINE

## 2023-06-27 PROCEDURE — 2580000003 HC RX 258: Performed by: ANESTHESIOLOGY

## 2023-06-27 PROCEDURE — 6370000000 HC RX 637 (ALT 250 FOR IP): Performed by: HOSPITALIST

## 2023-06-27 PROCEDURE — 6370000000 HC RX 637 (ALT 250 FOR IP): Performed by: INTERNAL MEDICINE

## 2023-06-27 PROCEDURE — 82330 ASSAY OF CALCIUM: CPT

## 2023-06-27 PROCEDURE — 85027 COMPLETE CBC AUTOMATED: CPT

## 2023-06-27 PROCEDURE — 1100000000 HC RM PRIVATE

## 2023-06-27 PROCEDURE — 2580000003 HC RX 258: Performed by: INTERNAL MEDICINE

## 2023-06-27 PROCEDURE — 36415 COLL VENOUS BLD VENIPUNCTURE: CPT

## 2023-06-27 PROCEDURE — 82962 GLUCOSE BLOOD TEST: CPT

## 2023-06-27 RX ORDER — SODIUM CHLORIDE, SODIUM LACTATE, POTASSIUM CHLORIDE, CALCIUM CHLORIDE 600; 310; 30; 20 MG/100ML; MG/100ML; MG/100ML; MG/100ML
INJECTION, SOLUTION INTRAVENOUS CONTINUOUS
Status: DISCONTINUED | OUTPATIENT
Start: 2023-06-27 | End: 2023-06-29 | Stop reason: HOSPADM

## 2023-06-27 RX ORDER — NIFEDIPINE 30 MG/1
30 TABLET, EXTENDED RELEASE ORAL 2 TIMES DAILY
Status: DISCONTINUED | OUTPATIENT
Start: 2023-06-27 | End: 2023-07-01 | Stop reason: HOSPADM

## 2023-06-27 RX ORDER — PROCHLORPERAZINE EDISYLATE 5 MG/ML
5 INJECTION INTRAMUSCULAR; INTRAVENOUS
Status: CANCELLED | OUTPATIENT
Start: 2023-06-27 | End: 2023-06-28

## 2023-06-27 RX ORDER — HYDROMORPHONE HYDROCHLORIDE 1 MG/ML
0.5 INJECTION, SOLUTION INTRAMUSCULAR; INTRAVENOUS; SUBCUTANEOUS EVERY 5 MIN PRN
Status: CANCELLED | OUTPATIENT
Start: 2023-06-27

## 2023-06-27 RX ORDER — SODIUM CHLORIDE 0.9 % (FLUSH) 0.9 %
5-40 SYRINGE (ML) INJECTION PRN
Status: DISCONTINUED | OUTPATIENT
Start: 2023-06-27 | End: 2023-06-27 | Stop reason: HOSPADM

## 2023-06-27 RX ORDER — SODIUM CHLORIDE 0.9 % (FLUSH) 0.9 %
5-40 SYRINGE (ML) INJECTION PRN
Status: CANCELLED | OUTPATIENT
Start: 2023-06-27

## 2023-06-27 RX ORDER — OXYCODONE HYDROCHLORIDE 5 MG/1
5 TABLET ORAL
Status: CANCELLED | OUTPATIENT
Start: 2023-06-27 | End: 2023-06-28

## 2023-06-27 RX ORDER — SODIUM CHLORIDE 0.9 % (FLUSH) 0.9 %
5-40 SYRINGE (ML) INJECTION EVERY 12 HOURS SCHEDULED
Status: DISCONTINUED | OUTPATIENT
Start: 2023-06-27 | End: 2023-06-27 | Stop reason: HOSPADM

## 2023-06-27 RX ORDER — ONDANSETRON 2 MG/ML
4 INJECTION INTRAMUSCULAR; INTRAVENOUS
Status: CANCELLED | OUTPATIENT
Start: 2023-06-27 | End: 2023-06-28

## 2023-06-27 RX ORDER — SODIUM CHLORIDE 9 MG/ML
INJECTION, SOLUTION INTRAVENOUS PRN
Status: DISCONTINUED | OUTPATIENT
Start: 2023-06-27 | End: 2023-06-27 | Stop reason: HOSPADM

## 2023-06-27 RX ORDER — SODIUM CHLORIDE 0.9 % (FLUSH) 0.9 %
5-40 SYRINGE (ML) INJECTION EVERY 12 HOURS SCHEDULED
Status: CANCELLED | OUTPATIENT
Start: 2023-06-27

## 2023-06-27 RX ORDER — SODIUM CHLORIDE 9 MG/ML
INJECTION, SOLUTION INTRAVENOUS PRN
Status: CANCELLED | OUTPATIENT
Start: 2023-06-27

## 2023-06-27 RX ORDER — FENTANYL CITRATE 50 UG/ML
25 INJECTION, SOLUTION INTRAMUSCULAR; INTRAVENOUS EVERY 5 MIN PRN
Status: CANCELLED | OUTPATIENT
Start: 2023-06-27

## 2023-06-27 RX ADMIN — NIFEDIPINE 30 MG: 30 TABLET, EXTENDED RELEASE ORAL at 20:53

## 2023-06-27 RX ADMIN — OXYCODONE HYDROCHLORIDE AND ACETAMINOPHEN 2 TABLET: 5; 325 TABLET ORAL at 06:46

## 2023-06-27 RX ADMIN — NIFEDIPINE 30 MG: 30 TABLET, EXTENDED RELEASE ORAL at 08:47

## 2023-06-27 RX ADMIN — SODIUM CHLORIDE, POTASSIUM CHLORIDE, SODIUM LACTATE AND CALCIUM CHLORIDE: 600; 310; 30; 20 INJECTION, SOLUTION INTRAVENOUS at 18:35

## 2023-06-27 RX ADMIN — PIPERACILLIN AND TAZOBACTAM 3375 MG: 3; .375 INJECTION, POWDER, LYOPHILIZED, FOR SOLUTION INTRAVENOUS at 10:54

## 2023-06-27 RX ADMIN — Medication 1 UNITS: at 08:47

## 2023-06-27 RX ADMIN — DIVALPROEX SODIUM 500 MG: 125 CAPSULE, COATED PELLETS ORAL at 08:47

## 2023-06-27 RX ADMIN — OXYCODONE HYDROCHLORIDE AND ACETAMINOPHEN 2 TABLET: 5; 325 TABLET ORAL at 20:53

## 2023-06-27 RX ADMIN — ATORVASTATIN CALCIUM 40 MG: 40 TABLET, FILM COATED ORAL at 08:45

## 2023-06-27 RX ADMIN — DIVALPROEX SODIUM 500 MG: 125 CAPSULE, COATED PELLETS ORAL at 20:53

## 2023-06-27 RX ADMIN — ATENOLOL 25 MG: 25 TABLET ORAL at 08:46

## 2023-06-27 ASSESSMENT — PAIN SCALES - GENERAL
PAINLEVEL_OUTOF10: 10
PAINLEVEL_OUTOF10: 10

## 2023-06-27 ASSESSMENT — PAIN DESCRIPTION - DESCRIPTORS
DESCRIPTORS: ACHING
DESCRIPTORS: SHARP

## 2023-06-27 ASSESSMENT — PAIN DESCRIPTION - LOCATION
LOCATION: FOOT
LOCATION: OTHER (COMMENT)

## 2023-06-27 ASSESSMENT — ENCOUNTER SYMPTOMS
COUGH: 0
VOMITING: 0
COLOR CHANGE: 1
NAUSEA: 0
SHORTNESS OF BREATH: 0

## 2023-06-27 ASSESSMENT — PAIN DESCRIPTION - ORIENTATION
ORIENTATION: RIGHT;LEFT
ORIENTATION: OTHER (COMMENT)

## 2023-06-28 ENCOUNTER — APPOINTMENT (OUTPATIENT)
Facility: HOSPITAL | Age: 48
DRG: 314 | End: 2023-06-28
Payer: COMMERCIAL

## 2023-06-28 LAB
BACTERIA SPEC CULT: NORMAL
BACTERIA SPEC CULT: NORMAL
BASOPHILS # BLD: 0 K/UL (ref 0–0.1)
BASOPHILS NFR BLD: 0 % (ref 0–1)
COMMENT:: NORMAL
DIFFERENTIAL METHOD BLD: ABNORMAL
ECHO BSA: 2.04 M2
EKG ATRIAL RATE: 73 BPM
EKG DIAGNOSIS: NORMAL
EKG P AXIS: 4 DEGREES
EKG P-R INTERVAL: 172 MS
EKG Q-T INTERVAL: 416 MS
EKG QRS DURATION: 82 MS
EKG QTC CALCULATION (BAZETT): 458 MS
EKG R AXIS: -35 DEGREES
EKG T AXIS: -26 DEGREES
EKG VENTRICULAR RATE: 73 BPM
EOSINOPHIL # BLD: 0.1 K/UL (ref 0–0.4)
EOSINOPHIL NFR BLD: 1 % (ref 0–7)
ERYTHROCYTE [DISTWIDTH] IN BLOOD BY AUTOMATED COUNT: 16.8 % (ref 11.5–14.5)
GLUCOSE BLD STRIP.AUTO-MCNC: 116 MG/DL (ref 65–117)
GLUCOSE BLD STRIP.AUTO-MCNC: 138 MG/DL (ref 65–117)
GLUCOSE BLD STRIP.AUTO-MCNC: 237 MG/DL (ref 65–117)
HCT VFR BLD AUTO: 31.5 % (ref 36.6–50.3)
HGB BLD-MCNC: 9.7 G/DL (ref 12.1–17)
IMM GRANULOCYTES # BLD AUTO: 0.1 K/UL (ref 0–0.04)
IMM GRANULOCYTES NFR BLD AUTO: 1 % (ref 0–0.5)
LYMPHOCYTES # BLD: 1.4 K/UL (ref 0.8–3.5)
LYMPHOCYTES NFR BLD: 15 % (ref 12–49)
MCH RBC QN AUTO: 27.6 PG (ref 26–34)
MCHC RBC AUTO-ENTMCNC: 30.8 G/DL (ref 30–36.5)
MCV RBC AUTO: 89.7 FL (ref 80–99)
MONOCYTES # BLD: 0.9 K/UL (ref 0–1)
MONOCYTES NFR BLD: 10 % (ref 5–13)
NEUTS SEG # BLD: 6.6 K/UL (ref 1.8–8)
NEUTS SEG NFR BLD: 73 % (ref 32–75)
NRBC # BLD: 0 K/UL (ref 0–0.01)
NRBC BLD-RTO: 0 PER 100 WBC
PLATELET # BLD AUTO: 248 K/UL (ref 150–400)
PMV BLD AUTO: 8.5 FL (ref 8.9–12.9)
RBC # BLD AUTO: 3.51 M/UL (ref 4.1–5.7)
SERVICE CMNT-IMP: ABNORMAL
SERVICE CMNT-IMP: ABNORMAL
SERVICE CMNT-IMP: NORMAL
SPECIMEN HOLD: NORMAL
VANCOMYCIN SERPL-MCNC: 16.1 UG/ML
VAS LEFT ATA DIST PSV: 0 CM/S
VAS LEFT ATA MID PSV: 58.9 CM/S
VAS LEFT ATA PROX PSV: 66.2 CM/S
VAS LEFT CFA DIST PSV: 151.5 CM/S
VAS LEFT PERONEAL DIST PSV: 0 CM/S
VAS LEFT PFA PROX PSV: 128 CM/S
VAS LEFT POP A PROX PSV: 0 CM/S
VAS LEFT POP A PROX VEL RATIO: 0
VAS LEFT PTA DIST PSV: 84.4 CM/S
VAS LEFT PTA MID PSV: 99 CM/S
VAS LEFT SFA DIST PSV: 33.3 CM/S
VAS LEFT SFA DIST VEL RATIO: 0.38
VAS LEFT SFA MID PSV: 88.7 CM/S
VAS LEFT SFA MID VEL RATIO: 0.97
VAS LEFT SFA PROX PSV: 91.3 CM/S
VAS LEFT SFA PROX VEL RATIO: 0.6
VAS RIGHT ARM BP: 128 MMHG
VAS RIGHT ATA DIST PSV: 70.6 CM/S
VAS RIGHT ATA MID PSV: 72.8 CM/S
VAS RIGHT ATA PROX PSV: 53.1 CM/S
VAS RIGHT CFA DIST PSV: 145.4 CM/S
VAS RIGHT PERONEAL DIST PSV: 50.9 CM/S
VAS RIGHT PERONEAL MID PSV: 37.8 CM/S
VAS RIGHT PERONEAL PROX PSV: 33.4 CM/S
VAS RIGHT PFA PROX PSV: 176 CM/S
VAS RIGHT POP A DIST PSV: 53.1 CM/S
VAS RIGHT POP A PROX PSV: 64.1 CM/S
VAS RIGHT POP A PROX VEL RATIO: 1.32
VAS RIGHT PTA DIST PSV: 0 CM/S
VAS RIGHT PTA MID PSV: 0 CM/S
VAS RIGHT PTA PROX PSV: 0 CM/S
VAS RIGHT SFA DIST PSV: 48.7 CM/S
VAS RIGHT SFA DIST VEL RATIO: 0.63
VAS RIGHT SFA MID PSV: 77.2 CM/S
VAS RIGHT SFA MID VEL RATIO: 0.7
VAS RIGHT SFA PROX PSV: 108 CM/S
VAS RIGHT SFA PROX VEL RATIO: 0.7
WBC # BLD AUTO: 9 K/UL (ref 4.1–11.1)

## 2023-06-28 PROCEDURE — 90935 HEMODIALYSIS ONE EVALUATION: CPT

## 2023-06-28 PROCEDURE — 6360000002 HC RX W HCPCS: Performed by: HOSPITALIST

## 2023-06-28 PROCEDURE — 93925 LOWER EXTREMITY STUDY: CPT

## 2023-06-28 PROCEDURE — 99233 SBSQ HOSP IP/OBS HIGH 50: CPT | Performed by: PODIATRIST

## 2023-06-28 PROCEDURE — 94760 N-INVAS EAR/PLS OXIMETRY 1: CPT

## 2023-06-28 PROCEDURE — 82962 GLUCOSE BLOOD TEST: CPT

## 2023-06-28 PROCEDURE — 6370000000 HC RX 637 (ALT 250 FOR IP): Performed by: INTERNAL MEDICINE

## 2023-06-28 PROCEDURE — 80202 ASSAY OF VANCOMYCIN: CPT

## 2023-06-28 PROCEDURE — 2580000003 HC RX 258: Performed by: INTERNAL MEDICINE

## 2023-06-28 PROCEDURE — 85025 COMPLETE CBC W/AUTO DIFF WBC: CPT

## 2023-06-28 PROCEDURE — 2700000000 HC OXYGEN THERAPY PER DAY

## 2023-06-28 PROCEDURE — 6360000002 HC RX W HCPCS: Performed by: INTERNAL MEDICINE

## 2023-06-28 PROCEDURE — 6370000000 HC RX 637 (ALT 250 FOR IP): Performed by: STUDENT IN AN ORGANIZED HEALTH CARE EDUCATION/TRAINING PROGRAM

## 2023-06-28 PROCEDURE — 6370000000 HC RX 637 (ALT 250 FOR IP): Performed by: HOSPITALIST

## 2023-06-28 PROCEDURE — 36415 COLL VENOUS BLD VENIPUNCTURE: CPT

## 2023-06-28 PROCEDURE — 1100000000 HC RM PRIVATE

## 2023-06-28 RX ADMIN — DIVALPROEX SODIUM 500 MG: 125 CAPSULE, COATED PELLETS ORAL at 12:33

## 2023-06-28 RX ADMIN — ALUMINUM HYDROXIDE, MAGNESIUM HYDROXIDE, AND DIMETHICONE 15 ML: 400; 400; 40 SUSPENSION ORAL at 06:05

## 2023-06-28 RX ADMIN — ASPIRIN 81 MG: 81 TABLET, COATED ORAL at 12:33

## 2023-06-28 RX ADMIN — OXYCODONE HYDROCHLORIDE AND ACETAMINOPHEN 2 TABLET: 5; 325 TABLET ORAL at 06:32

## 2023-06-28 RX ADMIN — DIVALPROEX SODIUM 500 MG: 125 CAPSULE, COATED PELLETS ORAL at 21:21

## 2023-06-28 RX ADMIN — OXYCODONE HYDROCHLORIDE AND ACETAMINOPHEN 2 TABLET: 5; 325 TABLET ORAL at 21:21

## 2023-06-28 RX ADMIN — ALUMINUM HYDROXIDE, MAGNESIUM HYDROXIDE, AND DIMETHICONE 15 ML: 400; 400; 40 SUSPENSION ORAL at 12:35

## 2023-06-28 RX ADMIN — ALUMINUM HYDROXIDE, MAGNESIUM HYDROXIDE, AND DIMETHICONE 15 ML: 400; 400; 40 SUSPENSION ORAL at 21:48

## 2023-06-28 RX ADMIN — NIFEDIPINE 30 MG: 30 TABLET, EXTENDED RELEASE ORAL at 21:21

## 2023-06-28 RX ADMIN — OXYCODONE HYDROCHLORIDE AND ACETAMINOPHEN 2 TABLET: 5; 325 TABLET ORAL at 12:38

## 2023-06-28 RX ADMIN — HYDRALAZINE HYDROCHLORIDE 5 MG: 20 INJECTION INTRAMUSCULAR; INTRAVENOUS at 01:26

## 2023-06-28 RX ADMIN — HYDRALAZINE HYDROCHLORIDE 5 MG: 20 INJECTION INTRAMUSCULAR; INTRAVENOUS at 21:27

## 2023-06-28 RX ADMIN — MELATONIN 3 MG: at 21:21

## 2023-06-28 RX ADMIN — ATORVASTATIN CALCIUM 40 MG: 40 TABLET, FILM COATED ORAL at 12:33

## 2023-06-28 RX ADMIN — PIPERACILLIN AND TAZOBACTAM 3375 MG: 3; .375 INJECTION, POWDER, LYOPHILIZED, FOR SOLUTION INTRAVENOUS at 21:21

## 2023-06-28 RX ADMIN — ACETAMINOPHEN 650 MG: 325 TABLET ORAL at 01:58

## 2023-06-28 ASSESSMENT — PAIN DESCRIPTION - LOCATION
LOCATION: FOOT

## 2023-06-28 ASSESSMENT — PAIN SCALES - GENERAL
PAINLEVEL_OUTOF10: 10
PAINLEVEL_OUTOF10: 10
PAINLEVEL_OUTOF10: 8
PAINLEVEL_OUTOF10: 9

## 2023-06-28 ASSESSMENT — PAIN DESCRIPTION - ORIENTATION
ORIENTATION: RIGHT;LEFT

## 2023-06-28 ASSESSMENT — PAIN DESCRIPTION - DESCRIPTORS
DESCRIPTORS: SHARP

## 2023-06-29 ENCOUNTER — ANESTHESIA EVENT (OUTPATIENT)
Facility: HOSPITAL | Age: 48
End: 2023-06-29
Payer: COMMERCIAL

## 2023-06-29 ENCOUNTER — ANESTHESIA (OUTPATIENT)
Facility: HOSPITAL | Age: 48
End: 2023-06-29
Payer: COMMERCIAL

## 2023-06-29 LAB
ANION GAP BLD CALC-SCNC: 4 (ref 10–20)
ANION GAP BLD CALC-SCNC: ABNORMAL MMOL/L (ref 10–20)
BASE EXCESS BLD CALC-SCNC: 2.9 MMOL/L
CA-I BLD-MCNC: 0.91 MMOL/L (ref 1.12–1.32)
CA-I BLD-MCNC: 0.94 MMOL/L (ref 1.12–1.32)
CHLORIDE BLD-SCNC: 102 MMOL/L (ref 100–108)
CHLORIDE BLD-SCNC: 104 MMOL/L (ref 98–107)
CO2 BLD-SCNC: 28 MMOL/L (ref 19–24)
CREAT UR-MCNC: 6 MG/DL (ref 0.6–1.3)
GLUCOSE BLD STRIP.AUTO-MCNC: 107 MG/DL (ref 65–117)
GLUCOSE BLD STRIP.AUTO-MCNC: 119 MG/DL (ref 65–117)
GLUCOSE BLD STRIP.AUTO-MCNC: 119 MG/DL (ref 74–106)
GLUCOSE BLD STRIP.AUTO-MCNC: 133 MG/DL (ref 65–117)
GLUCOSE BLD STRIP.AUTO-MCNC: 140 MG/DL (ref 65–117)
GLUCOSE BLD STRIP.AUTO-MCNC: 222 MG/DL (ref 65–117)
HCO3 BLDA-SCNC: 28 MMOL/L
LACTATE BLD-SCNC: 0.36 MMOL/L (ref 0.4–2)
PCO2 BLDV: 42.3 MMHG (ref 41–51)
PH BLDV: 7.42 (ref 7.32–7.42)
PO2 BLDV: 47 MMHG (ref 25–40)
POTASSIUM BLD-SCNC: 4.8 MMOL/L (ref 3.5–5.5)
POTASSIUM BLD-SCNC: ABNORMAL MMOL/L (ref 3.5–5.1)
SAO2 % BLD: 83 %
SERVICE CMNT-IMP: ABNORMAL
SERVICE CMNT-IMP: NORMAL
SODIUM BLD-SCNC: 134 MMOL/L (ref 136–145)
SODIUM BLD-SCNC: 134 MMOL/L (ref 136–145)
SPECIMEN SITE: ABNORMAL

## 2023-06-29 PROCEDURE — 84132 ASSAY OF SERUM POTASSIUM: CPT

## 2023-06-29 PROCEDURE — 87070 CULTURE OTHR SPECIMN AEROBIC: CPT

## 2023-06-29 PROCEDURE — 6370000000 HC RX 637 (ALT 250 FOR IP): Performed by: HOSPITALIST

## 2023-06-29 PROCEDURE — 6360000002 HC RX W HCPCS: Performed by: INTERNAL MEDICINE

## 2023-06-29 PROCEDURE — 84295 ASSAY OF SERUM SODIUM: CPT

## 2023-06-29 PROCEDURE — 2580000003 HC RX 258: Performed by: INTERNAL MEDICINE

## 2023-06-29 PROCEDURE — 3600000013 HC SURGERY LEVEL 3 ADDTL 15MIN: Performed by: PODIATRIST

## 2023-06-29 PROCEDURE — 2709999900 HC NON-CHARGEABLE SUPPLY: Performed by: PODIATRIST

## 2023-06-29 PROCEDURE — 6370000000 HC RX 637 (ALT 250 FOR IP): Performed by: INTERNAL MEDICINE

## 2023-06-29 PROCEDURE — 3700000000 HC ANESTHESIA ATTENDED CARE: Performed by: PODIATRIST

## 2023-06-29 PROCEDURE — 7100000001 HC PACU RECOVERY - ADDTL 15 MIN: Performed by: PODIATRIST

## 2023-06-29 PROCEDURE — 82947 ASSAY GLUCOSE BLOOD QUANT: CPT

## 2023-06-29 PROCEDURE — 0QBN0ZZ EXCISION OF RIGHT METATARSAL, OPEN APPROACH: ICD-10-PCS | Performed by: PODIATRIST

## 2023-06-29 PROCEDURE — 1100000000 HC RM PRIVATE

## 2023-06-29 PROCEDURE — 82962 GLUCOSE BLOOD TEST: CPT

## 2023-06-29 PROCEDURE — 6370000000 HC RX 637 (ALT 250 FOR IP): Performed by: STUDENT IN AN ORGANIZED HEALTH CARE EDUCATION/TRAINING PROGRAM

## 2023-06-29 PROCEDURE — 3700000001 HC ADD 15 MINUTES (ANESTHESIA): Performed by: PODIATRIST

## 2023-06-29 PROCEDURE — 3600000003 HC SURGERY LEVEL 3 BASE: Performed by: PODIATRIST

## 2023-06-29 PROCEDURE — 82330 ASSAY OF CALCIUM: CPT

## 2023-06-29 PROCEDURE — 87186 SC STD MICRODIL/AGAR DIL: CPT

## 2023-06-29 PROCEDURE — 87147 CULTURE TYPE IMMUNOLOGIC: CPT

## 2023-06-29 PROCEDURE — 6360000002 HC RX W HCPCS: Performed by: NURSE ANESTHETIST, CERTIFIED REGISTERED

## 2023-06-29 PROCEDURE — 11047 DBRDMT BONE EACH ADDL: CPT | Performed by: PODIATRIST

## 2023-06-29 PROCEDURE — 7100000000 HC PACU RECOVERY - FIRST 15 MIN: Performed by: PODIATRIST

## 2023-06-29 PROCEDURE — 2580000003 HC RX 258: Performed by: NURSE ANESTHETIST, CERTIFIED REGISTERED

## 2023-06-29 PROCEDURE — 87176 TISSUE HOMOGENIZATION CULTR: CPT

## 2023-06-29 PROCEDURE — 2500000003 HC RX 250 WO HCPCS: Performed by: NURSE ANESTHETIST, CERTIFIED REGISTERED

## 2023-06-29 PROCEDURE — 82803 BLOOD GASES ANY COMBINATION: CPT

## 2023-06-29 PROCEDURE — 11044 DBRDMT BONE 1ST 20 SQ CM/<: CPT | Performed by: PODIATRIST

## 2023-06-29 PROCEDURE — 87077 CULTURE AEROBIC IDENTIFY: CPT

## 2023-06-29 PROCEDURE — 87205 SMEAR GRAM STAIN: CPT

## 2023-06-29 RX ORDER — LIDOCAINE HYDROCHLORIDE 10 MG/ML
1 INJECTION, SOLUTION EPIDURAL; INFILTRATION; INTRACAUDAL; PERINEURAL
Status: DISCONTINUED | OUTPATIENT
Start: 2023-06-29 | End: 2023-06-29 | Stop reason: HOSPADM

## 2023-06-29 RX ORDER — HYDRALAZINE HYDROCHLORIDE 20 MG/ML
10 INJECTION INTRAMUSCULAR; INTRAVENOUS
Status: DISCONTINUED | OUTPATIENT
Start: 2023-06-29 | End: 2023-06-29 | Stop reason: HOSPADM

## 2023-06-29 RX ORDER — MIDAZOLAM HYDROCHLORIDE 1 MG/ML
INJECTION INTRAMUSCULAR; INTRAVENOUS PRN
Status: DISCONTINUED | OUTPATIENT
Start: 2023-06-29 | End: 2023-06-29 | Stop reason: SDUPTHER

## 2023-06-29 RX ORDER — DEXAMETHASONE SODIUM PHOSPHATE 4 MG/ML
INJECTION, SOLUTION INTRA-ARTICULAR; INTRALESIONAL; INTRAMUSCULAR; INTRAVENOUS; SOFT TISSUE PRN
Status: DISCONTINUED | OUTPATIENT
Start: 2023-06-29 | End: 2023-06-29 | Stop reason: SDUPTHER

## 2023-06-29 RX ORDER — 0.9 % SODIUM CHLORIDE 0.9 %
INTRAVENOUS SOLUTION INTRAVENOUS PRN
Status: DISCONTINUED | OUTPATIENT
Start: 2023-06-29 | End: 2023-06-29 | Stop reason: SDUPTHER

## 2023-06-29 RX ORDER — PROCHLORPERAZINE EDISYLATE 5 MG/ML
5 INJECTION INTRAMUSCULAR; INTRAVENOUS
Status: DISCONTINUED | OUTPATIENT
Start: 2023-06-29 | End: 2023-06-29 | Stop reason: HOSPADM

## 2023-06-29 RX ORDER — SODIUM CHLORIDE 0.9 % (FLUSH) 0.9 %
5-40 SYRINGE (ML) INJECTION PRN
Status: DISCONTINUED | OUTPATIENT
Start: 2023-06-29 | End: 2023-06-29 | Stop reason: HOSPADM

## 2023-06-29 RX ORDER — OXYCODONE HYDROCHLORIDE 5 MG/1
5 TABLET ORAL
Status: DISCONTINUED | OUTPATIENT
Start: 2023-06-29 | End: 2023-06-29 | Stop reason: HOSPADM

## 2023-06-29 RX ORDER — MIDAZOLAM HYDROCHLORIDE 2 MG/2ML
2 INJECTION, SOLUTION INTRAMUSCULAR; INTRAVENOUS
Status: DISCONTINUED | OUTPATIENT
Start: 2023-06-29 | End: 2023-06-29 | Stop reason: HOSPADM

## 2023-06-29 RX ORDER — FENTANYL CITRATE 50 UG/ML
25 INJECTION, SOLUTION INTRAMUSCULAR; INTRAVENOUS EVERY 5 MIN PRN
Status: DISCONTINUED | OUTPATIENT
Start: 2023-06-29 | End: 2023-06-29 | Stop reason: HOSPADM

## 2023-06-29 RX ORDER — ONDANSETRON 2 MG/ML
4 INJECTION INTRAMUSCULAR; INTRAVENOUS
Status: DISCONTINUED | OUTPATIENT
Start: 2023-06-29 | End: 2023-06-29 | Stop reason: HOSPADM

## 2023-06-29 RX ORDER — ACETAMINOPHEN 500 MG
1000 TABLET ORAL ONCE
Status: DISCONTINUED | OUTPATIENT
Start: 2023-06-29 | End: 2023-06-29 | Stop reason: HOSPADM

## 2023-06-29 RX ORDER — SODIUM CHLORIDE 9 MG/ML
INJECTION, SOLUTION INTRAVENOUS PRN
Status: DISCONTINUED | OUTPATIENT
Start: 2023-06-29 | End: 2023-06-29 | Stop reason: HOSPADM

## 2023-06-29 RX ORDER — LIDOCAINE HYDROCHLORIDE 20 MG/ML
INJECTION, SOLUTION EPIDURAL; INFILTRATION; INTRACAUDAL; PERINEURAL PRN
Status: DISCONTINUED | OUTPATIENT
Start: 2023-06-29 | End: 2023-06-29 | Stop reason: SDUPTHER

## 2023-06-29 RX ORDER — SODIUM CHLORIDE 0.9 % (FLUSH) 0.9 %
5-40 SYRINGE (ML) INJECTION EVERY 12 HOURS SCHEDULED
Status: DISCONTINUED | OUTPATIENT
Start: 2023-06-29 | End: 2023-06-29 | Stop reason: HOSPADM

## 2023-06-29 RX ORDER — HYDROMORPHONE HYDROCHLORIDE 1 MG/ML
0.5 INJECTION, SOLUTION INTRAMUSCULAR; INTRAVENOUS; SUBCUTANEOUS EVERY 5 MIN PRN
Status: DISCONTINUED | OUTPATIENT
Start: 2023-06-29 | End: 2023-06-29 | Stop reason: HOSPADM

## 2023-06-29 RX ORDER — SODIUM CHLORIDE, SODIUM LACTATE, POTASSIUM CHLORIDE, CALCIUM CHLORIDE 600; 310; 30; 20 MG/100ML; MG/100ML; MG/100ML; MG/100ML
INJECTION, SOLUTION INTRAVENOUS CONTINUOUS
Status: DISCONTINUED | OUTPATIENT
Start: 2023-06-29 | End: 2023-06-30

## 2023-06-29 RX ORDER — FENTANYL CITRATE 50 UG/ML
100 INJECTION, SOLUTION INTRAMUSCULAR; INTRAVENOUS
Status: DISCONTINUED | OUTPATIENT
Start: 2023-06-29 | End: 2023-06-29 | Stop reason: HOSPADM

## 2023-06-29 RX ORDER — ONDANSETRON 2 MG/ML
INJECTION INTRAMUSCULAR; INTRAVENOUS PRN
Status: DISCONTINUED | OUTPATIENT
Start: 2023-06-29 | End: 2023-06-29 | Stop reason: SDUPTHER

## 2023-06-29 RX ORDER — FENTANYL CITRATE 50 UG/ML
INJECTION, SOLUTION INTRAMUSCULAR; INTRAVENOUS PRN
Status: DISCONTINUED | OUTPATIENT
Start: 2023-06-29 | End: 2023-06-29 | Stop reason: SDUPTHER

## 2023-06-29 RX ADMIN — DIVALPROEX SODIUM 500 MG: 125 CAPSULE, COATED PELLETS ORAL at 09:43

## 2023-06-29 RX ADMIN — MIDAZOLAM 1 MG: 1 INJECTION INTRAMUSCULAR; INTRAVENOUS at 18:26

## 2023-06-29 RX ADMIN — DIVALPROEX SODIUM 500 MG: 125 CAPSULE, COATED PELLETS ORAL at 22:42

## 2023-06-29 RX ADMIN — INSULIN GLARGINE 7 UNITS: 100 INJECTION, SOLUTION SUBCUTANEOUS at 22:42

## 2023-06-29 RX ADMIN — SODIUM CHLORIDE 100 ML: 9 INJECTION, SOLUTION INTRAVENOUS at 18:26

## 2023-06-29 RX ADMIN — FERROUS SULFATE TAB 325 MG (65 MG ELEMENTAL FE) 325 MG: 325 (65 FE) TAB at 09:45

## 2023-06-29 RX ADMIN — OXYCODONE HYDROCHLORIDE AND ACETAMINOPHEN 2 TABLET: 5; 325 TABLET ORAL at 22:42

## 2023-06-29 RX ADMIN — OXYCODONE HYDROCHLORIDE AND ACETAMINOPHEN 2 TABLET: 5; 325 TABLET ORAL at 03:44

## 2023-06-29 RX ADMIN — FENTANYL CITRATE 10 MCG: 50 INJECTION, SOLUTION INTRAMUSCULAR; INTRAVENOUS at 18:30

## 2023-06-29 RX ADMIN — ALUMINUM HYDROXIDE, MAGNESIUM HYDROXIDE, AND DIMETHICONE 15 ML: 400; 400; 40 SUSPENSION ORAL at 10:45

## 2023-06-29 RX ADMIN — ONDANSETRON HYDROCHLORIDE 4 MG: 2 INJECTION, SOLUTION INTRAMUSCULAR; INTRAVENOUS at 18:32

## 2023-06-29 RX ADMIN — NIFEDIPINE 30 MG: 30 TABLET, EXTENDED RELEASE ORAL at 09:44

## 2023-06-29 RX ADMIN — OXYCODONE HYDROCHLORIDE AND ACETAMINOPHEN 2 TABLET: 5; 325 TABLET ORAL at 09:42

## 2023-06-29 RX ADMIN — FENTANYL CITRATE 10 MCG: 50 INJECTION, SOLUTION INTRAMUSCULAR; INTRAVENOUS at 18:26

## 2023-06-29 RX ADMIN — ATORVASTATIN CALCIUM 40 MG: 40 TABLET, FILM COATED ORAL at 09:44

## 2023-06-29 RX ADMIN — ATENOLOL 25 MG: 25 TABLET ORAL at 09:44

## 2023-06-29 RX ADMIN — PIPERACILLIN AND TAZOBACTAM 3375 MG: 3; .375 INJECTION, POWDER, LYOPHILIZED, FOR SOLUTION INTRAVENOUS at 22:52

## 2023-06-29 RX ADMIN — ALUMINUM HYDROXIDE, MAGNESIUM HYDROXIDE, AND DIMETHICONE 15 ML: 400; 400; 40 SUSPENSION ORAL at 03:59

## 2023-06-29 RX ADMIN — NIFEDIPINE 30 MG: 30 TABLET, EXTENDED RELEASE ORAL at 22:42

## 2023-06-29 RX ADMIN — LIDOCAINE HYDROCHLORIDE 100 MG: 20 INJECTION, SOLUTION EPIDURAL; INFILTRATION; INTRACAUDAL; PERINEURAL at 18:28

## 2023-06-29 RX ADMIN — VANCOMYCIN HYDROCHLORIDE 750 MG: 750 INJECTION, POWDER, LYOPHILIZED, FOR SOLUTION INTRAVENOUS at 18:26

## 2023-06-29 RX ADMIN — ASPIRIN 81 MG: 81 TABLET, COATED ORAL at 09:44

## 2023-06-29 RX ADMIN — Medication 1 UNITS: at 09:46

## 2023-06-29 RX ADMIN — DEXAMETHASONE SODIUM PHOSPHATE 4 MG: 4 INJECTION, SOLUTION INTRAMUSCULAR; INTRAVENOUS at 18:32

## 2023-06-29 RX ADMIN — ARIPIPRAZOLE 10 MG: 5 TABLET ORAL at 09:44

## 2023-06-29 RX ADMIN — PROPOFOL 30 MCG/KG/MIN: 10 INJECTION, EMULSION INTRAVENOUS at 18:30

## 2023-06-29 RX ADMIN — PIPERACILLIN AND TAZOBACTAM 3375 MG: 3; .375 INJECTION, POWDER, LYOPHILIZED, FOR SOLUTION INTRAVENOUS at 09:45

## 2023-06-29 RX ADMIN — VANCOMYCIN HYDROCHLORIDE 750 MG: 750 INJECTION, POWDER, LYOPHILIZED, FOR SOLUTION INTRAVENOUS at 17:28

## 2023-06-29 ASSESSMENT — PAIN SCALES - GENERAL
PAINLEVEL_OUTOF10: 10
PAINLEVEL_OUTOF10: 7
PAINLEVEL_OUTOF10: 8
PAINLEVEL_OUTOF10: 6
PAINLEVEL_OUTOF10: 0
PAINLEVEL_OUTOF10: 2

## 2023-06-29 ASSESSMENT — PAIN DESCRIPTION - LOCATION
LOCATION: LEG
LOCATION: FOOT

## 2023-06-29 ASSESSMENT — PAIN - FUNCTIONAL ASSESSMENT: PAIN_FUNCTIONAL_ASSESSMENT: 0-10

## 2023-06-29 ASSESSMENT — PAIN DESCRIPTION - DESCRIPTORS: DESCRIPTORS: ACHING

## 2023-06-30 LAB
ANION GAP SERPL CALC-SCNC: 5 MMOL/L (ref 5–15)
BASOPHILS # BLD: 0 K/UL (ref 0–0.1)
BASOPHILS NFR BLD: 0 % (ref 0–1)
BUN SERPL-MCNC: 36 MG/DL (ref 6–20)
BUN/CREAT SERPL: 8 (ref 12–20)
CALCIUM SERPL-MCNC: 7.2 MG/DL (ref 8.5–10.1)
CHLORIDE SERPL-SCNC: 99 MMOL/L (ref 97–108)
CO2 SERPL-SCNC: 30 MMOL/L (ref 21–32)
CREAT SERPL-MCNC: 4.24 MG/DL (ref 0.7–1.3)
DIFFERENTIAL METHOD BLD: ABNORMAL
EOSINOPHIL # BLD: 0 K/UL (ref 0–0.4)
EOSINOPHIL NFR BLD: 0 % (ref 0–7)
ERYTHROCYTE [DISTWIDTH] IN BLOOD BY AUTOMATED COUNT: 16.5 % (ref 11.5–14.5)
GLUCOSE BLD STRIP.AUTO-MCNC: 145 MG/DL (ref 65–117)
GLUCOSE BLD STRIP.AUTO-MCNC: 281 MG/DL (ref 65–117)
GLUCOSE BLD STRIP.AUTO-MCNC: 409 MG/DL (ref 65–117)
GLUCOSE SERPL-MCNC: 203 MG/DL (ref 65–100)
HCT VFR BLD AUTO: 32.4 % (ref 36.6–50.3)
HGB BLD-MCNC: 10 G/DL (ref 12.1–17)
IMM GRANULOCYTES # BLD AUTO: 0.1 K/UL (ref 0–0.04)
IMM GRANULOCYTES NFR BLD AUTO: 1 % (ref 0–0.5)
LYMPHOCYTES # BLD: 1 K/UL (ref 0.8–3.5)
LYMPHOCYTES NFR BLD: 9 % (ref 12–49)
MAGNESIUM SERPL-MCNC: 2.6 MG/DL (ref 1.6–2.4)
MCH RBC QN AUTO: 27.5 PG (ref 26–34)
MCHC RBC AUTO-ENTMCNC: 30.9 G/DL (ref 30–36.5)
MCV RBC AUTO: 89.3 FL (ref 80–99)
MONOCYTES # BLD: 0.3 K/UL (ref 0–1)
MONOCYTES NFR BLD: 3 % (ref 5–13)
NEUTS SEG # BLD: 9.9 K/UL (ref 1.8–8)
NEUTS SEG NFR BLD: 87 % (ref 32–75)
NRBC # BLD: 0 K/UL (ref 0–0.01)
NRBC BLD-RTO: 0 PER 100 WBC
PLATELET # BLD AUTO: 238 K/UL (ref 150–400)
POTASSIUM SERPL-SCNC: 4.5 MMOL/L (ref 3.5–5.1)
RBC # BLD AUTO: 3.63 M/UL (ref 4.1–5.7)
RBC MORPH BLD: ABNORMAL
SERVICE CMNT-IMP: ABNORMAL
SODIUM SERPL-SCNC: 134 MMOL/L (ref 136–145)
WBC # BLD AUTO: 11.3 K/UL (ref 4.1–11.1)

## 2023-06-30 PROCEDURE — 83735 ASSAY OF MAGNESIUM: CPT

## 2023-06-30 PROCEDURE — 82962 GLUCOSE BLOOD TEST: CPT

## 2023-06-30 PROCEDURE — 6360000002 HC RX W HCPCS: Performed by: INTERNAL MEDICINE

## 2023-06-30 PROCEDURE — 80048 BASIC METABOLIC PNL TOTAL CA: CPT

## 2023-06-30 PROCEDURE — 1100000000 HC RM PRIVATE

## 2023-06-30 PROCEDURE — 85025 COMPLETE CBC W/AUTO DIFF WBC: CPT

## 2023-06-30 PROCEDURE — 87350 HEPATITIS BE AG IA: CPT

## 2023-06-30 PROCEDURE — 90935 HEMODIALYSIS ONE EVALUATION: CPT

## 2023-06-30 PROCEDURE — 86707 HEPATITIS BE ANTIBODY: CPT

## 2023-06-30 PROCEDURE — 6370000000 HC RX 637 (ALT 250 FOR IP): Performed by: HOSPITALIST

## 2023-06-30 PROCEDURE — 6370000000 HC RX 637 (ALT 250 FOR IP): Performed by: INTERNAL MEDICINE

## 2023-06-30 PROCEDURE — 36415 COLL VENOUS BLD VENIPUNCTURE: CPT

## 2023-06-30 PROCEDURE — 2580000003 HC RX 258: Performed by: INTERNAL MEDICINE

## 2023-06-30 RX ORDER — AMOXICILLIN AND CLAVULANATE POTASSIUM 500; 125 MG/1; MG/1
1 TABLET, FILM COATED ORAL DAILY
Qty: 14 TABLET | Refills: 0 | OUTPATIENT
Start: 2023-06-30 | End: 2023-07-14

## 2023-06-30 RX ORDER — CALCIUM CARBONATE 500 MG/1
500 TABLET, CHEWABLE ORAL 3 TIMES DAILY PRN
Status: DISCONTINUED | OUTPATIENT
Start: 2023-06-30 | End: 2023-07-01 | Stop reason: HOSPADM

## 2023-06-30 RX ORDER — SULFAMETHOXAZOLE AND TRIMETHOPRIM 400; 80 MG/1; MG/1
1 TABLET ORAL 2 TIMES DAILY
Qty: 28 TABLET | Refills: 0 | OUTPATIENT
Start: 2023-06-30 | End: 2023-07-14

## 2023-06-30 RX ADMIN — NIFEDIPINE 30 MG: 30 TABLET, EXTENDED RELEASE ORAL at 22:27

## 2023-06-30 RX ADMIN — DIVALPROEX SODIUM 500 MG: 125 CAPSULE, COATED PELLETS ORAL at 21:00

## 2023-06-30 RX ADMIN — NIFEDIPINE 30 MG: 30 TABLET, EXTENDED RELEASE ORAL at 09:57

## 2023-06-30 RX ADMIN — ATORVASTATIN CALCIUM 40 MG: 40 TABLET, FILM COATED ORAL at 11:45

## 2023-06-30 RX ADMIN — ASPIRIN 81 MG: 81 TABLET, COATED ORAL at 09:56

## 2023-06-30 RX ADMIN — INSULIN GLARGINE 7 UNITS: 100 INJECTION, SOLUTION SUBCUTANEOUS at 21:00

## 2023-06-30 RX ADMIN — Medication 2 UNITS: at 11:47

## 2023-06-30 RX ADMIN — PIPERACILLIN AND TAZOBACTAM 3375 MG: 3; .375 INJECTION, POWDER, LYOPHILIZED, FOR SOLUTION INTRAVENOUS at 09:55

## 2023-06-30 RX ADMIN — ARIPIPRAZOLE 10 MG: 5 TABLET ORAL at 11:44

## 2023-06-30 RX ADMIN — ATENOLOL 25 MG: 25 TABLET ORAL at 09:56

## 2023-06-30 RX ADMIN — OXYCODONE HYDROCHLORIDE AND ACETAMINOPHEN 2 TABLET: 5; 325 TABLET ORAL at 10:18

## 2023-06-30 RX ADMIN — DIVALPROEX SODIUM 500 MG: 125 CAPSULE, COATED PELLETS ORAL at 11:45

## 2023-06-30 RX ADMIN — OXYCODONE HYDROCHLORIDE AND ACETAMINOPHEN 2 TABLET: 5; 325 TABLET ORAL at 18:59

## 2023-06-30 RX ADMIN — Medication 4 UNITS: at 09:57

## 2023-06-30 RX ADMIN — FERROUS SULFATE TAB 325 MG (65 MG ELEMENTAL FE) 325 MG: 325 (65 FE) TAB at 11:46

## 2023-06-30 ASSESSMENT — PAIN DESCRIPTION - ORIENTATION
ORIENTATION: LEFT

## 2023-06-30 ASSESSMENT — PAIN SCALES - GENERAL
PAINLEVEL_OUTOF10: 6
PAINLEVEL_OUTOF10: 2
PAINLEVEL_OUTOF10: 6
PAINLEVEL_OUTOF10: 2
PAINLEVEL_OUTOF10: 0
PAINLEVEL_OUTOF10: 2
PAINLEVEL_OUTOF10: 0

## 2023-06-30 ASSESSMENT — PAIN DESCRIPTION - LOCATION
LOCATION: FOOT
LOCATION: LEG
LOCATION: FOOT

## 2023-06-30 ASSESSMENT — PAIN DESCRIPTION - DESCRIPTORS: DESCRIPTORS: ACHING

## 2023-07-01 VITALS
BODY MASS INDEX: 24.49 KG/M2 | DIASTOLIC BLOOD PRESSURE: 78 MMHG | WEIGHT: 180.78 LBS | HEART RATE: 67 BPM | RESPIRATION RATE: 18 BRPM | OXYGEN SATURATION: 96 % | HEIGHT: 72 IN | SYSTOLIC BLOOD PRESSURE: 155 MMHG | TEMPERATURE: 98.2 F

## 2023-07-01 LAB — HBV E AG SERPL QL IA: NEGATIVE

## 2023-07-01 PROCEDURE — 97161 PT EVAL LOW COMPLEX 20 MIN: CPT

## 2023-07-01 PROCEDURE — 97166 OT EVAL MOD COMPLEX 45 MIN: CPT

## 2023-07-01 PROCEDURE — 97535 SELF CARE MNGMENT TRAINING: CPT

## 2023-07-01 PROCEDURE — 6370000000 HC RX 637 (ALT 250 FOR IP): Performed by: PHYSICIAN ASSISTANT

## 2023-07-01 PROCEDURE — 99232 SBSQ HOSP IP/OBS MODERATE 35: CPT | Performed by: PODIATRIST

## 2023-07-01 PROCEDURE — 97530 THERAPEUTIC ACTIVITIES: CPT

## 2023-07-01 PROCEDURE — 6370000000 HC RX 637 (ALT 250 FOR IP): Performed by: INTERNAL MEDICINE

## 2023-07-01 RX ORDER — OXYCODONE HYDROCHLORIDE AND ACETAMINOPHEN 5; 325 MG/1; MG/1
1 TABLET ORAL EVERY 6 HOURS PRN
Qty: 6 TABLET | Refills: 0 | Status: SHIPPED | OUTPATIENT
Start: 2023-07-01 | End: 2023-07-03

## 2023-07-01 RX ORDER — SULFAMETHOXAZOLE AND TRIMETHOPRIM 400; 80 MG/1; MG/1
1 TABLET ORAL DAILY
Qty: 14 TABLET | Refills: 0 | Status: SHIPPED | OUTPATIENT
Start: 2023-07-01 | End: 2023-07-15

## 2023-07-01 RX ORDER — AMOXICILLIN AND CLAVULANATE POTASSIUM 500; 125 MG/1; MG/1
1 TABLET, FILM COATED ORAL 2 TIMES DAILY
Qty: 28 TABLET | Refills: 0 | Status: SHIPPED | OUTPATIENT
Start: 2023-07-01 | End: 2023-07-15

## 2023-07-01 RX ADMIN — ANTACID TABLETS 500 MG: 500 TABLET, CHEWABLE ORAL at 04:19

## 2023-07-01 RX ADMIN — OXYCODONE HYDROCHLORIDE AND ACETAMINOPHEN 2 TABLET: 5; 325 TABLET ORAL at 11:03

## 2023-07-01 ASSESSMENT — PAIN DESCRIPTION - PAIN TYPE: TYPE: CHRONIC PAIN

## 2023-07-01 ASSESSMENT — PAIN DESCRIPTION - ORIENTATION: ORIENTATION: RIGHT;LEFT

## 2023-07-01 ASSESSMENT — ENCOUNTER SYMPTOMS
VOMITING: 0
SHORTNESS OF BREATH: 0
DIARRHEA: 0
ABDOMINAL PAIN: 0

## 2023-07-01 ASSESSMENT — PAIN SCALES - GENERAL: PAINLEVEL_OUTOF10: 10

## 2023-07-01 ASSESSMENT — PAIN DESCRIPTION - LOCATION: LOCATION: FOOT;LEG

## 2023-07-01 ASSESSMENT — PAIN DESCRIPTION - DESCRIPTORS: DESCRIPTORS: ACHING

## 2023-07-02 ENCOUNTER — HOME CARE VISIT (OUTPATIENT)
Facility: HOME HEALTH | Age: 48
End: 2023-07-02
Payer: COMMERCIAL

## 2023-07-02 LAB
BACTERIA SPEC CULT: ABNORMAL
GRAM STN SPEC: ABNORMAL
GRAM STN SPEC: ABNORMAL
SERVICE CMNT-IMP: ABNORMAL

## 2023-07-02 PROCEDURE — G0299 HHS/HOSPICE OF RN EA 15 MIN: HCPCS

## 2023-07-03 ENCOUNTER — HOME CARE VISIT (OUTPATIENT)
Dept: HOME HEALTH SERVICES | Facility: HOME HEALTH | Age: 48
End: 2023-07-03
Payer: COMMERCIAL

## 2023-07-03 ENCOUNTER — HOME CARE VISIT (OUTPATIENT)
Facility: HOME HEALTH | Age: 48
End: 2023-07-03
Payer: COMMERCIAL

## 2023-07-03 VITALS
HEART RATE: 72 BPM | TEMPERATURE: 98 F | SYSTOLIC BLOOD PRESSURE: 138 MMHG | OXYGEN SATURATION: 97 % | DIASTOLIC BLOOD PRESSURE: 64 MMHG | RESPIRATION RATE: 16 BRPM

## 2023-07-03 PROCEDURE — G0151 HHCP-SERV OF PT,EA 15 MIN: HCPCS

## 2023-07-03 ASSESSMENT — ENCOUNTER SYMPTOMS: PAIN LOCATION - PAIN QUALITY: ACHING, SHOOTING

## 2023-07-03 NOTE — HOME HEALTH
Skilled reason for admission/summary of clinical condition: 51year old male admitted to home care s/p recent hospitalization for debridedement of right foot, now heel touch weight bearing. Diagnosis:  ESRD onHD, DM, Clinically blind, right toe ampuation   Subjective: patient reports being in a lot of pain and wants a wheelchair-states everytime he puts weight on his heel his foot bleeds. Caregiver: mother. Caregiver assists with: Medications and Meals Caregiver unable to assist with: ADL and Transportation. Caregiver is available Regularly Caregiver is  present at this visit and did participate with clinician. Medications reviewed-no changes since SN admission visit. Home health supplies by type and quantity ordered/delivered this visit include: patient needs walker and wheelchair, will send information to [employer] supply  Patient/caregiver instructed on plan of care and are agreeable to plan of care at this time. Clinician reviewed orientation to home health booklet with patient/caregiver including agency phone number, agency complaint process, state hotline number, as well as joint commission's quality hotline number. Consent forms signed. Patient at risk for falls Yes:   Recommended requesting PT/OT orders due to fall risk YES:   Patient response to recommended requesting of PT/OT orders: agreeable    Discharge planning discussed with patient and caregiver. Discharge planning as follows: Will discharge when the patient has reached their maximum functional potential and maximum safety in their home Patient/caregiver did verbalize agreement with discharge planning. Clinical Assessment (What this means for the patient overall and need for ongoing skilled care): Patient presents today after hospitalization and is now heel touch weight bearing. He is experiencing high levels of pain which impact his ability to sleep and perform functional mobility.  Prior to this, he was ambulatory with cane

## 2023-07-04 ENCOUNTER — HOME CARE VISIT (OUTPATIENT)
Dept: HOME HEALTH SERVICES | Facility: HOME HEALTH | Age: 48
End: 2023-07-04
Payer: COMMERCIAL

## 2023-07-04 VITALS
DIASTOLIC BLOOD PRESSURE: 80 MMHG | SYSTOLIC BLOOD PRESSURE: 138 MMHG | OXYGEN SATURATION: 94 % | RESPIRATION RATE: 16 BRPM | TEMPERATURE: 98.2 F | HEART RATE: 72 BPM

## 2023-07-04 LAB — HBV E AB SERPL QL IA: NEGATIVE

## 2023-07-04 ASSESSMENT — ENCOUNTER SYMPTOMS: DYSPNEA ACTIVITY LEVEL: AFTER AMBULATING 10 - 20 FT

## 2023-07-05 ENCOUNTER — HOME CARE VISIT (OUTPATIENT)
Facility: HOME HEALTH | Age: 48
End: 2023-07-05
Payer: COMMERCIAL

## 2023-07-05 ENCOUNTER — HOME CARE VISIT (OUTPATIENT)
Dept: HOME HEALTH SERVICES | Facility: HOME HEALTH | Age: 48
End: 2023-07-05
Payer: COMMERCIAL

## 2023-07-05 NOTE — HOME HEALTH
Skilled reason for admission/summary of clinical condition: Daryl Luo MD: \"Paraslori Cochran is a 51 y.o.  male with PMHx significant for ESRD onHD, DM, Clinically blind, right toe ampuation came to ED for c/o draining right foot wound. Patient tells m e he het HH once weekly , who change her dressing, HH noticed draining wounbd today and send pt. To ED. He reports b/l chronic leg pain. Pt had wound debrided 6/29/23 and now home for wound care and to go to wound clinic every Tuesday. Diagnosis: Right foot osteomyelitis, chronic foot wound   Subjective: That nurse gave me the infection  Caregiver: Peterson Callas mother is present and assits [facility] medications, meals, personal care needs. Medications reconciled and all medications are available in the home this visit. The following education was provided regarding medications: medication interactions and look alike medications:  Patient/CG able to demonstrate knowledge through teach back with 95 percent accuracy. Medications are effective at this time. notified of any discrepancies/medication interactions n/a   A list of reconciled medications has been a copy has been uploaded to media. Home health supplies by type and quantity ordered/delivered this visit include: ordered  Patient/caregiver instructed on plan of care and are agreeable to plan of care at this time. Clinician reviewed orientation to home health booklet with patient/caregiver including agency phone number, agency complaint process, state hotline number, as well as joint commission's quality hotline number. Consent forms signed. Patient at risk for falls Yes:   Recommended requesting PT/OT orders due to fall risk YES:   Patient response to recommended requesting of PT/OT orders: ordered    Discharge planning discussed with patient and caregiver. Discharge planning as follows: When wound care goals met, wound healed, or caregiver is able to perform wound care.   Patient/caregiver did

## 2023-07-08 LAB
ECHO BSA: 2.04 M2
VAS LEFT ATA DIST PSV: 0 CM/S
VAS LEFT ATA MID PSV: 58.9 CM/S
VAS LEFT ATA PROX PSV: 66.2 CM/S
VAS LEFT CFA DIST PSV: 151.5 CM/S
VAS LEFT PERONEAL DIST PSV: 0 CM/S
VAS LEFT PFA PROX PSV: 128 CM/S
VAS LEFT POP A PROX PSV: 0 CM/S
VAS LEFT POP A PROX VEL RATIO: 0
VAS LEFT PTA DIST PSV: 84.4 CM/S
VAS LEFT PTA MID PSV: 99 CM/S
VAS LEFT SFA DIST PSV: 33.3 CM/S
VAS LEFT SFA DIST VEL RATIO: 0.38
VAS LEFT SFA MID PSV: 88.7 CM/S
VAS LEFT SFA MID VEL RATIO: 0.97
VAS LEFT SFA PROX PSV: 91.3 CM/S
VAS LEFT SFA PROX VEL RATIO: 0.6
VAS RIGHT ARM BP: 128 MMHG
VAS RIGHT ATA DIST PSV: 70.6 CM/S
VAS RIGHT ATA MID PSV: 72.8 CM/S
VAS RIGHT ATA PROX PSV: 53.1 CM/S
VAS RIGHT CFA DIST PSV: 145.4 CM/S
VAS RIGHT PERONEAL DIST PSV: 50.9 CM/S
VAS RIGHT PERONEAL MID PSV: 37.8 CM/S
VAS RIGHT PERONEAL PROX PSV: 33.4 CM/S
VAS RIGHT PFA PROX PSV: 176 CM/S
VAS RIGHT POP A DIST PSV: 53.1 CM/S
VAS RIGHT POP A PROX PSV: 64.1 CM/S
VAS RIGHT POP A PROX VEL RATIO: 1.32
VAS RIGHT PTA DIST PSV: 0 CM/S
VAS RIGHT PTA MID PSV: 0 CM/S
VAS RIGHT PTA PROX PSV: 0 CM/S
VAS RIGHT SFA DIST PSV: 48.7 CM/S
VAS RIGHT SFA DIST VEL RATIO: 0.63
VAS RIGHT SFA MID PSV: 77.2 CM/S
VAS RIGHT SFA MID VEL RATIO: 0.7
VAS RIGHT SFA PROX PSV: 108 CM/S
VAS RIGHT SFA PROX VEL RATIO: 0.7

## 2023-07-12 ENCOUNTER — TELEPHONE (OUTPATIENT)
Age: 48
End: 2023-07-12

## 2023-07-12 NOTE — TELEPHONE ENCOUNTER
Farhan with [facility] home [facility] would like a call from KATHIE HUA [facility] regarding patient blood pressure is running 220/186 refusing ER please give her a call @ [phone]

## 2023-07-12 NOTE — TELEPHONE ENCOUNTER
returned call to [facility],  stated pt is being non compliant with his medications, wound care,  bp 220/186- with same repeat , she advised pt to go to ER but he declined   call placed to pt,  stated he will call back after dialysis

## 2023-07-13 ENCOUNTER — HOSPITAL ENCOUNTER (INPATIENT)
Facility: HOSPITAL | Age: 48
LOS: 3 days | Discharge: HOME | DRG: 425 | End: 2023-07-16
Attending: EMERGENCY MEDICINE | Admitting: STUDENT IN AN ORGANIZED HEALTH CARE EDUCATION/TRAINING PROGRAM
Payer: COMMERCIAL

## 2023-07-13 ENCOUNTER — APPOINTMENT (OUTPATIENT)
Facility: HOSPITAL | Age: 48
DRG: 425 | End: 2023-07-13
Payer: COMMERCIAL

## 2023-07-13 DIAGNOSIS — J96.01 ACUTE RESPIRATORY FAILURE WITH HYPOXIA (HCC): Primary | ICD-10-CM

## 2023-07-13 DIAGNOSIS — I16.1 HYPERTENSIVE EMERGENCY: ICD-10-CM

## 2023-07-13 DIAGNOSIS — E87.70 HYPERVOLEMIA, UNSPECIFIED HYPERVOLEMIA TYPE: ICD-10-CM

## 2023-07-13 PROBLEM — R09.02 HYPOXIA: Status: ACTIVE | Noted: 2023-07-13

## 2023-07-13 LAB
ALBUMIN SERPL-MCNC: 2.2 G/DL (ref 3.5–5)
ALBUMIN/GLOB SERPL: 0.4 (ref 1.1–2.2)
ALP SERPL-CCNC: 931 U/L (ref 45–117)
ALT SERPL-CCNC: 46 U/L (ref 12–78)
ANION GAP SERPL CALC-SCNC: 9 MMOL/L (ref 5–15)
AST SERPL-CCNC: 44 U/L (ref 15–37)
BASOPHILS # BLD: 0 K/UL (ref 0–0.1)
BASOPHILS NFR BLD: 0 % (ref 0–1)
BILIRUB SERPL-MCNC: 0.6 MG/DL (ref 0.2–1)
BUN SERPL-MCNC: 40 MG/DL (ref 6–20)
BUN/CREAT SERPL: 7 (ref 12–20)
CALCIUM SERPL-MCNC: 8.2 MG/DL (ref 8.5–10.1)
CHLORIDE SERPL-SCNC: 100 MMOL/L (ref 97–108)
CO2 SERPL-SCNC: 24 MMOL/L (ref 21–32)
COMMENT:: NORMAL
COMMENT:: NORMAL
CREAT SERPL-MCNC: 5.84 MG/DL (ref 0.7–1.3)
CRP SERPL-MCNC: 6.8 MG/DL (ref 0–0.6)
DIFFERENTIAL METHOD BLD: ABNORMAL
EOSINOPHIL # BLD: 0.1 K/UL (ref 0–0.4)
EOSINOPHIL NFR BLD: 1 % (ref 0–7)
ERYTHROCYTE [DISTWIDTH] IN BLOOD BY AUTOMATED COUNT: 15.8 % (ref 11.5–14.5)
ERYTHROCYTE [SEDIMENTATION RATE] IN BLOOD: 127 MM/HR (ref 0–15)
EST. AVERAGE GLUCOSE BLD GHB EST-MCNC: 137 MG/DL
FLUAV AG NPH QL IA: NEGATIVE
FLUBV AG NOSE QL IA: NEGATIVE
GGT SERPL-CCNC: 1562 U/L (ref 15–85)
GLOBULIN SER CALC-MCNC: 6.1 G/DL (ref 2–4)
GLUCOSE BLD STRIP.AUTO-MCNC: 183 MG/DL (ref 65–117)
GLUCOSE BLD STRIP.AUTO-MCNC: 223 MG/DL (ref 65–117)
GLUCOSE BLD STRIP.AUTO-MCNC: 232 MG/DL (ref 65–117)
GLUCOSE BLD STRIP.AUTO-MCNC: 304 MG/DL (ref 65–117)
GLUCOSE BLD STRIP.AUTO-MCNC: 325 MG/DL (ref 65–117)
GLUCOSE SERPL-MCNC: 422 MG/DL (ref 65–100)
HBA1C MFR BLD: 6.4 % (ref 4–5.6)
HBV SURFACE AB SER QL: NONREACTIVE
HBV SURFACE AB SER-ACNC: <3.1 MIU/ML
HBV SURFACE AG SER QL: <0.1 INDEX
HBV SURFACE AG SER QL: NEGATIVE
HCT VFR BLD AUTO: 26.1 % (ref 36.6–50.3)
HGB BLD-MCNC: 8 G/DL (ref 12.1–17)
IMM GRANULOCYTES # BLD AUTO: 0.3 K/UL (ref 0–0.04)
IMM GRANULOCYTES NFR BLD AUTO: 2 % (ref 0–0.5)
LYMPHOCYTES # BLD: 1.7 K/UL (ref 0.8–3.5)
LYMPHOCYTES NFR BLD: 12 % (ref 12–49)
MAGNESIUM SERPL-MCNC: 2.3 MG/DL (ref 1.6–2.4)
MCH RBC QN AUTO: 28.1 PG (ref 26–34)
MCHC RBC AUTO-ENTMCNC: 30.7 G/DL (ref 30–36.5)
MCV RBC AUTO: 91.6 FL (ref 80–99)
MONOCYTES # BLD: 2.2 K/UL (ref 0–1)
MONOCYTES NFR BLD: 16 % (ref 5–13)
NEUTS SEG # BLD: 9.7 K/UL (ref 1.8–8)
NEUTS SEG NFR BLD: 69 % (ref 32–75)
NRBC # BLD: 0 K/UL (ref 0–0.01)
NRBC BLD-RTO: 0 PER 100 WBC
NT PRO BNP: ABNORMAL PG/ML
PLATELET # BLD AUTO: 205 K/UL (ref 150–400)
PMV BLD AUTO: 8.8 FL (ref 8.9–12.9)
POTASSIUM SERPL-SCNC: 4.2 MMOL/L (ref 3.5–5.1)
PROCALCITONIN SERPL-MCNC: 0.67 NG/ML
PROT SERPL-MCNC: 8.3 G/DL (ref 6.4–8.2)
RBC # BLD AUTO: 2.85 M/UL (ref 4.1–5.7)
RBC MORPH BLD: ABNORMAL
SARS-COV-2 RDRP RESP QL NAA+PROBE: NOT DETECTED
SERVICE CMNT-IMP: ABNORMAL
SODIUM SERPL-SCNC: 133 MMOL/L (ref 136–145)
SOURCE: NORMAL
SPECIMEN HOLD: NORMAL
SPECIMEN HOLD: NORMAL
TROPONIN I SERPL HS-MCNC: 31 NG/L (ref 0–76)
VALPROATE SERPL-MCNC: 41 UG/ML (ref 50–100)
WBC # BLD AUTO: 14 K/UL (ref 4.1–11.1)

## 2023-07-13 PROCEDURE — 82977 ASSAY OF GGT: CPT

## 2023-07-13 PROCEDURE — 36415 COLL VENOUS BLD VENIPUNCTURE: CPT

## 2023-07-13 PROCEDURE — 86140 C-REACTIVE PROTEIN: CPT

## 2023-07-13 PROCEDURE — 6360000004 HC RX CONTRAST MEDICATION: Performed by: STUDENT IN AN ORGANIZED HEALTH CARE EDUCATION/TRAINING PROGRAM

## 2023-07-13 PROCEDURE — 71045 X-RAY EXAM CHEST 1 VIEW: CPT

## 2023-07-13 PROCEDURE — 2500000003 HC RX 250 WO HCPCS: Performed by: EMERGENCY MEDICINE

## 2023-07-13 PROCEDURE — 96375 TX/PRO/DX INJ NEW DRUG ADDON: CPT

## 2023-07-13 PROCEDURE — 80164 ASSAY DIPROPYLACETIC ACD TOT: CPT

## 2023-07-13 PROCEDURE — 85025 COMPLETE CBC W/AUTO DIFF WBC: CPT

## 2023-07-13 PROCEDURE — 1100000003 HC PRIVATE W/ TELEMETRY

## 2023-07-13 PROCEDURE — 6370000000 HC RX 637 (ALT 250 FOR IP): Performed by: STUDENT IN AN ORGANIZED HEALTH CARE EDUCATION/TRAINING PROGRAM

## 2023-07-13 PROCEDURE — 86706 HEP B SURFACE ANTIBODY: CPT

## 2023-07-13 PROCEDURE — 96374 THER/PROPH/DIAG INJ IV PUSH: CPT

## 2023-07-13 PROCEDURE — 83735 ASSAY OF MAGNESIUM: CPT

## 2023-07-13 PROCEDURE — 73701 CT LOWER EXTREMITY W/DYE: CPT

## 2023-07-13 PROCEDURE — 99285 EMERGENCY DEPT VISIT HI MDM: CPT

## 2023-07-13 PROCEDURE — 93005 ELECTROCARDIOGRAM TRACING: CPT | Performed by: EMERGENCY MEDICINE

## 2023-07-13 PROCEDURE — 83880 ASSAY OF NATRIURETIC PEPTIDE: CPT

## 2023-07-13 PROCEDURE — 82962 GLUCOSE BLOOD TEST: CPT

## 2023-07-13 PROCEDURE — 90935 HEMODIALYSIS ONE EVALUATION: CPT

## 2023-07-13 PROCEDURE — 87340 HEPATITIS B SURFACE AG IA: CPT

## 2023-07-13 PROCEDURE — 84484 ASSAY OF TROPONIN QUANT: CPT

## 2023-07-13 PROCEDURE — 6370000000 HC RX 637 (ALT 250 FOR IP): Performed by: EMERGENCY MEDICINE

## 2023-07-13 PROCEDURE — 5A1D70Z PERFORMANCE OF URINARY FILTRATION, INTERMITTENT, LESS THAN 6 HOURS PER DAY: ICD-10-PCS | Performed by: INTERNAL MEDICINE

## 2023-07-13 PROCEDURE — 87804 INFLUENZA ASSAY W/OPTIC: CPT

## 2023-07-13 PROCEDURE — 2580000003 HC RX 258: Performed by: STUDENT IN AN ORGANIZED HEALTH CARE EDUCATION/TRAINING PROGRAM

## 2023-07-13 PROCEDURE — 6360000002 HC RX W HCPCS: Performed by: STUDENT IN AN ORGANIZED HEALTH CARE EDUCATION/TRAINING PROGRAM

## 2023-07-13 PROCEDURE — 85652 RBC SED RATE AUTOMATED: CPT

## 2023-07-13 PROCEDURE — 83036 HEMOGLOBIN GLYCOSYLATED A1C: CPT

## 2023-07-13 PROCEDURE — 84145 PROCALCITONIN (PCT): CPT

## 2023-07-13 PROCEDURE — 2500000003 HC RX 250 WO HCPCS: Performed by: STUDENT IN AN ORGANIZED HEALTH CARE EDUCATION/TRAINING PROGRAM

## 2023-07-13 PROCEDURE — 80053 COMPREHEN METABOLIC PANEL: CPT

## 2023-07-13 PROCEDURE — 6360000002 HC RX W HCPCS: Performed by: EMERGENCY MEDICINE

## 2023-07-13 PROCEDURE — 87635 SARS-COV-2 COVID-19 AMP PRB: CPT

## 2023-07-13 RX ORDER — SODIUM CHLORIDE 9 MG/ML
INJECTION, SOLUTION INTRAVENOUS PRN
Status: DISCONTINUED | OUTPATIENT
Start: 2023-07-13 | End: 2023-07-16 | Stop reason: HOSPADM

## 2023-07-13 RX ORDER — ACETAMINOPHEN 325 MG/1
650 TABLET ORAL EVERY 6 HOURS PRN
Status: DISCONTINUED | OUTPATIENT
Start: 2023-07-13 | End: 2023-07-16 | Stop reason: HOSPADM

## 2023-07-13 RX ORDER — HYDRALAZINE HYDROCHLORIDE 20 MG/ML
20 INJECTION INTRAMUSCULAR; INTRAVENOUS ONCE
Status: COMPLETED | OUTPATIENT
Start: 2023-07-13 | End: 2023-07-13

## 2023-07-13 RX ORDER — AMOXICILLIN AND CLAVULANATE POTASSIUM 500; 125 MG/1; MG/1
1 TABLET, FILM COATED ORAL 2 TIMES DAILY
Status: DISCONTINUED | OUTPATIENT
Start: 2023-07-13 | End: 2023-07-14

## 2023-07-13 RX ORDER — ATENOLOL 25 MG/1
25 TABLET ORAL DAILY
Status: DISCONTINUED | OUTPATIENT
Start: 2023-07-13 | End: 2023-07-13

## 2023-07-13 RX ORDER — CALCIUM ACETATE 667 MG/1
3 CAPSULE ORAL 3 TIMES DAILY
Status: DISCONTINUED | OUTPATIENT
Start: 2023-07-13 | End: 2023-07-16 | Stop reason: HOSPADM

## 2023-07-13 RX ORDER — FERROUS SULFATE 325(65) MG
325 TABLET ORAL
Status: DISCONTINUED | OUTPATIENT
Start: 2023-07-14 | End: 2023-07-16 | Stop reason: HOSPADM

## 2023-07-13 RX ORDER — INSULIN LISPRO 100 [IU]/ML
5 INJECTION, SOLUTION INTRAVENOUS; SUBCUTANEOUS
Status: DISCONTINUED | OUTPATIENT
Start: 2023-07-13 | End: 2023-07-16

## 2023-07-13 RX ORDER — HYDROMORPHONE HYDROCHLORIDE 1 MG/ML
0.5 INJECTION, SOLUTION INTRAMUSCULAR; INTRAVENOUS; SUBCUTANEOUS ONCE
Status: COMPLETED | OUTPATIENT
Start: 2023-07-13 | End: 2023-07-13

## 2023-07-13 RX ORDER — NIFEDIPINE 30 MG/1
90 TABLET, EXTENDED RELEASE ORAL ONCE
Status: DISCONTINUED | OUTPATIENT
Start: 2023-07-13 | End: 2023-07-13

## 2023-07-13 RX ORDER — SULFAMETHOXAZOLE AND TRIMETHOPRIM 400; 80 MG/1; MG/1
1 TABLET ORAL DAILY
Status: DISCONTINUED | OUTPATIENT
Start: 2023-07-13 | End: 2023-07-16 | Stop reason: HOSPADM

## 2023-07-13 RX ORDER — ASPIRIN 81 MG/1
81 TABLET ORAL DAILY
Status: DISCONTINUED | OUTPATIENT
Start: 2023-07-13 | End: 2023-07-16 | Stop reason: HOSPADM

## 2023-07-13 RX ORDER — ATORVASTATIN CALCIUM 40 MG/1
40 TABLET, FILM COATED ORAL DAILY
Status: DISCONTINUED | OUTPATIENT
Start: 2023-07-13 | End: 2023-07-16 | Stop reason: HOSPADM

## 2023-07-13 RX ORDER — INSULIN LISPRO 100 [IU]/ML
0-4 INJECTION, SOLUTION INTRAVENOUS; SUBCUTANEOUS NIGHTLY
Status: DISCONTINUED | OUTPATIENT
Start: 2023-07-13 | End: 2023-07-16 | Stop reason: HOSPADM

## 2023-07-13 RX ORDER — INSULIN GLARGINE 100 [IU]/ML
15 INJECTION, SOLUTION SUBCUTANEOUS NIGHTLY
Status: DISCONTINUED | OUTPATIENT
Start: 2023-07-13 | End: 2023-07-16 | Stop reason: HOSPADM

## 2023-07-13 RX ORDER — ENOXAPARIN SODIUM 100 MG/ML
30 INJECTION SUBCUTANEOUS DAILY
Status: DISCONTINUED | OUTPATIENT
Start: 2023-07-13 | End: 2023-07-14

## 2023-07-13 RX ORDER — POLYETHYLENE GLYCOL 3350 17 G/17G
17 POWDER, FOR SOLUTION ORAL DAILY PRN
Status: DISCONTINUED | OUTPATIENT
Start: 2023-07-13 | End: 2023-07-16 | Stop reason: HOSPADM

## 2023-07-13 RX ORDER — ONDANSETRON 2 MG/ML
4 INJECTION INTRAMUSCULAR; INTRAVENOUS EVERY 4 HOURS PRN
Status: DISCONTINUED | OUTPATIENT
Start: 2023-07-13 | End: 2023-07-16 | Stop reason: HOSPADM

## 2023-07-13 RX ORDER — DIVALPROEX SODIUM 125 MG/1
500 CAPSULE, COATED PELLETS ORAL 2 TIMES DAILY
Status: DISCONTINUED | OUTPATIENT
Start: 2023-07-13 | End: 2023-07-16 | Stop reason: HOSPADM

## 2023-07-13 RX ORDER — OXYCODONE HYDROCHLORIDE 5 MG/1
5 TABLET ORAL EVERY 4 HOURS PRN
Status: DISCONTINUED | OUTPATIENT
Start: 2023-07-13 | End: 2023-07-13

## 2023-07-13 RX ORDER — NIFEDIPINE 30 MG/1
90 TABLET, EXTENDED RELEASE ORAL ONCE
Status: COMPLETED | OUTPATIENT
Start: 2023-07-13 | End: 2023-07-13

## 2023-07-13 RX ORDER — CARVEDILOL 12.5 MG/1
25 TABLET ORAL
Status: COMPLETED | OUTPATIENT
Start: 2023-07-13 | End: 2023-07-13

## 2023-07-13 RX ORDER — ONDANSETRON 4 MG/1
4 TABLET, ORALLY DISINTEGRATING ORAL EVERY 8 HOURS PRN
Status: DISCONTINUED | OUTPATIENT
Start: 2023-07-13 | End: 2023-07-16 | Stop reason: HOSPADM

## 2023-07-13 RX ORDER — INSULIN LISPRO 100 [IU]/ML
0-8 INJECTION, SOLUTION INTRAVENOUS; SUBCUTANEOUS
Status: DISCONTINUED | OUTPATIENT
Start: 2023-07-13 | End: 2023-07-16 | Stop reason: HOSPADM

## 2023-07-13 RX ORDER — HYDRALAZINE HYDROCHLORIDE 50 MG/1
100 TABLET, FILM COATED ORAL 3 TIMES DAILY
Status: DISCONTINUED | OUTPATIENT
Start: 2023-07-13 | End: 2023-07-16 | Stop reason: HOSPADM

## 2023-07-13 RX ORDER — SODIUM CHLORIDE 0.9 % (FLUSH) 0.9 %
5-40 SYRINGE (ML) INJECTION EVERY 12 HOURS SCHEDULED
Status: DISCONTINUED | OUTPATIENT
Start: 2023-07-13 | End: 2023-07-16 | Stop reason: HOSPADM

## 2023-07-13 RX ORDER — AMLODIPINE BESYLATE 5 MG/1
10 TABLET ORAL DAILY
Status: DISCONTINUED | OUTPATIENT
Start: 2023-07-13 | End: 2023-07-14 | Stop reason: SDUPTHER

## 2023-07-13 RX ORDER — DEXTROSE MONOHYDRATE 100 MG/ML
INJECTION, SOLUTION INTRAVENOUS CONTINUOUS PRN
Status: DISCONTINUED | OUTPATIENT
Start: 2023-07-13 | End: 2023-07-16 | Stop reason: HOSPADM

## 2023-07-13 RX ORDER — SODIUM CHLORIDE 0.9 % (FLUSH) 0.9 %
5-40 SYRINGE (ML) INJECTION PRN
Status: DISCONTINUED | OUTPATIENT
Start: 2023-07-13 | End: 2023-07-16 | Stop reason: HOSPADM

## 2023-07-13 RX ORDER — OXYCODONE HYDROCHLORIDE 5 MG/1
10 TABLET ORAL EVERY 4 HOURS PRN
Status: DISCONTINUED | OUTPATIENT
Start: 2023-07-13 | End: 2023-07-16

## 2023-07-13 RX ORDER — ATENOLOL 50 MG/1
25 TABLET ORAL DAILY
Status: DISCONTINUED | OUTPATIENT
Start: 2023-07-14 | End: 2023-07-16 | Stop reason: HOSPADM

## 2023-07-13 RX ORDER — HYDRALAZINE HYDROCHLORIDE 20 MG/ML
10 INJECTION INTRAMUSCULAR; INTRAVENOUS EVERY 6 HOURS PRN
Status: DISCONTINUED | OUTPATIENT
Start: 2023-07-13 | End: 2023-07-16 | Stop reason: HOSPADM

## 2023-07-13 RX ORDER — NIFEDIPINE 90 MG/1
90 TABLET, EXTENDED RELEASE ORAL DAILY
Status: DISCONTINUED | OUTPATIENT
Start: 2023-07-13 | End: 2023-07-16 | Stop reason: HOSPADM

## 2023-07-13 RX ORDER — ARIPIPRAZOLE 5 MG/1
10 TABLET ORAL DAILY
Status: DISCONTINUED | OUTPATIENT
Start: 2023-07-13 | End: 2023-07-16 | Stop reason: HOSPADM

## 2023-07-13 RX ORDER — ONDANSETRON 2 MG/ML
4 INJECTION INTRAMUSCULAR; INTRAVENOUS EVERY 6 HOURS PRN
Status: DISCONTINUED | OUTPATIENT
Start: 2023-07-13 | End: 2023-07-16 | Stop reason: HOSPADM

## 2023-07-13 RX ORDER — ACETAMINOPHEN 650 MG/1
650 SUPPOSITORY RECTAL EVERY 6 HOURS PRN
Status: DISCONTINUED | OUTPATIENT
Start: 2023-07-13 | End: 2023-07-16 | Stop reason: HOSPADM

## 2023-07-13 RX ORDER — FUROSEMIDE 10 MG/ML
80 INJECTION INTRAMUSCULAR; INTRAVENOUS ONCE
Status: COMPLETED | OUTPATIENT
Start: 2023-07-13 | End: 2023-07-13

## 2023-07-13 RX ADMIN — SODIUM CHLORIDE, PRESERVATIVE FREE 10 ML: 5 INJECTION INTRAVENOUS at 21:39

## 2023-07-13 RX ADMIN — AMLODIPINE BESYLATE 10 MG: 5 TABLET ORAL at 19:32

## 2023-07-13 RX ADMIN — DIVALPROEX SODIUM 500 MG: 125 CAPSULE ORAL at 21:39

## 2023-07-13 RX ADMIN — CALCIUM ACETATE 2001 MG: 667 CAPSULE ORAL at 21:41

## 2023-07-13 RX ADMIN — OXYCODONE HYDROCHLORIDE 5 MG: 5 TABLET ORAL at 15:49

## 2023-07-13 RX ADMIN — AMOXICILLIN AND CLAVULANATE POTASSIUM 1 TABLET: 500; 125 TABLET, FILM COATED ORAL at 21:38

## 2023-07-13 RX ADMIN — HYDROMORPHONE HYDROCHLORIDE 0.5 MG: 1 INJECTION, SOLUTION INTRAMUSCULAR; INTRAVENOUS; SUBCUTANEOUS at 08:42

## 2023-07-13 RX ADMIN — FUROSEMIDE 80 MG: 10 INJECTION, SOLUTION INTRAMUSCULAR; INTRAVENOUS at 06:58

## 2023-07-13 RX ADMIN — ASPIRIN 81 MG: 81 TABLET, COATED ORAL at 12:13

## 2023-07-13 RX ADMIN — Medication 6 UNITS: at 12:14

## 2023-07-13 RX ADMIN — IOPAMIDOL 100 ML: 755 INJECTION, SOLUTION INTRAVENOUS at 16:29

## 2023-07-13 RX ADMIN — Medication 2 UNITS: at 17:16

## 2023-07-13 RX ADMIN — HYDRALAZINE HYDROCHLORIDE 100 MG: 50 TABLET, FILM COATED ORAL at 21:38

## 2023-07-13 RX ADMIN — HYDRALAZINE HYDROCHLORIDE 100 MG: 50 TABLET, FILM COATED ORAL at 15:54

## 2023-07-13 RX ADMIN — HYDRALAZINE HYDROCHLORIDE 20 MG: 20 INJECTION INTRAMUSCULAR; INTRAVENOUS at 06:58

## 2023-07-13 RX ADMIN — CARVEDILOL 25 MG: 12.5 TABLET, FILM COATED ORAL at 07:03

## 2023-07-13 RX ADMIN — Medication 5 UNITS: at 17:16

## 2023-07-13 RX ADMIN — AMOXICILLIN AND CLAVULANATE POTASSIUM 1 TABLET: 500; 125 TABLET, FILM COATED ORAL at 15:53

## 2023-07-13 RX ADMIN — ATORVASTATIN CALCIUM 40 MG: 40 TABLET, FILM COATED ORAL at 12:13

## 2023-07-13 RX ADMIN — NIFEDIPINE 90 MG: 90 TABLET, EXTENDED RELEASE ORAL at 15:53

## 2023-07-13 RX ADMIN — ACETAMINOPHEN 650 MG: 325 TABLET ORAL at 21:37

## 2023-07-13 RX ADMIN — SULFAMETHOXAZOLE AND TRIMETHOPRIM 1 TABLET: 400; 80 TABLET ORAL at 15:51

## 2023-07-13 RX ADMIN — OXYCODONE HYDROCHLORIDE 10 MG: 5 TABLET ORAL at 21:37

## 2023-07-13 RX ADMIN — ENOXAPARIN SODIUM 30 MG: 100 INJECTION SUBCUTANEOUS at 12:13

## 2023-07-13 RX ADMIN — ARIPIPRAZOLE 10 MG: 5 TABLET ORAL at 15:51

## 2023-07-13 RX ADMIN — INSULIN HUMAN 3 UNITS: 100 INJECTION, SOLUTION PARENTERAL at 10:24

## 2023-07-13 RX ADMIN — DIVALPROEX SODIUM 500 MG: 125 CAPSULE ORAL at 15:51

## 2023-07-13 RX ADMIN — NIFEDIPINE 90 MG: 30 TABLET, EXTENDED RELEASE ORAL at 08:10

## 2023-07-13 ASSESSMENT — PAIN DESCRIPTION - LOCATION
LOCATION: LEG;FOOT
LOCATION: FOOT;LEG
LOCATION: GENERALIZED
LOCATION: FOOT;LEG
LOCATION: FOOT
LOCATION: GENERALIZED

## 2023-07-13 ASSESSMENT — PAIN DESCRIPTION - ONSET
ONSET: ON-GOING
ONSET: ON-GOING

## 2023-07-13 ASSESSMENT — PAIN SCALES - GENERAL
PAINLEVEL_OUTOF10: 10
PAINLEVEL_OUTOF10: 10
PAINLEVEL_OUTOF10: 8
PAINLEVEL_OUTOF10: 8
PAINLEVEL_OUTOF10: 10
PAINLEVEL_OUTOF10: 8

## 2023-07-13 ASSESSMENT — PAIN DESCRIPTION - ORIENTATION
ORIENTATION: RIGHT;LEFT
ORIENTATION: RIGHT;LEFT
ORIENTATION: RIGHT;LEFT;LOWER;UPPER

## 2023-07-13 ASSESSMENT — PAIN DESCRIPTION - PAIN TYPE: TYPE: ACUTE PAIN

## 2023-07-13 ASSESSMENT — PAIN DESCRIPTION - FREQUENCY: FREQUENCY: CONTINUOUS

## 2023-07-13 ASSESSMENT — PAIN DESCRIPTION - DESCRIPTORS
DESCRIPTORS: BURNING;ACHING
DESCRIPTORS: ACHING;BURNING;DISCOMFORT

## 2023-07-13 ASSESSMENT — PAIN SCALES - WONG BAKER: WONGBAKER_NUMERICALRESPONSE: 2

## 2023-07-13 ASSESSMENT — PAIN - FUNCTIONAL ASSESSMENT
PAIN_FUNCTIONAL_ASSESSMENT: ACTIVITIES ARE NOT PREVENTED
PAIN_FUNCTIONAL_ASSESSMENT: 0-10

## 2023-07-14 ENCOUNTER — APPOINTMENT (OUTPATIENT)
Facility: HOSPITAL | Age: 48
DRG: 425 | End: 2023-07-14
Payer: COMMERCIAL

## 2023-07-14 LAB
ALBUMIN SERPL-MCNC: 2.1 G/DL (ref 3.5–5)
ANION GAP SERPL CALC-SCNC: 8 MMOL/L (ref 5–15)
BASOPHILS # BLD: 0 K/UL (ref 0–0.1)
BASOPHILS NFR BLD: 0 % (ref 0–1)
BUN SERPL-MCNC: 50 MG/DL (ref 6–20)
BUN/CREAT SERPL: 7 (ref 12–20)
CALCIUM SERPL-MCNC: 7.7 MG/DL (ref 8.5–10.1)
CHLORIDE SERPL-SCNC: 101 MMOL/L (ref 97–108)
CO2 SERPL-SCNC: 26 MMOL/L (ref 21–32)
CREAT SERPL-MCNC: 6.73 MG/DL (ref 0.7–1.3)
DIFFERENTIAL METHOD BLD: ABNORMAL
EKG ATRIAL RATE: 97 BPM
EKG DIAGNOSIS: NORMAL
EKG P AXIS: 70 DEGREES
EKG P-R INTERVAL: 186 MS
EKG Q-T INTERVAL: 358 MS
EKG QRS DURATION: 90 MS
EKG QTC CALCULATION (BAZETT): 454 MS
EKG R AXIS: -53 DEGREES
EKG T AXIS: 83 DEGREES
EKG VENTRICULAR RATE: 97 BPM
EOSINOPHIL # BLD: 0.2 K/UL (ref 0–0.4)
EOSINOPHIL NFR BLD: 1 % (ref 0–7)
ERYTHROCYTE [DISTWIDTH] IN BLOOD BY AUTOMATED COUNT: 16.1 % (ref 11.5–14.5)
GLUCOSE BLD STRIP.AUTO-MCNC: 135 MG/DL (ref 65–117)
GLUCOSE BLD STRIP.AUTO-MCNC: 149 MG/DL (ref 65–117)
GLUCOSE BLD STRIP.AUTO-MCNC: 169 MG/DL (ref 65–117)
GLUCOSE BLD STRIP.AUTO-MCNC: 176 MG/DL (ref 65–117)
GLUCOSE BLD STRIP.AUTO-MCNC: 238 MG/DL (ref 65–117)
GLUCOSE BLD STRIP.AUTO-MCNC: 273 MG/DL (ref 65–117)
GLUCOSE SERPL-MCNC: 161 MG/DL (ref 65–100)
HCT VFR BLD AUTO: 23 % (ref 36.6–50.3)
HGB BLD-MCNC: 7.2 G/DL (ref 12.1–17)
IMM GRANULOCYTES # BLD AUTO: 0.2 K/UL (ref 0–0.04)
IMM GRANULOCYTES NFR BLD AUTO: 1 % (ref 0–0.5)
LYMPHOCYTES # BLD: 2.5 K/UL (ref 0.8–3.5)
LYMPHOCYTES NFR BLD: 19 % (ref 12–49)
MAGNESIUM SERPL-MCNC: 2.3 MG/DL (ref 1.6–2.4)
MCH RBC QN AUTO: 29 PG (ref 26–34)
MCHC RBC AUTO-ENTMCNC: 31.3 G/DL (ref 30–36.5)
MCV RBC AUTO: 92.7 FL (ref 80–99)
MONOCYTES # BLD: 2.2 K/UL (ref 0–1)
MONOCYTES NFR BLD: 17 % (ref 5–13)
NEUTS SEG # BLD: 7.7 K/UL (ref 1.8–8)
NEUTS SEG NFR BLD: 61 % (ref 32–75)
NRBC # BLD: 0.03 K/UL (ref 0–0.01)
NRBC BLD-RTO: 0.2 PER 100 WBC
PHOSPHATE SERPL-MCNC: 6.4 MG/DL (ref 2.6–4.7)
PLATELET # BLD AUTO: 190 K/UL (ref 150–400)
PMV BLD AUTO: 9.4 FL (ref 8.9–12.9)
POTASSIUM SERPL-SCNC: 4.7 MMOL/L (ref 3.5–5.1)
RBC # BLD AUTO: 2.48 M/UL (ref 4.1–5.7)
SERVICE CMNT-IMP: ABNORMAL
SODIUM SERPL-SCNC: 135 MMOL/L (ref 136–145)
WBC # BLD AUTO: 12.6 K/UL (ref 4.1–11.1)

## 2023-07-14 PROCEDURE — 94760 N-INVAS EAR/PLS OXIMETRY 1: CPT

## 2023-07-14 PROCEDURE — 76705 ECHO EXAM OF ABDOMEN: CPT

## 2023-07-14 PROCEDURE — 87186 SC STD MICRODIL/AGAR DIL: CPT

## 2023-07-14 PROCEDURE — 99223 1ST HOSP IP/OBS HIGH 75: CPT | Performed by: PODIATRIST

## 2023-07-14 PROCEDURE — 87070 CULTURE OTHR SPECIMN AEROBIC: CPT

## 2023-07-14 PROCEDURE — 1100000003 HC PRIVATE W/ TELEMETRY

## 2023-07-14 PROCEDURE — 83735 ASSAY OF MAGNESIUM: CPT

## 2023-07-14 PROCEDURE — 2500000003 HC RX 250 WO HCPCS: Performed by: STUDENT IN AN ORGANIZED HEALTH CARE EDUCATION/TRAINING PROGRAM

## 2023-07-14 PROCEDURE — 2580000003 HC RX 258: Performed by: STUDENT IN AN ORGANIZED HEALTH CARE EDUCATION/TRAINING PROGRAM

## 2023-07-14 PROCEDURE — 87205 SMEAR GRAM STAIN: CPT

## 2023-07-14 PROCEDURE — 82962 GLUCOSE BLOOD TEST: CPT

## 2023-07-14 PROCEDURE — 80069 RENAL FUNCTION PANEL: CPT

## 2023-07-14 PROCEDURE — 6370000000 HC RX 637 (ALT 250 FOR IP): Performed by: STUDENT IN AN ORGANIZED HEALTH CARE EDUCATION/TRAINING PROGRAM

## 2023-07-14 PROCEDURE — 85025 COMPLETE CBC W/AUTO DIFF WBC: CPT

## 2023-07-14 PROCEDURE — 2700000000 HC OXYGEN THERAPY PER DAY

## 2023-07-14 PROCEDURE — 87077 CULTURE AEROBIC IDENTIFY: CPT

## 2023-07-14 PROCEDURE — 36415 COLL VENOUS BLD VENIPUNCTURE: CPT

## 2023-07-14 PROCEDURE — 6360000002 HC RX W HCPCS: Performed by: STUDENT IN AN ORGANIZED HEALTH CARE EDUCATION/TRAINING PROGRAM

## 2023-07-14 PROCEDURE — 90935 HEMODIALYSIS ONE EVALUATION: CPT

## 2023-07-14 RX ORDER — AMOXICILLIN AND CLAVULANATE POTASSIUM 500; 125 MG/1; MG/1
1 TABLET, FILM COATED ORAL
Status: DISCONTINUED | OUTPATIENT
Start: 2023-07-15 | End: 2023-07-16 | Stop reason: HOSPADM

## 2023-07-14 RX ORDER — ALBUMIN (HUMAN) 12.5 G/50ML
25 SOLUTION INTRAVENOUS PRN
Status: DISCONTINUED | OUTPATIENT
Start: 2023-07-14 | End: 2023-07-16 | Stop reason: HOSPADM

## 2023-07-14 RX ORDER — OXYCODONE HYDROCHLORIDE 5 MG/1
5 TABLET ORAL EVERY 4 HOURS PRN
Status: DISCONTINUED | OUTPATIENT
Start: 2023-07-14 | End: 2023-07-16

## 2023-07-14 RX ADMIN — OXYCODONE HYDROCHLORIDE 10 MG: 5 TABLET ORAL at 12:31

## 2023-07-14 RX ADMIN — DIVALPROEX SODIUM 500 MG: 125 CAPSULE ORAL at 12:37

## 2023-07-14 RX ADMIN — CALCIUM ACETATE 2001 MG: 667 CAPSULE ORAL at 21:21

## 2023-07-14 RX ADMIN — OXYCODONE HYDROCHLORIDE 10 MG: 5 TABLET ORAL at 05:35

## 2023-07-14 RX ADMIN — DIVALPROEX SODIUM 500 MG: 125 CAPSULE ORAL at 21:20

## 2023-07-14 RX ADMIN — ASPIRIN 81 MG: 81 TABLET, COATED ORAL at 12:35

## 2023-07-14 RX ADMIN — SODIUM CHLORIDE, PRESERVATIVE FREE 10 ML: 5 INJECTION INTRAVENOUS at 21:23

## 2023-07-14 RX ADMIN — HYDRALAZINE HYDROCHLORIDE 100 MG: 50 TABLET, FILM COATED ORAL at 21:20

## 2023-07-14 RX ADMIN — OXYCODONE HYDROCHLORIDE 10 MG: 5 TABLET ORAL at 19:57

## 2023-07-14 RX ADMIN — ENOXAPARIN SODIUM 30 MG: 100 INJECTION SUBCUTANEOUS at 09:41

## 2023-07-14 RX ADMIN — CALCIUM ACETATE 2001 MG: 667 CAPSULE ORAL at 16:31

## 2023-07-14 RX ADMIN — FERROUS SULFATE TAB 325 MG (65 MG ELEMENTAL FE) 325 MG: 325 (65 FE) TAB at 07:00

## 2023-07-14 RX ADMIN — HYDRALAZINE HYDROCHLORIDE 100 MG: 50 TABLET, FILM COATED ORAL at 15:21

## 2023-07-14 RX ADMIN — SODIUM CHLORIDE, PRESERVATIVE FREE 10 ML: 5 INJECTION INTRAVENOUS at 09:42

## 2023-07-14 RX ADMIN — ATORVASTATIN CALCIUM 40 MG: 40 TABLET, FILM COATED ORAL at 12:36

## 2023-07-14 RX ADMIN — SULFAMETHOXAZOLE AND TRIMETHOPRIM 1 TABLET: 400; 80 TABLET ORAL at 14:05

## 2023-07-14 RX ADMIN — CALCIUM ACETATE 2001 MG: 667 CAPSULE ORAL at 12:37

## 2023-07-14 RX ADMIN — HYDRALAZINE HYDROCHLORIDE 10 MG: 20 INJECTION INTRAMUSCULAR; INTRAVENOUS at 19:58

## 2023-07-14 ASSESSMENT — PAIN DESCRIPTION - LOCATION
LOCATION: FOOT;LEG
LOCATION: FOOT
LOCATION: FOOT;LEG
LOCATION: FOOT;LEG

## 2023-07-14 ASSESSMENT — PAIN DESCRIPTION - DESCRIPTORS: DESCRIPTORS: ACHING;THROBBING;STABBING

## 2023-07-14 ASSESSMENT — PAIN SCALES - GENERAL
PAINLEVEL_OUTOF10: 10
PAINLEVEL_OUTOF10: 7
PAINLEVEL_OUTOF10: 0
PAINLEVEL_OUTOF10: 10
PAINLEVEL_OUTOF10: 0
PAINLEVEL_OUTOF10: 0
PAINLEVEL_OUTOF10: 9
PAINLEVEL_OUTOF10: 8
PAINLEVEL_OUTOF10: 0

## 2023-07-14 ASSESSMENT — PAIN DESCRIPTION - ORIENTATION
ORIENTATION: RIGHT;LEFT
ORIENTATION: RIGHT;LEFT

## 2023-07-15 LAB
ANION GAP SERPL CALC-SCNC: 6 MMOL/L (ref 5–15)
BASOPHILS # BLD: 0 K/UL (ref 0–0.1)
BASOPHILS NFR BLD: 0 % (ref 0–1)
BUN SERPL-MCNC: 36 MG/DL (ref 6–20)
BUN/CREAT SERPL: 7 (ref 12–20)
CALCIUM SERPL-MCNC: 8.2 MG/DL (ref 8.5–10.1)
CHLORIDE SERPL-SCNC: 102 MMOL/L (ref 97–108)
CO2 SERPL-SCNC: 23 MMOL/L (ref 21–32)
CREAT SERPL-MCNC: 5.17 MG/DL (ref 0.7–1.3)
DIFFERENTIAL METHOD BLD: ABNORMAL
EOSINOPHIL # BLD: 0.2 K/UL (ref 0–0.4)
EOSINOPHIL NFR BLD: 1 % (ref 0–7)
ERYTHROCYTE [DISTWIDTH] IN BLOOD BY AUTOMATED COUNT: 16.1 % (ref 11.5–14.5)
GLUCOSE BLD STRIP.AUTO-MCNC: 105 MG/DL (ref 65–117)
GLUCOSE BLD STRIP.AUTO-MCNC: 183 MG/DL (ref 65–117)
GLUCOSE BLD STRIP.AUTO-MCNC: 187 MG/DL (ref 65–117)
GLUCOSE BLD STRIP.AUTO-MCNC: 194 MG/DL (ref 65–117)
GLUCOSE BLD STRIP.AUTO-MCNC: 69 MG/DL (ref 65–117)
GLUCOSE BLD STRIP.AUTO-MCNC: 72 MG/DL (ref 65–117)
GLUCOSE SERPL-MCNC: 254 MG/DL (ref 65–100)
HCT VFR BLD AUTO: 23.6 % (ref 36.6–50.3)
HGB BLD-MCNC: 7.3 G/DL (ref 12.1–17)
IMM GRANULOCYTES # BLD AUTO: 0.2 K/UL (ref 0–0.04)
IMM GRANULOCYTES NFR BLD AUTO: 2 % (ref 0–0.5)
LYMPHOCYTES # BLD: 2.7 K/UL (ref 0.8–3.5)
LYMPHOCYTES NFR BLD: 18 % (ref 12–49)
MAGNESIUM SERPL-MCNC: 2.2 MG/DL (ref 1.6–2.4)
MCH RBC QN AUTO: 29.2 PG (ref 26–34)
MCHC RBC AUTO-ENTMCNC: 30.9 G/DL (ref 30–36.5)
MCV RBC AUTO: 94.4 FL (ref 80–99)
MONOCYTES # BLD: 1.8 K/UL (ref 0–1)
MONOCYTES NFR BLD: 12 % (ref 5–13)
NEUTS SEG # BLD: 9.7 K/UL (ref 1.8–8)
NEUTS SEG NFR BLD: 67 % (ref 32–75)
NRBC # BLD: 0 K/UL (ref 0–0.01)
NRBC BLD-RTO: 0 PER 100 WBC
PHOSPHATE SERPL-MCNC: 4.6 MG/DL (ref 2.6–4.7)
PLATELET # BLD AUTO: 252 K/UL (ref 150–400)
PMV BLD AUTO: 9.1 FL (ref 8.9–12.9)
POTASSIUM SERPL-SCNC: 4.9 MMOL/L (ref 3.5–5.1)
RBC # BLD AUTO: 2.5 M/UL (ref 4.1–5.7)
SERVICE CMNT-IMP: ABNORMAL
SERVICE CMNT-IMP: NORMAL
SODIUM SERPL-SCNC: 131 MMOL/L (ref 136–145)
WBC # BLD AUTO: 14.6 K/UL (ref 4.1–11.1)

## 2023-07-15 PROCEDURE — 6370000000 HC RX 637 (ALT 250 FOR IP): Performed by: STUDENT IN AN ORGANIZED HEALTH CARE EDUCATION/TRAINING PROGRAM

## 2023-07-15 PROCEDURE — 83735 ASSAY OF MAGNESIUM: CPT

## 2023-07-15 PROCEDURE — 6360000002 HC RX W HCPCS: Performed by: STUDENT IN AN ORGANIZED HEALTH CARE EDUCATION/TRAINING PROGRAM

## 2023-07-15 PROCEDURE — 82962 GLUCOSE BLOOD TEST: CPT

## 2023-07-15 PROCEDURE — 36415 COLL VENOUS BLD VENIPUNCTURE: CPT

## 2023-07-15 PROCEDURE — 90935 HEMODIALYSIS ONE EVALUATION: CPT

## 2023-07-15 PROCEDURE — 2500000003 HC RX 250 WO HCPCS: Performed by: STUDENT IN AN ORGANIZED HEALTH CARE EDUCATION/TRAINING PROGRAM

## 2023-07-15 PROCEDURE — 2060000000 HC ICU INTERMEDIATE R&B

## 2023-07-15 PROCEDURE — 85025 COMPLETE CBC W/AUTO DIFF WBC: CPT

## 2023-07-15 PROCEDURE — 84100 ASSAY OF PHOSPHORUS: CPT

## 2023-07-15 PROCEDURE — 94760 N-INVAS EAR/PLS OXIMETRY 1: CPT

## 2023-07-15 PROCEDURE — 80048 BASIC METABOLIC PNL TOTAL CA: CPT

## 2023-07-15 PROCEDURE — 2700000000 HC OXYGEN THERAPY PER DAY

## 2023-07-15 PROCEDURE — 2580000003 HC RX 258: Performed by: STUDENT IN AN ORGANIZED HEALTH CARE EDUCATION/TRAINING PROGRAM

## 2023-07-15 RX ORDER — HYDROMORPHONE HYDROCHLORIDE 2 MG/ML
0.5 INJECTION, SOLUTION INTRAMUSCULAR; INTRAVENOUS; SUBCUTANEOUS ONCE
Status: COMPLETED | OUTPATIENT
Start: 2023-07-15 | End: 2023-07-15

## 2023-07-15 RX ADMIN — HYDRALAZINE HYDROCHLORIDE 100 MG: 50 TABLET, FILM COATED ORAL at 09:17

## 2023-07-15 RX ADMIN — OXYCODONE HYDROCHLORIDE 10 MG: 5 TABLET ORAL at 09:46

## 2023-07-15 RX ADMIN — ATENOLOL 25 MG: 50 TABLET ORAL at 09:15

## 2023-07-15 RX ADMIN — HYDRALAZINE HYDROCHLORIDE 100 MG: 50 TABLET, FILM COATED ORAL at 15:14

## 2023-07-15 RX ADMIN — SODIUM CHLORIDE, PRESERVATIVE FREE 10 ML: 5 INJECTION INTRAVENOUS at 20:27

## 2023-07-15 RX ADMIN — AMOXICILLIN AND CLAVULANATE POTASSIUM 1 TABLET: 500; 125 TABLET, FILM COATED ORAL at 09:17

## 2023-07-15 RX ADMIN — FERROUS SULFATE TAB 325 MG (65 MG ELEMENTAL FE) 325 MG: 325 (65 FE) TAB at 09:15

## 2023-07-15 RX ADMIN — Medication 5 UNITS: at 12:17

## 2023-07-15 RX ADMIN — ATORVASTATIN CALCIUM 40 MG: 40 TABLET, FILM COATED ORAL at 09:16

## 2023-07-15 RX ADMIN — Medication 5 UNITS: at 17:21

## 2023-07-15 RX ADMIN — SULFAMETHOXAZOLE AND TRIMETHOPRIM 1 TABLET: 400; 80 TABLET ORAL at 10:54

## 2023-07-15 RX ADMIN — DIVALPROEX SODIUM 500 MG: 125 CAPSULE ORAL at 21:50

## 2023-07-15 RX ADMIN — CALCIUM ACETATE 2001 MG: 667 CAPSULE ORAL at 20:12

## 2023-07-15 RX ADMIN — HYDROMORPHONE HYDROCHLORIDE 0.5 MG: 2 INJECTION, SOLUTION INTRAMUSCULAR; INTRAVENOUS; SUBCUTANEOUS at 03:40

## 2023-07-15 RX ADMIN — OXYCODONE HYDROCHLORIDE 10 MG: 5 TABLET ORAL at 15:13

## 2023-07-15 RX ADMIN — OXYCODONE HYDROCHLORIDE 10 MG: 5 TABLET ORAL at 20:11

## 2023-07-15 RX ADMIN — CALCIUM ACETATE 2001 MG: 667 CAPSULE ORAL at 09:16

## 2023-07-15 RX ADMIN — CALCIUM ACETATE 2001 MG: 667 CAPSULE ORAL at 15:14

## 2023-07-15 RX ADMIN — OXYCODONE HYDROCHLORIDE 10 MG: 5 TABLET ORAL at 03:14

## 2023-07-15 RX ADMIN — HYDRALAZINE HYDROCHLORIDE 10 MG: 20 INJECTION INTRAMUSCULAR; INTRAVENOUS at 03:40

## 2023-07-15 RX ADMIN — SODIUM CHLORIDE, PRESERVATIVE FREE 10 ML: 5 INJECTION INTRAVENOUS at 09:18

## 2023-07-15 RX ADMIN — NIFEDIPINE 90 MG: 90 TABLET, EXTENDED RELEASE ORAL at 09:16

## 2023-07-15 RX ADMIN — HYDRALAZINE HYDROCHLORIDE 100 MG: 50 TABLET, FILM COATED ORAL at 20:12

## 2023-07-15 RX ADMIN — ASPIRIN 81 MG: 81 TABLET, COATED ORAL at 09:15

## 2023-07-15 RX ADMIN — DIVALPROEX SODIUM 500 MG: 125 CAPSULE ORAL at 09:16

## 2023-07-15 ASSESSMENT — PAIN SCALES - GENERAL
PAINLEVEL_OUTOF10: 10
PAINLEVEL_OUTOF10: 0
PAINLEVEL_OUTOF10: 9
PAINLEVEL_OUTOF10: 10
PAINLEVEL_OUTOF10: 10
PAINLEVEL_OUTOF10: 9
PAINLEVEL_OUTOF10: 9
PAINLEVEL_OUTOF10: 7
PAINLEVEL_OUTOF10: 8
PAINLEVEL_OUTOF10: 10

## 2023-07-15 ASSESSMENT — PAIN DESCRIPTION - LOCATION
LOCATION: LEG;FOOT
LOCATION: FOOT
LOCATION: LEG

## 2023-07-15 ASSESSMENT — PAIN DESCRIPTION - PAIN TYPE: TYPE: CHRONIC PAIN

## 2023-07-15 ASSESSMENT — PAIN DESCRIPTION - ORIENTATION
ORIENTATION: LEFT;RIGHT
ORIENTATION: RIGHT;LEFT
ORIENTATION: RIGHT;LEFT

## 2023-07-15 ASSESSMENT — PAIN DESCRIPTION - DESCRIPTORS
DESCRIPTORS: SORE;THROBBING
DESCRIPTORS: ACHING;THROBBING
DESCRIPTORS: ACHING

## 2023-07-15 ASSESSMENT — PAIN SCALES - WONG BAKER: WONGBAKER_NUMERICALRESPONSE: 0

## 2023-07-16 VITALS
HEIGHT: 72 IN | HEART RATE: 72 BPM | RESPIRATION RATE: 21 BRPM | DIASTOLIC BLOOD PRESSURE: 68 MMHG | BODY MASS INDEX: 26.84 KG/M2 | OXYGEN SATURATION: 94 % | TEMPERATURE: 98.2 F | WEIGHT: 198.19 LBS | SYSTOLIC BLOOD PRESSURE: 144 MMHG

## 2023-07-16 LAB
ANION GAP SERPL CALC-SCNC: 6 MMOL/L (ref 5–15)
BASOPHILS # BLD: 0.1 K/UL (ref 0–0.1)
BASOPHILS NFR BLD: 0 % (ref 0–1)
BUN SERPL-MCNC: 47 MG/DL (ref 6–20)
BUN/CREAT SERPL: 8 (ref 12–20)
CALCIUM SERPL-MCNC: 8.6 MG/DL (ref 8.5–10.1)
CHLORIDE SERPL-SCNC: 99 MMOL/L (ref 97–108)
CO2 SERPL-SCNC: 28 MMOL/L (ref 21–32)
CREAT SERPL-MCNC: 6.2 MG/DL (ref 0.7–1.3)
DIFFERENTIAL METHOD BLD: ABNORMAL
EOSINOPHIL # BLD: 0.2 K/UL (ref 0–0.4)
EOSINOPHIL NFR BLD: 1 % (ref 0–7)
ERYTHROCYTE [DISTWIDTH] IN BLOOD BY AUTOMATED COUNT: 16.6 % (ref 11.5–14.5)
GLUCOSE BLD STRIP.AUTO-MCNC: 185 MG/DL (ref 65–117)
GLUCOSE BLD STRIP.AUTO-MCNC: 195 MG/DL (ref 65–117)
GLUCOSE SERPL-MCNC: 185 MG/DL (ref 65–100)
HCT VFR BLD AUTO: 25.8 % (ref 36.6–50.3)
HGB BLD-MCNC: 7.8 G/DL (ref 12.1–17)
IMM GRANULOCYTES # BLD AUTO: 0.2 K/UL (ref 0–0.04)
IMM GRANULOCYTES NFR BLD AUTO: 1 % (ref 0–0.5)
LYMPHOCYTES # BLD: 2.8 K/UL (ref 0.8–3.5)
LYMPHOCYTES NFR BLD: 19 % (ref 12–49)
MAGNESIUM SERPL-MCNC: 2.3 MG/DL (ref 1.6–2.4)
MCH RBC QN AUTO: 28.6 PG (ref 26–34)
MCHC RBC AUTO-ENTMCNC: 30.2 G/DL (ref 30–36.5)
MCV RBC AUTO: 94.5 FL (ref 80–99)
MONOCYTES # BLD: 1.8 K/UL (ref 0–1)
MONOCYTES NFR BLD: 12 % (ref 5–13)
NEUTS SEG # BLD: 10.3 K/UL (ref 1.8–8)
NEUTS SEG NFR BLD: 67 % (ref 32–75)
NRBC # BLD: 0 K/UL (ref 0–0.01)
NRBC BLD-RTO: 0 PER 100 WBC
PHOSPHATE SERPL-MCNC: 5.8 MG/DL (ref 2.6–4.7)
PLATELET # BLD AUTO: 278 K/UL (ref 150–400)
PMV BLD AUTO: 9 FL (ref 8.9–12.9)
POTASSIUM SERPL-SCNC: 5.4 MMOL/L (ref 3.5–5.1)
PROCALCITONIN SERPL-MCNC: 0.33 NG/ML
RBC # BLD AUTO: 2.73 M/UL (ref 4.1–5.7)
SERVICE CMNT-IMP: ABNORMAL
SERVICE CMNT-IMP: ABNORMAL
SODIUM SERPL-SCNC: 133 MMOL/L (ref 136–145)
WBC # BLD AUTO: 15.3 K/UL (ref 4.1–11.1)

## 2023-07-16 PROCEDURE — 6370000000 HC RX 637 (ALT 250 FOR IP): Performed by: STUDENT IN AN ORGANIZED HEALTH CARE EDUCATION/TRAINING PROGRAM

## 2023-07-16 PROCEDURE — 84145 PROCALCITONIN (PCT): CPT

## 2023-07-16 PROCEDURE — 36415 COLL VENOUS BLD VENIPUNCTURE: CPT

## 2023-07-16 PROCEDURE — 85025 COMPLETE CBC W/AUTO DIFF WBC: CPT

## 2023-07-16 PROCEDURE — 2580000003 HC RX 258: Performed by: STUDENT IN AN ORGANIZED HEALTH CARE EDUCATION/TRAINING PROGRAM

## 2023-07-16 PROCEDURE — 2700000000 HC OXYGEN THERAPY PER DAY

## 2023-07-16 PROCEDURE — 82962 GLUCOSE BLOOD TEST: CPT

## 2023-07-16 PROCEDURE — 84100 ASSAY OF PHOSPHORUS: CPT

## 2023-07-16 PROCEDURE — 2500000003 HC RX 250 WO HCPCS: Performed by: STUDENT IN AN ORGANIZED HEALTH CARE EDUCATION/TRAINING PROGRAM

## 2023-07-16 PROCEDURE — 83735 ASSAY OF MAGNESIUM: CPT

## 2023-07-16 PROCEDURE — 80048 BASIC METABOLIC PNL TOTAL CA: CPT

## 2023-07-16 RX ORDER — OXYCODONE HYDROCHLORIDE 5 MG/1
5 TABLET ORAL
Status: DISCONTINUED | OUTPATIENT
Start: 2023-07-16 | End: 2023-07-16 | Stop reason: HOSPADM

## 2023-07-16 RX ORDER — INSULIN LISPRO 100 [IU]/ML
3 INJECTION, SOLUTION INTRAVENOUS; SUBCUTANEOUS
Status: DISCONTINUED | OUTPATIENT
Start: 2023-07-16 | End: 2023-07-16 | Stop reason: HOSPADM

## 2023-07-16 RX ORDER — OXYCODONE HYDROCHLORIDE 5 MG/1
10 TABLET ORAL
Status: DISCONTINUED | OUTPATIENT
Start: 2023-07-16 | End: 2023-07-16 | Stop reason: HOSPADM

## 2023-07-16 RX ADMIN — FERROUS SULFATE TAB 325 MG (65 MG ELEMENTAL FE) 325 MG: 325 (65 FE) TAB at 09:17

## 2023-07-16 RX ADMIN — HYDRALAZINE HYDROCHLORIDE 100 MG: 50 TABLET, FILM COATED ORAL at 09:17

## 2023-07-16 RX ADMIN — Medication 3 UNITS: at 13:34

## 2023-07-16 RX ADMIN — CALCIUM ACETATE 2001 MG: 667 CAPSULE ORAL at 13:34

## 2023-07-16 RX ADMIN — OXYCODONE HYDROCHLORIDE 10 MG: 5 TABLET ORAL at 00:55

## 2023-07-16 RX ADMIN — SODIUM CHLORIDE, PRESERVATIVE FREE 10 ML: 5 INJECTION INTRAVENOUS at 11:05

## 2023-07-16 RX ADMIN — SULFAMETHOXAZOLE AND TRIMETHOPRIM 1 TABLET: 400; 80 TABLET ORAL at 10:53

## 2023-07-16 RX ADMIN — ASPIRIN 81 MG: 81 TABLET, COATED ORAL at 09:17

## 2023-07-16 RX ADMIN — HYDRALAZINE HYDROCHLORIDE 100 MG: 50 TABLET, FILM COATED ORAL at 13:34

## 2023-07-16 RX ADMIN — OXYCODONE HYDROCHLORIDE 10 MG: 5 TABLET ORAL at 09:27

## 2023-07-16 RX ADMIN — CALCIUM ACETATE 2001 MG: 667 CAPSULE ORAL at 09:17

## 2023-07-16 RX ADMIN — ATORVASTATIN CALCIUM 40 MG: 40 TABLET, FILM COATED ORAL at 09:17

## 2023-07-16 RX ADMIN — DIVALPROEX SODIUM 500 MG: 125 CAPSULE ORAL at 10:53

## 2023-07-16 RX ADMIN — ATENOLOL 25 MG: 50 TABLET ORAL at 09:18

## 2023-07-16 RX ADMIN — Medication 3 UNITS: at 09:18

## 2023-07-16 RX ADMIN — AMOXICILLIN AND CLAVULANATE POTASSIUM 1 TABLET: 500; 125 TABLET, FILM COATED ORAL at 10:52

## 2023-07-16 RX ADMIN — NIFEDIPINE 90 MG: 90 TABLET, EXTENDED RELEASE ORAL at 09:17

## 2023-07-16 ASSESSMENT — PAIN DESCRIPTION - DESCRIPTORS: DESCRIPTORS: ACHING

## 2023-07-16 ASSESSMENT — PAIN SCALES - GENERAL
PAINLEVEL_OUTOF10: 0
PAINLEVEL_OUTOF10: 10
PAINLEVEL_OUTOF10: 0
PAINLEVEL_OUTOF10: 10

## 2023-07-16 ASSESSMENT — PAIN DESCRIPTION - ORIENTATION
ORIENTATION: RIGHT;LEFT
ORIENTATION: RIGHT;LEFT

## 2023-07-16 ASSESSMENT — PAIN DESCRIPTION - LOCATION
LOCATION: FOOT;LEG
LOCATION: LEG;FOOT

## 2023-07-18 LAB
BACTERIA SPEC CULT: ABNORMAL
BACTERIA SPEC CULT: ABNORMAL
GRAM STN SPEC: ABNORMAL
GRAM STN SPEC: ABNORMAL
SERVICE CMNT-IMP: ABNORMAL

## 2023-07-24 ENCOUNTER — HOSPITAL ENCOUNTER (INPATIENT)
Facility: HOSPITAL | Age: 48
LOS: 3 days | Discharge: HOME | End: 2023-07-28
Attending: EMERGENCY MEDICINE | Admitting: INTERNAL MEDICINE
Payer: COMMERCIAL

## 2023-07-24 DIAGNOSIS — J96.01 ACUTE RESPIRATORY FAILURE WITH HYPOXIA (HCC): Primary | ICD-10-CM

## 2023-07-24 DIAGNOSIS — J90 PLEURAL EFFUSION: ICD-10-CM

## 2023-07-24 DIAGNOSIS — N18.6 ESRD (END STAGE RENAL DISEASE) (HCC): ICD-10-CM

## 2023-07-24 PROCEDURE — 99285 EMERGENCY DEPT VISIT HI MDM: CPT

## 2023-07-24 ASSESSMENT — PAIN SCALES - GENERAL: PAINLEVEL_OUTOF10: 7

## 2023-07-24 ASSESSMENT — PAIN DESCRIPTION - ORIENTATION: ORIENTATION: RIGHT;LEFT

## 2023-07-24 ASSESSMENT — PAIN DESCRIPTION - LOCATION: LOCATION: FOOT

## 2023-07-24 ASSESSMENT — PAIN DESCRIPTION - PAIN TYPE: TYPE: CHRONIC PAIN

## 2023-07-24 ASSESSMENT — PAIN - FUNCTIONAL ASSESSMENT: PAIN_FUNCTIONAL_ASSESSMENT: 0-10

## 2023-07-25 ENCOUNTER — APPOINTMENT (OUTPATIENT)
Facility: HOSPITAL | Age: 48
End: 2023-07-25
Payer: COMMERCIAL

## 2023-07-25 PROBLEM — J96.91 RESPIRATORY FAILURE WITH HYPOXIA, UNSPECIFIED CHRONICITY (HCC): Status: ACTIVE | Noted: 2023-07-25

## 2023-07-25 LAB
ALBUMIN SERPL-MCNC: 2.2 G/DL (ref 3.5–5)
ALBUMIN/GLOB SERPL: 0.4 (ref 1.1–2.2)
ALP SERPL-CCNC: 805 U/L (ref 45–117)
ALT SERPL-CCNC: 37 U/L (ref 12–78)
ANION GAP SERPL CALC-SCNC: 9 MMOL/L (ref 5–15)
AST SERPL-CCNC: 34 U/L (ref 15–37)
BASOPHILS # BLD: 0.1 K/UL (ref 0–0.1)
BASOPHILS NFR BLD: 0 % (ref 0–1)
BILIRUB SERPL-MCNC: 0.6 MG/DL (ref 0.2–1)
BUN SERPL-MCNC: 32 MG/DL (ref 6–20)
BUN/CREAT SERPL: 6 (ref 12–20)
CALCIUM SERPL-MCNC: 7.7 MG/DL (ref 8.5–10.1)
CHLORIDE SERPL-SCNC: 100 MMOL/L (ref 97–108)
CO2 SERPL-SCNC: 26 MMOL/L (ref 21–32)
CREAT SERPL-MCNC: 5.23 MG/DL (ref 0.7–1.3)
CRP SERPL-MCNC: 9 MG/DL (ref 0–0.6)
DIFFERENTIAL METHOD BLD: ABNORMAL
EKG ATRIAL RATE: 95 BPM
EKG DIAGNOSIS: NORMAL
EKG P AXIS: 83 DEGREES
EKG P-R INTERVAL: 174 MS
EKG Q-T INTERVAL: 354 MS
EKG QRS DURATION: 92 MS
EKG QTC CALCULATION (BAZETT): 444 MS
EKG R AXIS: -25 DEGREES
EKG T AXIS: 104 DEGREES
EKG VENTRICULAR RATE: 95 BPM
EOSINOPHIL # BLD: 0.1 K/UL (ref 0–0.4)
EOSINOPHIL NFR BLD: 1 % (ref 0–7)
ERYTHROCYTE [DISTWIDTH] IN BLOOD BY AUTOMATED COUNT: 17.7 % (ref 11.5–14.5)
ERYTHROCYTE [SEDIMENTATION RATE] IN BLOOD: 127 MM/HR (ref 0–15)
GLOBULIN SER CALC-MCNC: 5.8 G/DL (ref 2–4)
GLUCOSE BLD STRIP.AUTO-MCNC: 129 MG/DL (ref 65–117)
GLUCOSE BLD STRIP.AUTO-MCNC: 157 MG/DL (ref 65–117)
GLUCOSE BLD STRIP.AUTO-MCNC: 194 MG/DL (ref 65–117)
GLUCOSE BLD STRIP.AUTO-MCNC: 307 MG/DL (ref 65–117)
GLUCOSE SERPL-MCNC: 286 MG/DL (ref 65–100)
HCT VFR BLD AUTO: 23.8 % (ref 36.6–50.3)
HGB BLD-MCNC: 7.7 G/DL (ref 12.1–17)
IMM GRANULOCYTES # BLD AUTO: 0.2 K/UL (ref 0–0.04)
IMM GRANULOCYTES NFR BLD AUTO: 1 % (ref 0–0.5)
LYMPHOCYTES # BLD: 2 K/UL (ref 0.8–3.5)
LYMPHOCYTES NFR BLD: 16 % (ref 12–49)
MAGNESIUM SERPL-MCNC: 2 MG/DL (ref 1.6–2.4)
MCH RBC QN AUTO: 30.3 PG (ref 26–34)
MCHC RBC AUTO-ENTMCNC: 32.4 G/DL (ref 30–36.5)
MCV RBC AUTO: 93.7 FL (ref 80–99)
MONOCYTES # BLD: 1.8 K/UL (ref 0–1)
MONOCYTES NFR BLD: 14 % (ref 5–13)
NEUTS SEG # BLD: 8.8 K/UL (ref 1.8–8)
NEUTS SEG NFR BLD: 68 % (ref 32–75)
NRBC # BLD: 0.03 K/UL (ref 0–0.01)
NRBC BLD-RTO: 0.2 PER 100 WBC
NT PRO BNP: ABNORMAL PG/ML
PLATELET # BLD AUTO: 264 K/UL (ref 150–400)
PMV BLD AUTO: 8.8 FL (ref 8.9–12.9)
POTASSIUM SERPL-SCNC: 4.1 MMOL/L (ref 3.5–5.1)
PROT SERPL-MCNC: 8 G/DL (ref 6.4–8.2)
RBC # BLD AUTO: 2.54 M/UL (ref 4.1–5.7)
SERVICE CMNT-IMP: ABNORMAL
SODIUM SERPL-SCNC: 135 MMOL/L (ref 136–145)
TROPONIN I SERPL HS-MCNC: 48 NG/L (ref 0–76)
WBC # BLD AUTO: 13 K/UL (ref 4.1–11.1)

## 2023-07-25 PROCEDURE — 82962 GLUCOSE BLOOD TEST: CPT

## 2023-07-25 PROCEDURE — 2060000000 HC ICU INTERMEDIATE R&B

## 2023-07-25 PROCEDURE — 85652 RBC SED RATE AUTOMATED: CPT

## 2023-07-25 PROCEDURE — 83880 ASSAY OF NATRIURETIC PEPTIDE: CPT

## 2023-07-25 PROCEDURE — 2580000003 HC RX 258: Performed by: NURSE PRACTITIONER

## 2023-07-25 PROCEDURE — 90935 HEMODIALYSIS ONE EVALUATION: CPT

## 2023-07-25 PROCEDURE — 6370000000 HC RX 637 (ALT 250 FOR IP): Performed by: NURSE PRACTITIONER

## 2023-07-25 PROCEDURE — 86140 C-REACTIVE PROTEIN: CPT

## 2023-07-25 PROCEDURE — 36415 COLL VENOUS BLD VENIPUNCTURE: CPT

## 2023-07-25 PROCEDURE — 99223 1ST HOSP IP/OBS HIGH 75: CPT | Performed by: PODIATRIST

## 2023-07-25 PROCEDURE — 71045 X-RAY EXAM CHEST 1 VIEW: CPT

## 2023-07-25 PROCEDURE — 2500000003 HC RX 250 WO HCPCS: Performed by: NURSE PRACTITIONER

## 2023-07-25 PROCEDURE — 6370000000 HC RX 637 (ALT 250 FOR IP): Performed by: INTERNAL MEDICINE

## 2023-07-25 PROCEDURE — 80053 COMPREHEN METABOLIC PANEL: CPT

## 2023-07-25 PROCEDURE — 5A1D70Z PERFORMANCE OF URINARY FILTRATION, INTERMITTENT, LESS THAN 6 HOURS PER DAY: ICD-10-PCS | Performed by: INTERNAL MEDICINE

## 2023-07-25 PROCEDURE — 2500000003 HC RX 250 WO HCPCS: Performed by: EMERGENCY MEDICINE

## 2023-07-25 PROCEDURE — 6370000000 HC RX 637 (ALT 250 FOR IP): Performed by: EMERGENCY MEDICINE

## 2023-07-25 PROCEDURE — 73620 X-RAY EXAM OF FOOT: CPT

## 2023-07-25 PROCEDURE — 93005 ELECTROCARDIOGRAM TRACING: CPT | Performed by: EMERGENCY MEDICINE

## 2023-07-25 PROCEDURE — 85025 COMPLETE CBC W/AUTO DIFF WBC: CPT

## 2023-07-25 PROCEDURE — 84484 ASSAY OF TROPONIN QUANT: CPT

## 2023-07-25 PROCEDURE — 96374 THER/PROPH/DIAG INJ IV PUSH: CPT

## 2023-07-25 PROCEDURE — 2500000003 HC RX 250 WO HCPCS: Performed by: INTERNAL MEDICINE

## 2023-07-25 PROCEDURE — 83735 ASSAY OF MAGNESIUM: CPT

## 2023-07-25 PROCEDURE — 2500000003 HC RX 250 WO HCPCS: Performed by: STUDENT IN AN ORGANIZED HEALTH CARE EDUCATION/TRAINING PROGRAM

## 2023-07-25 PROCEDURE — 6360000002 HC RX W HCPCS: Performed by: STUDENT IN AN ORGANIZED HEALTH CARE EDUCATION/TRAINING PROGRAM

## 2023-07-25 RX ORDER — ONDANSETRON 4 MG/1
4 TABLET, ORALLY DISINTEGRATING ORAL EVERY 8 HOURS PRN
Status: DISCONTINUED | OUTPATIENT
Start: 2023-07-25 | End: 2023-07-28 | Stop reason: HOSPADM

## 2023-07-25 RX ORDER — POLYETHYLENE GLYCOL 3350 17 G/17G
17 POWDER, FOR SOLUTION ORAL DAILY PRN
Status: DISCONTINUED | OUTPATIENT
Start: 2023-07-25 | End: 2023-07-28 | Stop reason: HOSPADM

## 2023-07-25 RX ORDER — INSULIN LISPRO 100 [IU]/ML
3 INJECTION, SOLUTION INTRAVENOUS; SUBCUTANEOUS
Status: DISCONTINUED | OUTPATIENT
Start: 2023-07-25 | End: 2023-07-28 | Stop reason: HOSPADM

## 2023-07-25 RX ORDER — ASPIRIN 81 MG/1
81 TABLET ORAL DAILY
Status: DISCONTINUED | OUTPATIENT
Start: 2023-07-25 | End: 2023-07-28 | Stop reason: HOSPADM

## 2023-07-25 RX ORDER — LABETALOL HYDROCHLORIDE 5 MG/ML
20 INJECTION, SOLUTION INTRAVENOUS EVERY 4 HOURS PRN
Status: DISCONTINUED | OUTPATIENT
Start: 2023-07-25 | End: 2023-07-28 | Stop reason: HOSPADM

## 2023-07-25 RX ORDER — PANTOPRAZOLE SODIUM 40 MG/1
40 TABLET, DELAYED RELEASE ORAL
Status: DISCONTINUED | OUTPATIENT
Start: 2023-07-25 | End: 2023-07-28 | Stop reason: HOSPADM

## 2023-07-25 RX ORDER — INSULIN GLARGINE 100 [IU]/ML
15 INJECTION, SOLUTION SUBCUTANEOUS NIGHTLY
Status: DISCONTINUED | OUTPATIENT
Start: 2023-07-25 | End: 2023-07-25

## 2023-07-25 RX ORDER — INSULIN LISPRO 100 [IU]/ML
0-4 INJECTION, SOLUTION INTRAVENOUS; SUBCUTANEOUS
Status: DISCONTINUED | OUTPATIENT
Start: 2023-07-25 | End: 2023-07-28 | Stop reason: HOSPADM

## 2023-07-25 RX ORDER — ATORVASTATIN CALCIUM 40 MG/1
40 TABLET, FILM COATED ORAL DAILY
Status: DISCONTINUED | OUTPATIENT
Start: 2023-07-25 | End: 2023-07-28 | Stop reason: HOSPADM

## 2023-07-25 RX ORDER — AMLODIPINE BESYLATE 5 MG/1
10 TABLET ORAL DAILY
Status: DISCONTINUED | OUTPATIENT
Start: 2023-07-25 | End: 2023-07-28 | Stop reason: HOSPADM

## 2023-07-25 RX ORDER — OXYCODONE HYDROCHLORIDE 5 MG/1
10 TABLET ORAL ONCE
Status: COMPLETED | OUTPATIENT
Start: 2023-07-25 | End: 2023-07-25

## 2023-07-25 RX ORDER — LABETALOL HYDROCHLORIDE 5 MG/ML
5 INJECTION, SOLUTION INTRAVENOUS EVERY 4 HOURS PRN
Status: DISCONTINUED | OUTPATIENT
Start: 2023-07-25 | End: 2023-07-25

## 2023-07-25 RX ORDER — SODIUM CHLORIDE 0.9 % (FLUSH) 0.9 %
5-40 SYRINGE (ML) INJECTION EVERY 12 HOURS SCHEDULED
Status: DISCONTINUED | OUTPATIENT
Start: 2023-07-25 | End: 2023-07-28 | Stop reason: HOSPADM

## 2023-07-25 RX ORDER — ACETAMINOPHEN 650 MG/1
650 SUPPOSITORY RECTAL EVERY 6 HOURS PRN
Status: DISCONTINUED | OUTPATIENT
Start: 2023-07-25 | End: 2023-07-28 | Stop reason: HOSPADM

## 2023-07-25 RX ORDER — SODIUM CHLORIDE 9 MG/ML
INJECTION, SOLUTION INTRAVENOUS PRN
Status: DISCONTINUED | OUTPATIENT
Start: 2023-07-25 | End: 2023-07-28 | Stop reason: HOSPADM

## 2023-07-25 RX ORDER — INSULIN LISPRO 100 [IU]/ML
0-4 INJECTION, SOLUTION INTRAVENOUS; SUBCUTANEOUS NIGHTLY
Status: DISCONTINUED | OUTPATIENT
Start: 2023-07-25 | End: 2023-07-28 | Stop reason: HOSPADM

## 2023-07-25 RX ORDER — CALCIUM ACETATE 667 MG/1
3 CAPSULE ORAL 3 TIMES DAILY
Status: DISCONTINUED | OUTPATIENT
Start: 2023-07-25 | End: 2023-07-28 | Stop reason: HOSPADM

## 2023-07-25 RX ORDER — OXYCODONE HYDROCHLORIDE 5 MG/1
5 TABLET ORAL EVERY 4 HOURS PRN
Status: DISCONTINUED | OUTPATIENT
Start: 2023-07-25 | End: 2023-07-28 | Stop reason: HOSPADM

## 2023-07-25 RX ORDER — DIVALPROEX SODIUM 125 MG/1
500 CAPSULE, COATED PELLETS ORAL 2 TIMES DAILY
Status: DISCONTINUED | OUTPATIENT
Start: 2023-07-25 | End: 2023-07-28 | Stop reason: HOSPADM

## 2023-07-25 RX ORDER — SODIUM CHLORIDE 0.9 % (FLUSH) 0.9 %
5-40 SYRINGE (ML) INJECTION PRN
Status: DISCONTINUED | OUTPATIENT
Start: 2023-07-25 | End: 2023-07-28 | Stop reason: HOSPADM

## 2023-07-25 RX ORDER — HYDRALAZINE HYDROCHLORIDE 20 MG/ML
10 INJECTION INTRAMUSCULAR; INTRAVENOUS EVERY 6 HOURS PRN
Status: DISCONTINUED | OUTPATIENT
Start: 2023-07-25 | End: 2023-07-28 | Stop reason: HOSPADM

## 2023-07-25 RX ORDER — HYDRALAZINE HYDROCHLORIDE 50 MG/1
100 TABLET, FILM COATED ORAL 3 TIMES DAILY
Status: DISCONTINUED | OUTPATIENT
Start: 2023-07-25 | End: 2023-07-28 | Stop reason: HOSPADM

## 2023-07-25 RX ORDER — FERROUS SULFATE 325(65) MG
325 TABLET ORAL
Status: DISCONTINUED | OUTPATIENT
Start: 2023-07-25 | End: 2023-07-28 | Stop reason: HOSPADM

## 2023-07-25 RX ORDER — ENOXAPARIN SODIUM 100 MG/ML
30 INJECTION SUBCUTANEOUS DAILY
Status: DISCONTINUED | OUTPATIENT
Start: 2023-07-25 | End: 2023-07-25

## 2023-07-25 RX ORDER — HYDROMORPHONE HYDROCHLORIDE 1 MG/ML
0.5 INJECTION, SOLUTION INTRAMUSCULAR; INTRAVENOUS; SUBCUTANEOUS ONCE
Status: COMPLETED | OUTPATIENT
Start: 2023-07-25 | End: 2023-07-25

## 2023-07-25 RX ORDER — INSULIN GLARGINE 100 [IU]/ML
7.5 INJECTION, SOLUTION SUBCUTANEOUS NIGHTLY
Status: DISCONTINUED | OUTPATIENT
Start: 2023-07-25 | End: 2023-07-28 | Stop reason: HOSPADM

## 2023-07-25 RX ORDER — NIFEDIPINE 90 MG/1
90 TABLET, EXTENDED RELEASE ORAL DAILY
Status: DISCONTINUED | OUTPATIENT
Start: 2023-07-25 | End: 2023-07-28 | Stop reason: HOSPADM

## 2023-07-25 RX ORDER — ATENOLOL 50 MG/1
50 TABLET ORAL DAILY
Status: DISCONTINUED | OUTPATIENT
Start: 2023-07-26 | End: 2023-07-28 | Stop reason: HOSPADM

## 2023-07-25 RX ORDER — ACETAMINOPHEN 325 MG/1
650 TABLET ORAL EVERY 6 HOURS PRN
Status: DISCONTINUED | OUTPATIENT
Start: 2023-07-25 | End: 2023-07-28 | Stop reason: HOSPADM

## 2023-07-25 RX ORDER — ENOXAPARIN SODIUM 100 MG/ML
30 INJECTION SUBCUTANEOUS DAILY
Status: DISCONTINUED | OUTPATIENT
Start: 2023-07-25 | End: 2023-07-28 | Stop reason: HOSPADM

## 2023-07-25 RX ORDER — ATENOLOL 50 MG/1
25 TABLET ORAL DAILY
Status: DISCONTINUED | OUTPATIENT
Start: 2023-07-25 | End: 2023-07-25

## 2023-07-25 RX ORDER — INSULIN LISPRO 100 [IU]/ML
5 INJECTION, SOLUTION INTRAVENOUS; SUBCUTANEOUS
Status: DISCONTINUED | OUTPATIENT
Start: 2023-07-25 | End: 2023-07-25

## 2023-07-25 RX ORDER — DEXTROSE MONOHYDRATE 100 MG/ML
INJECTION, SOLUTION INTRAVENOUS CONTINUOUS PRN
Status: DISCONTINUED | OUTPATIENT
Start: 2023-07-25 | End: 2023-07-28 | Stop reason: HOSPADM

## 2023-07-25 RX ORDER — BUMETANIDE 0.25 MG/ML
2 INJECTION INTRAMUSCULAR; INTRAVENOUS ONCE
Status: COMPLETED | OUTPATIENT
Start: 2023-07-25 | End: 2023-07-25

## 2023-07-25 RX ORDER — INSULIN GLARGINE-YFGN 100 [IU]/ML
15 INJECTION, SOLUTION SUBCUTANEOUS NIGHTLY
Status: DISCONTINUED | OUTPATIENT
Start: 2023-07-25 | End: 2023-07-25

## 2023-07-25 RX ORDER — ACETAMINOPHEN 500 MG
500 TABLET ORAL EVERY 6 HOURS PRN
Status: DISCONTINUED | OUTPATIENT
Start: 2023-07-25 | End: 2023-07-28 | Stop reason: HOSPADM

## 2023-07-25 RX ORDER — ONDANSETRON 2 MG/ML
4 INJECTION INTRAMUSCULAR; INTRAVENOUS EVERY 6 HOURS PRN
Status: DISCONTINUED | OUTPATIENT
Start: 2023-07-25 | End: 2023-07-28 | Stop reason: HOSPADM

## 2023-07-25 RX ADMIN — SODIUM CHLORIDE, PRESERVATIVE FREE 10 ML: 5 INJECTION INTRAVENOUS at 22:26

## 2023-07-25 RX ADMIN — AMLODIPINE BESYLATE 10 MG: 5 TABLET ORAL at 08:48

## 2023-07-25 RX ADMIN — PANTOPRAZOLE SODIUM 40 MG: 40 TABLET, DELAYED RELEASE ORAL at 08:48

## 2023-07-25 RX ADMIN — SODIUM CHLORIDE, PRESERVATIVE FREE 10 ML: 5 INJECTION INTRAVENOUS at 22:23

## 2023-07-25 RX ADMIN — Medication 3 UNITS: at 08:49

## 2023-07-25 RX ADMIN — Medication 3 UNITS: at 14:16

## 2023-07-25 RX ADMIN — OXYCODONE HYDROCHLORIDE 5 MG: 5 TABLET ORAL at 14:53

## 2023-07-25 RX ADMIN — HYDRALAZINE HYDROCHLORIDE 10 MG: 20 INJECTION INTRAMUSCULAR; INTRAVENOUS at 06:56

## 2023-07-25 RX ADMIN — OXYCODONE HYDROCHLORIDE 10 MG: 5 TABLET ORAL at 03:23

## 2023-07-25 RX ADMIN — SODIUM CHLORIDE, PRESERVATIVE FREE 10 ML: 5 INJECTION INTRAVENOUS at 08:50

## 2023-07-25 RX ADMIN — HYDROMORPHONE HYDROCHLORIDE 0.5 MG: 1 INJECTION, SOLUTION INTRAMUSCULAR; INTRAVENOUS; SUBCUTANEOUS at 06:55

## 2023-07-25 RX ADMIN — HYDRALAZINE HYDROCHLORIDE 100 MG: 50 TABLET, FILM COATED ORAL at 08:48

## 2023-07-25 RX ADMIN — ATORVASTATIN CALCIUM 40 MG: 40 TABLET, FILM COATED ORAL at 08:48

## 2023-07-25 RX ADMIN — ATENOLOL 25 MG: 50 TABLET ORAL at 08:47

## 2023-07-25 RX ADMIN — FERROUS SULFATE TAB 325 MG (65 MG ELEMENTAL FE) 325 MG: 325 (65 FE) TAB at 08:48

## 2023-07-25 RX ADMIN — CALCIUM ACETATE 2001 MG: 667 CAPSULE ORAL at 08:48

## 2023-07-25 RX ADMIN — SODIUM CHLORIDE, PRESERVATIVE FREE 10 ML: 5 INJECTION INTRAVENOUS at 08:52

## 2023-07-25 RX ADMIN — DIVALPROEX SODIUM 500 MG: 125 CAPSULE, COATED PELLETS ORAL at 08:47

## 2023-07-25 RX ADMIN — NIFEDIPINE 90 MG: 90 TABLET, EXTENDED RELEASE ORAL at 08:47

## 2023-07-25 RX ADMIN — ACETAMINOPHEN 650 MG: 325 TABLET ORAL at 09:27

## 2023-07-25 RX ADMIN — BUMETANIDE 2 MG: 0.25 INJECTION INTRAMUSCULAR; INTRAVENOUS at 03:23

## 2023-07-25 RX ADMIN — HYDROMORPHONE HYDROCHLORIDE 0.5 MG: 1 INJECTION, SOLUTION INTRAMUSCULAR; INTRAVENOUS; SUBCUTANEOUS at 17:01

## 2023-07-25 RX ADMIN — HYDRALAZINE HYDROCHLORIDE 100 MG: 50 TABLET, FILM COATED ORAL at 14:15

## 2023-07-25 RX ADMIN — ASPIRIN 81 MG: 81 TABLET, COATED ORAL at 08:47

## 2023-07-25 RX ADMIN — DIVALPROEX SODIUM 500 MG: 125 CAPSULE, COATED PELLETS ORAL at 22:14

## 2023-07-25 RX ADMIN — HYDRALAZINE HYDROCHLORIDE 100 MG: 50 TABLET, FILM COATED ORAL at 22:15

## 2023-07-25 RX ADMIN — CALCIUM ACETATE 2001 MG: 667 CAPSULE ORAL at 22:14

## 2023-07-25 RX ADMIN — CALCIUM ACETATE 2001 MG: 667 CAPSULE ORAL at 14:15

## 2023-07-25 ASSESSMENT — PAIN SCALES - GENERAL
PAINLEVEL_OUTOF10: 0
PAINLEVEL_OUTOF10: 5
PAINLEVEL_OUTOF10: 9
PAINLEVEL_OUTOF10: 2
PAINLEVEL_OUTOF10: 9
PAINLEVEL_OUTOF10: 9
PAINLEVEL_OUTOF10: 10
PAINLEVEL_OUTOF10: 5
PAINLEVEL_OUTOF10: 9

## 2023-07-25 ASSESSMENT — PAIN SCALES - WONG BAKER
WONGBAKER_NUMERICALRESPONSE: 0
WONGBAKER_NUMERICALRESPONSE: 0

## 2023-07-25 ASSESSMENT — LIFESTYLE VARIABLES
HOW MANY STANDARD DRINKS CONTAINING ALCOHOL DO YOU HAVE ON A TYPICAL DAY: PATIENT DOES NOT DRINK
HOW OFTEN DO YOU HAVE A DRINK CONTAINING ALCOHOL: NEVER

## 2023-07-25 ASSESSMENT — PAIN DESCRIPTION - LOCATION
LOCATION: FOOT
LOCATION: FOOT
LOCATION: LEG;FOOT
LOCATION: FOOT
LOCATION: FOOT
LOCATION: LEG;FOOT

## 2023-07-25 ASSESSMENT — ENCOUNTER SYMPTOMS
SHORTNESS OF BREATH: 1
COLOR CHANGE: 1
ALLERGIC/IMMUNOLOGIC NEGATIVE: 1
GASTROINTESTINAL NEGATIVE: 1

## 2023-07-25 NOTE — ED TRIAGE NOTES
Patient brought in by EMS complaining of bilateral foot pain, shortness of breath and increased blood glucose levels, patient is alert and oriented x 4, patient had dialysis this morning.  Patient refusing to wear pulse ox at this time

## 2023-07-25 NOTE — H&P
0.5 %    Neutrophils Absolute 8.8 (H) 1.8 - 8.0 K/UL    Lymphocytes Absolute 2.0 0.8 - 3.5 K/UL    Monocytes Absolute 1.8 (H) 0.0 - 1.0 K/UL    Eosinophils Absolute 0.1 0.0 - 0.4 K/UL    Basophils Absolute 0.1 0.0 - 0.1 K/UL    Absolute Immature Granulocyte 0.2 (H) 0.00 - 0.04 K/UL    Differential Type AUTOMATED     CMP    Collection Time: 07/25/23  1:04 AM   Result Value Ref Range    Sodium 135 (L) 136 - 145 mmol/L    Potassium 4.1 3.5 - 5.1 mmol/L    Chloride 100 97 - 108 mmol/L    CO2 26 21 - 32 mmol/L    Anion Gap 9 5 - 15 mmol/L    Glucose 286 (H) 65 - 100 mg/dL    BUN 32 (H) 6 - 20 MG/DL    Creatinine 5.23 (H) 0.70 - 1.30 MG/DL    Bun/Cre Ratio 6 (L) 12 - 20      Est, Glom Filt Rate 13 (L) >60 ml/min/1.73m2    Calcium 7.7 (L) 8.5 - 10.1 MG/DL    Total Bilirubin 0.6 0.2 - 1.0 MG/DL    ALT 37 12 - 78 U/L    AST 34 15 - 37 U/L    Alk Phosphatase 805 (H) 45 - 117 U/L    Total Protein 8.0 6.4 - 8.2 g/dL    Albumin 2.2 (L) 3.5 - 5.0 g/dL    Globulin 5.8 (H) 2.0 - 4.0 g/dL    Albumin/Globulin Ratio 0.4 (L) 1.1 - 2.2     Magnesium    Collection Time: 07/25/23  1:04 AM   Result Value Ref Range    Magnesium 2.0 1.6 - 2.4 mg/dL   Brain Natriuretic Peptide    Collection Time: 07/25/23  1:04 AM   Result Value Ref Range    NT Pro-BNP >35,000 (H) <125 PG/ML   Troponin    Collection Time: 07/25/23  1:04 AM   Result Value Ref Range    Troponin, High Sensitivity 48 0 - 76 ng/L   Sedimentation Rate    Collection Time: 07/25/23  1:04 AM   Result Value Ref Range    Sed Rate, Automated 127 (H) 0 - 15 mm/hr   EKG 12 Lead    Collection Time: 07/25/23  1:41 AM   Result Value Ref Range    Ventricular Rate 95 BPM    Atrial Rate 95 BPM    P-R Interval 174 ms    QRS Duration 92 ms    Q-T Interval 354 ms    QTc Calculation (Bazett) 444 ms    P Axis 83 degrees    R Axis -25 degrees    T Axis 104 degrees    Diagnosis       Normal sinus rhythm  Nonspecific ST and T wave abnormality  When compared with ECG of 13-JUL-2023 06:21,  Left anterior recent):  XR CHEST PORTABLE 07/25/2023    Narrative  EXAM:  XR CHEST PORTABLE    INDICATION: Shortness of breath    COMPARISON: 7/13/2023    TECHNIQUE: portable chest AP view obtained portably at 0201 hours    FINDINGS: The cardiac silhouette is stable. A dual lumen catheter terminates at  the cavoatrial junction. Pulmonary congestion has resolved. There are residual  bilateral pleural effusions as well as bibasilar airspace disease    Impression  Resolving central congestion  Persistent bibasilar airspace disease and bilateral pleural effusions        _______________________________________________________________________    TOTAL TIME:  70 Minutes    Critical Care Provided     Minutes non procedure based    Signed: KATIE Aguiar NP    Procedures: see electronic medical records for all procedures/Xrays and details which were not copied into this note but were reviewed prior to creation of Plan.

## 2023-07-25 NOTE — PROGRESS NOTES
Patient present in IR. Thoracentesis procedure explained to patient by RN. Patient states that he does not feel well enough to have that done today and that he is very tired and hungary after dialysis. Patient would like to have thoracentesis performed tomorrow 7/26.  Pt added to IR schedule for thoracentesis on 7/26 at patient request.

## 2023-07-25 NOTE — CARE COORDINATION
Condition of Participation: Discharge Planning   The Plan for Transition of Care is related to the following treatment goals: Home health and Inpatient rehab   The Patient and/or Patient Representative was provided with a Choice of Provider? Patient;Patient Representative   Name of the Patient Representative who was provided with the Choice of Provider and agrees with the Discharge Plan? Patient's mother Dara Simmons [phone]   The Patient and/Or Patient Representative agree with the Discharge Plan? Yes   Freedom of Choice list was provided with basic dialogue that supports the patient's individualized plan of care/goals, treatment preferences, and shares the quality data associated with the providers?   Yes     Readmission Assessment  Number of Days since last admission?: 8-30 days (7/13/2023 - 7/16/2023 (3 days))  Previous Disposition: Home with Family [facility])  Who is being Interviewed: Patient  What was the patient's/caregiver's perception as to why they think they needed to return back to the hospital?: Not enough help at home, Unable to obtain medications, Did not realize care needs would be so extensive  Did you visit your Primary Care Physician after you left the hospital, before you returned this time?: No  Why weren't you able to visit your PCP?: Could not get an appointment  Did you see a specialist, such as Cardiac, Pulmonary, Orthopedic Physician, etc. after you left the hospital?: No  Who advised the patient to return to the hospital?: Self-referral  Does the patient report anything that got in the way of taking their medications?: Yes  What reasons did they give?: No ability to  medications  In our efforts to provide the best possible care to you and others like you, can you think of anything that we could have done to help you after you left the hospital the first time, so that you might not have needed to return so soon?: Arrange for more help when leaving the hospital, Additional Community resources available for illness support    CM met the patient at the bedside, introduced self, role of CM related to discharge planning and transition of care and verified demographics and insurance/payor information. The patient refused to discuss ACP. The patient is alert, able to make needs known, but requires assistance with ALDS, and not able to drive due to being legally blind and having wounds on both feet. The patient has [address], food stamps, and disability incomes; lives with her mother Korin Mcdaniel [phone] who is the primary caregiver in an apartment. He has a wheelchair and cane. He uses Medicaid transport. The patient might discharge back to his apartment with home health, used [address] in the past. IPR to be considered if he qualify.       Alondra Villeda RN  Case Management  [phone]

## 2023-07-25 NOTE — CONSULTS
Heart Disease Mother     No Known Problems Father       Social History     Tobacco Use    Smoking status: Every Day     Packs/day: 0.50     Types: Cigarettes     Passive exposure: Current    Smokeless tobacco: Never   Substance Use Topics    Alcohol use: Never     Current Facility-Administered Medications   Medication Dose Route Frequency Provider Last Rate Last Admin    sodium chloride flush 0.9 % injection 5-40 mL  5-40 mL IntraVENous 2 times per day Zeynep Amel, APRN - NP   10 mL at 07/25/23 0852    sodium chloride flush 0.9 % injection 5-40 mL  5-40 mL IntraVENous PRN Zeynep Amel, APRN - NP        0.9 % sodium chloride infusion   IntraVENous PRN Zeynep Amel, APRN - NP        ondansetron (ZOFRAN-ODT) disintegrating tablet 4 mg  4 mg Oral Q8H PRN Zeynep Amel, APRN - NP        Or    ondansetron (ZOFRAN) injection 4 mg  4 mg IntraVENous Q6H PRN Zeynep Amel, APRN - NP        polyethylene glycol (GLYCOLAX) packet 17 g  17 g Oral Daily PRN Zeynep Amel, APRN - NP        acetaminophen (TYLENOL) tablet 650 mg  650 mg Oral Q6H PRN Chary Espinole Pippins, APRN - NP   650 mg at 07/25/23 4014    Or    acetaminophen (TYLENOL) suppository 650 mg  650 mg Rectal Q6H PRN Zeynep Amel, APRN - NP        acetaminophen (TYLENOL) tablet 500 mg  500 mg Oral Q6H PRN Zeynep Amel, APRN - NP        amLODIPine (NORVASC) tablet 10 mg  10 mg Oral Daily Chary Espinole Pippins, APRN - NP   10 mg at 07/25/23 0848    aspirin EC tablet 81 mg  81 mg Oral Daily Chary Peña Caryle Pippins, APRN - NP   81 mg at 07/25/23 0847    atorvastatin (LIPITOR) tablet 40 mg  40 mg Oral Daily Chary Espinole Pippins, APRN - NP   40 mg at 07/25/23 0848    calcium acetate (PHOSLO) capsule 2,001 mg  3 capsule Oral TID Zeynep Amel, APRN - NP   2,001 mg at 07/25/23 1415    divalproex Diabetic ulcer, right foot  DM T2  PAD  ESRD on HD  Anemia of chronic disease  Chronic pain syndrome      Recommendation:     Patient seen and evaluated at bedside  - Current labs personally reviewed and findings dicussed with patient  - XR images personally reviewed and findings discussed with pt. Likely post op changes as pt is roughly 4 weeks post op extensive debridement of nonviable bone and soft tissue. MRI will note a post op flare  - Local wound care performed. Orders placed in [facility] for daily wound care. Cont offloading for wound healing purposes  - Cont empiric antibiotics, WCX pending. Once final should be clear for DC as no surgery planned during this admission.  - Pt to follow with me at the [facility] wound clinic Tuesday afternoons. Please ensure appt made prior to DC  - We will continue to follow patient. WB Status: As tolerated to the left, NWB to the right  Wound Care: Vashe WTD daily to the right foot          Osiel Lange, DPM, CWSP, [address]  [address] [facility], [address], [address]  O: [phone]  F: [phone]    [address] (Opening Sept 2023)  [address] [facility], [facility] [facility]  O: [phone]  F: [phone]    [address] [place] [address]  [address] [place] [address], [address] [place] [address]  O: [phone]  F: [phone]    * Available via [facility] 24/7

## 2023-07-25 NOTE — ED NOTES
Pt refused to wear pulse ox. Pt also refused to wear BP cuff.       Urmila Rosales, RN  07/25/23 3169

## 2023-07-25 NOTE — PROGRESS NOTES
Pt admitted to room 2316. Connected to monitor. Pt requesting apple juice and a cup of ice. Pt states he needs more pain medicine and double trays ordered. BP 190s, reaching out to MD for orders. 6334: Received order for dilaudid and hydralazine. Pt does not want BP cuff and pulse ox continuously on him, he states \" I feel tied down and I have enough on me right now\"     0656: PRN hydralazine and dilaudid given.     0700: Report given to JOSIAH Silva

## 2023-07-25 NOTE — WOUND CARE
Wound care consulted for osteomyelitis:  Chart reviewed, including the last photos taken. Podiatry needs to see this patient. He has been cared for by Dr. Anaid Jenkins in the recent past.  She is most likely out of town right now but her partners can see the patient. (Dr. Niraj Alexander or Dr. Kelechi Faust). Until a podiatrist sees him, cleanse the feet wounds with Povidine iodine swabs and let it air dry or place a betadine moistened dressng on both feet and cover with ABD and wrap with guilherme. Change daily.    Zenaida Chacko RN, BSN, [employer]

## 2023-07-25 NOTE — PROGRESS NOTES
69-year-old male admitted for acute hypoxic respiratory failure 2/2 fluid overload , concern for osteomyelilits > podiatry consult pending   Plan for thoracocentesis tomorrow  Reviewed chart , pt admitted this morning   C/o foot pain , refuses oxycodone , wants dilaudid   Non billable round   Time : 20mn

## 2023-07-25 NOTE — PROGRESS NOTES
End of Shift Note    Bedside shift change report given to  Junior Viramontes RN(oncoming nurse) by Tricia Malik RN (offgoing nurse). Report included the following information SBAR    Shift worked:  07AM-07PM     Shift summary and any significant changes:     Patient had a dialysis today. Removed 3L fluid. Dialysis port subclavian needs dressing change handed  over . Podiatry seen. Redressed leg wound, daily dressing. Nutrition referral done. Sliding insuline. Non compliant with treatment sometimes. Patient is for dialysis and Thoracentesis tomorrow. Pain management. Concerns for physician to address:       Zone phone for oncoming shift:          Activity:     Number times ambulated in hallways past shift: 0  Number of times OOB to chair past shift: 0    Cardiac:   Cardiac Monitoring: Yes           Access:  Current line(s): PIV     Genitourinary:   Urinary status: oliguric    Respiratory:      Chronic home O2 use?: NO  Incentive spirometer at bedside: NO       GI:     Current diet:  ADULT DIET; Regular; Low Fat/Low Chol/High Fiber/2 gm Na; Low Sodium (2 gm);  Low Potassium (Less than 3000 mg/day); no eggs and no pork  Passing flatus: YES  Tolerating current diet: YES       Pain Management:   Patient states pain is manageable on current regimen: YES    Skin:     Interventions: float heels and nutritional support    Patient Safety:  Fall Score:    Interventions: bed/chair alarm       Length of Stay:  Expected LOS: 4  Actual LOS: 0      Tricia Malik RN

## 2023-07-25 NOTE — FLOWSHEET NOTE
07/25/23 1245   Vital Signs   BP (!) 121/57   Pulse 74   Respirations 18   SpO2 95 %   Pain Assessment   Pain Assessment None - Denies Pain   Pain Level 0   Post-Hemodialysis Assessment   Post-Treatment Procedures Blood returned;Catheter Capped, clamped with Saline x2 ports   Machine Disinfection Process Exterior Machine Disinfection   Rinseback Volume (ml) 300 ml   Blood Volume Processed (Liters) 0 l/min   Dialyzer Clearance Lightly streaked   Duration of Treatment (minutes) 180 minutes   Heparin Amount Administered During Treatment (mL) 0 mL   Hemodialysis Intake (ml) 500 ml   Hemodialysis Output (ml) 3500 ml   NET Removed (ml) 3000   Tolerated Treatment Good   Patient Response to Treatment Good   Bilateral Breath Sounds Diminished   Edema Generalized Trace   Time Off 1240   Patient Disposition Return to room     Primary RN SBAR: Migel Jernigan RN  Comments: Treatment completed without any issues. States he feels little bit better. Patient remain alert and oriented. Not in distress. Bed locked at lowest position. All rails up. Transported back to room per transport.

## 2023-07-25 NOTE — ED PROVIDER NOTES
monitor kit and supplies  Dispense sufficient amount for indicated testing frequency plus additional to accommodate PRN testing needs. Dispense all needed supplies to include: monitor, strips, lancing device, lancets, control solutions, alcohol swabs. * This list has 2 medication(s) that are the same as other medications prescribed for you. Read the directions carefully, and ask your doctor or other care provider to review them with you. STOP taking these medications      ARIPiprazole 10 MG tablet  Commonly known as: ABILIFY            ASK your doctor about these medications      acetaminophen 500 MG tablet  Commonly known as: TYLENOL     amLODIPine 10 MG tablet  Commonly known as: NORVASC     aspirin 81 MG EC tablet  Take 1 tablet by mouth daily     atenolol 25 MG tablet  Commonly known as: TENORMIN     atorvastatin 40 MG tablet  Commonly known as: LIPITOR  Take 1 tablet by mouth daily     calcium acetate 667 MG Caps capsule  Commonly known as: PHOSLO     divalproex 125 MG DR capsule  Commonly known as: DEPAKOTE SPRINKLE     ferrous sulfate 325 (65 Fe) MG tablet  Commonly known as: IRON 325  Take 1 tablet by mouth every morning (before breakfast)     hydrALAZINE 100 MG tablet  Commonly known as: APRESOLINE  Take 1 tablet by mouth 3 times daily     insulin glargine-yfgn 100 UNIT/ML injection vial  Commonly known as: SEMGLEE-YFGN     Insulin Lispro-aabc 100 UNIT/ML Soln     NIFEdipine 90 MG extended release tablet  Commonly known as: PROCARDIA XL  Take 1 tablet by mouth daily                DISCONTINUED MEDICATIONS:  Current Discharge Medication List          I am the Primary Clinician of Record. Inocente Archibaldcil, DO (electronically signed)    (Please note that parts of this dictation were completed with voice recognition software. Quite often unanticipated grammatical, syntax, homophones, and other interpretive errors are inadvertently transcribed by the computer software.  Please disregards these errors.  Please excuse any errors that have escaped final proofreading.)         Min White DO  07/25/23 7459

## 2023-07-26 ENCOUNTER — APPOINTMENT (OUTPATIENT)
Facility: HOSPITAL | Age: 48
End: 2023-07-26
Payer: COMMERCIAL

## 2023-07-26 LAB
ALP SERPL-CCNC: 636 U/L (ref 45–117)
APPEARANCE FLD: ABNORMAL
COLOR FLD: ABNORMAL
COMMENT:: NORMAL
EOSINOPHIL NFR FLD MANUAL: 1 %
GLUCOSE BLD STRIP.AUTO-MCNC: 188 MG/DL (ref 65–117)
GLUCOSE BLD STRIP.AUTO-MCNC: 219 MG/DL (ref 65–117)
GLUCOSE BLD STRIP.AUTO-MCNC: 266 MG/DL (ref 65–117)
GLUCOSE BLD STRIP.AUTO-MCNC: 298 MG/DL (ref 65–117)
LDH FLD L TO P-CCNC: 228 U/L
LYMPHOCYTES NFR FLD: 26 %
MONOS+MACROS NFR FLD: 45 %
NEUTROPHILS NFR FLD: 28 %
NUC CELL # FLD: 399 /CU MM
PROT FLD-MCNC: 3 G/DL
RBC # FLD: >100 /CU MM
SERVICE CMNT-IMP: ABNORMAL
SPECIMEN HOLD: NORMAL
SPECIMEN SOURCE FLD: ABNORMAL
SPECIMEN SOURCE FLD: NORMAL
SPECIMEN SOURCE FLD: NORMAL
UFH PPP CHRO-ACNC: <0.1 IU/ML

## 2023-07-26 PROCEDURE — 0W993ZZ DRAINAGE OF RIGHT PLEURAL CAVITY, PERCUTANEOUS APPROACH: ICD-10-PCS | Performed by: STUDENT IN AN ORGANIZED HEALTH CARE EDUCATION/TRAINING PROGRAM

## 2023-07-26 PROCEDURE — 6370000000 HC RX 637 (ALT 250 FOR IP): Performed by: NURSE PRACTITIONER

## 2023-07-26 PROCEDURE — 83615 LACTATE (LD) (LDH) ENZYME: CPT

## 2023-07-26 PROCEDURE — 6370000000 HC RX 637 (ALT 250 FOR IP): Performed by: INTERNAL MEDICINE

## 2023-07-26 PROCEDURE — 84075 ASSAY ALKALINE PHOSPHATASE: CPT

## 2023-07-26 PROCEDURE — 6360000002 HC RX W HCPCS: Performed by: INTERNAL MEDICINE

## 2023-07-26 PROCEDURE — 2709999900 US THORACENTESIS

## 2023-07-26 PROCEDURE — 2580000003 HC RX 258: Performed by: NURSE PRACTITIONER

## 2023-07-26 PROCEDURE — 1100000003 HC PRIVATE W/ TELEMETRY

## 2023-07-26 PROCEDURE — 88305 TISSUE EXAM BY PATHOLOGIST: CPT

## 2023-07-26 PROCEDURE — 82962 GLUCOSE BLOOD TEST: CPT

## 2023-07-26 PROCEDURE — 89050 BODY FLUID CELL COUNT: CPT

## 2023-07-26 PROCEDURE — 85520 HEPARIN ASSAY: CPT

## 2023-07-26 PROCEDURE — 71045 X-RAY EXAM CHEST 1 VIEW: CPT

## 2023-07-26 PROCEDURE — 36415 COLL VENOUS BLD VENIPUNCTURE: CPT

## 2023-07-26 PROCEDURE — 87070 CULTURE OTHR SPECIMN AEROBIC: CPT

## 2023-07-26 PROCEDURE — 88112 CYTOPATH CELL ENHANCE TECH: CPT

## 2023-07-26 PROCEDURE — 2500000003 HC RX 250 WO HCPCS: Performed by: NURSE PRACTITIONER

## 2023-07-26 PROCEDURE — 84157 ASSAY OF PROTEIN OTHER: CPT

## 2023-07-26 PROCEDURE — 87205 SMEAR GRAM STAIN: CPT

## 2023-07-26 RX ORDER — HYDROMORPHONE HYDROCHLORIDE 1 MG/ML
0.5 INJECTION, SOLUTION INTRAMUSCULAR; INTRAVENOUS; SUBCUTANEOUS
Status: COMPLETED | OUTPATIENT
Start: 2023-07-26 | End: 2023-07-26

## 2023-07-26 RX ORDER — LIDOCAINE HYDROCHLORIDE 10 MG/ML
20 INJECTION, SOLUTION EPIDURAL; INFILTRATION; INTRACAUDAL; PERINEURAL ONCE
Status: DISCONTINUED | OUTPATIENT
Start: 2023-07-26 | End: 2023-07-28 | Stop reason: HOSPADM

## 2023-07-26 RX ADMIN — SODIUM CHLORIDE, PRESERVATIVE FREE 20 ML: 5 INJECTION INTRAVENOUS at 22:55

## 2023-07-26 RX ADMIN — OXYCODONE HYDROCHLORIDE 5 MG: 5 TABLET ORAL at 09:23

## 2023-07-26 RX ADMIN — AMLODIPINE BESYLATE 10 MG: 5 TABLET ORAL at 08:54

## 2023-07-26 RX ADMIN — CALCIUM ACETATE 2001 MG: 667 CAPSULE ORAL at 08:53

## 2023-07-26 RX ADMIN — SODIUM CHLORIDE, PRESERVATIVE FREE 10 ML: 5 INJECTION INTRAVENOUS at 08:57

## 2023-07-26 RX ADMIN — OXYCODONE HYDROCHLORIDE 5 MG: 5 TABLET ORAL at 01:31

## 2023-07-26 RX ADMIN — SODIUM CHLORIDE, PRESERVATIVE FREE 10 ML: 5 INJECTION INTRAVENOUS at 22:10

## 2023-07-26 RX ADMIN — CALCIUM ACETATE 2001 MG: 667 CAPSULE ORAL at 22:52

## 2023-07-26 RX ADMIN — OXYCODONE HYDROCHLORIDE 5 MG: 5 TABLET ORAL at 18:19

## 2023-07-26 RX ADMIN — ASPIRIN 81 MG: 81 TABLET, COATED ORAL at 08:53

## 2023-07-26 RX ADMIN — ATORVASTATIN CALCIUM 40 MG: 40 TABLET, FILM COATED ORAL at 08:54

## 2023-07-26 RX ADMIN — IRON SUCROSE 200 MG: 20 INJECTION, SOLUTION INTRAVENOUS at 08:55

## 2023-07-26 RX ADMIN — Medication 2 UNITS: at 08:56

## 2023-07-26 RX ADMIN — PANTOPRAZOLE SODIUM 40 MG: 40 TABLET, DELAYED RELEASE ORAL at 08:54

## 2023-07-26 RX ADMIN — CALCIUM ACETATE 2001 MG: 667 CAPSULE ORAL at 15:00

## 2023-07-26 RX ADMIN — HYDRALAZINE HYDROCHLORIDE 100 MG: 50 TABLET, FILM COATED ORAL at 22:52

## 2023-07-26 RX ADMIN — DIVALPROEX SODIUM 500 MG: 125 CAPSULE, COATED PELLETS ORAL at 08:54

## 2023-07-26 RX ADMIN — HYDRALAZINE HYDROCHLORIDE 100 MG: 50 TABLET, FILM COATED ORAL at 15:01

## 2023-07-26 RX ADMIN — HYDRALAZINE HYDROCHLORIDE 100 MG: 50 TABLET, FILM COATED ORAL at 08:55

## 2023-07-26 RX ADMIN — ENOXAPARIN SODIUM 30 MG: 100 INJECTION SUBCUTANEOUS at 08:51

## 2023-07-26 RX ADMIN — HYDROMORPHONE HYDROCHLORIDE 0.5 MG: 1 INJECTION, SOLUTION INTRAMUSCULAR; INTRAVENOUS; SUBCUTANEOUS at 05:05

## 2023-07-26 RX ADMIN — ATENOLOL 50 MG: 50 TABLET ORAL at 08:53

## 2023-07-26 RX ADMIN — FERROUS SULFATE TAB 325 MG (65 MG ELEMENTAL FE) 325 MG: 325 (65 FE) TAB at 08:54

## 2023-07-26 RX ADMIN — DIVALPROEX SODIUM 500 MG: 125 CAPSULE, COATED PELLETS ORAL at 22:49

## 2023-07-26 RX ADMIN — NIFEDIPINE 90 MG: 90 TABLET, EXTENDED RELEASE ORAL at 08:53

## 2023-07-26 RX ADMIN — SODIUM CHLORIDE, PRESERVATIVE FREE 10 ML: 5 INJECTION INTRAVENOUS at 08:56

## 2023-07-26 ASSESSMENT — PAIN SCALES - GENERAL
PAINLEVEL_OUTOF10: 10
PAINLEVEL_OUTOF10: 9
PAINLEVEL_OUTOF10: 10
PAINLEVEL_OUTOF10: 9
PAINLEVEL_OUTOF10: 0
PAINLEVEL_OUTOF10: 0
PAINLEVEL_OUTOF10: 9

## 2023-07-26 ASSESSMENT — PAIN DESCRIPTION - LOCATION
LOCATION: FOOT
LOCATION: FOOT
LOCATION: LEG;FOOT
LOCATION: FOOT

## 2023-07-26 ASSESSMENT — PAIN DESCRIPTION - DESCRIPTORS
DESCRIPTORS: SHARP
DESCRIPTORS: ACHING;DISCOMFORT
DESCRIPTORS: ACHING
DESCRIPTORS: ACHING

## 2023-07-26 ASSESSMENT — PAIN - FUNCTIONAL ASSESSMENT: PAIN_FUNCTIONAL_ASSESSMENT: PREVENTS OR INTERFERES WITH ALL ACTIVE AND SOME PASSIVE ACTIVITIES

## 2023-07-26 ASSESSMENT — PAIN SCALES - WONG BAKER: WONGBAKER_NUMERICALRESPONSE: 0

## 2023-07-26 ASSESSMENT — PAIN DESCRIPTION - ORIENTATION
ORIENTATION: RIGHT
ORIENTATION: RIGHT

## 2023-07-26 NOTE — PROGRESS NOTES
0700: Bedside shift change report given to Adria Benton RN (oncoming nurse) by Mukesh June RN (offgoing nurse). Report included the following information Nurse Handoff Report. 9464: Pt states \"I will shoot 2 of the insulin not 5 because I know my body\". Pt is refusing assessment and dressing change. 1235: Pt refused insulin    1345: Pt refusing dialysis states \"I'm not going I will just go tomorrow. \" Dialysis nurse notified. 1500: Bedside shift change report given to Nando Melo (oncoming nurse) by Adria Benton RN (offgoing nurse). Report included the following information Nurse Handoff Report.

## 2023-07-26 NOTE — PROGRESS NOTES
End of Shift Note    Bedside shift change report given to RN (oncoming nurse) by Noe Cardoza RN (offgoing nurse). Report included the following information SBAR, Kardex, Intake/Output, MAR, and Recent Results    Shift worked:  5117-3974     Shift summary and any significant changes:     1915: Patient would not allow me to check temp orally w/o him doing it by himself. Patient also refused all his meds. Patient stated\" I am different and you will not be putting the thermometer in my mouth. I also will not be taking any of my meds tonight. They are stuffing me up with meds. \" Educated patient on the importance of taking his meds and how it aids in the progression of his care and discharge. 2120: Checked patient BS,patient was upset that I woke him up around 9 to check sugar. Patient states \" Why do yall keep bothering me when I am sleeping. Do you not see I am trying to sleep. \" I explained to patient the routine in the hospital and patient disregarded my explanation and said he should not be bothered. Patient BS was 194. I notified him that he would not need his sliding scale however he would need his scheduled Lantus. He stated \" I am not taking it. Once again, I  educated the patient on the importance of taking his insulin. Patient is non compliant in all of his care. 0130: Patient requested for something stronger for pain, notified provider. Then he apologized for attitude and stated he had just wanted to sleep. He stated he is now willing to take his scheduled meds when I asked if he wanted that with his pain med. However, he then refused his lantus, patient educated. Please see mar for additional info. 0510: Patient refused lab draw. He states\" I got an IV and it better draw back. \" IV does not draw back and he said he will not be stuck for labs. Patient also refused port dressing change. He raised his voice and said \" nah, they did that yesterday. \"   Patient kept requesting for higher dose

## 2023-07-26 NOTE — PROGRESS NOTES
1510:    Pt arrived to XR recovery for Thoracentesis . Pt is A/O x4 with no complaints of pain. VS to be collected and consent to be obtained. Pt bed is locked and in lowest position and call bell is within reach. Pt demanding for oxygen to be applied. Pt arrived on RA with O2 at 93%. NC applied per patient request. Pt wanting pulse ox to be removed stating \"the probe is too tight. \" Pulse ox was removed and ear pulse ox was applied. 1550:    Name of procedure: Right thoracentesis    Vital Signs: Stable    Fluids removed: 1100 ml of red colored fluid    Samples sent to lab: Yes, including hold cup    Any complications related to procedure: None    Post Procedure Care Needed/order sets placed in connect care. Patient is at increased fall risk due to medication given. Pt with some post procedure discomfort. Pt pt informed that it's to be expected due to lung re-expanding. TRANSFER - OUT REPORT:    Verbal report given to JOSIAH Cruz on Tony Sports  being transferred to PCU for routine post-op . Report consisted of patient's Situation, Background, Assessment and   Recommendations(SBAR). Information from the following report(s) Nurse Handoff Report was reviewed with the receiving nurse.            Lines:   CVC Double Lumen Right Subclavian (Active)   Central Line Being Utilized Yes 07/26/23 0345   Criteria for Appropriate Use Dialysis/apheresis 07/16/23 1200   Site Assessment Clean, dry & intact 07/26/23 0345   Phlebitis Assessment No symptoms 07/26/23 0345   Infiltration Assessment 0 07/26/23 0345   Color/Movement/Sensation Capillary refill less than 3 sec 06/29/23 1545   Proximal Lumen Color/Status Red;Capped 07/26/23 0345   Distal Lumen Color/Status Blue;Capped 07/26/23 0345   Line Care Connections checked and tightened 07/26/23 0345   Alcohol Cap Used Yes 07/26/23 0345   Date of Last Dressing Change 07/13/23 07/16/23 1200   Dressing Type Transparent w/CHG gel 07/26/23 0345   Dressing Status Clean, dry & intact 07/26/23 0345   Dressing Intervention New 06/23/23 1750       Peripheral IV 07/24/23 Proximal;Right; Anterior Forearm (Active)   Site Assessment Clean, dry & intact 07/26/23 1130   Line Status Flushed;Capped 07/26/23 1130   Line Care Connections checked and tightened;Ports disinfected 07/26/23 1130   Phlebitis Assessment No symptoms 07/26/23 1130   Infiltration Assessment 0 07/26/23 1130   Alcohol Cap Used Yes 07/26/23 1130   Dressing Status Clean, dry & intact 07/26/23 1130   Dressing Type Transparent 07/26/23 1130       Hemodialysis Central Access Right Subclavian (Active)   Continued need for line? Yes 07/26/23 1130   Site Assessment Clean, dry & intact 07/26/23 1130   CVC Lumen Status Flushed;Brisk blood return 07/26/23 1130   Venous Lumen Status Flushed;Brisk blood return 07/26/23 1130   Arterial Lumen Status Flushed;Brisk blood return 07/26/23 1130   Alcohol Cap Used Yes 07/26/23 1130   Line Care Connections checked and tightened;Ports disinfected 07/26/23 1130   Dressing Type Transparent 07/26/23 1130   Date of Last Dressing Change 07/25/23 07/26/23 1130   Dressing Status Clean, dry & intact 07/26/23 1130        Opportunity for questions and clarification was provided. Patient transported with tele box on and working on 2L NC. Pt states discomfort is improving but slowly. Pt informed that discomfort can last 1-2 hrs post procedure.

## 2023-07-26 NOTE — PLAN OF CARE
Problem: Discharge Planning  Goal: Discharge to home or other facility with appropriate resources  Outcome: Progressing     Problem: Pain  Goal: Verbalizes/displays adequate comfort level or baseline comfort level  Outcome: Progressing     Problem: Chronic Conditions and Co-morbidities  Goal: Patient's chronic conditions and co-morbidity symptoms are monitored and maintained or improved  Outcome: Progressing  Flowsheets (Taken 7/25/2023 2512 by Nan Yuen RN)  Care Plan - Patient's Chronic Conditions and Co-Morbidity Symptoms are Monitored and Maintained or Improved: Monitor and assess patient's chronic conditions and comorbid symptoms for stability, deterioration, or improvement     Problem: Safety - Adult  Goal: Free from fall injury  Outcome: Progressing

## 2023-07-26 NOTE — DIALYSIS
Pt refused dialysis, Dr. Saima Chanel aware. Orders changed to TTS.  Per Dr. Saima Chanel please use AVF tomorrow 17g needles

## 2023-07-26 NOTE — PROGRESS NOTES
Spiritual Care Partner Volunteer visited patient at [facility] in [facility] PROGRESSIVE CARE on 7/26/2023   Documented by:  NIELS Goins paging service [phone]

## 2023-07-27 LAB
GLUCOSE BLD STRIP.AUTO-MCNC: 153 MG/DL (ref 65–117)
GLUCOSE BLD STRIP.AUTO-MCNC: 235 MG/DL (ref 65–117)
GLUCOSE BLD STRIP.AUTO-MCNC: 255 MG/DL (ref 65–117)
GLUCOSE BLD STRIP.AUTO-MCNC: 264 MG/DL (ref 65–117)
GLUCOSE BLD STRIP.AUTO-MCNC: 265 MG/DL (ref 65–117)
SERVICE CMNT-IMP: ABNORMAL

## 2023-07-27 PROCEDURE — 6370000000 HC RX 637 (ALT 250 FOR IP): Performed by: INTERNAL MEDICINE

## 2023-07-27 PROCEDURE — 82962 GLUCOSE BLOOD TEST: CPT

## 2023-07-27 PROCEDURE — 2500000003 HC RX 250 WO HCPCS: Performed by: NURSE PRACTITIONER

## 2023-07-27 PROCEDURE — 2700000000 HC OXYGEN THERAPY PER DAY

## 2023-07-27 PROCEDURE — 99232 SBSQ HOSP IP/OBS MODERATE 35: CPT | Performed by: PODIATRIST

## 2023-07-27 PROCEDURE — 6370000000 HC RX 637 (ALT 250 FOR IP): Performed by: NURSE PRACTITIONER

## 2023-07-27 PROCEDURE — 1100000003 HC PRIVATE W/ TELEMETRY

## 2023-07-27 PROCEDURE — 90935 HEMODIALYSIS ONE EVALUATION: CPT

## 2023-07-27 RX ORDER — LEVOFLOXACIN 250 MG/1
250 TABLET ORAL
Status: DISCONTINUED | OUTPATIENT
Start: 2023-07-28 | End: 2023-07-28 | Stop reason: HOSPADM

## 2023-07-27 RX ADMIN — Medication 2 UNITS: at 09:21

## 2023-07-27 RX ADMIN — ATENOLOL 50 MG: 50 TABLET ORAL at 09:20

## 2023-07-27 RX ADMIN — OXYCODONE HYDROCHLORIDE 5 MG: 5 TABLET ORAL at 06:43

## 2023-07-27 RX ADMIN — HYDRALAZINE HYDROCHLORIDE 100 MG: 50 TABLET, FILM COATED ORAL at 09:20

## 2023-07-27 RX ADMIN — ACETAMINOPHEN 650 MG: 325 TABLET ORAL at 22:41

## 2023-07-27 RX ADMIN — ACETAMINOPHEN 650 MG: 325 TABLET ORAL at 06:43

## 2023-07-27 RX ADMIN — OXYCODONE HYDROCHLORIDE 5 MG: 5 TABLET ORAL at 10:28

## 2023-07-27 RX ADMIN — NIFEDIPINE 90 MG: 90 TABLET, EXTENDED RELEASE ORAL at 09:20

## 2023-07-27 RX ADMIN — Medication 2 UNITS: at 12:50

## 2023-07-27 RX ADMIN — DIVALPROEX SODIUM 500 MG: 125 CAPSULE, COATED PELLETS ORAL at 21:23

## 2023-07-27 RX ADMIN — HYDRALAZINE HYDROCHLORIDE 100 MG: 50 TABLET, FILM COATED ORAL at 22:41

## 2023-07-27 RX ADMIN — CALCIUM ACETATE 2001 MG: 667 CAPSULE ORAL at 21:23

## 2023-07-27 RX ADMIN — AMLODIPINE BESYLATE 10 MG: 5 TABLET ORAL at 09:20

## 2023-07-27 RX ADMIN — OXYCODONE HYDROCHLORIDE 5 MG: 5 TABLET ORAL at 19:28

## 2023-07-27 ASSESSMENT — PAIN DESCRIPTION - ORIENTATION
ORIENTATION: RIGHT
ORIENTATION: RIGHT;LEFT;LOWER
ORIENTATION: RIGHT

## 2023-07-27 ASSESSMENT — PAIN SCALES - GENERAL
PAINLEVEL_OUTOF10: 10

## 2023-07-27 ASSESSMENT — PAIN DESCRIPTION - DESCRIPTORS
DESCRIPTORS: ACHING

## 2023-07-27 ASSESSMENT — PAIN DESCRIPTION - LOCATION
LOCATION: LEG;FOOT
LOCATION: FOOT
LOCATION: LEG;FOOT

## 2023-07-27 NOTE — PLAN OF CARE
Problem: Discharge Planning  Goal: Discharge to home or other facility with appropriate resources  Outcome: Progressing     Problem: Pain  Goal: Verbalizes/displays adequate comfort level or baseline comfort level  7/26/2023 2054 by Alva Ariza RN  Outcome: Progressing  7/26/2023 1057 by Olive Matute RN  Outcome: Progressing     Problem: Chronic Conditions and Co-morbidities  Goal: Patient's chronic conditions and co-morbidity symptoms are monitored and maintained or improved  7/26/2023 2054 by Alva Ariza RN  Outcome: Progressing  7/26/2023 1057 by Olive Matute RN  Outcome: Progressing     Problem: Safety - Adult  Goal: Free from fall injury  7/26/2023 2054 by Alva Ariza RN  Outcome: Progressing  7/26/2023 1057 by Olive Matute RN  Outcome: Progressing

## 2023-07-27 NOTE — PROGRESS NOTES
End of Shift Note    Bedside shift change report given to RN (oncoming nurse) by García Hay RN (offgoing nurse). Report included the following information SBAR, Kardex, Intake/Output, MAR, and Recent Results    Shift worked:  8414-7627     Shift summary and any significant changes:     1915: patient refused RN assessment and dressing change. 2045: Patient has taken Tele leads off yet again, Educated patient that he is at the hospital and it helps monitor his heart. He states it itches and he will put it back on when he feel like it. \" Notified provider & Tele. 2200: Patient has refused his insulin yet again tonight. Patient states \"my level will fall. \" Patient also refused his epoetin brennan. Educated patient on importance and indication of drug. He states \" Liam Raymond am not taking no shot. \"   Patient bandage was soiled w/ blood from thoracentesis. Pressure dressing applied. 2305: Vital was taken, and was able to encourage patient to get tele leads back on. Patient requested, as I stated that we will get vitals again @ 3am, if he is sleeping I should allow him to sleep and not wake him up to get vitals. 2697: Patient woke requesting for pain medication. 0165: Brought patient pain meds with morning meds. Patient refused morning meds. He states \" I am not taking those now\". Concerns for physician to address:  Patient is noncompliant with a lot of his care at the hospital.      Zone phone for oncoming shift:   0000       Activity:     Number times ambulated in hallways past shift: 0  Number of times OOB to chair past shift: 0    Cardiac:   Cardiac Monitoring: Yes           Access:  Current line(s): PIV and port     Genitourinary:   Urinary status: voiding and oliguric    Respiratory:      Chronic home O2 use?: NO  Incentive spirometer at bedside: YES       GI:     Current diet:  ADULT DIET; Regular; 5 carb choices (75 gm/meal); Low Fat/Low Chol/High Fiber/2 gm Na; Low Sodium (2 gm);  Low Potassium (Less than 3000 mg/day); no eggs and no pork  Passing flatus: YES  Tolerating current diet: YES    Pain Management:   Patient states pain is manageable on current regimen: YES    Skin:     Interventions: turn team, specialty bed, float heels, increase time out of bed, and PT/OT consult    Patient Safety:  Fall Score:    Interventions: bed/chair alarm, assistive device (walker, cane.  etc), gripper socks, and pt to call before getting OOB       Length of Stay:  Expected LOS: 4  Actual LOS: 75 Beekman  RN

## 2023-07-27 NOTE — PROGRESS NOTES
BS  DERM: Wound right foot. Dry skin noted to B/L LE. No proximal lymphatic streaking noted to B/L LE. VASC: Pedal pulses (DP/PT) dimished to B/L LE. CFT<3sec to all digits of B/L LE. Pedal hair growth noted to the level of the digits for B/L LE. Skin temp is warm to warm from proximal to distal for B/L LE. Neg homans/clementine signs to B/L LE. No varicosities or telangectasias noted to B/L LE.  NEURO: Protective and epicritic sensations grossly absent to B/L LE  MSK: (-) POP, No gross deformities. Good muscle tone and bulk noted to B/L LE.  PSYCH: Cooperative with normal mood and affect       Labs/Imaging/Diagnostics    Labs:  CBC:  Recent Labs     07/25/23 0104   WBC 13.0*   RBC 2.54*   HGB 7.7*   HCT 23.8*   MCV 93.7   RDW 17.7*        CHEMISTRIES:  Recent Labs     07/25/23 0104   *   K 4.1      CO2 26   BUN 32*   CREATININE 5.23*   GLUCOSE 286*   MG 2.0     PT/INR:No results for input(s): PROTIME, INR in the last 72 hours. APTT:No results for input(s): APTT in the last 72 hours. LIVER PROFILE:  Recent Labs     07/25/23 0104 07/26/23  1705   AST 34  --    ALT 37  --    BILITOT 0.6  --    ALKPHOS 805* 636*       Imaging Last 24 Hours:  XR CHEST PORTABLE    Result Date: 7/26/2023  EXAM:  XR CHEST PORTABLE INDICATION: Post right-sided thoracentesis COMPARISON: 9/25/2023 TECHNIQUE: Upright portable chest AP view 1559 hours FINDINGS: The right IJ catheter overlies the right atrium. There is no change in cardiomegaly. Right pleural effusion has decreased post thoracentesis. There is improved aeration right lower lobe with some residual atelectasis. There is no pneumothorax. Left pleural effusion and left basilar atelectasis persists. 1. No pneumothorax    US THORACENTESIS Which side should the procedure be performed? Radiologist Recommendation    Result Date: 7/27/2023  PROCEDURE:  ULTRASOUND GUIDED THORACENTESIS HISTORY: Bath Mando is a 51years old Male with right pleural effusion. levaquin 750mg PO QD x 14 days  - Pt stable for DC from podiatry standpoint and will follow with me at the [facility] wound clinic Tuesday afternoons. Please ensure appt made prior to DC  - We will continue to follow patient while he is in house and update recs as needed     WB Status: As tolerated to the left, NWB to the right  Wound Care: Vashe WTD daily to the right foot              Osiel Lange DPM, SP, [address]  [address], [address], [id number] [place] [address]  O: [phone]  F: [phone]     [address] (Opening Sept 2023)  [address] [address] [place] [address]  O: [phone]  F: [phone]     [address] [place] [address] [place]  O: [phone]  F: [phone]     * Available via [facility] 24/7        Electronically signed by Prasanth Rod DPM on 7/27/23 at 4:38 PM EDT

## 2023-07-27 NOTE — PROGRESS NOTES
Pulse oximetry assessment   93% at rest on room air (if 88% or less, skip next steps)    87% while ambulating on room air      * pt is WCB, this is while repositioning and activity in bed    98% at rest on 2 LPM    93% while ambulating on 2LPM      * pt is WCB, this is while repositioning and activity in bed

## 2023-07-27 NOTE — PROGRESS NOTES
5442: Bedside report received from [address]. Pt sitting up to edge of bed eating breakfast.  Pt refused HD until after breakfast so he has been pushed back on the schedule to this afternoon. Pt also informed nurse he will only consent to taking BP meds and max of 2u humalog this morning for . Unable to complete dual skin assessment as pt has signed refusal and continues to refuse. 1028: Admin PRN Shy per pt c/o 10/10 R foot pain. Early lunch tray ordered. 1100: Transport to bedside, pt refusing HD until after lunch. 1401: HD called, they will send transport for him. 1410: Transport to bedside, pt off to HD.    1801: Pt on the way back from HD.    1815: O2 challenge completed, see separate note. 1901: Bedside report given to Rock Nielsen RN.

## 2023-07-27 NOTE — DIALYSIS
Transport request entered for pt to come to HD suite for treatment at 0700. Call from transport at 0730 stating pt is refusing HD until he has breakfast. I asked Primary RN to let pt know that I will move him to second shift so he can have breakfast.     Transport request reentered at 1050 for pt to come to HD suite for treatment. Call from transport at 1100 stating pt is refusing HD now until he gets lunch. Transport request canceled. Dr. Kaz Richards notified. HD canceled for today d/t pt refusing to come to HD suite everytime he's being sent for. Will keep pt on the schedule for tomorrow. [id number] [address] Mendoza  notified.

## 2023-07-27 NOTE — CARE COORDINATION
Transition of Care Plan:    RUR: 28% (High RUR)  Prior Level of Functioning: Dependent with Bathing;Dressing;Cooking;Housework; Shopping;Mobility  Disposition: Home with home health and private caregiver  If SNF or IPR: Date FOC offered: NA  Date FOC received:   Accepting facility:   Date authorization started with reference number:   Date authorization received and expires: Follow up appointments: PCP/Specialist   DME needed: Needs o2; has a cane and wheelchair  Transportation at discharge: Medicaid transportation  IM/IMM Medicare/ letter given: NA-Medicaid  Is patient a  and connected with VA? Na   If yes, was [employer] transfer form completed and VA notified? Caregiver Contact: Jenni Cassidy, [phone] [facility])   Discharge Caregiver contacted prior to discharge? Will be contacted  Care Conference needed? No  Barriers to discharge: o2 challenge and Medical clearance    SANDRO spoke to the patient's mother Jenni Cassidy, [phone] [facility]) who refused for the patient to be placed in a facility and asked for him to return home. However she is agreeable to have home health services. Also, requested oxygen (SANDRO explained oxygen requirment and will contact MD to order an oxygen challenge. In addition, stated that the patient has a CNA that provides care 5 P. M to 9. PM but would like additional hours of care for the patient. SANDRO spoke to Dr. Devika Rader and received approval to send Home Health order. The patient has elected [facility].        Arpit Dewey RN  Case Management  [phone]

## 2023-07-28 VITALS
BODY MASS INDEX: 24.38 KG/M2 | TEMPERATURE: 98.9 F | SYSTOLIC BLOOD PRESSURE: 165 MMHG | HEIGHT: 72 IN | HEART RATE: 81 BPM | DIASTOLIC BLOOD PRESSURE: 68 MMHG | OXYGEN SATURATION: 89 % | RESPIRATION RATE: 16 BRPM | WEIGHT: 180 LBS

## 2023-07-28 LAB
ANION GAP SERPL CALC-SCNC: 6 MMOL/L (ref 5–15)
BASOPHILS # BLD: 0 K/UL (ref 0–0.1)
BASOPHILS NFR BLD: 0 % (ref 0–1)
BUN SERPL-MCNC: 41 MG/DL (ref 6–20)
BUN/CREAT SERPL: 8 (ref 12–20)
CALCIUM SERPL-MCNC: 8 MG/DL (ref 8.5–10.1)
CHLORIDE SERPL-SCNC: 97 MMOL/L (ref 97–108)
CO2 SERPL-SCNC: 29 MMOL/L (ref 21–32)
CREAT SERPL-MCNC: 5.34 MG/DL (ref 0.7–1.3)
DIFFERENTIAL METHOD BLD: ABNORMAL
EOSINOPHIL # BLD: 0.1 K/UL (ref 0–0.4)
EOSINOPHIL NFR BLD: 1 % (ref 0–7)
ERYTHROCYTE [DISTWIDTH] IN BLOOD BY AUTOMATED COUNT: 18.4 % (ref 11.5–14.5)
GLUCOSE BLD STRIP.AUTO-MCNC: 290 MG/DL (ref 65–117)
GLUCOSE SERPL-MCNC: 341 MG/DL (ref 65–100)
HCT VFR BLD AUTO: 24 % (ref 36.6–50.3)
HGB BLD-MCNC: 7.4 G/DL (ref 12.1–17)
IMM GRANULOCYTES # BLD AUTO: 0.1 K/UL (ref 0–0.04)
IMM GRANULOCYTES NFR BLD AUTO: 1 % (ref 0–0.5)
LYMPHOCYTES # BLD: 1.8 K/UL (ref 0.8–3.5)
LYMPHOCYTES NFR BLD: 16 % (ref 12–49)
MCH RBC QN AUTO: 30.3 PG (ref 26–34)
MCHC RBC AUTO-ENTMCNC: 30.8 G/DL (ref 30–36.5)
MCV RBC AUTO: 98.4 FL (ref 80–99)
MONOCYTES # BLD: 1.8 K/UL (ref 0–1)
MONOCYTES NFR BLD: 16 % (ref 5–13)
NEUTS SEG # BLD: 7.6 K/UL (ref 1.8–8)
NEUTS SEG NFR BLD: 67 % (ref 32–75)
NRBC # BLD: 0 K/UL (ref 0–0.01)
NRBC BLD-RTO: 0 PER 100 WBC
PLATELET # BLD AUTO: 214 K/UL (ref 150–400)
PMV BLD AUTO: 8.9 FL (ref 8.9–12.9)
POTASSIUM SERPL-SCNC: 4.6 MMOL/L (ref 3.5–5.1)
RBC # BLD AUTO: 2.44 M/UL (ref 4.1–5.7)
SERVICE CMNT-IMP: ABNORMAL
SODIUM SERPL-SCNC: 132 MMOL/L (ref 136–145)
WBC # BLD AUTO: 11.4 K/UL (ref 4.1–11.1)

## 2023-07-28 PROCEDURE — 85025 COMPLETE CBC W/AUTO DIFF WBC: CPT

## 2023-07-28 PROCEDURE — 90935 HEMODIALYSIS ONE EVALUATION: CPT

## 2023-07-28 PROCEDURE — 80048 BASIC METABOLIC PNL TOTAL CA: CPT

## 2023-07-28 PROCEDURE — 82962 GLUCOSE BLOOD TEST: CPT

## 2023-07-28 PROCEDURE — 2700000000 HC OXYGEN THERAPY PER DAY

## 2023-07-28 PROCEDURE — 2500000003 HC RX 250 WO HCPCS: Performed by: NURSE PRACTITIONER

## 2023-07-28 PROCEDURE — 6370000000 HC RX 637 (ALT 250 FOR IP): Performed by: INTERNAL MEDICINE

## 2023-07-28 PROCEDURE — 36415 COLL VENOUS BLD VENIPUNCTURE: CPT

## 2023-07-28 PROCEDURE — 6370000000 HC RX 637 (ALT 250 FOR IP): Performed by: NURSE PRACTITIONER

## 2023-07-28 RX ORDER — LEVOFLOXACIN 250 MG/1
250 TABLET ORAL
Qty: 5 TABLET | Refills: 0 | Status: ON HOLD | OUTPATIENT
Start: 2023-07-28 | End: 2023-08-04 | Stop reason: HOSPADM

## 2023-07-28 RX ADMIN — DIVALPROEX SODIUM 500 MG: 125 CAPSULE, COATED PELLETS ORAL at 08:31

## 2023-07-28 RX ADMIN — CALCIUM ACETATE 2001 MG: 667 CAPSULE ORAL at 08:31

## 2023-07-28 RX ADMIN — Medication 2 UNITS: at 08:33

## 2023-07-28 RX ADMIN — FERROUS SULFATE TAB 325 MG (65 MG ELEMENTAL FE) 325 MG: 325 (65 FE) TAB at 06:16

## 2023-07-28 RX ADMIN — OXYCODONE HYDROCHLORIDE 5 MG: 5 TABLET ORAL at 06:13

## 2023-07-28 RX ADMIN — PANTOPRAZOLE SODIUM 40 MG: 40 TABLET, DELAYED RELEASE ORAL at 06:17

## 2023-07-28 ASSESSMENT — PAIN DESCRIPTION - DESCRIPTORS
DESCRIPTORS: ACHING
DESCRIPTORS: ACHING

## 2023-07-28 ASSESSMENT — PAIN DESCRIPTION - ORIENTATION
ORIENTATION: RIGHT;LEFT
ORIENTATION: RIGHT;LEFT;DISTAL;LOWER

## 2023-07-28 ASSESSMENT — PAIN DESCRIPTION - LOCATION
LOCATION: FOOT;LEG
LOCATION: FOOT;LEG

## 2023-07-28 ASSESSMENT — PAIN SCALES - GENERAL
PAINLEVEL_OUTOF10: 10
PAINLEVEL_OUTOF10: 10

## 2023-07-28 NOTE — CARE COORDINATION
Medicaid transport called,-[phone]; [id number]  Transition of Care Plan:    RUR: 29% (High RUR)  Prior Level of Functioning: Dependent with Bathing;Dressing;Cooking;Housework; Shopping;Mobility  Disposition: Home with home health and private caregiver. [address] [address] [facility] has accepted  If SNF or IPR: Date FOC offered: NA  Date FOC received:   Accepting facility:   Date authorization started with reference number:   Date authorization received and expires: Follow up appointments:11:00 am for your PCP hospital follow 08/17 Dr. Nancy Lanza  DME needed: Needs o2; has a cane and wheelchair  Transportation at discharge: Medicaid transportation  IM/IMM Medicare/ letter given: NA-Medicaid  Is patient a [place] and connected with [facility]? Na              If yes, was [employer] transfer form completed and VA notified? Caregiver Contact: Raúl Simon, [phone] [facility])   Discharge Caregiver contacted prior to discharge? Contacted  Care Conference needed? No  Barriers to discharge: None     CM spoke to the patient's mother Raúl Simon, [phone] to inform her that patient is discharging home today. [address] [facility] was updated to start services upon discharge; oxygen order and challenge were sent. Uriel Bernardo stated to pull from the closet. Requested for o2 to be delivered to [address] [place] [id number] before 1 p.m. Medicaid transport called,-[phone]; [id number] at 1 p.m    From:  Address: [address] [facility], [address] [place] [address]; Room 2316  to  [address] provided the patient a list of pain management specialist because Ivan Singer was not allowed to set up an appointment per Bowen Montenegro instructions. The patient does not have any further needs or concerns. 1 [address] [facility] has denied the patient. A mass referral was sent and pending acceptance.      Raman Lo, RN  Case Management  [phone]

## 2023-07-28 NOTE — DISCHARGE INSTRUCTIONS
All of your medications from before your hospitalization are the same EXCEPT:  Your new prescription for you to start is levaquin for infection. Please take all of your medications as prescribed. If prescribed any medications, please read all pharmacy instructions and inserts. Inform your doctor and pharmacist about all other medications and alternative therapies. Please follow up with your PCP in 1-2 weeks to be reassessed after your hospital stay. Please also follow up with nephrology  Please follow up with Podiatry wound care clinic EVERY Tuesday UNTIL [facility] [facility], [address]. Please follow up at your scheduled appointment for pain clinic. If you start feeling any symptoms similar to what brought you into the hospital, please come back to the ED to be re-evaluated.

## 2023-07-28 NOTE — PROGRESS NOTES
1915 Pt refused dual skin checks. Pt education given. Robert Hernandez also went to see pt.    2048 Blood glucose 235, refused to take his lantus and the rest of his medicines right now. 2100 Pt agreed to have his wound dressing on his foot. 36 Pt requested to check his blood glucose, now 255. He said he does not need insulin right now. Pt education given. He agreed to take his oral medications now. 0130 Requested frozen meals, had chicken parmesan    0200 asked for 1 more chicken parmesan    0230 Refused to have his labs drawn. He doesn't want to be sticked. 0630 Dialysis called and will take pt at around 0700 - 0730am. Gave pt the frozen parmesan chicken, so he will not miss breakfast.    End of Shift Note    Bedside shift change report given to Talon Rodriguez (oncoming nurse) by Kojo Doll RN (offgoing nurse). Report included the following information SBAR, MAR, and Cardiac Rhythm Sinus Rhythm    Shift worked:  7p-7a     Shift summary and any significant changes:     Refused labs this am Jennifer Santiago RN informed. He probably would agreed bloods taken w/ dialysis     Concerns for physician to address:       Zone phone for oncoming shift:          Activity:     Number times ambulated in hallways past shift: 0  Number of times OOB to chair past shift: 0    Cardiac:   Cardiac Monitoring: Yes           Access:  Current line(s): PIV and central line     Genitourinary:   Urinary status: oliguric    Respiratory:      Chronic home O2 use?: NO  Incentive spirometer at bedside: NO       GI:     Current diet:  ADULT DIET; Regular; Low Sodium (2 gm);  Low Potassium (Less than 3000 mg/day); no eggs and no pork  DIET ONE TIME MESSAGE;  DIET ONE TIME MESSAGE;  Passing flatus: YES  Tolerating current diet: YES       Pain Management:   Patient states pain is manageable on current regimen: YES    Skin:     Interventions: float heels, increase time out of bed, PT/OT consult, limit briefs, and nutritional support    Patient Safety:  Fall Score:    Interventions: bed/chair alarm, assistive device (walker, cane.  etc), gripper socks, and pt to call before getting OOB       Length of Stay:  Expected LOS: 4  Actual LOS: 2      Jarad Wheat RN

## 2023-07-28 NOTE — FLOWSHEET NOTE
Post hd note   07/28/23 1145   Treatment   Time On 1145   Treatment Goal 3500   Observations & Evaluations   Level of Consciousness 0   Oriented X 4   Heart Rhythm Regular   Respiratory Quality/Effort Unlabored   O2 Device Nasal cannula   Bilateral Breath Sounds Clear   Edema Generalized   Vital Signs   BP (!) 165/68   Pulse 81   Pain Assessment   Pain Assessment 0-10   During Hemodialysis Assessment   Ultrafiltration Removed (mL) 3305 ml/min   Hemodialysis Central Access Right Subclavian   No placement date or time found. Orientation: Right  Access Location: Subclavian   Continued need for line? No   Alcohol Cap Used No   Line Care Connections checked and tightened; Chlorhexidine wipes   Dressing Type Antimicrobial;Sterile dressing, transparent   Date of Last Dressing Change 07/27/23   Dressing Status New dressing applied   Dressing Intervention New   Dressing Change Due 07/28/23   Post-Hemodialysis Assessment   Post-Treatment Procedures Blood returned;Catheter Capped, clamped with Saline x2 ports   Machine Disinfection Process Acid/Vinegar Clean;Heat Disinfect   Rinseback Volume (ml) 200 ml   Blood Volume Processed (Liters) 53.8 l/min   Dialyzer Clearance Clear   Duration of Treatment (minutes) 147 minutes   Hemodialysis Intake (ml) 500 ml   Hemodialysis Output (ml) 3200 ml   NET Removed (ml) 2700   Tolerated Treatment Fair   Patient Response to Treatment   (Pt. terminated tx. ran 2.45hrs.  informed of  AMA)   Physician Notified Yes   Patient Disposition Return to room   Provider Notification   Reason for Communication Patient request   Provider Name [facility]   Provider Notification Physician   Method of Communication Page   Response Other (Comment)  (pt. AMA, Dr. Yosvany Borden aware)   Notification Time 1040   Shift Event AMA     RN completed patient assessment. RN reviewed technicians vital signs and procedure note. Tx completed.  Reviewed by JOSIAH Alvares  Report given to  Isaiah Gallardo RN

## 2023-07-28 NOTE — PROGRESS NOTES
1300- pt has been given all discharge instructions but refuses to have nurse review instructions with him. Pt made aware of new medication to be picked up and taken. 1523- pt has been taken down to see if his ride is here.

## 2023-07-28 NOTE — DISCHARGE SUMMARY
Discharge Summary    Name: Theo Persaud  [id number]  YOB: 1975 (Age: 51 y.o.)   Date of Admission: 7/24/2023  Date of Discharge: 7/28/2023  Attending Physician: Hieu Christopher MD    Discharge Diagnosis:     Acute Hypoxic Respiratory Failure  Osteomyelitis  ESRD   Accelerated HTN  DM  Elevated Alk-Phos  Acute on Chronic Anemia    Consultations:  IP CONSULT TO PODIATRY  IP CONSULT TO NEPHROLOGY  IP WOUND CARE NURSE CONSULT TO EVAL  IP CONSULT TO CASE MANAGEMENT  IP CONSULT TO CASE MANAGEMENT      Brief Admission History/Reason for Admission Per Tal Guerin MD:     Theo Persaud is a 51 y.o. male with PMHx significant for ESRD on dialysis MWF, HTN, CVA resulting in blindness, seizures, who presents to the ED on Monday following hemodialysis for SOB. Patient reported shortness of breath started 5 days ago. He also states that following dialysis his SOB is temporarily resolved but returns within 2 hours post dialysis. He reports that he relieves his SOB by using his mother's home oxygen. Denies CP, chills , abdominal pain, N/V or      In the ED, CXR with resolving central congestion, persistent bibasilar airspace disease & bilateral pleural effusions; XR of left and right foot with no plain film evidence of osteomyelitis; EKG NSR. Patient with elevated BNP, troponin WNL, alk phos elevated (805), , WBC elevated (13.0), H/H 7.7 & 23.8, Ca++7.7, and elevated sed rate (127). We were asked to admit for work up and evaluation of the above problems. Brief Hospital Course by Main Problems:     Acute Hypoxic Respiratory Failure  Most likely r/t fluid overload; pt ESRD; received dialysis yesterday  CXR with bilateral pleural effusions and bibasilar airspace disease  Elevated BNP >35,000  Patient declined serum lab testing for thoracentesis, presumed exudative fluids.   Plan  Follow up with nephrology as prescribed for dialysis  Failed O2

## 2023-07-30 LAB
BACTERIA SPEC CULT: NORMAL
BACTERIA SPEC CULT: NORMAL
GRAM STN SPEC: NORMAL
GRAM STN SPEC: NORMAL
SERVICE CMNT-IMP: NORMAL

## 2023-08-01 NOTE — PROGRESS NOTES
Physician Progress Note      Max Lowe  CSN #:                  [id number]  :                       1975  ADMIT DATE:       2023 10:36 PM  [facility] DATE:        2023 3:24 PM  RESPONDING  PROVIDER #:        Jimi Land MD          QUERY TEXT:    61LPJ pt admitted with SOB d/t fluid overload and has CHF documented as   problem list noted on 2023 by Podiatrist. After study, please further   specify regarding the type and acuity of CHF:    The medical record reflects the following:  Risk Factors: hx HTN, CKD/ESRD on HD, Chronic Diastolic CHF, PVD  Clinical Indicators:  SOB, CXR with bilateral pleural effusions and bibasilar   airspace disease; RLE/LLE edema present b/l LE w/ taut skin. Elevated BNP >35,000     Podiatry note  under problem list '-Acute exacerbation of CHF (congestive   heart failure) ([address]) 2022.'  Treatment: IV Bumex, IV hydralazine, UF HD per Nephrology, US guided   Thoracentesis, supplemental O2  Options provided:  -- Chronic Systolic CHF/HFrEF  -- Chronic Diastolic CHF/HFpEF  -- Acute on chronic Diastolic CHF  -- Other - I will add my own diagnosis  -- Disagree - Not applicable / Not valid  -- Disagree - Clinically unable to determine / Unknown  -- Refer to Clinical Documentation Reviewer    PROVIDER RESPONSE TEXT:    This patient has chronic diastolic CHF/HFpEF.     Query created by: Chad Pope on 2023 11:14 AM      Electronically signed by:  Jimi Land MD 2023 9:28 AM

## 2023-08-02 ENCOUNTER — HOSPITAL ENCOUNTER (INPATIENT)
Facility: HOSPITAL | Age: 48
LOS: 1 days | Discharge: HOME | DRG: 663 | End: 2023-08-04
Attending: EMERGENCY MEDICINE | Admitting: STUDENT IN AN ORGANIZED HEALTH CARE EDUCATION/TRAINING PROGRAM
Payer: COMMERCIAL

## 2023-08-02 ENCOUNTER — APPOINTMENT (OUTPATIENT)
Facility: HOSPITAL | Age: 48
DRG: 663 | End: 2023-08-02
Attending: EMERGENCY MEDICINE
Payer: COMMERCIAL

## 2023-08-02 DIAGNOSIS — R06.02 SHORTNESS OF BREATH: Primary | ICD-10-CM

## 2023-08-02 DIAGNOSIS — J90 PLEURAL EFFUSION: ICD-10-CM

## 2023-08-02 DIAGNOSIS — N18.6 ESRD (END STAGE RENAL DISEASE) (HCC): ICD-10-CM

## 2023-08-02 DIAGNOSIS — T14.8XXA BLEEDING FROM WOUND: ICD-10-CM

## 2023-08-02 DIAGNOSIS — D64.9 ANEMIA, UNSPECIFIED TYPE: ICD-10-CM

## 2023-08-02 PROBLEM — D62 ACUTE BLOOD LOSS ANEMIA: Status: ACTIVE | Noted: 2023-08-02

## 2023-08-02 LAB
ALBUMIN SERPL-MCNC: 2.2 G/DL (ref 3.5–5)
ALBUMIN/GLOB SERPL: 0.4 (ref 1.1–2.2)
ALP SERPL-CCNC: 774 U/L (ref 45–117)
ALT SERPL-CCNC: 43 U/L (ref 12–78)
ANION GAP SERPL CALC-SCNC: 6 MMOL/L (ref 5–15)
AST SERPL-CCNC: 30 U/L (ref 15–37)
BASOPHILS # BLD: 0 K/UL (ref 0–0.1)
BASOPHILS NFR BLD: 0 % (ref 0–1)
BILIRUB SERPL-MCNC: 0.6 MG/DL (ref 0.2–1)
BUN SERPL-MCNC: 47 MG/DL (ref 6–20)
BUN/CREAT SERPL: 8 (ref 12–20)
CALCIUM SERPL-MCNC: 8.3 MG/DL (ref 8.5–10.1)
CHLORIDE SERPL-SCNC: 98 MMOL/L (ref 97–108)
CO2 SERPL-SCNC: 28 MMOL/L (ref 21–32)
COMMENT:: NORMAL
COMMENT:: NORMAL
CREAT SERPL-MCNC: 5.66 MG/DL (ref 0.7–1.3)
DIFFERENTIAL METHOD BLD: ABNORMAL
EOSINOPHIL # BLD: 0.1 K/UL (ref 0–0.4)
EOSINOPHIL NFR BLD: 1 % (ref 0–7)
ERYTHROCYTE [DISTWIDTH] IN BLOOD BY AUTOMATED COUNT: 16.4 % (ref 11.5–14.5)
GLOBULIN SER CALC-MCNC: 6.2 G/DL (ref 2–4)
GLUCOSE BLD STRIP.AUTO-MCNC: 219 MG/DL (ref 65–117)
GLUCOSE BLD STRIP.AUTO-MCNC: 230 MG/DL (ref 65–117)
GLUCOSE BLD STRIP.AUTO-MCNC: 288 MG/DL (ref 65–117)
GLUCOSE BLD STRIP.AUTO-MCNC: 340 MG/DL (ref 65–117)
GLUCOSE SERPL-MCNC: 227 MG/DL (ref 65–100)
HCT VFR BLD AUTO: 21.2 % (ref 36.6–50.3)
HGB BLD-MCNC: 6.8 G/DL (ref 12.1–17)
HISTORY CHECK: NORMAL
IMM GRANULOCYTES # BLD AUTO: 0.1 K/UL (ref 0–0.04)
IMM GRANULOCYTES NFR BLD AUTO: 1 % (ref 0–0.5)
LYMPHOCYTES # BLD: 2.2 K/UL (ref 0.8–3.5)
LYMPHOCYTES NFR BLD: 21 % (ref 12–49)
MCH RBC QN AUTO: 31.6 PG (ref 26–34)
MCHC RBC AUTO-ENTMCNC: 32.1 G/DL (ref 30–36.5)
MCV RBC AUTO: 98.6 FL (ref 80–99)
MONOCYTES # BLD: 1.4 K/UL (ref 0–1)
MONOCYTES NFR BLD: 14 % (ref 5–13)
NEUTS SEG # BLD: 6.5 K/UL (ref 1.8–8)
NEUTS SEG NFR BLD: 63 % (ref 32–75)
NRBC # BLD: 0 K/UL (ref 0–0.01)
NRBC BLD-RTO: 0 PER 100 WBC
PLATELET # BLD AUTO: 225 K/UL (ref 150–400)
PMV BLD AUTO: 8.9 FL (ref 8.9–12.9)
POTASSIUM SERPL-SCNC: 4.6 MMOL/L (ref 3.5–5.1)
PROT SERPL-MCNC: 8.4 G/DL (ref 6.4–8.2)
RBC # BLD AUTO: 2.15 M/UL (ref 4.1–5.7)
SARS-COV-2 RDRP RESP QL NAA+PROBE: NOT DETECTED
SERVICE CMNT-IMP: ABNORMAL
SODIUM SERPL-SCNC: 132 MMOL/L (ref 136–145)
SOURCE: NORMAL
SPECIMEN HOLD: NORMAL
SPECIMEN HOLD: NORMAL
TROPONIN I SERPL HS-MCNC: 35 NG/L (ref 0–76)
WBC # BLD AUTO: 10.3 K/UL (ref 4.1–11.1)

## 2023-08-02 PROCEDURE — 6370000000 HC RX 637 (ALT 250 FOR IP): Performed by: STUDENT IN AN ORGANIZED HEALTH CARE EDUCATION/TRAINING PROGRAM

## 2023-08-02 PROCEDURE — 82962 GLUCOSE BLOOD TEST: CPT

## 2023-08-02 PROCEDURE — 85025 COMPLETE CBC W/AUTO DIFF WBC: CPT

## 2023-08-02 PROCEDURE — 2500000003 HC RX 250 WO HCPCS: Performed by: STUDENT IN AN ORGANIZED HEALTH CARE EDUCATION/TRAINING PROGRAM

## 2023-08-02 PROCEDURE — 84484 ASSAY OF TROPONIN QUANT: CPT

## 2023-08-02 PROCEDURE — 86900 BLOOD TYPING SEROLOGIC ABO: CPT

## 2023-08-02 PROCEDURE — G0378 HOSPITAL OBSERVATION PER HR: HCPCS

## 2023-08-02 PROCEDURE — 99285 EMERGENCY DEPT VISIT HI MDM: CPT

## 2023-08-02 PROCEDURE — 86850 RBC ANTIBODY SCREEN: CPT

## 2023-08-02 PROCEDURE — 71045 X-RAY EXAM CHEST 1 VIEW: CPT

## 2023-08-02 PROCEDURE — 86923 COMPATIBILITY TEST ELECTRIC: CPT

## 2023-08-02 PROCEDURE — 6360000002 HC RX W HCPCS: Performed by: INTERNAL MEDICINE

## 2023-08-02 PROCEDURE — 86901 BLOOD TYPING SEROLOGIC RH(D): CPT

## 2023-08-02 PROCEDURE — 2700000000 HC OXYGEN THERAPY PER DAY

## 2023-08-02 PROCEDURE — 36430 TRANSFUSION BLD/BLD COMPNT: CPT

## 2023-08-02 PROCEDURE — 96374 THER/PROPH/DIAG INJ IV PUSH: CPT

## 2023-08-02 PROCEDURE — 87635 SARS-COV-2 COVID-19 AMP PRB: CPT

## 2023-08-02 PROCEDURE — 80053 COMPREHEN METABOLIC PANEL: CPT

## 2023-08-02 PROCEDURE — 36415 COLL VENOUS BLD VENIPUNCTURE: CPT

## 2023-08-02 PROCEDURE — P9016 RBC LEUKOCYTES REDUCED: HCPCS

## 2023-08-02 PROCEDURE — 93005 ELECTROCARDIOGRAM TRACING: CPT | Performed by: STUDENT IN AN ORGANIZED HEALTH CARE EDUCATION/TRAINING PROGRAM

## 2023-08-02 PROCEDURE — 2580000003 HC RX 258: Performed by: STUDENT IN AN ORGANIZED HEALTH CARE EDUCATION/TRAINING PROGRAM

## 2023-08-02 PROCEDURE — 30233N1 TRANSFUSION OF NONAUTOLOGOUS RED BLOOD CELLS INTO PERIPHERAL VEIN, PERCUTANEOUS APPROACH: ICD-10-PCS | Performed by: STUDENT IN AN ORGANIZED HEALTH CARE EDUCATION/TRAINING PROGRAM

## 2023-08-02 RX ORDER — SODIUM CHLORIDE 9 MG/ML
INJECTION, SOLUTION INTRAVENOUS PRN
Status: DISCONTINUED | OUTPATIENT
Start: 2023-08-02 | End: 2023-08-04 | Stop reason: HOSPADM

## 2023-08-02 RX ORDER — ONDANSETRON 4 MG/1
4 TABLET, ORALLY DISINTEGRATING ORAL EVERY 8 HOURS PRN
Status: DISCONTINUED | OUTPATIENT
Start: 2023-08-02 | End: 2023-08-04 | Stop reason: HOSPADM

## 2023-08-02 RX ORDER — DEXTROSE MONOHYDRATE 100 MG/ML
INJECTION, SOLUTION INTRAVENOUS CONTINUOUS PRN
Status: DISCONTINUED | OUTPATIENT
Start: 2023-08-02 | End: 2023-08-04 | Stop reason: HOSPADM

## 2023-08-02 RX ORDER — CALCIUM ACETATE 667 MG/1
3 CAPSULE ORAL 3 TIMES DAILY
Status: DISCONTINUED | OUTPATIENT
Start: 2023-08-02 | End: 2023-08-04 | Stop reason: HOSPADM

## 2023-08-02 RX ORDER — POLYETHYLENE GLYCOL 3350 17 G/17G
17 POWDER, FOR SOLUTION ORAL DAILY PRN
Status: DISCONTINUED | OUTPATIENT
Start: 2023-08-02 | End: 2023-08-04 | Stop reason: HOSPADM

## 2023-08-02 RX ORDER — ONDANSETRON 2 MG/ML
4 INJECTION INTRAMUSCULAR; INTRAVENOUS EVERY 6 HOURS PRN
Status: DISCONTINUED | OUTPATIENT
Start: 2023-08-02 | End: 2023-08-04 | Stop reason: HOSPADM

## 2023-08-02 RX ORDER — ATORVASTATIN CALCIUM 40 MG/1
40 TABLET, FILM COATED ORAL DAILY
Status: DISCONTINUED | OUTPATIENT
Start: 2023-08-02 | End: 2023-08-04 | Stop reason: HOSPADM

## 2023-08-02 RX ORDER — SODIUM CHLORIDE 0.9 % (FLUSH) 0.9 %
5-40 SYRINGE (ML) INJECTION EVERY 12 HOURS SCHEDULED
Status: DISCONTINUED | OUTPATIENT
Start: 2023-08-02 | End: 2023-08-04 | Stop reason: HOSPADM

## 2023-08-02 RX ORDER — INSULIN LISPRO 100 [IU]/ML
0-4 INJECTION, SOLUTION INTRAVENOUS; SUBCUTANEOUS NIGHTLY
Status: DISCONTINUED | OUTPATIENT
Start: 2023-08-02 | End: 2023-08-04 | Stop reason: HOSPADM

## 2023-08-02 RX ORDER — INSULIN LISPRO 100 [IU]/ML
0-8 INJECTION, SOLUTION INTRAVENOUS; SUBCUTANEOUS
Status: DISCONTINUED | OUTPATIENT
Start: 2023-08-02 | End: 2023-08-04 | Stop reason: HOSPADM

## 2023-08-02 RX ORDER — NIFEDIPINE 30 MG/1
90 TABLET, EXTENDED RELEASE ORAL DAILY
Status: DISCONTINUED | OUTPATIENT
Start: 2023-08-02 | End: 2023-08-04 | Stop reason: HOSPADM

## 2023-08-02 RX ORDER — DIVALPROEX SODIUM 125 MG/1
500 CAPSULE, COATED PELLETS ORAL 2 TIMES DAILY
Status: DISCONTINUED | OUTPATIENT
Start: 2023-08-02 | End: 2023-08-04 | Stop reason: HOSPADM

## 2023-08-02 RX ORDER — ASPIRIN 81 MG/1
81 TABLET ORAL DAILY
Status: DISCONTINUED | OUTPATIENT
Start: 2023-08-02 | End: 2023-08-04 | Stop reason: HOSPADM

## 2023-08-02 RX ORDER — FERROUS SULFATE 325(65) MG
325 TABLET ORAL
Status: DISCONTINUED | OUTPATIENT
Start: 2023-08-02 | End: 2023-08-02

## 2023-08-02 RX ORDER — LEVOFLOXACIN 500 MG/1
250 TABLET, FILM COATED ORAL
Status: DISCONTINUED | OUTPATIENT
Start: 2023-08-02 | End: 2023-08-04

## 2023-08-02 RX ORDER — SODIUM CHLORIDE 0.9 % (FLUSH) 0.9 %
5-40 SYRINGE (ML) INJECTION PRN
Status: DISCONTINUED | OUTPATIENT
Start: 2023-08-02 | End: 2023-08-04 | Stop reason: HOSPADM

## 2023-08-02 RX ORDER — ACETAMINOPHEN 325 MG/1
650 TABLET ORAL EVERY 6 HOURS PRN
Status: DISCONTINUED | OUTPATIENT
Start: 2023-08-02 | End: 2023-08-04 | Stop reason: HOSPADM

## 2023-08-02 RX ORDER — ACETAMINOPHEN 650 MG/1
650 SUPPOSITORY RECTAL EVERY 6 HOURS PRN
Status: DISCONTINUED | OUTPATIENT
Start: 2023-08-02 | End: 2023-08-04 | Stop reason: HOSPADM

## 2023-08-02 RX ORDER — OXYCODONE HYDROCHLORIDE 5 MG/1
5 TABLET ORAL EVERY 6 HOURS PRN
Status: DISCONTINUED | OUTPATIENT
Start: 2023-08-02 | End: 2023-08-03

## 2023-08-02 RX ORDER — INSULIN GLARGINE 100 [IU]/ML
10 INJECTION, SOLUTION SUBCUTANEOUS NIGHTLY
Status: DISCONTINUED | OUTPATIENT
Start: 2023-08-02 | End: 2023-08-03

## 2023-08-02 RX ORDER — ATENOLOL 50 MG/1
25 TABLET ORAL DAILY
Status: DISCONTINUED | OUTPATIENT
Start: 2023-08-02 | End: 2023-08-04 | Stop reason: HOSPADM

## 2023-08-02 RX ORDER — HYDRALAZINE HYDROCHLORIDE 50 MG/1
50 TABLET, FILM COATED ORAL EVERY 8 HOURS SCHEDULED
Status: DISCONTINUED | OUTPATIENT
Start: 2023-08-02 | End: 2023-08-04 | Stop reason: HOSPADM

## 2023-08-02 RX ADMIN — Medication 6 UNITS: at 17:42

## 2023-08-02 RX ADMIN — DIVALPROEX SODIUM 500 MG: 125 CAPSULE, COATED PELLETS ORAL at 08:13

## 2023-08-02 RX ADMIN — OXYCODONE HYDROCHLORIDE 5 MG: 5 TABLET ORAL at 06:22

## 2023-08-02 RX ADMIN — ASPIRIN 81 MG: 81 TABLET, COATED ORAL at 08:11

## 2023-08-02 RX ADMIN — ATENOLOL 25 MG: 50 TABLET ORAL at 08:11

## 2023-08-02 RX ADMIN — SODIUM CHLORIDE, PRESERVATIVE FREE 10 ML: 5 INJECTION INTRAVENOUS at 22:58

## 2023-08-02 RX ADMIN — FERROUS SULFATE TAB 325 MG (65 MG ELEMENTAL FE) 325 MG: 325 (65 FE) TAB at 08:11

## 2023-08-02 RX ADMIN — HYDRALAZINE HYDROCHLORIDE 50 MG: 50 TABLET ORAL at 15:09

## 2023-08-02 RX ADMIN — INSULIN GLARGINE 10 UNITS: 100 INJECTION, SOLUTION SUBCUTANEOUS at 21:30

## 2023-08-02 RX ADMIN — CALCIUM ACETATE 2001 MG: 667 CAPSULE ORAL at 08:11

## 2023-08-02 RX ADMIN — DIVALPROEX SODIUM 500 MG: 125 CAPSULE, COATED PELLETS ORAL at 21:00

## 2023-08-02 RX ADMIN — NIFEDIPINE 90 MG: 30 TABLET, EXTENDED RELEASE ORAL at 08:10

## 2023-08-02 RX ADMIN — Medication 2 UNITS: at 08:21

## 2023-08-02 RX ADMIN — CALCIUM ACETATE 2001 MG: 667 CAPSULE ORAL at 15:07

## 2023-08-02 RX ADMIN — ATORVASTATIN CALCIUM 40 MG: 40 TABLET, FILM COATED ORAL at 08:11

## 2023-08-02 RX ADMIN — CALCIUM ACETATE 2001 MG: 667 CAPSULE ORAL at 22:28

## 2023-08-02 RX ADMIN — HYDRALAZINE HYDROCHLORIDE 50 MG: 50 TABLET ORAL at 08:21

## 2023-08-02 RX ADMIN — HYDRALAZINE HYDROCHLORIDE 50 MG: 50 TABLET ORAL at 22:29

## 2023-08-02 RX ADMIN — LEVOFLOXACIN 250 MG: 500 TABLET, FILM COATED ORAL at 21:30

## 2023-08-02 RX ADMIN — IRON SUCROSE 200 MG: 20 INJECTION, SOLUTION INTRAVENOUS at 15:07

## 2023-08-02 RX ADMIN — SODIUM CHLORIDE, PRESERVATIVE FREE 10 ML: 5 INJECTION INTRAVENOUS at 15:10

## 2023-08-02 ASSESSMENT — PAIN - FUNCTIONAL ASSESSMENT: PAIN_FUNCTIONAL_ASSESSMENT: 0-10

## 2023-08-02 ASSESSMENT — PAIN DESCRIPTION - ORIENTATION
ORIENTATION: RIGHT

## 2023-08-02 ASSESSMENT — ENCOUNTER SYMPTOMS
SHORTNESS OF BREATH: 1
DIARRHEA: 0
VOMITING: 0
ABDOMINAL PAIN: 0
NAUSEA: 0

## 2023-08-02 ASSESSMENT — PAIN SCALES - GENERAL
PAINLEVEL_OUTOF10: 10
PAINLEVEL_OUTOF10: 4
PAINLEVEL_OUTOF10: 10
PAINLEVEL_OUTOF10: 0
PAINLEVEL_OUTOF10: 4
PAINLEVEL_OUTOF10: 10

## 2023-08-02 ASSESSMENT — PAIN DESCRIPTION - LOCATION
LOCATION: FOOT
LOCATION: FOOT
LOCATION: FOOT;LEG
LOCATION: FOOT

## 2023-08-02 NOTE — WOUND CARE
Wound care nurse consult for POA right foot non-healing surgical TMA site. Chart reviewed and patient assessed    52 y/o AAM who is blind, admitted today for acute blood loss anemia from right foot amputation site. PMH: ESRD - HD-MWF;HTN; Seizure disorder; DM, Blindness. Surgical HX of Left TMA well healed. Past Medical History:   Diagnosis Date    Bulging eyes     Diabetes ([address] [facility])     Dialysis patient [facility])     End stage renal disease ([address] [facility])     Hypertension     Seizure ([address] [facility])     Stroke [facility])      Past Surgical History:   Procedure Laterality Date    FOOT DEBRIDEMENT Right 6/29/2023    RIGHT FOOT DEBRIDEMENT INCISION AND DRAINAGE performed by Christopher Harrell DPM at [facility] MAIN OR    IR NONTUNNELED VASCULAR CATHETER  08/19/2022    IR NONTUNNELED VASCULAR CATHETER 8/19/2022 [facility]    IR TUNNELED CATHETER PLACEMENT GREATER THAN 5 YEARS  08/23/2022    IR TUNNELED CATHETER PLACEMENT GREATER THAN 5 YEARS 8/23/2022 [facility]    IR TUNNELED CATHETER PLACEMENT GREATER THAN 5 YEARS  8/23/2022    TOE AMPUTATION Bilateral 2022    all toes removed    VASCULAR SURGERY Left     LLE     Patient states he had a recent debridement of right foot incision due to infection by [facility] [place] aprox a month ago. Dr Pool Holden is consulted. Suspect that patient is bearing weight on right foot wound causing the bleeding. Removed Quick clot dressing with Coban compression wrap and scant bleeding occurred for aprox 10-15 minutes before new dressing applied. Wound today :2.5 x 6 x 0.4 cm, small-moderate sanguinous drainage, 50% devitalized tissue and 50% pink/red. Will defer to Dr Pool Holden for his recommended dressing when he sees patient. WC nurse applied some Silvasorb wound gel with AG, Opticell-AG gelling fiber to wound. Secured with ABD pad and guilherme wrap. This dressing can be kept in place for 2-3 days before it needs to be changed.  Ample time for Dr Pool Holden to see patient and recommend

## 2023-08-02 NOTE — ED NOTES
Attempted to call report on patient, awaiting a call back from receiving nurse.       Laura Mabry RN  08/02/23 4062

## 2023-08-02 NOTE — PROGRESS NOTES
[address] to patient representative via phone. Sent VOON to pt representative email as requested. See scanned documents. Jenaro Ibanez Care Management Assistant    Attempted to deliver and verbally explain the Karissa Links with patient. Patient was with clinical staff.  John Aguirre

## 2023-08-02 NOTE — CONSENT
Informed Consent for Blood Component Transfusion Note    I have discussed with the patient the rationale for blood component transfusion; its benefits in treating or preventing fatigue, organ damage, or death; and its risk which includes mild transfusion reactions, rare risk of blood borne infection, or more serious but rare reactions. I have discussed the alternatives to transfusion, including the risk and consequences of not receiving transfusion. The patient had an opportunity to ask questions and had agreed to proceed with transfusion of blood components.     Electronically signed by Tomasz Jorgensen MD on 8/2/23 at 4:34 AM EDT

## 2023-08-02 NOTE — PROGRESS NOTES
08/02/2023    HCT 21.2 (L) 08/02/2023    MCV 98.6 08/02/2023     08/02/2023      Recent Labs     08/02/23 0203   *   K 4.6   CL 98   CO2 28   GLUCOSE 227*   BUN 47*   CREATININE 5.66*   CALCIUM 8.3*       Recent Labs     08/02/23 0203   WBC 10.3   RBC 2.15*   HGB 6.8*   HCT 21.2*   MCV 98.6   MCH 31.6   MCHC 32.1   RDW 16.4*      MPV 8.9     Lab Results   Component Value Date/Time    IRON 23 (L) 07/13/2022 01:27 AM    TIBC 170 (L) 07/13/2022 01:27 AM    LABIRON 14 (L) 07/13/2022 01:27 AM     No results found for: PTH  Lab Results   Component Value Date/Time    LABA1C 6.4 (H) 07/13/2023 12:29 PM     Lab Results   Component Value Date/Time    COLORU YELLOW/STRAW 07/12/2022 04:11 AM    CLARITYU CLEAR 07/12/2022 04:11 AM    GLUCOSEU Negative 07/12/2022 04:11 AM    BILIRUBINUR Negative 07/12/2022 04:11 AM    KETUA Negative 07/12/2022 04:11 AM    SPECGRAV 1.008 07/12/2022 04:11 AM    BLOODU TRACE (A) 07/12/2022 04:11 AM    PHUR 5.5 07/12/2022 04:11 AM    PROTEINU 300 (A) 07/12/2022 04:11 AM    NITRU Negative 07/12/2022 04:11 AM    LEUKOCYTESUR Negative 07/12/2022 04:11 AM     US Results (most recent):  Medication list  reviewed  Current Facility-Administered Medications   Medication Dose Route Frequency    0.9 % sodium chloride infusion   IntraVENous PRN    aspirin EC tablet 81 mg  81 mg Oral Daily    atenolol (TENORMIN) tablet 25 mg  25 mg Oral Daily    atorvastatin (LIPITOR) tablet 40 mg  40 mg Oral Daily    calcium acetate (PHOSLO) capsule 2,001 mg  3 capsule Oral TID    divalproex (DEPAKOTE SPRINKLE) DR capsule 500 mg  500 mg Oral BID    insulin glargine (LANTUS) injection vial 10 Units  10 Units SubCUTAneous Nightly    insulin lispro (HUMALOG) injection vial 0-8 Units  0-8 Units SubCUTAneous TID     insulin lispro (HUMALOG) injection vial 0-4 Units  0-4 Units SubCUTAneous Nightly    glucose chewable tablet 16 g  4 tablet Oral PRN    dextrose bolus 10% 125 mL  125 mL IntraVENous PRN    Or dextrose bolus 10% 250 mL  250 mL IntraVENous PRN    glucagon injection 1 mg  1 mg SubCUTAneous PRN    dextrose 10 % infusion   IntraVENous Continuous PRN    levoFLOXacin (LEVAQUIN) tablet 250 mg  250 mg Oral Once per day on Mon Wed Fri    NIFEdipine (PROCARDIA XL) extended release tablet 90 mg  90 mg Oral Daily    sodium chloride flush 0.9 % injection 5-40 mL  5-40 mL IntraVENous 2 times per day    sodium chloride flush 0.9 % injection 5-40 mL  5-40 mL IntraVENous PRN    0.9 % sodium chloride infusion   IntraVENous PRN    ondansetron (ZOFRAN-ODT) disintegrating tablet 4 mg  4 mg Oral Q8H PRN    Or    ondansetron (ZOFRAN) injection 4 mg  4 mg IntraVENous Q6H PRN    polyethylene glycol (GLYCOLAX) packet 17 g  17 g Oral Daily PRN    acetaminophen (TYLENOL) tablet 650 mg  650 mg Oral Q6H PRN    Or    acetaminophen (TYLENOL) suppository 650 mg  650 mg Rectal Q6H PRN    oxyCODONE (ROXICODONE) immediate release tablet 5 mg  5 mg Oral Q6H PRN    hydrALAZINE (APRESOLINE) tablet 50 mg  50 mg Oral 3 times per day    iron sucrose (VENOFER) 200 mg in sodium chloride 0.9 % 100 mL IVPB  200 mg IntraVENous Once    Epoetin Rambo-epbx (RETACRIT) injection 20,000 Units  20,000 Units SubCUTAneous Q MWF        Bladimir Bailon MD  8/2/2023

## 2023-08-02 NOTE — PROGRESS NOTES
End of Shift Note    Bedside shift change report given to (oncoming nurse) by Nan Yuen RN (offgoing nurse). Report included the following information SBAR    Shift worked:  07AM-07PM     Shift summary and any significant changes:     Patient simply refuses his medications sometimes, saying too many medication but with  lots of persuasions he took his medication fully. Wound nurse Jeffrey Schwartz see the right foot wound, redressed. Dialysis done and one unit of blood transfusion as HB 6.8. Concerns for physician to address:       Zone phone for oncoming shift:          Activity:     Number times ambulated in hallways past shift: 0  Number of times OOB to chair past shift: 0    Cardiac:   Cardiac Monitoring: Yes           Access:  Current line(s): PIV     Genitourinary:   Urinary status: voiding and oliguric    Respiratory:      Chronic home O2 use?: YES  Incentive spirometer at bedside: NO       GI:     Current diet:  ADULT DIET;  Regular  Passing flatus: YES  Tolerating current diet: YES       Pain Management:   Patient states pain is manageable on current regimen: NO    Skin:     Interventions: float heels, PT/OT consult, and nutritional support    Patient Safety:  Fall Score:    Interventions: bed/chair alarm       Length of Stay:  Expected LOS: 1  Actual LOS: 0      Nan Yuen RN

## 2023-08-02 NOTE — H&P
eyes     Diabetes ([address])     Dialysis patient ([address])     End stage renal disease ([address])     Hypertension     Seizure ([address])     Stroke [facility])         Past Surgical History:   Procedure Laterality Date    FOOT DEBRIDEMENT Right 6/29/2023    RIGHT FOOT DEBRIDEMENT INCISION AND DRAINAGE performed by Ernestine Degroot DPM at \[facility]\"" MAIN OR    IR NONTUNNELED VASCULAR CATHETER  08/19/2022    IR NONTUNNELED VASCULAR CATHETER 8/19/2022 [facility]    IR TUNNELED CATHETER PLACEMENT GREATER THAN 5 YEARS  08/23/2022    IR TUNNELED CATHETER PLACEMENT GREATER THAN 5 YEARS 8/23/2022 [facility]    IR TUNNELED CATHETER PLACEMENT GREATER THAN 5 YEARS  8/23/2022    TOE AMPUTATION Bilateral 2022    all toes removed    VASCULAR SURGERY Left     LLE       Social History     Tobacco Use    Smoking status: Every Day     Packs/day: 0.50     Types: Cigarettes     Passive exposure: Current    Smokeless tobacco: Never   Substance Use Topics    Alcohol use: Never        Family History   Problem Relation Age of Onset    Heart Disease Mother     No Known Problems Father      Allergies   Allergen Reactions    Heparin Other (See Comments)     NO PORK    Pork Allergy Hives     Do not send pork to the patient please    Tramadol Hives        Prior to Admission medications    Medication Sig Start Date End Date Taking? Authorizing Provider   levoFLOXacin (LEVAQUIN) 250 MG tablet Take 1 tablet by mouth three times a week for 12 days 7/28/23 8/9/23  Smith Gee MD   Insulin Lispro-aabc 100 UNIT/ML SOLN Inject 5 Units into the skin 3 times daily.  Pt states he takes dose he wants when BS high 7/2/23   Ar Automatic Reconciliation   calcium acetate (PHOSLO) 667 MG CAPS capsule Take 3 capsules by mouth 3 times daily    Historical Provider, MD   acetaminophen (TYLENOL) 500 MG tablet Take 1 tablet by mouth every 6 hours as needed for Pain    Historical Provider, MD   glucose monitoring (FREESTYLE FREEDOM) kit 1 kit by Does not apply route daily compared to the prior exam. Bilateral lower  lobe atelectasis and pulmonary edema again noted. The visualized bones and upper  abdomen are age-appropriate. Impression  Increased right pleural effusion. Persistent bilateral lower lobe atelectasis  and pulmonary edema        _______________________________________________________________________    TOTAL TIME:  75 Minutes   TOBACCO CESSATION COUNSELING: No    Critical Care Provided  N/A  (Minutes non procedure based)        [facility], [facility] - Internal Medicine Hospitalist/ Nocturnist  8/2/2023 5:00 AM    Please do not hesitate to reach out to myself or assigned provider on-call via [facility] paging system with questions or concerns. Procedures: see electronic medical records for all procedures/Xrays and details which were not copied into this note but were reviewed prior to creation of Plan.

## 2023-08-02 NOTE — CARE COORDINATION
Care Management Initial Assessment       RUR: NA  Readmission? No  1st IM letter given? No-NA  1st  letter given: No     08/02/23 0911   Service Assessment   Patient Orientation Alert and Oriented   Cognition Alert   History Provided By Patient   Primary Caregiver Family   Accompanied By/Relationship no one   Support Systems Parent  (Patient's mother Meagan Walker [phone])   [address] is: Patient Declined (Legal Next of Kin Remains as Decision Maker)   PCP Verified by CM Yes   Last Visit to PCP Within last 3 months   Prior Functional Level Assistance with the following:;Bathing;Dressing; Mobility; Shopping;Housework;Cooking; Toileting   Current Functional Level Assistance with the following:;Bathing;Dressing; Mobility; Shopping;Housework; Toileting;Cooking   Can patient return to prior living arrangement Yes   Ability to make needs known: Good   Family able to assist with home care needs: Yes  (Patient's mother Meagan Walker [phone])   Would you like for me to discuss the discharge plan with any other family members/significant others, and if so, who? Yes  (Patient's mother Meagan Walker [phone])   [address]; Medicaid  (disability income)   Social/Functional History   Lives With Parent;Son  (Patient's mother Meagan Walker [phone])   Type of [facility] One level   Home Access Level entry   Home Equipment Wheelchair-manual;Cane   Active  No   Patient's  Info Medicaid   Discharge Planning   Living Arrangements Family Members  (Patient's mother Meagan Walker [phone])   Current Services Prior To Admission None   Patient expects to be discharged to: [facility]   Transition of Care Consult (CM Consult) Home Health   Internal Home Health No   Reason Outside Agency Chosen Unable to staff case   [address] Discharge Home Health   Mode of Transport at Discharge [facility]

## 2023-08-02 NOTE — ED PROVIDER NOTES
EMERGENCY DEPARTMENT HISTORY AND PHYSICAL EXAM     ----------------------------------------------------------------------------  Please note that this dictation was completed with [employer], the [employer] voice recognition software. Quite often unanticipated grammatical, syntax, homophones, and other interpretive errors are inadvertently transcribed by the computer software. Please disregard these errors. Please excuse any errors that have escaped final proofreading  ----------------------------------------------------------------------------      Date: 8/2/2023  Patient Name: Peter Lopez ILLNESS     Chief Complaint   Patient presents with    Wound Infection     Wound to right leg, dressing changed tonight at 2100 and draining through dressing. Shortness of Breath     Increased shortness of breath since yesterday, on 2L at baseline. History obtainted from:  Patient    Other independent source of history: none    HPI: Janny Beasley is a 51 y.o. male, with significant pmhx of end-stage renal disease, diabetes, hypertension, previous stroke, seizure disorder, osteomyelitis of foot with subsequent toe amputations, who presents via EMS to the ED with c/o bleeding from previous surgical wound of right foot without associated trauma. Reportedly had dressing change at 9 PM but has been bleeding through it. Also complaining of shortness of breath for the last few days. Denies associated fever, cough, known sick contacts but was recently admitted. PCP: Regino Park MD    Allergy List:   Allergies   Allergen Reactions    Heparin Other (See Comments)     NO PORK    Pork Allergy Hives     Do not send pork to the patient please    Tramadol Hives         Medications:  No current facility-administered medications for this encounter.      Current Outpatient Medications   Medication Sig Dispense Refill    levoFLOXacin (LEVAQUIN) 250 MG tablet Take 1 tablet by mouth three times a Collection Time: 08/02/23  2:03 AM   Result Value Ref Range    Troponin, High Sensitivity 35 0 - 76 ng/L   COVID-19, Rapid    Collection Time: 08/02/23  2:28 AM    Specimen: Nasopharyngeal   Result Value Ref Range    Source Nasopharyngeal      SARS-CoV-2, Rapid Not detected NOTD     Extra Tubes Hold    Collection Time: 08/02/23  2:44 AM   Result Value Ref Range    Specimen HOld PST LV BL     Comment:        Add-on orders for these samples will be processed based on acceptable specimen integrity and analyte stability, which may vary by analyte. Radiology:  Non-plain film images such as CT, Ultrasound and MRI are read by the radiologist. Plain radiographic images are visualized and preliminarily interpreted by the ED Provider with the findings documented in the MDM section. Interpretation per the Radiologist below, if available at the time of this note:    Radiologic Studies:  XR CHEST PORTABLE   Final Result      Increased right pleural effusion. Persistent bilateral lower lobe atelectasis   and pulmonary edema               ED COURSE   I am the first provider for this patient. Initial assessment performed. The patients presenting problems have been discussed, and they are in agreement with the care plan formulated and outlined with them. I have encouraged them to ask questions as they arise throughout their visit. Nursing notes will be reviewed and interpreted by me as they become available in realtime while the pt has been in the ED. MEDS GIVEN     Blood    PROCEDURES     Procedure Note - Wound check:  Procedure by: Rajwinder Petersen MD   Wound to underside of right foot. No expressible purulent drainage. No surrounding erythema, edema or ecchymosis. Noted to have slightly oozing punctate area under right foot. Procedure took 1-15 minutes and patient tolerated the procedure.       [facility]     Patient is a 51 y.o. male who presents Bleeding from wound to right foot after having a previous

## 2023-08-03 LAB
ANION GAP SERPL CALC-SCNC: 8 MMOL/L (ref 5–15)
BASOPHILS # BLD: 0 K/UL (ref 0–0.1)
BASOPHILS NFR BLD: 0 % (ref 0–1)
BUN SERPL-MCNC: 43 MG/DL (ref 6–20)
BUN/CREAT SERPL: 9 (ref 12–20)
CALCIUM SERPL-MCNC: 7.6 MG/DL (ref 8.5–10.1)
CHLORIDE SERPL-SCNC: 98 MMOL/L (ref 97–108)
CO2 SERPL-SCNC: 27 MMOL/L (ref 21–32)
CREAT SERPL-MCNC: 4.77 MG/DL (ref 0.7–1.3)
DIFFERENTIAL METHOD BLD: ABNORMAL
EKG ATRIAL RATE: 79 BPM
EKG DIAGNOSIS: NORMAL
EKG P AXIS: 76 DEGREES
EKG P-R INTERVAL: 170 MS
EKG Q-T INTERVAL: 388 MS
EKG QRS DURATION: 90 MS
EKG QTC CALCULATION (BAZETT): 444 MS
EKG R AXIS: 2 DEGREES
EKG T AXIS: 73 DEGREES
EKG VENTRICULAR RATE: 79 BPM
EOSINOPHIL # BLD: 0.2 K/UL (ref 0–0.4)
EOSINOPHIL NFR BLD: 2 % (ref 0–7)
ERYTHROCYTE [DISTWIDTH] IN BLOOD BY AUTOMATED COUNT: 16 % (ref 11.5–14.5)
GLUCOSE BLD STRIP.AUTO-MCNC: 225 MG/DL (ref 65–117)
GLUCOSE BLD STRIP.AUTO-MCNC: 282 MG/DL (ref 65–117)
GLUCOSE BLD STRIP.AUTO-MCNC: 364 MG/DL (ref 65–117)
GLUCOSE BLD STRIP.AUTO-MCNC: 402 MG/DL (ref 65–117)
GLUCOSE SERPL-MCNC: 332 MG/DL (ref 65–100)
HCT VFR BLD AUTO: 21.7 % (ref 36.6–50.3)
HGB BLD-MCNC: 6.6 G/DL (ref 12.1–17)
IMM GRANULOCYTES # BLD AUTO: 0.1 K/UL (ref 0–0.04)
IMM GRANULOCYTES NFR BLD AUTO: 1 % (ref 0–0.5)
LYMPHOCYTES # BLD: 2.2 K/UL (ref 0.8–3.5)
LYMPHOCYTES NFR BLD: 20 % (ref 12–49)
MCH RBC QN AUTO: 28.8 PG (ref 26–34)
MCHC RBC AUTO-ENTMCNC: 30.4 G/DL (ref 30–36.5)
MCV RBC AUTO: 94.8 FL (ref 80–99)
MONOCYTES # BLD: 1.4 K/UL (ref 0–1)
MONOCYTES NFR BLD: 13 % (ref 5–13)
NEUTS SEG # BLD: 7.2 K/UL (ref 1.8–8)
NEUTS SEG NFR BLD: 64 % (ref 32–75)
NRBC # BLD: 0 K/UL (ref 0–0.01)
NRBC BLD-RTO: 0 PER 100 WBC
PLATELET # BLD AUTO: 241 K/UL (ref 150–400)
PMV BLD AUTO: 8.8 FL (ref 8.9–12.9)
POTASSIUM SERPL-SCNC: 4.8 MMOL/L (ref 3.5–5.1)
RBC # BLD AUTO: 2.29 M/UL (ref 4.1–5.7)
RBC MORPH BLD: ABNORMAL
SERVICE CMNT-IMP: ABNORMAL
SODIUM SERPL-SCNC: 133 MMOL/L (ref 136–145)
WBC # BLD AUTO: 11.1 K/UL (ref 4.1–11.1)

## 2023-08-03 PROCEDURE — 2500000003 HC RX 250 WO HCPCS: Performed by: STUDENT IN AN ORGANIZED HEALTH CARE EDUCATION/TRAINING PROGRAM

## 2023-08-03 PROCEDURE — 36415 COLL VENOUS BLD VENIPUNCTURE: CPT

## 2023-08-03 PROCEDURE — 36430 TRANSFUSION BLD/BLD COMPNT: CPT

## 2023-08-03 PROCEDURE — P9016 RBC LEUKOCYTES REDUCED: HCPCS

## 2023-08-03 PROCEDURE — 6370000000 HC RX 637 (ALT 250 FOR IP): Performed by: STUDENT IN AN ORGANIZED HEALTH CARE EDUCATION/TRAINING PROGRAM

## 2023-08-03 PROCEDURE — 96375 TX/PRO/DX INJ NEW DRUG ADDON: CPT

## 2023-08-03 PROCEDURE — 1100000003 HC PRIVATE W/ TELEMETRY

## 2023-08-03 PROCEDURE — 82962 GLUCOSE BLOOD TEST: CPT

## 2023-08-03 PROCEDURE — 85025 COMPLETE CBC W/AUTO DIFF WBC: CPT

## 2023-08-03 PROCEDURE — 80048 BASIC METABOLIC PNL TOTAL CA: CPT

## 2023-08-03 PROCEDURE — 99223 1ST HOSP IP/OBS HIGH 75: CPT | Performed by: PODIATRIST

## 2023-08-03 PROCEDURE — 2700000000 HC OXYGEN THERAPY PER DAY

## 2023-08-03 PROCEDURE — 96376 TX/PRO/DX INJ SAME DRUG ADON: CPT

## 2023-08-03 PROCEDURE — 94760 N-INVAS EAR/PLS OXIMETRY 1: CPT

## 2023-08-03 RX ORDER — HYDROMORPHONE HYDROCHLORIDE 1 MG/ML
0.5 INJECTION, SOLUTION INTRAMUSCULAR; INTRAVENOUS; SUBCUTANEOUS EVERY 4 HOURS PRN
Status: DISPENSED | OUTPATIENT
Start: 2023-08-03 | End: 2023-08-04

## 2023-08-03 RX ORDER — OXYCODONE HYDROCHLORIDE 5 MG/1
10 TABLET ORAL EVERY 6 HOURS PRN
Status: DISCONTINUED | OUTPATIENT
Start: 2023-08-03 | End: 2023-08-04 | Stop reason: HOSPADM

## 2023-08-03 RX ORDER — INSULIN GLARGINE 100 [IU]/ML
15 INJECTION, SOLUTION SUBCUTANEOUS NIGHTLY
Status: DISCONTINUED | OUTPATIENT
Start: 2023-08-03 | End: 2023-08-04 | Stop reason: HOSPADM

## 2023-08-03 RX ORDER — INSULIN LISPRO 100 [IU]/ML
4 INJECTION, SOLUTION INTRAVENOUS; SUBCUTANEOUS ONCE
Status: COMPLETED | OUTPATIENT
Start: 2023-08-03 | End: 2023-08-03

## 2023-08-03 RX ORDER — HYDROMORPHONE HYDROCHLORIDE 1 MG/ML
1 INJECTION, SOLUTION INTRAMUSCULAR; INTRAVENOUS; SUBCUTANEOUS ONCE
Status: COMPLETED | OUTPATIENT
Start: 2023-08-03 | End: 2023-08-03

## 2023-08-03 RX ADMIN — HYDROMORPHONE HYDROCHLORIDE 1 MG: 1 INJECTION, SOLUTION INTRAMUSCULAR; INTRAVENOUS; SUBCUTANEOUS at 10:03

## 2023-08-03 RX ADMIN — OXYCODONE HYDROCHLORIDE 10 MG: 5 TABLET ORAL at 01:46

## 2023-08-03 RX ADMIN — CALCIUM ACETATE 2001 MG: 667 CAPSULE ORAL at 08:56

## 2023-08-03 RX ADMIN — HYDROMORPHONE HYDROCHLORIDE 0.5 MG: 1 INJECTION, SOLUTION INTRAMUSCULAR; INTRAVENOUS; SUBCUTANEOUS at 21:42

## 2023-08-03 RX ADMIN — Medication 8 UNITS: at 11:47

## 2023-08-03 RX ADMIN — INSULIN GLARGINE 5 UNITS: 100 INJECTION, SOLUTION SUBCUTANEOUS at 21:36

## 2023-08-03 RX ADMIN — ATORVASTATIN CALCIUM 40 MG: 40 TABLET, FILM COATED ORAL at 08:56

## 2023-08-03 RX ADMIN — CALCIUM ACETATE 2001 MG: 667 CAPSULE ORAL at 21:35

## 2023-08-03 RX ADMIN — ASPIRIN 81 MG: 81 TABLET, COATED ORAL at 08:56

## 2023-08-03 RX ADMIN — NIFEDIPINE 90 MG: 30 TABLET, EXTENDED RELEASE ORAL at 08:54

## 2023-08-03 RX ADMIN — DIVALPROEX SODIUM 500 MG: 125 CAPSULE, COATED PELLETS ORAL at 21:36

## 2023-08-03 RX ADMIN — HYDRALAZINE HYDROCHLORIDE 50 MG: 50 TABLET ORAL at 05:47

## 2023-08-03 RX ADMIN — HYDRALAZINE HYDROCHLORIDE 50 MG: 50 TABLET ORAL at 21:36

## 2023-08-03 RX ADMIN — Medication 4 UNITS: at 10:02

## 2023-08-03 RX ADMIN — ATENOLOL 25 MG: 50 TABLET ORAL at 08:56

## 2023-08-03 RX ADMIN — HYDROMORPHONE HYDROCHLORIDE 0.5 MG: 1 INJECTION, SOLUTION INTRAMUSCULAR; INTRAVENOUS; SUBCUTANEOUS at 16:40

## 2023-08-03 RX ADMIN — DIVALPROEX SODIUM 500 MG: 125 CAPSULE, COATED PELLETS ORAL at 08:56

## 2023-08-03 ASSESSMENT — PAIN DESCRIPTION - LOCATION
LOCATION: BACK
LOCATION: ABDOMEN;FOOT
LOCATION: LEG;FOOT
LOCATION: OTHER (COMMENT)

## 2023-08-03 ASSESSMENT — PAIN SCALES - GENERAL
PAINLEVEL_OUTOF10: 0
PAINLEVEL_OUTOF10: 10
PAINLEVEL_OUTOF10: 7
PAINLEVEL_OUTOF10: 10
PAINLEVEL_OUTOF10: 2
PAINLEVEL_OUTOF10: 9

## 2023-08-03 ASSESSMENT — PAIN SCALES - WONG BAKER
WONGBAKER_NUMERICALRESPONSE: 0

## 2023-08-03 ASSESSMENT — PAIN DESCRIPTION - DESCRIPTORS: DESCRIPTORS: ACHING

## 2023-08-03 ASSESSMENT — PAIN DESCRIPTION - ORIENTATION
ORIENTATION: RIGHT
ORIENTATION: RIGHT

## 2023-08-03 NOTE — PROGRESS NOTES
Hospitalist Progress Note    NAME:   Adolfo Jimenes   : 1975   MRN: [id number]     Date/Time: 8/3/2023 8:44 AM  Patient PCP: Linda Marsh MD    Estimated discharge date:  Barriers:       Assessment / Plan:  #Acute blood loss anemia from amputation site - resolved   Recent left forefoot amputation secondary to osteomyelitis+ diabetic foot ulcer   Peripheral arterial disease   :Admit to telemetry observation  Agree with 1 unit packed red blood cell transfusion  Wound redressed by ED provider with quick clot with excellent hemostatic control at present  Podiatry consulted, greatly appreciate their expertise  Case management consulted-assistance establishing wound care  -Patient reports he is currently managing wounds himself (?)  Continue PTA Levaquin   1 Units PRBC transfuse since Hb dropped to 6.8  No active bleeding   Nephrology recs appreciated   8/3:  Podiatry recommendations appreciated   Had bleeding while dressing overnight -dropped Hb ,received 1 units of transfusion   -Switched to Dilaudid for pain management        ESRD on MWF IHD  Evidence of volume overload secondary to above  Nephrology consulted, greatly appreciate their expertise  Continue PTA PhosLo  Started on Epogen and Venofer  Iron oral stopped   8/3: NO HD today   Renal recommendations appreciated         Essential hypertension  Continue PTA atenolol, nifedipine, atorvastatin, aspirin  Hydralazine 50 mg started will increase to home dose based on his BP tomorrow  8/3: continue with same dose of Hydralazine      Seizure disorder  Continue PTA Depakote     Diabetes mellitus-uncontrolled   Glargine 15 units nightly  Corrective coverage insulin  Accu-Cheks  Diabetic diet-patient declines request regular diet which we will provide  Hypoglycemia protocol in place     Blindness  Monitor for signs of delirium  Accommodations as needed     Medical Decision Making:   I personally reviewed labs: Yes, as listed below  I personally

## 2023-08-03 NOTE — PROGRESS NOTES
Pt calls out to report bleeding from R foot. Approx 200mL red blood on floor and in bed. Removed dsg and cleansed foot c wound cleanser. Observed small trickle of red blood oozing from planter area of foot. Applied quickclot and [employer]. Held pressure for 5 minutes. Reinforced c several 4x4s and wrapped c kerlix. Pt tolerated well. Changed bed linens. Educated pt that he needs to elevate foot on pillows and not dangle legs at bedside. Notified primary nurse Basil Olivia RN    0530: Verbal shift change report given to John Coates RN (oncoming nurse) by Houston Mcdonald (offgoing nurse). Report included the following information SBAR.    1123: Pt calls out requesting apple juice and a frozen meal. Request to check BG on pt prior to eating and expressed concern over hyperglycemia. Pt states \"Apple juice is good for me since I'm on dialysis and I'm hungry\" Advised pt he will likely require extra insulin coverage this morning. Bedside handoff report given to Andre Gee (oncoming nurse) by JOE RAHMAN (offgoing nurse) for the period of 0530 to 0700. Report included the following information SBAR, Recent Results, and Cardiac Rhythm NSR .

## 2023-08-04 VITALS
DIASTOLIC BLOOD PRESSURE: 60 MMHG | WEIGHT: 180 LBS | HEART RATE: 90 BPM | RESPIRATION RATE: 22 BRPM | TEMPERATURE: 98.4 F | OXYGEN SATURATION: 95 % | SYSTOLIC BLOOD PRESSURE: 170 MMHG | HEIGHT: 72 IN | BODY MASS INDEX: 24.38 KG/M2

## 2023-08-04 LAB
ABO + RH BLD: NORMAL
BLD PROD TYP BPU: NORMAL
BLD PROD TYP BPU: NORMAL
BLOOD BANK DISPENSE STATUS: NORMAL
BLOOD BANK DISPENSE STATUS: NORMAL
BLOOD GROUP ANTIBODIES SERPL: NORMAL
BPU ID: NORMAL
BPU ID: NORMAL
CROSSMATCH RESULT: NORMAL
CROSSMATCH RESULT: NORMAL
GLUCOSE BLD STRIP.AUTO-MCNC: 144 MG/DL (ref 65–117)
GLUCOSE BLD STRIP.AUTO-MCNC: 275 MG/DL (ref 65–117)
SERVICE CMNT-IMP: ABNORMAL
SERVICE CMNT-IMP: ABNORMAL
SPECIMEN EXP DATE BLD: NORMAL
UNIT DIVISION: 0
UNIT DIVISION: 0

## 2023-08-04 PROCEDURE — 2500000003 HC RX 250 WO HCPCS: Performed by: STUDENT IN AN ORGANIZED HEALTH CARE EDUCATION/TRAINING PROGRAM

## 2023-08-04 PROCEDURE — 6370000000 HC RX 637 (ALT 250 FOR IP): Performed by: STUDENT IN AN ORGANIZED HEALTH CARE EDUCATION/TRAINING PROGRAM

## 2023-08-04 PROCEDURE — 82962 GLUCOSE BLOOD TEST: CPT

## 2023-08-04 PROCEDURE — 6360000002 HC RX W HCPCS: Performed by: STUDENT IN AN ORGANIZED HEALTH CARE EDUCATION/TRAINING PROGRAM

## 2023-08-04 PROCEDURE — 5A1D70Z PERFORMANCE OF URINARY FILTRATION, INTERMITTENT, LESS THAN 6 HOURS PER DAY: ICD-10-PCS | Performed by: INTERNAL MEDICINE

## 2023-08-04 PROCEDURE — 2700000000 HC OXYGEN THERAPY PER DAY

## 2023-08-04 PROCEDURE — 90935 HEMODIALYSIS ONE EVALUATION: CPT

## 2023-08-04 RX ORDER — LEVOFLOXACIN 500 MG/1
500 TABLET, FILM COATED ORAL EVERY OTHER DAY
Status: DISCONTINUED | OUTPATIENT
Start: 2023-08-05 | End: 2023-08-04

## 2023-08-04 RX ORDER — LEVOFLOXACIN 500 MG/1
500 TABLET, FILM COATED ORAL EVERY OTHER DAY
Qty: 5 TABLET | Refills: 0 | Status: SHIPPED | OUTPATIENT
Start: 2023-08-06 | End: 2023-08-16

## 2023-08-04 RX ORDER — LEVOFLOXACIN 500 MG/1
500 TABLET, FILM COATED ORAL EVERY OTHER DAY
Status: DISCONTINUED | OUTPATIENT
Start: 2023-08-04 | End: 2023-08-04 | Stop reason: HOSPADM

## 2023-08-04 RX ADMIN — HYDRALAZINE HYDROCHLORIDE 50 MG: 50 TABLET ORAL at 13:41

## 2023-08-04 RX ADMIN — HYDROMORPHONE HYDROCHLORIDE 0.5 MG: 1 INJECTION, SOLUTION INTRAMUSCULAR; INTRAVENOUS; SUBCUTANEOUS at 09:08

## 2023-08-04 RX ADMIN — HYDROMORPHONE HYDROCHLORIDE 0.5 MG: 1 INJECTION, SOLUTION INTRAMUSCULAR; INTRAVENOUS; SUBCUTANEOUS at 04:07

## 2023-08-04 RX ADMIN — LEVOFLOXACIN 500 MG: 500 TABLET, FILM COATED ORAL at 12:28

## 2023-08-04 RX ADMIN — CALCIUM ACETATE 2001 MG: 667 CAPSULE ORAL at 13:41

## 2023-08-04 RX ADMIN — Medication 4 UNITS: at 08:45

## 2023-08-04 RX ADMIN — NIFEDIPINE 90 MG: 30 TABLET, EXTENDED RELEASE ORAL at 12:28

## 2023-08-04 ASSESSMENT — PAIN SCALES - GENERAL
PAINLEVEL_OUTOF10: 7
PAINLEVEL_OUTOF10: 0
PAINLEVEL_OUTOF10: 0
PAINLEVEL_OUTOF10: 4
PAINLEVEL_OUTOF10: 4
PAINLEVEL_OUTOF10: 6

## 2023-08-04 ASSESSMENT — PAIN SCALES - WONG BAKER: WONGBAKER_NUMERICALRESPONSE: 0

## 2023-08-04 ASSESSMENT — PAIN DESCRIPTION - LOCATION: LOCATION: LEG

## 2023-08-04 ASSESSMENT — PAIN DESCRIPTION - ORIENTATION: ORIENTATION: RIGHT;LEFT

## 2023-08-04 NOTE — CARE COORDINATION
Medicaid transport called,-[phone]; canceled transportation due to bleeding  Transition of Care Plan:     RUR: 30% (High RUR)  Prior Level of Functioning: Dependent with Bathing;Dressing;Cooking;Housework; Shopping;Mobility  Disposition: Home with home health and private caregiver. [facility]  has accepted  If SNF or IPR: Date FOC offered: NA  Date FOC received:   Accepting facility:   Date authorization started with reference number:   Date authorization received and expires: Follow up appointments: Cameron Echeverria was contacted  DME needed: Denies any need; has a cane , o2 and wheelchair  Transportation at discharge: Medicaid transportation  IM/IMM Medicare/ letter given: NA-Medicaid  Is patient a [place] and connected with [facility]? Na              If yes, was [employer] transfer form completed and VA notified? Caregiver Contact: Skye Erickson, [phone] [facility] [place])   Discharge Caregiver contacted prior to discharge? Contacted  Care Conference needed? No  Barriers to discharge: None     CM met the patient at the bedside who is agreeable to be discharged home with [facility] instead of [place] due to delay and inconsistent care. The patient's girlfriend was present Sarah Krishnan who denied any concerns. The patient's mother will provide transportation, but CM will not cancel the Medicaid transportation until the patient has been picked up. CM spoke to the patient' mother Skye Erickson, [phone] to inform her that patient is discharging home today. [facility] was updated to start services upon discharge; Medicaid transport called,-[phone]; REF: [id number] at 4 p.m     From:  [facility], [address] [place] [address]; Room 2307 To 645 [address] [address] 0295 CM canceled transportation due to bleeding.       Leesa Levy RN  Case Management  [phone]          08/04/23 1103   Services At/After Discharge   Transition of Care Consult (CM Consult) Home Health  ([address])   Internal Home Health No   Reason Outside [facility] of service area   [facility]  ([facility])   Mode of Transport at Discharge BLS   Confirm Follow Up Transport Family  Javier Guadalupe (Parent)   [phone] (Mobile))   Condition of Participation: Discharge Planning   The Plan for Transition of Care is related to the following treatment goals: Home health   The Patient and/or Patient Representative was provided with a Choice of Provider? Patient;Patient Representative   Name of the Patient Representative who was provided with the Choice of Provider and agrees with the Discharge Plan? Javier Guadalupe (Parent)   [phone] (Mobile)   The Patient and/Or Patient Representative agree with the Discharge Plan? Yes   Freedom of Choice list was provided with basic dialogue that supports the patient's individualized plan of care/goals, treatment preferences, and shares the quality data associated with the providers?   Yes       Javier Guadalupe (Parent)   [phone] (Mobile)      Joanne Calderon RN  Case Management  [phone]

## 2023-08-04 NOTE — DISCHARGE SUMMARY
Discharge Summary    Name: Aleks Ramirez  [id number]  YOB: 1975 (Age: 51 y.o.)   Date of Admission: 8/2/2023  Date of Discharge: 8/4/2023  Attending Physician: Maria Teresa Gudino MD    Discharge Diagnosis:   #Acute blood loss anemia from amputation site - intermittent mostly on weight bearing   Recent left forefoot amputation secondary to osteomyelitis+ diabetic foot ulcer   Peripheral arterial disease   #ESRD on MWF IHD  Evidence of volume overload secondary to above  #Essential hypertension  #Seizure disorder  #Diabetes mellitus-uncontrolled  #Blindness     Consultations:  IP CONSULT TO PODIATRY  IP CONSULT TO NEPHROLOGY  IP CONSULT TO CASE MANAGEMENT  IP WOUND CARE NURSE CONSULT TO EVAL  IP CONSULT TO CASE MANAGEMENT      Brief Admission History/Reason for Admission Per Maria Teresa Gudino MD:   Aleks Ramirez is a 51 y.o.  male with PMHx as listed below presenting to emergency department with complaints of bleeding through dressing (recent left forefoot amputation secondary to osteomyelitis) as well as increased shortness of breath. Patient reports his wound care was revoked (?)  And he is now providing all his own wound care despite being blind. Reports he is attempting not to bear any weight on foot, but occasionally does so to transition from wheelchair to toilet in bed. Reports he was changing his dressing today when noted it was wet subsequently calling EMS who found patient with fairly heavy bleeding from foot and on arrival dressing provided by EMS was soaked through. ED provider provided quick clot dressing with pressure dressing with excellent hemostatic control. ROS otherwise negative. Brief Hospital Course by Main Problems: In the ED, patient afebrile and hemodynamically stable saturating low 90s on baseline oxygen. CXR demonstrates increased right pleural effusion and evidence of pulmonary edema. COVID-negative.   Labs demonstrate: * This list has 2 medication(s) that are the same as other medications prescribed for you. Read the directions carefully, and ask your doctor or other care provider to review them with you. Where to Get Your Medications        These medications were sent to [facility]/pharmacy #[id number]- [facility], [address] [place] [address] Silv[place] -  [phone] Yojana Khan [phone]  [address] [place] [address], [address] [place] [address] Drive      Hours: 24-hours Phone: [phone]   levoFLOXacin 500 MG tablet             DISPOSITION:    Home with Family: X      Home with HH/PT/OT/RN:    SNF/LTC:    AUBREY:    OTHER:            Code status: full   Recommended diet: diabetic diet  Recommended activity: no weight bearing on left foot   Wound care:  Silvasorb wound gel with AG, Opticell-AG gelling fiber to wound. Secured with ABD pad and guilherme wrap. This dressing can be kept in place for 2-3 days before it needs to be changed      Follow up with:   PCP : MD Inocencia Winkler, [address] [place] [address]  [phone]    Go on 8/15/2023  at 12:00am for your PCP hospital follow up. [facility] [phone], [address]   [facility] Phone: [phone]  Follow up  This is your home health agency.     Jame Hood DPM  [address] [facility] [address] [phone]    Schedule an appointment as soon as possible for a visit in 2 day(s)            Total time in minutes spent coordinating this discharge (includes going over instructions, follow-up, prescriptions, and preparing report for sign off to her PCP) :  35 minutes

## 2023-08-04 NOTE — PROGRESS NOTES
Physician Progress Note      Mely Yoo  CSN #:                  [id number]  :                       1975  ADMIT DATE:       2023 1:28 AM  DISCH DATE:  RESPONDING  PROVIDER #:        Vero Young MD          QUERY TEXT:    Pt admitted with Acute blood loss anemia and underwent extensive debridement   of the same foot during a previous admission, DOS 2023.? If possible,   please document in progress notes and discharge summary the relationship if   any between the Acute blood loss anemia and the surgery: The medical record reflects the following:  Risk Factors: Hx: DM; ESRD; HTN; Seizure; CVA. ...S/p recent procedure on DOS   2023  Clinical Indicators: H/H: 6.8/21.2 ^ 6.6/21.7    ED NOTED:  Right foot:  Skin integrity: Ulcer present. Comments: Patient with continuous oozing of blood from the underside of right   foot and healing wound area. No purulent drainage. No pulsatile bleeding    Treatment: Monitor H/H; Transfused 1 unit PRBCs; Podiatry consult; Wound care   consult; Thank you,    Flynn Morejon  CDI  Options provided:  -- Acute blood loss anemia is due to the procedure  -- Acute blood loss anemia is not due to the procedure, but is due to ***   (known patient risk factor)  -- Acute blood loss anemia is not due to the procedure, but is due to other   incidental risk factor, Please specify other incidental risk factor. -- Other - I will add my own diagnosis  -- Disagree - Not applicable / Not valid  -- Disagree - Clinically unable to determine / Unknown  -- Refer to Clinical Documentation Reviewer    PROVIDER RESPONSE TEXT:    Patient has Acute blood loss anemia due to the procedure.     Query created by: Flynn Morejon on 2023 10:52 AM      Electronically signed by:  Vero Young MD 2023 3:37 PM

## 2023-08-04 NOTE — PROGRESS NOTES
[phone] nurse called and pt right foot had satruated his dressing with blood. New dressing applied with quick clot, abd pad, and Kerlix with pressure tape running from the bottom of the foot over the toes (not around the foot) to apply some pressure to area. Notified MD Jolly Worthington. MD has suggested calling Podiatry to get further instructions. Nurse called and spoke to MD Razia Reid from Podiatry. MD states to  keep dressing in place and have the pt follow up in the office with them on Monday. Pt is to call the office if anything foot related happens over the weekend and they will take care of it. Patient can be discharged home from Podiatry standpoint. Primary MD Chase Cardona served with this information. 1630- pt is now ready for discharge pt states he does not need 02 to get home. Pt states he does not need his oxygen to get home. Nurse offered to call his mother and get 18, pt refused.

## 2023-08-04 NOTE — PROGRESS NOTES
Hospital follow-up PCP transitional care appointment has been re-scheduled with Dr. Vanessa Esquivel on 8/15/23 1200. This is the first available appt due to limited provider availability and that coordinates with patient's HD schedule. PCP office does not offer alternate provider option for hospital follow up. Pending patient discharge.  Natanael Bonilla, Care Management Assistant

## 2023-08-04 NOTE — PROGRESS NOTES
11.1 08/03/2023    HGB 6.6 (L) 08/03/2023    HCT 21.7 (L) 08/03/2023    MCV 94.8 08/03/2023     08/03/2023      Recent Labs     08/02/23  0203 08/03/23  0430   * 133*   K 4.6 4.8   CL 98 98   CO2 28 27   GLUCOSE 227* 332*   BUN 47* 43*   CREATININE 5.66* 4.77*   CALCIUM 8.3* 7.6*         Recent Labs     08/02/23  0203 08/03/23  0430   WBC 10.3 11.1   RBC 2.15* 2.29*   HGB 6.8* 6.6*   HCT 21.2* 21.7*   MCV 98.6 94.8   MCH 31.6 28.8   MCHC 32.1 30.4   RDW 16.4* 16.0*    241   MPV 8.9 8.8*       Lab Results   Component Value Date/Time    IRON 23 (L) 07/13/2022 01:27 AM    TIBC 170 (L) 07/13/2022 01:27 AM    LABIRON 14 (L) 07/13/2022 01:27 AM     No results found for: PTH  Lab Results   Component Value Date/Time    LABA1C 6.4 (H) 07/13/2023 12:29 PM     Lab Results   Component Value Date/Time    COLORU YELLOW/STRAW 07/12/2022 04:11 AM    CLARITYU CLEAR 07/12/2022 04:11 AM    GLUCOSEU Negative 07/12/2022 04:11 AM    BILIRUBINUR Negative 07/12/2022 04:11 AM    KETUA Negative 07/12/2022 04:11 AM    SPECGRAV 1.008 07/12/2022 04:11 AM    BLOODU TRACE (A) 07/12/2022 04:11 AM    PHUR 5.5 07/12/2022 04:11 AM    PROTEINU 300 (A) 07/12/2022 04:11 AM    NITRU Negative 07/12/2022 04:11 AM    LEUKOCYTESUR Negative 07/12/2022 04:11 AM     US Results (most recent):  Medication list  reviewed  Current Facility-Administered Medications   Medication Dose Route Frequency    [Held by provider] oxyCODONE (ROXICODONE) immediate release tablet 10 mg  10 mg Oral Q6H PRN    HYDROmorphone HCl PF (DILAUDID) injection 0.5 mg  0.5 mg IntraVENous Q4H PRN    insulin glargine (LANTUS) injection vial 15 Units  15 Units SubCUTAneous Nightly    0.9 % sodium chloride infusion   IntraVENous PRN    aspirin EC tablet 81 mg  81 mg Oral Daily    atenolol (TENORMIN) tablet 25 mg  25 mg Oral Daily    atorvastatin (LIPITOR) tablet 40 mg  40 mg Oral Daily    calcium acetate (PHOSLO) capsule 2,001 mg  3 capsule Oral TID    divalproex (DEPAKOTE SPRINKLE) DR capsule 500 mg  500 mg Oral BID    insulin lispro (HUMALOG) injection vial 0-8 Units  0-8 Units SubCUTAneous TID WC    insulin lispro (HUMALOG) injection vial 0-4 Units  0-4 Units SubCUTAneous Nightly    glucose chewable tablet 16 g  4 tablet Oral PRN    dextrose bolus 10% 125 mL  125 mL IntraVENous PRN    Or    dextrose bolus 10% 250 mL  250 mL IntraVENous PRN    glucagon injection 1 mg  1 mg SubCUTAneous PRN    dextrose 10 % infusion   IntraVENous Continuous PRN    levoFLOXacin (LEVAQUIN) tablet 250 mg  250 mg Oral Once per day on Mon Wed Fri    NIFEdipine (PROCARDIA XL) extended release tablet 90 mg  90 mg Oral Daily    sodium chloride flush 0.9 % injection 5-40 mL  5-40 mL IntraVENous 2 times per day    sodium chloride flush 0.9 % injection 5-40 mL  5-40 mL IntraVENous PRN    0.9 % sodium chloride infusion   IntraVENous PRN    ondansetron (ZOFRAN-ODT) disintegrating tablet 4 mg  4 mg Oral Q8H PRN    Or    ondansetron (ZOFRAN) injection 4 mg  4 mg IntraVENous Q6H PRN    polyethylene glycol (GLYCOLAX) packet 17 g  17 g Oral Daily PRN    acetaminophen (TYLENOL) tablet 650 mg  650 mg Oral Q6H PRN    Or    acetaminophen (TYLENOL) suppository 650 mg  650 mg Rectal Q6H PRN    hydrALAZINE (APRESOLINE) tablet 50 mg  50 mg Oral 3 times per day    Epoetin Rambo-epbx (RETACRIT) injection 20,000 Units  20,000 Units SubCUTAneous Once per day on Mon Wed Fri        Karen Ang MD  8/4/2023

## 2023-08-14 ENCOUNTER — HOSPITAL ENCOUNTER (OUTPATIENT)
Facility: HOSPITAL | Age: 48
Setting detail: SPECIMEN
Discharge: HOME | End: 2023-08-17

## 2023-08-14 LAB — HGB BLD-MCNC: 6.5 G/DL (ref 12.1–17)

## 2023-08-14 PROCEDURE — 85018 HEMOGLOBIN: CPT

## 2023-08-14 PROCEDURE — 36415 COLL VENOUS BLD VENIPUNCTURE: CPT

## 2023-08-15 ENCOUNTER — OFFICE VISIT (OUTPATIENT)
Age: 48
End: 2023-08-15
Payer: COMMERCIAL

## 2023-08-15 VITALS
TEMPERATURE: 98.1 F | SYSTOLIC BLOOD PRESSURE: 196 MMHG | RESPIRATION RATE: 18 BRPM | HEART RATE: 93 BPM | DIASTOLIC BLOOD PRESSURE: 80 MMHG | OXYGEN SATURATION: 98 %

## 2023-08-15 DIAGNOSIS — E11.9 DIET-CONTROLLED TYPE 2 DIABETES MELLITUS (HCC): ICD-10-CM

## 2023-08-15 DIAGNOSIS — L97.912 CHRONIC ULCER OF RIGHT LOWER EXTREMITY WITH FAT LAYER EXPOSED (HCC): ICD-10-CM

## 2023-08-15 DIAGNOSIS — M86.671 CHRONIC OSTEOMYELITIS OF RIGHT FOOT (HCC): ICD-10-CM

## 2023-08-15 DIAGNOSIS — Z99.2 ESRD (END STAGE RENAL DISEASE) ON DIALYSIS (HCC): ICD-10-CM

## 2023-08-15 DIAGNOSIS — Z91.199 NONCOMPLIANCE BY DECLINING SERVICE: ICD-10-CM

## 2023-08-15 DIAGNOSIS — R09.02 HYPOXIA: Primary | ICD-10-CM

## 2023-08-15 DIAGNOSIS — N18.6 ESRD (END STAGE RENAL DISEASE) ON DIALYSIS (HCC): ICD-10-CM

## 2023-08-15 PROCEDURE — 3044F HG A1C LEVEL LT 7.0%: CPT | Performed by: FAMILY MEDICINE

## 2023-08-15 PROCEDURE — 99214 OFFICE O/P EST MOD 30 MIN: CPT | Performed by: FAMILY MEDICINE

## 2023-08-15 PROCEDURE — 3074F SYST BP LT 130 MM HG: CPT | Performed by: FAMILY MEDICINE

## 2023-08-15 PROCEDURE — 3078F DIAST BP <80 MM HG: CPT | Performed by: FAMILY MEDICINE

## 2023-08-15 RX ORDER — ACETAMINOPHEN 500 MG
1000 TABLET ORAL 3 TIMES DAILY
Qty: 540 TABLET | Refills: 1 | Status: SHIPPED | OUTPATIENT
Start: 2023-08-15

## 2023-08-22 ENCOUNTER — TELEPHONE (OUTPATIENT)
Age: 48
End: 2023-08-22

## 2023-08-22 DIAGNOSIS — L97.912 CHRONIC ULCER OF RIGHT LOWER EXTREMITY WITH FAT LAYER EXPOSED (HCC): Primary | ICD-10-CM

## 2023-08-22 NOTE — TELEPHONE ENCOUNTER
Returned call to pt. Home health currently with pt, pt refusing wet to dry dressing as ordered by u MD,  requesting dry to dry only. Advised per Dr Denton Santana to do dry to dry as requested,  encouraged wet to dry but pt still refusing. Also bp elevated at 180/78, has not taken meds.   Requesting referral and last office note faxed to [facility] vascular clinic [phone], faxed at this time

## 2023-08-23 PROCEDURE — 36415 COLL VENOUS BLD VENIPUNCTURE: CPT

## 2023-08-23 PROCEDURE — 85018 HEMOGLOBIN: CPT

## 2023-08-24 ENCOUNTER — HOSPITAL ENCOUNTER (OUTPATIENT)
Facility: HOSPITAL | Age: 48
Setting detail: SPECIMEN
Discharge: HOME | End: 2023-08-27

## 2023-08-24 LAB — HGB BLD-MCNC: 7.3 G/DL (ref 12.1–17)

## 2023-09-07 ENCOUNTER — TELEPHONE (OUTPATIENT)
Age: 48
End: 2023-09-07

## 2023-09-07 NOTE — TELEPHONE ENCOUNTER
Estee patient home health nurse would like a call from nurse  regarding patient she can be reached @ [phone]

## 2023-09-08 NOTE — TELEPHONE ENCOUNTER
Returned call to [facility], care advantage, pt non compliant- with dressing care. Pt was advised to follow up with podiatry.

## 2023-10-10 ENCOUNTER — HOSPITAL ENCOUNTER (EMERGENCY)
Facility: HOSPITAL | Age: 48
Discharge: HOME | End: 2023-10-10
Attending: EMERGENCY MEDICINE
Payer: COMMERCIAL

## 2023-10-10 ENCOUNTER — APPOINTMENT (OUTPATIENT)
Facility: HOSPITAL | Age: 48
End: 2023-10-10
Payer: COMMERCIAL

## 2023-10-10 VITALS
RESPIRATION RATE: 24 BRPM | TEMPERATURE: 97.3 F | BODY MASS INDEX: 23.89 KG/M2 | DIASTOLIC BLOOD PRESSURE: 78 MMHG | OXYGEN SATURATION: 100 % | HEIGHT: 72 IN | SYSTOLIC BLOOD PRESSURE: 161 MMHG | HEART RATE: 90 BPM | WEIGHT: 176.37 LBS

## 2023-10-10 DIAGNOSIS — S91.001A OPEN WOUND OF RIGHT KNEE, LEG, AND ANKLE, INITIAL ENCOUNTER: Primary | ICD-10-CM

## 2023-10-10 DIAGNOSIS — J90 PLEURAL EFFUSION: ICD-10-CM

## 2023-10-10 DIAGNOSIS — S81.801A OPEN WOUND OF RIGHT KNEE, LEG, AND ANKLE, INITIAL ENCOUNTER: Primary | ICD-10-CM

## 2023-10-10 DIAGNOSIS — S81.001A OPEN WOUND OF RIGHT KNEE, LEG, AND ANKLE, INITIAL ENCOUNTER: Primary | ICD-10-CM

## 2023-10-10 LAB
ALBUMIN SERPL-MCNC: 2.3 G/DL (ref 3.5–5)
ALBUMIN/GLOB SERPL: 0.4 (ref 1.1–2.2)
ALP SERPL-CCNC: 694 U/L (ref 45–117)
ALT SERPL-CCNC: 40 U/L (ref 12–78)
ANION GAP SERPL CALC-SCNC: 5 MMOL/L (ref 5–15)
AST SERPL-CCNC: 43 U/L (ref 15–37)
BASOPHILS # BLD: 0 K/UL (ref 0–0.1)
BASOPHILS NFR BLD: 0 % (ref 0–1)
BILIRUB SERPL-MCNC: 0.8 MG/DL (ref 0.2–1)
BUN SERPL-MCNC: 38 MG/DL (ref 6–20)
BUN/CREAT SERPL: 7 (ref 12–20)
CALCIUM SERPL-MCNC: 7.5 MG/DL (ref 8.5–10.1)
CHLORIDE SERPL-SCNC: 100 MMOL/L (ref 97–108)
CO2 SERPL-SCNC: 30 MMOL/L (ref 21–32)
CREAT SERPL-MCNC: 5.16 MG/DL (ref 0.7–1.3)
DIFFERENTIAL METHOD BLD: ABNORMAL
EKG ATRIAL RATE: 86 BPM
EKG DIAGNOSIS: NORMAL
EKG P AXIS: 68 DEGREES
EKG P-R INTERVAL: 186 MS
EKG Q-T INTERVAL: 384 MS
EKG QRS DURATION: 104 MS
EKG QTC CALCULATION (BAZETT): 459 MS
EKG R AXIS: -35 DEGREES
EKG T AXIS: 67 DEGREES
EKG VENTRICULAR RATE: 86 BPM
EOSINOPHIL # BLD: 0.1 K/UL (ref 0–0.4)
EOSINOPHIL NFR BLD: 2 % (ref 0–7)
ERYTHROCYTE [DISTWIDTH] IN BLOOD BY AUTOMATED COUNT: 15.7 % (ref 11.5–14.5)
GLOBULIN SER CALC-MCNC: 5.7 G/DL (ref 2–4)
GLUCOSE SERPL-MCNC: 271 MG/DL (ref 65–100)
HCT VFR BLD AUTO: 24.6 % (ref 36.6–50.3)
HGB BLD-MCNC: 7.3 G/DL (ref 12.1–17)
IMM GRANULOCYTES # BLD AUTO: 0 K/UL (ref 0–0.04)
IMM GRANULOCYTES NFR BLD AUTO: 1 % (ref 0–0.5)
LYMPHOCYTES # BLD: 0.9 K/UL (ref 0.8–3.5)
LYMPHOCYTES NFR BLD: 12 % (ref 12–49)
MCH RBC QN AUTO: 28.1 PG (ref 26–34)
MCHC RBC AUTO-ENTMCNC: 29.7 G/DL (ref 30–36.5)
MCV RBC AUTO: 94.6 FL (ref 80–99)
MONOCYTES # BLD: 0.8 K/UL (ref 0–1)
MONOCYTES NFR BLD: 11 % (ref 5–13)
NEUTS SEG # BLD: 5.8 K/UL (ref 1.8–8)
NEUTS SEG NFR BLD: 74 % (ref 32–75)
NRBC # BLD: 0 K/UL (ref 0–0.01)
NRBC BLD-RTO: 0 PER 100 WBC
PLATELET # BLD AUTO: 267 K/UL (ref 150–400)
PMV BLD AUTO: 10 FL (ref 8.9–12.9)
POTASSIUM SERPL-SCNC: 4.3 MMOL/L (ref 3.5–5.1)
PROT SERPL-MCNC: 8 G/DL (ref 6.4–8.2)
RBC # BLD AUTO: 2.6 M/UL (ref 4.1–5.7)
SODIUM SERPL-SCNC: 135 MMOL/L (ref 136–145)
TROPONIN I SERPL HS-MCNC: 56 NG/L (ref 0–76)
TROPONIN I SERPL HS-MCNC: 58 NG/L (ref 0–76)
WBC # BLD AUTO: 7.7 K/UL (ref 4.1–11.1)

## 2023-10-10 PROCEDURE — 84484 ASSAY OF TROPONIN QUANT: CPT

## 2023-10-10 PROCEDURE — 71045 X-RAY EXAM CHEST 1 VIEW: CPT

## 2023-10-10 PROCEDURE — 36415 COLL VENOUS BLD VENIPUNCTURE: CPT

## 2023-10-10 PROCEDURE — 85025 COMPLETE CBC W/AUTO DIFF WBC: CPT

## 2023-10-10 PROCEDURE — 80053 COMPREHEN METABOLIC PANEL: CPT

## 2023-10-10 PROCEDURE — 99285 EMERGENCY DEPT VISIT HI MDM: CPT

## 2023-10-10 ASSESSMENT — PAIN SCALES - GENERAL: PAINLEVEL_OUTOF10: 10

## 2023-10-10 ASSESSMENT — PAIN DESCRIPTION - DESCRIPTORS: DESCRIPTORS: ACHING

## 2023-10-10 ASSESSMENT — PAIN DESCRIPTION - PAIN TYPE: TYPE: ACUTE PAIN

## 2023-10-10 ASSESSMENT — PAIN DESCRIPTION - LOCATION: LOCATION: FOOT

## 2023-10-10 ASSESSMENT — PAIN DESCRIPTION - ORIENTATION: ORIENTATION: RIGHT

## 2023-10-10 NOTE — ED PROVIDER NOTES
[facility] EMERGENCY DEPT  EMERGENCY DEPARTMENT ENCOUNTER       Pt Name: Keira Raymundo  MRN: [id number]  [address] [facility] 1975  Date of evaluation: 10/10/2023  Provider: Jolene Paula MD   PCP: aVnessa Esquivel MD  Note Started: 6:29 AM EDT 10/10/23     CHIEF COMPLAINT       Chief Complaint   Patient presents with    Shortness of Breath     Started since yesterday associated cough as claimed. He is using oxygen at home at 2LPM nasal cannula. Wound Check     Wound on the right leg which is bleeding after scratching as claimed by patient        HISTORY OF PRESENT ILLNESS: 1 or more elements      History From: patient, History limited by: none     Keira Raymundo is a 51 y.o. male who presents with a cc of SOB and requesting a wound check       Please See MDM for Additional Details of the HPI/PMH  Nursing Notes were all reviewed and agreed with or any disagreements were addressed in the HPI. REVIEW OF SYSTEMS        Positives and Pertinent negatives as per HPI.     PAST HISTORY     Past Medical History:  Past Medical History:   Diagnosis Date    Bulging eyes     Diabetes ([address])     Dialysis patient ([address])     End stage renal disease ([address])     Hypertension     Seizure ([address])     Stroke [facility])        Past Surgical History:  Past Surgical History:   Procedure Laterality Date    FOOT DEBRIDEMENT Right 6/29/2023    RIGHT FOOT DEBRIDEMENT INCISION AND DRAINAGE performed by Tk Muñoz DPM at [facility] MAIN OR    IR NONTUNNELED VASCULAR CATHETER  08/19/2022    IR NONTUNNELED VASCULAR CATHETER 8/19/2022 [facility] RAD ANGIO IR    IR TUNNELED CATHETER PLACEMENT GREATER THAN 5 YEARS  08/23/2022    IR TUNNELED CATHETER PLACEMENT GREATER THAN 5 YEARS 8/23/2022 [facility] RAD ANGIO IR    IR TUNNELED CATHETER PLACEMENT GREATER THAN 5 YEARS  8/23/2022    TOE AMPUTATION Bilateral 2022    all toes removed    VASCULAR SURGERY Left     LLE       Family History:  Family History   Problem Relation Age of Onset    Heart Disease Mother     No Known

## 2023-10-10 NOTE — ED NOTES
Bedside shift change report given to David Alvares (oncoming nurse) by Aleksandar Belle (offgoing nurse). Report included the following information Nurse Handoff Report.        Kathy Hurtado RN  10/10/23 2548

## 2023-10-10 NOTE — CARE COORDINATION
12:05 PM  CM met with pt's mother and pt at bedside. Pt is now agreeing to home health. Referrals pending via [address] Will update pt/mother upon securing agency. 11:06 AM  CM alerted by ED RN that pt's mother, Lynn Marino [phone] has communicated with RN that pt is transitioning to a [address]. CM asked to place call to mother to discuss. CM placed call to Ms. Andrew Kumar who states that she is trying to facilitate pt transitioning to [address] Pt does have Medicaid coverage in place, but unclear if pt has had LTSS/if [facility] can accept. CM placed call to Ester at [facility] who reports her first interactions with pt's mother were this AM, and that she needs a referral to begin reviewing. Ester updated that pt is stable for d/c from ED and will return home on today. Ester to follow and assist with trying to transition pt to LTC from community. CM updated pt's mother, Ms. Andrew Kumar, of this. CM obtained permission from pt at bedside to send referral to [facility] for review. Pt's mother informed of steps to transition to LTC, advised to coordinate LTSS screening with DSS in community. CM informed pt's mother that pt would not be admitted to hospital today as pt has been cleared for d/c from ED MD, and cannot remain in hospital to coordinate placement. Pt's mother is requesting call from MD to discuss medical updates. CM relayed this message to MD. CM [facility] for admissions team to review. Initial note:  SANDRO alerted that pt would benefit from wound care for leg wound. CM reviewed chart, noted that pt was previously open with [facility]. CM met with pt at bedside to discuss. Pt does not wish to pursue home health again at this time, feels capable of performing his own wound care at home. Pt is agreeable to CM providing outpatient wound care information, pt understands that he can call and coordinate outpatient appointment.

## 2023-10-10 NOTE — ED NOTES
Patient arrived via EMS complaining of SOB and bleeding from wound on his right leg. Not in respiratory distress. Noted with toe amputation on both foot.        Ananya Guerra RN  10/10/23 2565

## 2023-10-10 NOTE — ED NOTES
Rewrapped patient's right leg per MD instruction, non-adhesive dressing covered by curlex.       Crystal Puckett RN  10/10/23 5747

## 2023-10-10 NOTE — ED NOTES
1100: Pt bleeding through old dressing, dressing removed. Applied surgifoam and abd pad to bleeding wound and vaseline gauze to other open ulcers on anterior right leg. New gauze/ace wrap applied to right leg, no bleeding noted att. Pt home health nurse remains at bedside. 1200: MD Toro and  Janet at bedside. Pt mother and pt verbalize understanding  of instructions for further outpatient care, wound care, and home health. Pt discharged and [facility] to accompany pt back home. Pt mother has discharge paperwork.       Navneet Michaud RN  10/10/23 7238

## 2023-10-11 ENCOUNTER — TELEPHONE (OUTPATIENT)
Facility: HOSPITAL | Age: 48
End: 2023-10-11

## 2023-10-11 NOTE — TELEPHONE ENCOUNTER
Pt has been accepted by [facility] for [address] [facility] SN for wound care.     Alena Alvarado, [address] [facility], [address] [facility]  [phone]/Available on [employer]

## 2023-10-20 ENCOUNTER — APPOINTMENT (OUTPATIENT)
Facility: HOSPITAL | Age: 48
DRG: 468 | End: 2023-10-20
Payer: COMMERCIAL

## 2023-10-20 ENCOUNTER — APPOINTMENT (OUTPATIENT)
Facility: HOSPITAL | Age: 48
DRG: 468 | End: 2023-10-20
Attending: STUDENT IN AN ORGANIZED HEALTH CARE EDUCATION/TRAINING PROGRAM
Payer: COMMERCIAL

## 2023-10-20 ENCOUNTER — HOSPITAL ENCOUNTER (INPATIENT)
Facility: HOSPITAL | Age: 48
LOS: 7 days | Discharge: HOME HEALTH | DRG: 468 | End: 2023-10-27
Attending: EMERGENCY MEDICINE | Admitting: STUDENT IN AN ORGANIZED HEALTH CARE EDUCATION/TRAINING PROGRAM
Payer: COMMERCIAL

## 2023-10-20 DIAGNOSIS — L97.922 ULCER OF LEFT LOWER EXTREMITY WITH FAT LAYER EXPOSED (HCC): ICD-10-CM

## 2023-10-20 DIAGNOSIS — R73.09 ELEVATED GLUCOSE: ICD-10-CM

## 2023-10-20 DIAGNOSIS — N18.6 ESRD (END STAGE RENAL DISEASE) (HCC): ICD-10-CM

## 2023-10-20 DIAGNOSIS — I63.50 CEREBROVASCULAR ACCIDENT (CVA) DUE TO OCCLUSION OF CEREBRAL ARTERY (HCC): ICD-10-CM

## 2023-10-20 DIAGNOSIS — E11.9 DIET-CONTROLLED TYPE 2 DIABETES MELLITUS (HCC): ICD-10-CM

## 2023-10-20 DIAGNOSIS — D64.9 ACUTE ANEMIA: Primary | ICD-10-CM

## 2023-10-20 DIAGNOSIS — J81.1 CHRONIC PULMONARY EDEMA: ICD-10-CM

## 2023-10-20 DIAGNOSIS — L97.912 CHRONIC ULCER OF RIGHT LOWER EXTREMITY WITH FAT LAYER EXPOSED (HCC): ICD-10-CM

## 2023-10-20 DIAGNOSIS — R06.02 SHORTNESS OF BREATH: ICD-10-CM

## 2023-10-20 DIAGNOSIS — R60.9 EDEMA, PERIPHERAL: ICD-10-CM

## 2023-10-20 LAB
ALBUMIN SERPL-MCNC: 2.6 G/DL (ref 3.5–5)
ALBUMIN/GLOB SERPL: 0.5 (ref 1.1–2.2)
ALP SERPL-CCNC: 838 U/L (ref 45–117)
ALT SERPL-CCNC: 49 U/L (ref 12–78)
ANION GAP SERPL CALC-SCNC: 5 MMOL/L (ref 5–15)
AST SERPL-CCNC: 40 U/L (ref 15–37)
BASOPHILS # BLD: 0 K/UL (ref 0–0.1)
BASOPHILS NFR BLD: 0 % (ref 0–1)
BILIRUB SERPL-MCNC: 0.8 MG/DL (ref 0.2–1)
BUN SERPL-MCNC: 43 MG/DL (ref 6–20)
BUN/CREAT SERPL: 7 (ref 12–20)
CALCIUM SERPL-MCNC: 7.7 MG/DL (ref 8.5–10.1)
CHLORIDE SERPL-SCNC: 101 MMOL/L (ref 97–108)
CO2 SERPL-SCNC: 28 MMOL/L (ref 21–32)
COMMENT:: NORMAL
CREAT SERPL-MCNC: 6.07 MG/DL (ref 0.7–1.3)
DIFFERENTIAL METHOD BLD: ABNORMAL
ECHO AO ASC DIAM: 3.5 CM
ECHO AO ASCENDING AORTA INDEX: 1.7 CM/M2
ECHO AO ROOT DIAM: 3.5 CM
ECHO AO ROOT INDEX: 1.7 CM/M2
ECHO AV AREA PEAK VELOCITY: 2.7 CM2
ECHO AV AREA/BSA PEAK VELOCITY: 1.3 CM2/M2
ECHO AV PEAK GRADIENT: 9 MMHG
ECHO AV PEAK VELOCITY: 1.5 M/S
ECHO AV VELOCITY RATIO: 0.73
ECHO BSA: 2.06 M2
ECHO EST RA PRESSURE: 15 MMHG
ECHO LA DIAMETER INDEX: 1.99 CM/M2
ECHO LA DIAMETER: 4.1 CM
ECHO LA TO AORTIC ROOT RATIO: 1.17
ECHO LA VOL 2C: 92 ML (ref 18–58)
ECHO LA VOL 4C: 69 ML (ref 18–58)
ECHO LA VOLUME INDEX A2C: 45 ML/M2 (ref 16–34)
ECHO LA VOLUME INDEX A4C: 33 ML/M2 (ref 16–34)
ECHO LV E' LATERAL VELOCITY: 9 CM/S
ECHO LV E' SEPTAL VELOCITY: 6 CM/S
ECHO LV FRACTIONAL SHORTENING: 33 % (ref 28–44)
ECHO LV INTERNAL DIMENSION DIASTOLE INDEX: 2.04 CM/M2
ECHO LV INTERNAL DIMENSION DIASTOLIC: 4.2 CM (ref 4.2–5.9)
ECHO LV INTERNAL DIMENSION SYSTOLIC INDEX: 1.36 CM/M2
ECHO LV INTERNAL DIMENSION SYSTOLIC: 2.8 CM
ECHO LV IVSD: 2 CM (ref 0.6–1)
ECHO LV MASS 2D: 333.8 G (ref 88–224)
ECHO LV MASS INDEX 2D: 162 G/M2 (ref 49–115)
ECHO LV POSTERIOR WALL DIASTOLIC: 1.6 CM (ref 0.6–1)
ECHO LV RELATIVE WALL THICKNESS RATIO: 0.76
ECHO LVOT AREA: 3.5 CM2
ECHO LVOT DIAM: 2.1 CM
ECHO LVOT PEAK GRADIENT: 5 MMHG
ECHO LVOT PEAK VELOCITY: 1.1 M/S
ECHO PV MAX VELOCITY: 0.8 M/S
ECHO PV PEAK GRADIENT: 3 MMHG
ECHO RA AREA 4C: 22 CM2
ECHO RIGHT VENTRICULAR SYSTOLIC PRESSURE (RVSP): 42 MMHG
ECHO RV BASAL DIMENSION: 4.2 CM
ECHO RV FREE WALL PEAK S': 12 CM/S
ECHO RV INTERNAL DIMENSION: 4.5 CM
ECHO RV TAPSE: 1.5 CM (ref 1.7–?)
ECHO TV REGURGITANT MAX VELOCITY: 2.59 M/S
ECHO TV REGURGITANT PEAK GRADIENT: 27 MMHG
EOSINOPHIL # BLD: 0.1 K/UL (ref 0–0.4)
EOSINOPHIL NFR BLD: 1 % (ref 0–7)
ERYTHROCYTE [DISTWIDTH] IN BLOOD BY AUTOMATED COUNT: 16 % (ref 11.5–14.5)
GLOBULIN SER CALC-MCNC: 5.7 G/DL (ref 2–4)
GLUCOSE BLD STRIP.AUTO-MCNC: 207 MG/DL (ref 65–117)
GLUCOSE BLD STRIP.AUTO-MCNC: 256 MG/DL (ref 65–117)
GLUCOSE SERPL-MCNC: 184 MG/DL (ref 65–100)
HCT VFR BLD AUTO: 20.3 % (ref 36.6–50.3)
HGB BLD-MCNC: 6.1 G/DL (ref 12.1–17)
HISTORY CHECK: NORMAL
HISTORY CHECK: NORMAL
IMM GRANULOCYTES # BLD AUTO: 0.1 K/UL (ref 0–0.04)
IMM GRANULOCYTES NFR BLD AUTO: 1 % (ref 0–0.5)
LYMPHOCYTES # BLD: 1.6 K/UL (ref 0.8–3.5)
LYMPHOCYTES NFR BLD: 16 % (ref 12–49)
MAGNESIUM SERPL-MCNC: 2.3 MG/DL (ref 1.6–2.4)
MCH RBC QN AUTO: 28 PG (ref 26–34)
MCHC RBC AUTO-ENTMCNC: 30 G/DL (ref 30–36.5)
MCV RBC AUTO: 93.1 FL (ref 80–99)
MONOCYTES # BLD: 1.7 K/UL (ref 0–1)
MONOCYTES NFR BLD: 17 % (ref 5–13)
NEUTS SEG # BLD: 6.6 K/UL (ref 1.8–8)
NEUTS SEG NFR BLD: 65 % (ref 32–75)
NRBC # BLD: 0.02 K/UL (ref 0–0.01)
NRBC BLD-RTO: 0.2 PER 100 WBC
NT PRO BNP: ABNORMAL PG/ML
PLATELET # BLD AUTO: 311 K/UL (ref 150–400)
PMV BLD AUTO: 9.5 FL (ref 8.9–12.9)
POTASSIUM SERPL-SCNC: 4.8 MMOL/L (ref 3.5–5.1)
PROT SERPL-MCNC: 8.3 G/DL (ref 6.4–8.2)
RBC # BLD AUTO: 2.18 M/UL (ref 4.1–5.7)
RBC MORPH BLD: ABNORMAL
SERVICE CMNT-IMP: ABNORMAL
SERVICE CMNT-IMP: ABNORMAL
SODIUM SERPL-SCNC: 134 MMOL/L (ref 136–145)
SPECIMEN HOLD: NORMAL
TROPONIN I SERPL HS-MCNC: 37 NG/L (ref 0–76)
WBC # BLD AUTO: 10.1 K/UL (ref 4.1–11.1)

## 2023-10-20 PROCEDURE — 80053 COMPREHEN METABOLIC PANEL: CPT

## 2023-10-20 PROCEDURE — 94762 N-INVAS EAR/PLS OXIMTRY CONT: CPT

## 2023-10-20 PROCEDURE — 6360000002 HC RX W HCPCS: Performed by: INTERNAL MEDICINE

## 2023-10-20 PROCEDURE — 86900 BLOOD TYPING SEROLOGIC ABO: CPT

## 2023-10-20 PROCEDURE — 85025 COMPLETE CBC W/AUTO DIFF WBC: CPT

## 2023-10-20 PROCEDURE — 36430 TRANSFUSION BLD/BLD COMPNT: CPT

## 2023-10-20 PROCEDURE — 99285 EMERGENCY DEPT VISIT HI MDM: CPT

## 2023-10-20 PROCEDURE — 83735 ASSAY OF MAGNESIUM: CPT

## 2023-10-20 PROCEDURE — 86850 RBC ANTIBODY SCREEN: CPT

## 2023-10-20 PROCEDURE — 93005 ELECTROCARDIOGRAM TRACING: CPT

## 2023-10-20 PROCEDURE — 90935 HEMODIALYSIS ONE EVALUATION: CPT

## 2023-10-20 PROCEDURE — 86901 BLOOD TYPING SEROLOGIC RH(D): CPT

## 2023-10-20 PROCEDURE — 83880 ASSAY OF NATRIURETIC PEPTIDE: CPT

## 2023-10-20 PROCEDURE — 6370000000 HC RX 637 (ALT 250 FOR IP): Performed by: STUDENT IN AN ORGANIZED HEALTH CARE EDUCATION/TRAINING PROGRAM

## 2023-10-20 PROCEDURE — 84484 ASSAY OF TROPONIN QUANT: CPT

## 2023-10-20 PROCEDURE — 1100000003 HC PRIVATE W/ TELEMETRY

## 2023-10-20 PROCEDURE — 2580000003 HC RX 258: Performed by: STUDENT IN AN ORGANIZED HEALTH CARE EDUCATION/TRAINING PROGRAM

## 2023-10-20 PROCEDURE — 71045 X-RAY EXAM CHEST 1 VIEW: CPT

## 2023-10-20 PROCEDURE — 6370000000 HC RX 637 (ALT 250 FOR IP): Performed by: EMERGENCY MEDICINE

## 2023-10-20 PROCEDURE — 82962 GLUCOSE BLOOD TEST: CPT

## 2023-10-20 PROCEDURE — 36415 COLL VENOUS BLD VENIPUNCTURE: CPT

## 2023-10-20 PROCEDURE — 2700000000 HC OXYGEN THERAPY PER DAY

## 2023-10-20 PROCEDURE — 30233N1 TRANSFUSION OF NONAUTOLOGOUS RED BLOOD CELLS INTO PERIPHERAL VEIN, PERCUTANEOUS APPROACH: ICD-10-PCS | Performed by: INTERNAL MEDICINE

## 2023-10-20 PROCEDURE — 93306 TTE W/DOPPLER COMPLETE: CPT

## 2023-10-20 PROCEDURE — 94640 AIRWAY INHALATION TREATMENT: CPT

## 2023-10-20 PROCEDURE — P9016 RBC LEUKOCYTES REDUCED: HCPCS

## 2023-10-20 PROCEDURE — 2500000003 HC RX 250 WO HCPCS: Performed by: STUDENT IN AN ORGANIZED HEALTH CARE EDUCATION/TRAINING PROGRAM

## 2023-10-20 PROCEDURE — 5A1D70Z PERFORMANCE OF URINARY FILTRATION, INTERMITTENT, LESS THAN 6 HOURS PER DAY: ICD-10-PCS | Performed by: INTERNAL MEDICINE

## 2023-10-20 PROCEDURE — 86923 COMPATIBILITY TEST ELECTRIC: CPT

## 2023-10-20 RX ORDER — ACETAMINOPHEN 650 MG/1
650 SUPPOSITORY RECTAL EVERY 6 HOURS PRN
Status: DISCONTINUED | OUTPATIENT
Start: 2023-10-20 | End: 2023-10-27 | Stop reason: HOSPADM

## 2023-10-20 RX ORDER — SODIUM CHLORIDE 9 MG/ML
INJECTION, SOLUTION INTRAVENOUS PRN
Status: DISCONTINUED | OUTPATIENT
Start: 2023-10-20 | End: 2023-10-27 | Stop reason: HOSPADM

## 2023-10-20 RX ORDER — SODIUM CHLORIDE 9 MG/ML
INJECTION, SOLUTION INTRAVENOUS PRN
Status: DISCONTINUED | OUTPATIENT
Start: 2023-10-20 | End: 2023-10-21

## 2023-10-20 RX ORDER — IPRATROPIUM BROMIDE AND ALBUTEROL SULFATE 2.5; .5 MG/3ML; MG/3ML
1 SOLUTION RESPIRATORY (INHALATION)
Status: COMPLETED | OUTPATIENT
Start: 2023-10-20 | End: 2023-10-20

## 2023-10-20 RX ORDER — HYDRALAZINE HYDROCHLORIDE 50 MG/1
100 TABLET, FILM COATED ORAL 3 TIMES DAILY
Status: DISCONTINUED | OUTPATIENT
Start: 2023-10-20 | End: 2023-10-27 | Stop reason: HOSPADM

## 2023-10-20 RX ORDER — ONDANSETRON 2 MG/ML
4 INJECTION INTRAMUSCULAR; INTRAVENOUS EVERY 6 HOURS PRN
Status: DISCONTINUED | OUTPATIENT
Start: 2023-10-20 | End: 2023-10-27 | Stop reason: HOSPADM

## 2023-10-20 RX ORDER — ONDANSETRON 4 MG/1
4 TABLET, ORALLY DISINTEGRATING ORAL EVERY 8 HOURS PRN
Status: DISCONTINUED | OUTPATIENT
Start: 2023-10-20 | End: 2023-10-27 | Stop reason: HOSPADM

## 2023-10-20 RX ORDER — INSULIN LISPRO 100 [IU]/ML
0-4 INJECTION, SOLUTION INTRAVENOUS; SUBCUTANEOUS NIGHTLY
Status: DISCONTINUED | OUTPATIENT
Start: 2023-10-20 | End: 2023-10-27 | Stop reason: HOSPADM

## 2023-10-20 RX ORDER — ASPIRIN 81 MG/1
81 TABLET ORAL DAILY
Status: DISCONTINUED | OUTPATIENT
Start: 2023-10-20 | End: 2023-10-27 | Stop reason: HOSPADM

## 2023-10-20 RX ORDER — AMLODIPINE BESYLATE 5 MG/1
10 TABLET ORAL DAILY
Status: DISCONTINUED | OUTPATIENT
Start: 2023-10-20 | End: 2023-10-20

## 2023-10-20 RX ORDER — CALCIUM ACETATE 667 MG/1
3 CAPSULE ORAL 3 TIMES DAILY
Status: DISCONTINUED | OUTPATIENT
Start: 2023-10-20 | End: 2023-10-27 | Stop reason: HOSPADM

## 2023-10-20 RX ORDER — SODIUM CHLORIDE 0.9 % (FLUSH) 0.9 %
5-40 SYRINGE (ML) INJECTION EVERY 12 HOURS SCHEDULED
Status: DISCONTINUED | OUTPATIENT
Start: 2023-10-20 | End: 2023-10-27 | Stop reason: HOSPADM

## 2023-10-20 RX ORDER — NIFEDIPINE 90 MG/1
90 TABLET, EXTENDED RELEASE ORAL DAILY
Status: DISCONTINUED | OUTPATIENT
Start: 2023-10-20 | End: 2023-10-27 | Stop reason: HOSPADM

## 2023-10-20 RX ORDER — DIVALPROEX SODIUM 125 MG/1
500 CAPSULE, COATED PELLETS ORAL 2 TIMES DAILY
Status: DISCONTINUED | OUTPATIENT
Start: 2023-10-20 | End: 2023-10-27 | Stop reason: HOSPADM

## 2023-10-20 RX ORDER — ATORVASTATIN CALCIUM 40 MG/1
40 TABLET, FILM COATED ORAL DAILY
Status: DISCONTINUED | OUTPATIENT
Start: 2023-10-20 | End: 2023-10-27 | Stop reason: HOSPADM

## 2023-10-20 RX ORDER — DEXTROSE MONOHYDRATE 100 MG/ML
INJECTION, SOLUTION INTRAVENOUS CONTINUOUS PRN
Status: DISCONTINUED | OUTPATIENT
Start: 2023-10-20 | End: 2023-10-27 | Stop reason: HOSPADM

## 2023-10-20 RX ORDER — ATENOLOL 25 MG/1
25 TABLET ORAL DAILY
Status: DISCONTINUED | OUTPATIENT
Start: 2023-10-20 | End: 2023-10-27 | Stop reason: HOSPADM

## 2023-10-20 RX ORDER — ACETAMINOPHEN 325 MG/1
650 TABLET ORAL EVERY 6 HOURS PRN
Status: DISCONTINUED | OUTPATIENT
Start: 2023-10-20 | End: 2023-10-27 | Stop reason: HOSPADM

## 2023-10-20 RX ORDER — SODIUM CHLORIDE 0.9 % (FLUSH) 0.9 %
5-40 SYRINGE (ML) INJECTION PRN
Status: DISCONTINUED | OUTPATIENT
Start: 2023-10-20 | End: 2023-10-27 | Stop reason: HOSPADM

## 2023-10-20 RX ORDER — INSULIN GLARGINE 100 [IU]/ML
15 INJECTION, SOLUTION SUBCUTANEOUS NIGHTLY
Status: DISCONTINUED | OUTPATIENT
Start: 2023-10-20 | End: 2023-10-27 | Stop reason: HOSPADM

## 2023-10-20 RX ORDER — POLYETHYLENE GLYCOL 3350 17 G/17G
17 POWDER, FOR SOLUTION ORAL DAILY PRN
Status: DISCONTINUED | OUTPATIENT
Start: 2023-10-20 | End: 2023-10-27 | Stop reason: HOSPADM

## 2023-10-20 RX ORDER — INSULIN LISPRO 100 [IU]/ML
0-8 INJECTION, SOLUTION INTRAVENOUS; SUBCUTANEOUS
Status: DISCONTINUED | OUTPATIENT
Start: 2023-10-20 | End: 2023-10-27 | Stop reason: HOSPADM

## 2023-10-20 RX ORDER — FERROUS SULFATE 325(65) MG
325 TABLET ORAL
Status: DISCONTINUED | OUTPATIENT
Start: 2023-10-21 | End: 2023-10-21

## 2023-10-20 RX ADMIN — ASPIRIN 81 MG: 81 TABLET, COATED ORAL at 12:30

## 2023-10-20 RX ADMIN — SODIUM CHLORIDE, PRESERVATIVE FREE 10 ML: 5 INJECTION INTRAVENOUS at 12:30

## 2023-10-20 RX ADMIN — SODIUM CHLORIDE, PRESERVATIVE FREE 10 ML: 5 INJECTION INTRAVENOUS at 21:16

## 2023-10-20 RX ADMIN — NIFEDIPINE 90 MG: 90 TABLET, EXTENDED RELEASE ORAL at 12:30

## 2023-10-20 RX ADMIN — HYDRALAZINE HYDROCHLORIDE 100 MG: 50 TABLET, FILM COATED ORAL at 12:29

## 2023-10-20 RX ADMIN — EPOETIN ALFA 10000 UNITS: 10000 SOLUTION INTRAVENOUS; SUBCUTANEOUS at 23:18

## 2023-10-20 RX ADMIN — AMLODIPINE BESYLATE 10 MG: 5 TABLET ORAL at 12:30

## 2023-10-20 RX ADMIN — CALCIUM ACETATE 2001 MG: 667 CAPSULE ORAL at 12:29

## 2023-10-20 RX ADMIN — ATENOLOL 25 MG: 25 TABLET ORAL at 12:30

## 2023-10-20 RX ADMIN — DIVALPROEX SODIUM 500 MG: 125 CAPSULE ORAL at 23:08

## 2023-10-20 RX ADMIN — DIVALPROEX SODIUM 500 MG: 125 CAPSULE ORAL at 12:29

## 2023-10-20 RX ADMIN — IPRATROPIUM BROMIDE AND ALBUTEROL SULFATE 1 DOSE: 2.5; .5 SOLUTION RESPIRATORY (INHALATION) at 04:08

## 2023-10-20 RX ADMIN — ATORVASTATIN CALCIUM 40 MG: 40 TABLET, FILM COATED ORAL at 12:30

## 2023-10-20 ASSESSMENT — PAIN SCALES - GENERAL
PAINLEVEL_OUTOF10: 0
PAINLEVEL_OUTOF10: 8
PAINLEVEL_OUTOF10: 0
PAINLEVEL_OUTOF10: 0
PAINLEVEL_OUTOF10: 10
PAINLEVEL_OUTOF10: 0

## 2023-10-20 ASSESSMENT — PAIN DESCRIPTION - LOCATION
LOCATION: FOOT
LOCATION: LEG

## 2023-10-20 ASSESSMENT — PAIN DESCRIPTION - ORIENTATION: ORIENTATION: RIGHT;LEFT

## 2023-10-20 ASSESSMENT — PAIN DESCRIPTION - PAIN TYPE: TYPE: OTHER (COMMENT)

## 2023-10-20 ASSESSMENT — PAIN - FUNCTIONAL ASSESSMENT: PAIN_FUNCTIONAL_ASSESSMENT: 0-10

## 2023-10-20 NOTE — ED NOTES
Report given to Ludwig Doran RN. Report included the following information Nurse Handoff Report, ED Encounter Summary, ED SBAR, Adult Overview, Intake/Output, MAR, Recent Results, Cardiac Rhythm NSR, and Neuro Assessment. Questions and concerns addressed.        Yan Whelan  10/20/23 7769

## 2023-10-20 NOTE — PROGRESS NOTES
TRANSFER - IN REPORT:    9454  Verbal report received from Vikram Seals  on rKis Pedraza  being received from ED for routine progression of patient care      Report consisted of patient's Situation, Background, Assessment and   Recommendations(SBAR). Information from the following report(s) Nurse Handoff Report, ED SBAR, MAR, and Cardiac Rhythm NSR   was reviewed with the receiving nurse. Opportunity for questions and clarification was provided. Assessment completed upon patient's arrival to unit and care assumed.        ED Rns to deliver Pt to [facility]

## 2023-10-20 NOTE — PROGRESS NOTES
End of Shift Note    Bedside shift change report given to Wu Krishnan (oncoming nurse) by Gene Taylor RN (offgoing nurse). Report included the following information SBAR    Shift worked:  7am-7pm     Shift summary and any significant changes:     Patient return to the unit  from HD at 1826 1 uit RBC given with dialysis       Concerns for physician to address:  None      Zone phone for oncoming shift:          Activity:     Number times ambulated in hallways past shift: 0  Number of times OOB to chair past shift: 0    Cardiac:   Cardiac Monitoring: Yes           Access:  Current line(s): PIV     Genitourinary:   Urinary status: oliguric    Respiratory:      Chronic home O2 use?: YES  Incentive spirometer at bedside: NO       GI:     Current diet:  DIET ONE TIME MESSAGE;  ADULT DIET;  Regular; Low Potassium (Less than 3000 mg/day)  Passing flatus: YES  Tolerating current diet: YES       Pain Management:   Patient states pain is manageable on current regimen: YES    Skin:     Interventions: float heels    Patient Safety:  Fall Score:    Interventions: bed/chair alarm       Length of Stay:  Expected LOS: 3  Actual LOS: 0      Gene Taylor RN

## 2023-10-20 NOTE — CONSENT
Informed Consent for Blood Component Transfusion Note    I have discussed with the patient the rationale for blood component transfusion; its benefits in treating or preventing fatigue, organ damage, or death; and its risk which includes mild transfusion reactions, rare risk of blood borne infection, or more serious but rare reactions. I have discussed the alternatives to transfusion, including the risk and consequences of not receiving transfusion. The patient had an opportunity to ask questions and had agreed to proceed with transfusion of blood components.     Electronically signed by Soy Murphy MD on 10/20/23 at 4:02 AM EDT

## 2023-10-20 NOTE — ED NOTES
ED tech at bedside to obtain [address] line     Marina Gilbert, [address]  10/20/23 5290

## 2023-10-20 NOTE — ED NOTES
Pt asked if I could bring his chronic oxygen usage nasal cannula from 3LPM to 2LPM.      Tom Meek RN  10/20/23 8065

## 2023-10-20 NOTE — H&P
Hospitalist Admission Note    NAME:   Stephany Card   : 1975   MRN: [id number]     Date/Time: 10/20/2023 11:20 AM    Patient PCP: Chanel Carrasco MD    ______________________________________________________________________  Given the patient's current clinical presentation, I have a high level of concern for decompensation if discharged from the emergency department. Complex decision making was performed, which includes reviewing the patient's available past medical records, laboratory results, and x-ray films. My assessment of this patient's clinical condition and my plan of care is as follows. Assessment / Plan:    Symptomatic anemia  Shortness of breath  We will transfuse 1 unit of blood   CXR: Unchanged cardiomegaly with mild pulmonary edema, bibasilar airspace disease, and small to moderate bilateral pleural effusions  Fluid management with dialysis  proBNP elevated, likely due to kidney function  We will check echo      ESRD MWF  Nephrology consult  Might need Epogen  Potassium within normal limits    COPD  Baseline he is on 3 L nasal cannula  Currently on 3  CXR as above  No wheezing  Shortness of breath likely due to symptomatic anemia versus effusions  We will treat underlying causes as above  Breathing treatment as needed  No steroid    HTN  Continue with home medication  Monitor blood pressure      DM  Lantus for basal coverage  Lispro sliding scale  Keep blood sugar 1 40-1 80    Hx of seizure:   Continue with home Depakote      Medical Decision Making:   I personally reviewed labs: yes  I personally reviewed imaging:yes  I personally reviewed EKG:  Toxic drug monitoring: yes  Discussed case with: ED provider. After discussion I am in agreement that acuity of patient's medical condition necessitates hospital stay.       Code Status: Full code  DVT Prophylaxis: SCD while in bed, heparin tomorrow  GI Prophylaxis: None indicated  Baseline: Blind, partial dependent on

## 2023-10-20 NOTE — ED NOTES
Pt complaining of bilateral foot pain 10/10 due to amputation of all 10 toes. He receives dialysis MWF and still produces small amounts of urine. Patient is also completely blind. He is currently resting comfortably with call kraft in reach.      Alonso Larry  10/20/23 0245

## 2023-10-20 NOTE — ED NOTES
Pt on call light. Had IV catheter in his hands and states \"the IV came out\". That IV was placed by ultrasound.       Soy Chowdhury RN  10/20/23 4271

## 2023-10-20 NOTE — ED NOTES
Pt sitting on side of bed. Explained to pt the importance of lying in bed since he is a high-fall risk. Pt states \"No I'm good. Im not going to fall. \" Explained that we are trying to keep pt safe and free of injuries. Informed Charge RN and was advised to document the situation. Pt has call light bedside him.      Erick Roberto RN  10/20/23 9181

## 2023-10-21 LAB
ANION GAP SERPL CALC-SCNC: 8 MMOL/L (ref 5–15)
BASOPHILS # BLD: 0 K/UL (ref 0–0.1)
BASOPHILS NFR BLD: 0 % (ref 0–1)
BUN SERPL-MCNC: 30 MG/DL (ref 6–20)
BUN/CREAT SERPL: 7 (ref 12–20)
CALCIUM SERPL-MCNC: 7.7 MG/DL (ref 8.5–10.1)
CHLORIDE SERPL-SCNC: 97 MMOL/L (ref 97–108)
CO2 SERPL-SCNC: 28 MMOL/L (ref 21–32)
CREAT SERPL-MCNC: 4.3 MG/DL (ref 0.7–1.3)
DIFFERENTIAL METHOD BLD: ABNORMAL
EOSINOPHIL # BLD: 0.1 K/UL (ref 0–0.4)
EOSINOPHIL NFR BLD: 1 % (ref 0–7)
ERYTHROCYTE [DISTWIDTH] IN BLOOD BY AUTOMATED COUNT: 17 % (ref 11.5–14.5)
ERYTHROCYTE [DISTWIDTH] IN BLOOD BY AUTOMATED COUNT: 17.2 % (ref 11.5–14.5)
EST. AVERAGE GLUCOSE BLD GHB EST-MCNC: 154 MG/DL
FERRITIN SERPL-MCNC: 1135 NG/ML (ref 26–388)
GLUCOSE BLD STRIP.AUTO-MCNC: 211 MG/DL (ref 65–117)
GLUCOSE BLD STRIP.AUTO-MCNC: 250 MG/DL (ref 65–117)
GLUCOSE BLD STRIP.AUTO-MCNC: 272 MG/DL (ref 65–117)
GLUCOSE SERPL-MCNC: 272 MG/DL (ref 65–100)
HBA1C MFR BLD: 7 % (ref 4–5.6)
HCT VFR BLD AUTO: 23.1 % (ref 36.6–50.3)
HCT VFR BLD AUTO: 24.4 % (ref 36.6–50.3)
HGB BLD-MCNC: 7.1 G/DL (ref 12.1–17)
HGB BLD-MCNC: 7.4 G/DL (ref 12.1–17)
IMM GRANULOCYTES # BLD AUTO: 0.1 K/UL (ref 0–0.04)
IMM GRANULOCYTES NFR BLD AUTO: 1 % (ref 0–0.5)
IRON SATN MFR SERPL: 18 % (ref 20–50)
IRON SERPL-MCNC: 38 UG/DL (ref 35–150)
LYMPHOCYTES # BLD: 1.5 K/UL (ref 0.8–3.5)
LYMPHOCYTES NFR BLD: 14 % (ref 12–49)
MCH RBC QN AUTO: 27.9 PG (ref 26–34)
MCH RBC QN AUTO: 28.2 PG (ref 26–34)
MCHC RBC AUTO-ENTMCNC: 30.3 G/DL (ref 30–36.5)
MCHC RBC AUTO-ENTMCNC: 30.7 G/DL (ref 30–36.5)
MCV RBC AUTO: 91.7 FL (ref 80–99)
MCV RBC AUTO: 92.1 FL (ref 80–99)
MONOCYTES # BLD: 1.3 K/UL (ref 0–1)
MONOCYTES NFR BLD: 12 % (ref 5–13)
NEUTS SEG # BLD: 8.2 K/UL (ref 1.8–8)
NEUTS SEG NFR BLD: 72 % (ref 32–75)
NRBC # BLD: 0.02 K/UL (ref 0–0.01)
NRBC # BLD: 0.02 K/UL (ref 0–0.01)
NRBC BLD-RTO: 0.2 PER 100 WBC
NRBC BLD-RTO: 0.2 PER 100 WBC
NT PRO BNP: ABNORMAL PG/ML
PLATELET # BLD AUTO: 304 K/UL (ref 150–400)
PLATELET # BLD AUTO: 344 K/UL (ref 150–400)
PMV BLD AUTO: 9.6 FL (ref 8.9–12.9)
PMV BLD AUTO: 9.6 FL (ref 8.9–12.9)
POTASSIUM SERPL-SCNC: 3.9 MMOL/L (ref 3.5–5.1)
RBC # BLD AUTO: 2.52 M/UL (ref 4.1–5.7)
RBC # BLD AUTO: 2.65 M/UL (ref 4.1–5.7)
SERVICE CMNT-IMP: ABNORMAL
SODIUM SERPL-SCNC: 133 MMOL/L (ref 136–145)
TIBC SERPL-MCNC: 207 UG/DL (ref 250–450)
WBC # BLD AUTO: 11.3 K/UL (ref 4.1–11.1)
WBC # BLD AUTO: 13 K/UL (ref 4.1–11.1)

## 2023-10-21 PROCEDURE — 83540 ASSAY OF IRON: CPT

## 2023-10-21 PROCEDURE — 6360000002 HC RX W HCPCS: Performed by: NURSE PRACTITIONER

## 2023-10-21 PROCEDURE — 83550 IRON BINDING TEST: CPT

## 2023-10-21 PROCEDURE — 51798 US URINE CAPACITY MEASURE: CPT

## 2023-10-21 PROCEDURE — 85025 COMPLETE CBC W/AUTO DIFF WBC: CPT

## 2023-10-21 PROCEDURE — 1100000003 HC PRIVATE W/ TELEMETRY

## 2023-10-21 PROCEDURE — 80048 BASIC METABOLIC PNL TOTAL CA: CPT

## 2023-10-21 PROCEDURE — 82728 ASSAY OF FERRITIN: CPT

## 2023-10-21 PROCEDURE — 36415 COLL VENOUS BLD VENIPUNCTURE: CPT

## 2023-10-21 PROCEDURE — 94640 AIRWAY INHALATION TREATMENT: CPT

## 2023-10-21 PROCEDURE — 83036 HEMOGLOBIN GLYCOSYLATED A1C: CPT

## 2023-10-21 PROCEDURE — 2700000000 HC OXYGEN THERAPY PER DAY

## 2023-10-21 PROCEDURE — 6370000000 HC RX 637 (ALT 250 FOR IP): Performed by: STUDENT IN AN ORGANIZED HEALTH CARE EDUCATION/TRAINING PROGRAM

## 2023-10-21 PROCEDURE — 85027 COMPLETE CBC AUTOMATED: CPT

## 2023-10-21 PROCEDURE — 90935 HEMODIALYSIS ONE EVALUATION: CPT

## 2023-10-21 PROCEDURE — 82962 GLUCOSE BLOOD TEST: CPT

## 2023-10-21 PROCEDURE — 6370000000 HC RX 637 (ALT 250 FOR IP)

## 2023-10-21 PROCEDURE — 83880 ASSAY OF NATRIURETIC PEPTIDE: CPT

## 2023-10-21 RX ORDER — IPRATROPIUM BROMIDE AND ALBUTEROL SULFATE 2.5; .5 MG/3ML; MG/3ML
SOLUTION RESPIRATORY (INHALATION)
Status: COMPLETED
Start: 2023-10-21 | End: 2023-10-21

## 2023-10-21 RX ORDER — FUROSEMIDE 10 MG/ML
20 INJECTION INTRAMUSCULAR; INTRAVENOUS ONCE
Status: COMPLETED | OUTPATIENT
Start: 2023-10-21 | End: 2023-10-21

## 2023-10-21 RX ORDER — HYDRALAZINE HYDROCHLORIDE 20 MG/ML
10 INJECTION INTRAMUSCULAR; INTRAVENOUS
Status: DISPENSED | OUTPATIENT
Start: 2023-10-21 | End: 2023-10-21

## 2023-10-21 RX ORDER — IPRATROPIUM BROMIDE AND ALBUTEROL SULFATE 2.5; .5 MG/3ML; MG/3ML
1 SOLUTION RESPIRATORY (INHALATION)
Status: COMPLETED | OUTPATIENT
Start: 2023-10-21 | End: 2023-10-21

## 2023-10-21 RX ORDER — FERROUS SULFATE 325(65) MG
325 TABLET ORAL 2 TIMES DAILY WITH MEALS
Status: DISCONTINUED | OUTPATIENT
Start: 2023-10-21 | End: 2023-10-27 | Stop reason: HOSPADM

## 2023-10-21 RX ORDER — IPRATROPIUM BROMIDE AND ALBUTEROL SULFATE 2.5; .5 MG/3ML; MG/3ML
1 SOLUTION RESPIRATORY (INHALATION) EVERY 4 HOURS PRN
Status: DISCONTINUED | OUTPATIENT
Start: 2023-10-21 | End: 2023-10-27 | Stop reason: HOSPADM

## 2023-10-21 RX ADMIN — DIVALPROEX SODIUM 500 MG: 125 CAPSULE ORAL at 21:40

## 2023-10-21 RX ADMIN — IPRATROPIUM BROMIDE AND ALBUTEROL SULFATE 1 DOSE: 2.5; .5 SOLUTION RESPIRATORY (INHALATION) at 02:01

## 2023-10-21 RX ADMIN — IPRATROPIUM BROMIDE AND ALBUTEROL SULFATE 1 DOSE: .5; 3 SOLUTION RESPIRATORY (INHALATION) at 02:01

## 2023-10-21 RX ADMIN — FUROSEMIDE 20 MG: 10 INJECTION, SOLUTION INTRAMUSCULAR; INTRAVENOUS at 06:00

## 2023-10-21 ASSESSMENT — PAIN SCALES - GENERAL
PAINLEVEL_OUTOF10: 0

## 2023-10-21 NOTE — CONSULTS
[address]  X[address]   :1975   Esrd on hd mwf  Sob  Htn  Anemia  - JENNIFER with HD    Plan  UF today  Increased goal to 4L per patient request. BP tolerating  Prbc tx per primary team    Subjective  Seen during UF session. Denies any complaints.  BP stable    Vitals:    10/21/23 1200   BP: 137/67   Pulse: 63   Resp:    Temp:    SpO2:          Shortness of breath [R06.02]  Chronic pulmonary edema [J81.1]  ESRD (end stage renal disease) (HCC) [N18.6]  Acute anemia [D64.9]  Acute on chronic anemia [D64.9]     Dianne Telles, [address] [facility]  [facility]  [address] [place] [address]  Phone - [phone]         Fax - [phone] [facility]  [address], [address] [facility], [address] [place] [address]  Phone - [phone]        Fax - [phone]

## 2023-10-21 NOTE — PROGRESS NOTES
End of Shift Note    Bedside shift change report given to  (oncoming nurse) by Lauri Aldridge RN (offgoing nurse). Report included the following information SBAR    Shift worked:  2100 Pt  refused 9 pm meds saying \" I just took a bunch of meds IM not taking any now \"  Pt continues to refuse his insulin both Lantus and lispro as he did on day shift with no explanation why . Refuses some meds I asked him several time to please take the Depakote and Epogen and he did . Refused blood work until am ,saying 'I already got some blood \"  2200 agreed to let be do a partial skin assessment after me asking several times . Refused to take down pants for sacrum assessment . RLE has a ace wrap with old dressings from Last week home care as per PT . I removed after moistening with normal saline . Cleaned with wound  cleanser applied Vaseline gauze over 4 dry pink almost healed venous stasis ulcers ,and wrapped with guilherme ,reapplied ace . Dressing noticed on bottom of foot Pt shouted and declined Nurses to remove to assess    Wound care consult ordered       0140 Pt called out Nurse I cant breathe homar lung sound exp insp wheeze . Perfect served NP for STAT neb treatment ,    Rapid nurse called to see pt discussed case with NP Haleigh ,CBC,BMP with PBNP all drawn and sent to lab     0250 H&H 7.1-23.1  reported this to NP and rapid nurse . Pt says neb treatment, \" helped a little \"  Awaiting more lab results like PBNP   Pt sitting up in bed eating pack of NABS   0300 Resting breathing improved .     0550 Pt called out again c/o \"SOB \" Rapid nurse called and NP also arrived to place orders lasix  20 mg IV ordered, given hydralazine ordered he refused   Voided 100 ml urine        Shift summary and any significant changes:          Concerns for physician to address:       Zone phone for oncoming shift:          Activity:     Number times ambulated in hallways past shift: 0  Number of times OOB to chair past shift: 0    Cardiac:   Cardiac

## 2023-10-21 NOTE — PROGRESS NOTES
0845: Assessed patient to find that his IV had been removed. Bleeding controlled, linens changed. Refusing replacement of new IV at this time as well as all medications. Hospitalist aware.

## 2023-10-21 NOTE — PROGRESS NOTES
RAPID RESPONSE TEAM    0140: Called by patient's nurse to come see patient for increasing SOB. Patient already receiving neb treatment. Spoke with NP [place] and received orders for RN to draw CBC, BMP, Pro-BNP. Upon assessment, patient has crackles in the bases of his lungs and last chest xray show pleural effusions. Per NP will give lasix depending on Pro BNP result. 7661: Patient complaining of SOB again. Updated NP with Pro BNP results and primary RN received orders to give 20 of lasix.     Kenn Mac RN  Rapid Response Team  EXT [phone]

## 2023-10-21 NOTE — PROGRESS NOTES
Hospitalist Progress Note    NAME:   Adam Smiley   : 1975   MRN: [id number]     Date/Time: 10/21/2023    Patient PCP: Shante Puga MD    Estimated discharge date: 2 days  Barriers: stable Hb, clinical  improvement     Assessment / Plan:      Symptomatic anemia  Shortness of breath  Acute on chronic diastolic HF  CXR with mild pulmonary edema, bibasilar airspace disease and small to moderate bilateral pleural effusions  Pulmonary hypertension  Given 1 unit of blood   CXR: Unchanged cardiomegaly with mild pulmonary edema, bibasilar airspace disease, and small to moderate bilateral pleural effusions  Fluid management with dialysis  proBNP elevated, likely due to kidney function  ECHO with normal EF, Right ventricle is dilated with Reduced systolic function. Tricuspid Valve: Mild to mod regurg with an eccentrically directed jet and may underestimate severity. The estimated RVSP is 42 mmHg. Right atrium is dilated. Pulmonary hypertension.   Epogen    Ferrous sulfate BID  Iron profile, ferritin, FOBT     ESRD [facility]  Nephrology consult  Epogen  Potassium within normal limits     COPD  Baseline he is on 3 L nasal cannula, started on home O2 by end of 2023 for fluid overload  Currently on 3  No wheezing  Shortness of breath likely due to symptomatic anemia versus effusions  Breathing treatment as needed     HTN  Continue with home medication  Monitor blood pressure    DM  Lantus for basal coverage  Lispro sliding scale  Keep blood sugar 1 40-1 80     Hx of seizure:   Continue with home Depakote    PVD  S/p amputation of left forefoot d/t OM  Blindness from CVA  Non complaint     Medical Decision Making:   I personally reviewed labs: yes, as below   I personally reviewed imaging reports: yes, as below   Toxic drug monitoring:   Discussed case with: Pt, RN  Code Status: Full Code   DVT Prophylaxis: scd  GI Prophylaxis:     Subjective:  Assessment / Plan:      Mild SOB  Leg swelling  No fever  Mild

## 2023-10-22 LAB
ANION GAP SERPL CALC-SCNC: 8 MMOL/L (ref 5–15)
BUN SERPL-MCNC: 42 MG/DL (ref 6–20)
BUN/CREAT SERPL: 8 (ref 12–20)
CALCIUM SERPL-MCNC: 7.5 MG/DL (ref 8.5–10.1)
CHLORIDE SERPL-SCNC: 98 MMOL/L (ref 97–108)
CO2 SERPL-SCNC: 28 MMOL/L (ref 21–32)
CREAT SERPL-MCNC: 5.58 MG/DL (ref 0.7–1.3)
ERYTHROCYTE [DISTWIDTH] IN BLOOD BY AUTOMATED COUNT: 16.5 % (ref 11.5–14.5)
GLUCOSE BLD STRIP.AUTO-MCNC: 106 MG/DL (ref 65–117)
GLUCOSE BLD STRIP.AUTO-MCNC: 139 MG/DL (ref 65–117)
GLUCOSE BLD STRIP.AUTO-MCNC: 177 MG/DL (ref 65–117)
GLUCOSE BLD STRIP.AUTO-MCNC: 221 MG/DL (ref 65–117)
GLUCOSE SERPL-MCNC: 174 MG/DL (ref 65–100)
HCT VFR BLD AUTO: 23.6 % (ref 36.6–50.3)
HGB BLD-MCNC: 6.9 G/DL (ref 12.1–17)
HISTORY CHECK: NORMAL
MCH RBC QN AUTO: 27.3 PG (ref 26–34)
MCHC RBC AUTO-ENTMCNC: 29.2 G/DL (ref 30–36.5)
MCV RBC AUTO: 93.3 FL (ref 80–99)
NRBC # BLD: 0.03 K/UL (ref 0–0.01)
NRBC BLD-RTO: 0.3 PER 100 WBC
PLATELET # BLD AUTO: 274 K/UL (ref 150–400)
PMV BLD AUTO: 9.5 FL (ref 8.9–12.9)
POTASSIUM SERPL-SCNC: 4.5 MMOL/L (ref 3.5–5.1)
RBC # BLD AUTO: 2.53 M/UL (ref 4.1–5.7)
SERVICE CMNT-IMP: ABNORMAL
SERVICE CMNT-IMP: NORMAL
SODIUM SERPL-SCNC: 134 MMOL/L (ref 136–145)
WBC # BLD AUTO: 11.8 K/UL (ref 4.1–11.1)

## 2023-10-22 PROCEDURE — 85027 COMPLETE CBC AUTOMATED: CPT

## 2023-10-22 PROCEDURE — P9016 RBC LEUKOCYTES REDUCED: HCPCS

## 2023-10-22 PROCEDURE — 80048 BASIC METABOLIC PNL TOTAL CA: CPT

## 2023-10-22 PROCEDURE — 36415 COLL VENOUS BLD VENIPUNCTURE: CPT

## 2023-10-22 PROCEDURE — 2500000003 HC RX 250 WO HCPCS: Performed by: NURSE PRACTITIONER

## 2023-10-22 PROCEDURE — 2580000003 HC RX 258: Performed by: STUDENT IN AN ORGANIZED HEALTH CARE EDUCATION/TRAINING PROGRAM

## 2023-10-22 PROCEDURE — 1100000003 HC PRIVATE W/ TELEMETRY

## 2023-10-22 PROCEDURE — 82962 GLUCOSE BLOOD TEST: CPT

## 2023-10-22 PROCEDURE — 36430 TRANSFUSION BLD/BLD COMPNT: CPT

## 2023-10-22 PROCEDURE — 6370000000 HC RX 637 (ALT 250 FOR IP): Performed by: STUDENT IN AN ORGANIZED HEALTH CARE EDUCATION/TRAINING PROGRAM

## 2023-10-22 PROCEDURE — 2700000000 HC OXYGEN THERAPY PER DAY

## 2023-10-22 RX ORDER — SODIUM CHLORIDE 9 MG/ML
INJECTION, SOLUTION INTRAVENOUS PRN
Status: DISCONTINUED | OUTPATIENT
Start: 2023-10-22 | End: 2023-10-24

## 2023-10-22 RX ORDER — HYDROMORPHONE HYDROCHLORIDE 1 MG/ML
0.5 INJECTION, SOLUTION INTRAMUSCULAR; INTRAVENOUS; SUBCUTANEOUS
Status: COMPLETED | OUTPATIENT
Start: 2023-10-22 | End: 2023-10-22

## 2023-10-22 RX ADMIN — INSULIN LISPRO 2 UNITS: 100 INJECTION, SOLUTION INTRAVENOUS; SUBCUTANEOUS at 11:36

## 2023-10-22 RX ADMIN — DIVALPROEX SODIUM 500 MG: 125 CAPSULE ORAL at 10:13

## 2023-10-22 RX ADMIN — HYDROMORPHONE HYDROCHLORIDE 0.5 MG: 1 INJECTION, SOLUTION INTRAMUSCULAR; INTRAVENOUS; SUBCUTANEOUS at 03:17

## 2023-10-22 RX ADMIN — ACETAMINOPHEN 650 MG: 325 TABLET ORAL at 10:13

## 2023-10-22 RX ADMIN — SODIUM CHLORIDE, PRESERVATIVE FREE 10 ML: 5 INJECTION INTRAVENOUS at 10:17

## 2023-10-22 RX ADMIN — ATORVASTATIN CALCIUM 40 MG: 40 TABLET, FILM COATED ORAL at 10:13

## 2023-10-22 ASSESSMENT — PAIN DESCRIPTION - ORIENTATION
ORIENTATION: LEFT;RIGHT
ORIENTATION: RIGHT;LEFT

## 2023-10-22 ASSESSMENT — PAIN DESCRIPTION - DESCRIPTORS
DESCRIPTORS: ACHING
DESCRIPTORS: ACHING

## 2023-10-22 ASSESSMENT — PAIN DESCRIPTION - PAIN TYPE
TYPE: ACUTE PAIN
TYPE: ACUTE PAIN

## 2023-10-22 ASSESSMENT — PAIN SCALES - GENERAL
PAINLEVEL_OUTOF10: 0
PAINLEVEL_OUTOF10: 10
PAINLEVEL_OUTOF10: 10

## 2023-10-22 ASSESSMENT — PAIN DESCRIPTION - LOCATION
LOCATION: FOOT
LOCATION: FOOT

## 2023-10-22 ASSESSMENT — PAIN DESCRIPTION - ONSET
ONSET: GRADUAL
ONSET: AWAKENED FROM SLEEP

## 2023-10-22 ASSESSMENT — PAIN DESCRIPTION - FREQUENCY
FREQUENCY: INTERMITTENT
FREQUENCY: CONTINUOUS

## 2023-10-22 ASSESSMENT — PAIN - FUNCTIONAL ASSESSMENT
PAIN_FUNCTIONAL_ASSESSMENT: ACTIVITIES ARE NOT PREVENTED
PAIN_FUNCTIONAL_ASSESSMENT: ACTIVITIES ARE NOT PREVENTED

## 2023-10-22 NOTE — PROGRESS NOTES
End of Shift Note    Bedside shift change report given to  (oncoming nurse) by Sarah Shabazz RN (offgoing nurse). Report included the following information Cardiac Rhythm      Shift worked:  1930 Pt is without IV . He says he wants \"someone with machine to put it in \"  I will ask NSG sup  for Intrasound guided assist   Contacted Rosita OWENS CCU RN   the patient continues to refuse almost all meds does take seizure meds ,refusing all insulins ,refusing to wear hospital gowns ,or any hygiene,saying \" his nurse will come and bathe him in morning .    0304 USG # 20 IV inserted right upper arm the patient c/o foot pain at a 15 on pain scale messaged NP dilaudid 0.5 mg IV ordered and given   Pt refused B/P check after dilaudid said I was \"waking him up\"    Resting verbalized relief of pain   0630 Awake alert am labs drawn with some difficulty ,as he is a hard lab stick   Pt did not urinate tonight           Shift summary and any significant changes:          Concerns for physician to address:       Zone phone for oncoming shift:          Activity:     Number times ambulated in hallways past shift: 0  Number of times OOB to chair past shift: 0    Cardiac:   Cardiac Monitoring: Yes           Access:  Current line(s): HD access     Genitourinary:   Urinary status: oliguric    Respiratory:      Chronic home O2 use?: YES  Incentive spirometer at bedside: NO  refused        GI:     Current diet:  DIET ONE TIME MESSAGE;  ADULT DIET; Regular  Passing flatus: YES  Tolerating current diet: YES       Pain Management:   Patient states pain is manageable on current regimen: YES    Skin:     Interventions: specialty bed, float heels, and increase time out of bed    Patient Safety:  Fall Score:    Interventions: bed/chair alarm, assistive device (walker, cane.  etc), gripper socks, pt to call before getting OOB, and stay with me (per policy)       Length of Stay:  Expected LOS: 3  Actual LOS: 1      Sarah Shabazz

## 2023-10-23 LAB
ALBUMIN SERPL-MCNC: 2.4 G/DL (ref 3.5–5)
ANION GAP SERPL CALC-SCNC: 7 MMOL/L (ref 5–15)
BUN SERPL-MCNC: 56 MG/DL (ref 6–20)
BUN/CREAT SERPL: 8 (ref 12–20)
CALCIUM SERPL-MCNC: 7.4 MG/DL (ref 8.5–10.1)
CHLORIDE SERPL-SCNC: 99 MMOL/L (ref 97–108)
CO2 SERPL-SCNC: 28 MMOL/L (ref 21–32)
CREAT SERPL-MCNC: 6.61 MG/DL (ref 0.7–1.3)
ERYTHROCYTE [DISTWIDTH] IN BLOOD BY AUTOMATED COUNT: 16.2 % (ref 11.5–14.5)
GLUCOSE BLD STRIP.AUTO-MCNC: 203 MG/DL (ref 65–117)
GLUCOSE BLD STRIP.AUTO-MCNC: 256 MG/DL (ref 65–117)
GLUCOSE SERPL-MCNC: 171 MG/DL (ref 65–100)
HCT VFR BLD AUTO: 23.2 % (ref 36.6–50.3)
HGB BLD-MCNC: 7.1 G/DL (ref 12.1–17)
HISTORY CHECK: NORMAL
MCH RBC QN AUTO: 28.6 PG (ref 26–34)
MCHC RBC AUTO-ENTMCNC: 30.6 G/DL (ref 30–36.5)
MCV RBC AUTO: 93.5 FL (ref 80–99)
NRBC # BLD: 0.04 K/UL (ref 0–0.01)
NRBC BLD-RTO: 0.4 PER 100 WBC
PHOSPHATE SERPL-MCNC: 8.3 MG/DL (ref 2.6–4.7)
PLATELET # BLD AUTO: 264 K/UL (ref 150–400)
PMV BLD AUTO: 9.6 FL (ref 8.9–12.9)
POTASSIUM SERPL-SCNC: 4.6 MMOL/L (ref 3.5–5.1)
RBC # BLD AUTO: 2.48 M/UL (ref 4.1–5.7)
SERVICE CMNT-IMP: ABNORMAL
SERVICE CMNT-IMP: ABNORMAL
SODIUM SERPL-SCNC: 134 MMOL/L (ref 136–145)
WBC # BLD AUTO: 11.1 K/UL (ref 4.1–11.1)

## 2023-10-23 PROCEDURE — 90935 HEMODIALYSIS ONE EVALUATION: CPT

## 2023-10-23 PROCEDURE — 80069 RENAL FUNCTION PANEL: CPT

## 2023-10-23 PROCEDURE — P9016 RBC LEUKOCYTES REDUCED: HCPCS

## 2023-10-23 PROCEDURE — 85027 COMPLETE CBC AUTOMATED: CPT

## 2023-10-23 PROCEDURE — 36430 TRANSFUSION BLD/BLD COMPNT: CPT

## 2023-10-23 PROCEDURE — 36415 COLL VENOUS BLD VENIPUNCTURE: CPT

## 2023-10-23 PROCEDURE — 2700000000 HC OXYGEN THERAPY PER DAY

## 2023-10-23 PROCEDURE — 82962 GLUCOSE BLOOD TEST: CPT

## 2023-10-23 PROCEDURE — 1100000003 HC PRIVATE W/ TELEMETRY

## 2023-10-23 PROCEDURE — 76937 US GUIDE VASCULAR ACCESS: CPT

## 2023-10-23 RX ORDER — SODIUM CHLORIDE 9 MG/ML
INJECTION, SOLUTION INTRAVENOUS PRN
Status: DISCONTINUED | OUTPATIENT
Start: 2023-10-23 | End: 2023-10-24

## 2023-10-23 RX ORDER — HYDROMORPHONE HYDROCHLORIDE 1 MG/ML
0.5 INJECTION, SOLUTION INTRAMUSCULAR; INTRAVENOUS; SUBCUTANEOUS ONCE
Status: COMPLETED | OUTPATIENT
Start: 2023-10-23 | End: 2023-10-24

## 2023-10-23 ASSESSMENT — PAIN SCALES - GENERAL: PAINLEVEL_OUTOF10: 0

## 2023-10-23 NOTE — PROGRESS NOTES
On floor to a assess patient for ultrasound guided PIV placement. Patient currently off the floor for dialysis. Asked Natividad Clark to tell bedside RN Norma Suh to call Vascular Access Team when patient returns to floor.

## 2023-10-23 NOTE — PROGRESS NOTES
1650  Patient receiving 1 unit of blood and IV infiltrated. 3 different nurses attempted to get new access on patient with a total of 5 attempts. Patient refused in additional attempts and stated that he would wait till Monday for the PICC temp because he lines have to be placed with US guided. Doctor Teresa notified. Risk explained to patient and patient denied access attempts. Blood destroyed. Md notified.

## 2023-10-23 NOTE — PROGRESS NOTES
0740  Refused AM labs, refused accucheck,  has been refusing IV wants PICC team to place line. AM medications held for dialysis. Refused to remove socks or clothing for skin assessment. 1027  Dr. Marcy Kunz notified of patient refusal.  Unit of blood ordered to be given in dialysis. Dr. Marcy Kunz would like PICC team to evaluate for peripheral IV.      1040  Patient refused AM labs. Patient to dialysis. 1300  Patient to received blood transfusion in dialysis. PICC team to come evaluate for peripheral line when back from dialysis. 1500  Returned from dialysis. 4 liters removed. Dr. Marcy Kunz here to evaluate patient. On 3 liters. States not feeling better. Blind. Refused bath, refused to change into gown (\"do they have poop on them\" refused skin assessment of buttock or feet. Refused medications. 1700  Refused medications. 1920  End of Shift Note    Bedside shift change report given to Maricarmen Pedraza (oncoming nurse) by Tommy Habermann, RN (offgoing nurse). Report included the following information SBAR, cardiac rhythm sinus rhythm, procedure, recent results    Shift worked:  7A-7P     Shift summary and any significant changes:     HD today 4 liters removed. Peripheral line placed by PICC team.  Refused medcations, refused baths,  refused AM blood sugar but allowed 1600 blood sugar  1200 mL fluid restriction     Concerns for physician to address: To have labs tonight to see if patient needs dialysis again tomorrow     Zone phone for oncoming shift:  [phone]       Activity:     Number times ambulated in hallways past shift: 0  Number of times OOB to chair past shift: 0    Cardiac:   Cardiac Monitoring: Yes           Access:  Current line(s): PIV     Genitourinary:   Urinary status: oliguric    Respiratory:      Chronic home O2 use?: YES  Incentive spirometer at bedside: N/A       GI:     Current diet:  DIET ONE TIME MESSAGE;  ADULT DIET;  Regular; 1200 ml  Passing flatus: YES  Tolerating

## 2023-10-23 NOTE — PROGRESS NOTES
Physician Progress Note      Osman Healy  [facility] #:                  [id number]  :                       1975  ADMIT DATE:       10/20/2023 2:20 AM  DISCH DATE:  RESPONDING  PROVIDER #:        Bashir Mcgrath MD          QUERY TEXT:    Dr Georgia Amin  Pt admitted with acute on chronic diastolic CHF. Pt noted to also have   mild/moderate Tricuspid valve regurgitation, HTN. If possible, please document   in progress notes and discharge summary the etiology of CHF, if able to be   determined. The medical record reflects the following:  Risk Factors: ESRD, HTN,  Clinical Indicators: ECHO with normal EF, Right ventricle is dilated with   Reduced systolic function. Tricuspid Valve: Mild to mod regurg with an   eccentrically directed jet and may underestimate severity. The estimated RVSP   is 42 mmHg. Right atrium is dilated. Pulmonary hypertension  Treatment: HD for fluid management, daily weights, strict I/Os  Options provided:  -- CHF due to Hypertensive Heart Disease  -- CHF due to Hypertensive Heart Disease and Valvular Heart Disease  -- CHF not due to Hypertension but due to valvular heart disease  -- Other - I will add my own diagnosis  -- Disagree - Not applicable / Not valid  -- Disagree - Clinically unable to determine / Unknown  -- Refer to Clinical Documentation Reviewer    PROVIDER RESPONSE TEXT:    Non compliance with fluid restriction    Query created by:  Kaity Cheney on 10/23/2023 9:27 AM      Electronically signed by:  Bashir Mcgrath MD 10/23/2023 11:51 AM

## 2023-10-24 LAB
ABO + RH BLD: NORMAL
ANION GAP SERPL CALC-SCNC: 6 MMOL/L (ref 5–15)
BLD PROD TYP BPU: NORMAL
BLOOD BANK DISPENSE STATUS: NORMAL
BLOOD GROUP ANTIBODIES SERPL: NORMAL
BPU ID: NORMAL
BUN SERPL-MCNC: 39 MG/DL (ref 6–20)
BUN/CREAT SERPL: 8 (ref 12–20)
CALCIUM SERPL-MCNC: 7.4 MG/DL (ref 8.5–10.1)
CHLORIDE SERPL-SCNC: 98 MMOL/L (ref 97–108)
CO2 SERPL-SCNC: 32 MMOL/L (ref 21–32)
CREAT SERPL-MCNC: 4.92 MG/DL (ref 0.7–1.3)
CROSSMATCH RESULT: NORMAL
EKG ATRIAL RATE: 87 BPM
EKG DIAGNOSIS: NORMAL
EKG P AXIS: 62 DEGREES
EKG P-R INTERVAL: 174 MS
EKG Q-T INTERVAL: 372 MS
EKG QRS DURATION: 98 MS
EKG QTC CALCULATION (BAZETT): 447 MS
EKG R AXIS: -15 DEGREES
EKG T AXIS: 69 DEGREES
EKG VENTRICULAR RATE: 87 BPM
ERYTHROCYTE [DISTWIDTH] IN BLOOD BY AUTOMATED COUNT: 16.4 % (ref 11.5–14.5)
GLUCOSE BLD STRIP.AUTO-MCNC: 176 MG/DL (ref 65–117)
GLUCOSE BLD STRIP.AUTO-MCNC: 178 MG/DL (ref 65–117)
GLUCOSE BLD STRIP.AUTO-MCNC: 197 MG/DL (ref 65–117)
GLUCOSE BLD STRIP.AUTO-MCNC: 322 MG/DL (ref 65–117)
GLUCOSE SERPL-MCNC: 176 MG/DL (ref 65–100)
HCT VFR BLD AUTO: 27 % (ref 36.6–50.3)
HGB BLD-MCNC: 8 G/DL (ref 12.1–17)
MCH RBC QN AUTO: 28.1 PG (ref 26–34)
MCHC RBC AUTO-ENTMCNC: 29.6 G/DL (ref 30–36.5)
MCV RBC AUTO: 94.7 FL (ref 80–99)
NRBC # BLD: 0.04 K/UL (ref 0–0.01)
NRBC BLD-RTO: 0.4 PER 100 WBC
PLATELET # BLD AUTO: 200 K/UL (ref 150–400)
PMV BLD AUTO: 11.1 FL (ref 8.9–12.9)
POTASSIUM SERPL-SCNC: 4.5 MMOL/L (ref 3.5–5.1)
RBC # BLD AUTO: 2.85 M/UL (ref 4.1–5.7)
SERVICE CMNT-IMP: ABNORMAL
SODIUM SERPL-SCNC: 136 MMOL/L (ref 136–145)
SPECIMEN EXP DATE BLD: NORMAL
UNIT DIVISION: 0
WBC # BLD AUTO: 11 K/UL (ref 4.1–11.1)

## 2023-10-24 PROCEDURE — 36415 COLL VENOUS BLD VENIPUNCTURE: CPT

## 2023-10-24 PROCEDURE — 6370000000 HC RX 637 (ALT 250 FOR IP): Performed by: STUDENT IN AN ORGANIZED HEALTH CARE EDUCATION/TRAINING PROGRAM

## 2023-10-24 PROCEDURE — 2500000003 HC RX 250 WO HCPCS: Performed by: NURSE PRACTITIONER

## 2023-10-24 PROCEDURE — 2700000000 HC OXYGEN THERAPY PER DAY

## 2023-10-24 PROCEDURE — 2500000003 HC RX 250 WO HCPCS: Performed by: STUDENT IN AN ORGANIZED HEALTH CARE EDUCATION/TRAINING PROGRAM

## 2023-10-24 PROCEDURE — 6370000000 HC RX 637 (ALT 250 FOR IP): Performed by: GENERAL ACUTE CARE HOSPITAL

## 2023-10-24 PROCEDURE — 1100000003 HC PRIVATE W/ TELEMETRY

## 2023-10-24 PROCEDURE — 82962 GLUCOSE BLOOD TEST: CPT

## 2023-10-24 PROCEDURE — 80048 BASIC METABOLIC PNL TOTAL CA: CPT

## 2023-10-24 PROCEDURE — 85027 COMPLETE CBC AUTOMATED: CPT

## 2023-10-24 RX ORDER — ACETAMINOPHEN 500 MG
500 TABLET ORAL EVERY 6 HOURS PRN
Qty: 540 TABLET | Refills: 1 | Status: SHIPPED
Start: 2023-10-24

## 2023-10-24 RX ORDER — ATENOLOL 50 MG/1
50 TABLET ORAL DAILY
Qty: 30 TABLET | Refills: 2 | Status: SHIPPED | OUTPATIENT
Start: 2023-10-24

## 2023-10-24 RX ADMIN — HYDRALAZINE HYDROCHLORIDE 100 MG: 50 TABLET, FILM COATED ORAL at 15:37

## 2023-10-24 RX ADMIN — CALCIUM ACETATE 2001 MG: 667 CAPSULE ORAL at 20:53

## 2023-10-24 RX ADMIN — HYDRALAZINE HYDROCHLORIDE 100 MG: 50 TABLET, FILM COATED ORAL at 20:52

## 2023-10-24 RX ADMIN — FERROUS SULFATE TAB 325 MG (65 MG ELEMENTAL FE) 325 MG: 325 (65 FE) TAB at 15:37

## 2023-10-24 RX ADMIN — CALCIUM ACETATE 2001 MG: 667 CAPSULE ORAL at 15:37

## 2023-10-24 RX ADMIN — HYDROMORPHONE HYDROCHLORIDE 0.5 MG: 1 INJECTION, SOLUTION INTRAMUSCULAR; INTRAVENOUS; SUBCUTANEOUS at 00:51

## 2023-10-24 ASSESSMENT — ENCOUNTER SYMPTOMS
VOMITING: 0
DIARRHEA: 0
COLOR CHANGE: 0
STRIDOR: 0
COUGH: 0
ABDOMINAL PAIN: 0
NAUSEA: 0
SHORTNESS OF BREATH: 1
BACK PAIN: 0

## 2023-10-24 ASSESSMENT — PAIN SCALES - GENERAL
PAINLEVEL_OUTOF10: 0
PAINLEVEL_OUTOF10: 10

## 2023-10-24 ASSESSMENT — PAIN DESCRIPTION - LOCATION: LOCATION: GENERALIZED

## 2023-10-24 ASSESSMENT — PAIN DESCRIPTION - DESCRIPTORS: DESCRIPTORS: ACHING

## 2023-10-24 NOTE — PROGRESS NOTES
Hospitalist Progress Note    NAME:   Ethel Bright   : 1975   MRN: [id number]     Date/Time: 10/24/2023    Patient PCP: Maryjean Lesches, MD    Estimated discharge date: stable for DC  Barriers: pt appealed the DC    Assessment / Plan:      Symptomatic anemia  Shortness of breath  Acute on chronic diastolic HF  CXR with mild pulmonary edema, bibasilar airspace disease and small to moderate bilateral pleural effusions  Pulmonary hypertension  Given 2 unit of blood   CXR: Unchanged cardiomegaly with mild pulmonary edema, bibasilar airspace disease, and small to moderate bilateral pleural effusions  Fluid management with dialysis  proBNP elevated, likely due to kidney function  ECHO with normal EF, Right ventricle is dilated with Reduced systolic function. Tricuspid Valve: Mild to mod regurg with an eccentrically directed jet and may underestimate severity. The estimated RVSP is 42 mmHg. Right atrium is dilated. Pulmonary hypertension.   Epogen    Ferrous sulfate BID  Fluid restriction 1200 ml     ESRD [facility]  Nephrology consulted  Epogen  Potassium within normal limits     COPD  Baseline he is on 3 L nasal cannula, started on home O2 by end of 2023 for fluid overload  Currently on 3  No wheezing  Shortness of breath likely due to symptomatic anemia versus effusions  Breathing treatment as needed     HTN  Continue with home medication  Monitor blood pressure    DM  Lantus for basal coverage  Lispro sliding scale  Keep blood sugar 1 40-1 80     Hx of seizure:   Continue with home Depakote    PVD  S/p amputation of left forefoot d/t OM  Blindness from CVA  Non complaint     Medical Decision Making:   I personally reviewed labs: yes, as below   I personally reviewed imaging reports: yes, as below   Toxic drug monitoring:   Discussed case with: Pt, RN, CM for placement   Code Status: Full Code   DVT Prophylaxis: scd  GI Prophylaxis:     Subjective:  Assessment / Plan:      SOB  Leg swelling  No and cultures  YES  Reviewed most current radiology test results   YES  Review and summation of old records today    NO  Reviewed patient's current orders and MAR    YES  PMH/SH reviewed - no change compared to H&P    ________________________________________________________________________      Total NON critical care TIME:  30  Minutes      Total CRITICAL CARE TIME Spent:   Minutes non procedure based          Comments   >50% of visit spent in counseling and coordination of care x    ________________________________________________________________________        Signed: Sheree Thompson MD

## 2023-10-24 NOTE — WOUND CARE
Wound care nurse consult for POA right LE and right foot wounds    Chart reviewed and patient assessed    50 y/o AAM who is legally blind admitted for acute on chronic anemia. Patient is a current smoker. Past Medical History:   Diagnosis Date    Bulging eyes     Diabetes ([address] [facility])     Dialysis patient [facility])     End stage renal disease ([address] [facility])     Hypertension     Seizure ([address] [facility])     Stroke [facility])      Past Surgical History:   Procedure Laterality Date    FOOT DEBRIDEMENT Right 6/29/2023    RIGHT FOOT DEBRIDEMENT INCISION AND DRAINAGE performed by Bebe Soriano DPM at [facility] MAIN OR    IR NONTUNNELED VASCULAR CATHETER  08/19/2022    IR NONTUNNELED VASCULAR CATHETER 8/19/2022 [facility] RAD ANGIO IR    IR TUNNELED CATHETER PLACEMENT GREATER THAN 5 YEARS  08/23/2022    IR TUNNELED CATHETER PLACEMENT GREATER THAN 5 YEARS 8/23/2022 [facility] RAD ANGIO IR    IR TUNNELED CATHETER PLACEMENT GREATER THAN 5 YEARS  8/23/2022    TOE AMPUTATION Bilateral 2022    all toes removed    VASCULAR SURGERY Left     LLE     Patient gets [facility] visits 3x/week to change dressings to RLE and right foot. Patient was seen by podiatry last admission in 8/2023 and debridement was done to right TMA. Currently TMA looks like:      RLE has some partial thickness trauma wounds:      Recommend:    RLE shin wounds: MWF, cleanse with NS moist 4x4, wipe clean, cover with a piece of Xeroform gauze, ABD pad and secure with guilherme and ace wrap. Right foot/TMA wound: MWF, cleanse with betadine moist 4x4, wipe clean. Pack wound with Betadine moist packing, cover with dry dressing and secure with tape.     [address] [place] [address], RN, CWON

## 2023-10-24 NOTE — PROGRESS NOTES
Current Inpatient Medications:      Current Facility-Administered Medications:     epoetin brennan (EPOGEN;PROCRIT) injection 15,000 Units, 15,000 Units, SubCUTAneous, Once per day on Mon Wed Fri, Vicente Larios MD    0.9 % sodium chloride infusion, , IntraVENous, PRN, Belkys Moore MD    0.9 % sodium chloride infusion, , IntraVENous, PRN, Belkys Moore MD    ipratropium 0.5 mg-albuterol 2.5 mg (DUONEB) nebulizer solution 1 Dose, 1 Dose, Inhalation, Q4H PRN, Belkys Moore MD    ferrous sulfate (IRON 325) tablet 325 mg, 325 mg, Oral, BID Teresa TYSON Haydar M, MD    sodium chloride flush 0.9 % injection 5-40 mL, 5-40 mL, IntraVENous, 2 times per day, Ailyn Bhatia MD, 10 mL at 10/22/23 1017    sodium chloride flush 0.9 % injection 5-40 mL, 5-40 mL, IntraVENous, PRN, Ailyn Bhatia MD    0.9 % sodium chloride infusion, , IntraVENous, PRN, Ailyn Bhatia MD    ondansetron (ZOFRAN-ODT) disintegrating tablet 4 mg, 4 mg, Oral, Q8H PRN **OR** ondansetron (ZOFRAN) injection 4 mg, 4 mg, IntraVENous, Q6H PRN, Ailyn Bhatia MD    polyethylene glycol (GLYCOLAX) packet 17 g, 17 g, Oral, Daily PRN, Ailyn Bhatia MD    acetaminophen (TYLENOL) tablet 650 mg, 650 mg, Oral, Q6H PRN, 650 mg at 10/22/23 1013 **OR** acetaminophen (TYLENOL) suppository 650 mg, 650 mg, Rectal, Q6H PRN, Ailyn Bhatia MD    insulin lispro (HUMALOG) injection vial 0-8 Units, 0-8 Units, SubCUTAneous, TID Sriram TYSON Tasneem, MD, 2 Units at 10/22/23 1136    insulin lispro (HUMALOG) injection vial 0-4 Units, 0-4 Units, SubCUTAneous, Nightly, Ailyn Bhatia MD    insulin glargine (LANTUS) injection vial 15 Units, 15 Units, SubCUTAneous, Nightly, Ailyn Bhatia MD    glucose chewable tablet 16 g, 4 tablet, Oral, PRN, Ailyn Bhatia MD    dextrose bolus 10% 125 mL, 125 mL, IntraVENous, PRN **OR** dextrose bolus 10% 250 mL, 250 mL, IntraVENous, PRN, Ailyn Bhatia MD    glucagon injection 1 mg, 1 mg, SubCUTAneous, PRN, Ailyn Bhatia MD    dextrose 10 % infusion, , IntraVENous, Continuous PRN, Ailyn Bhatia MD    [Held by provider] aspirin EC tablet 81 mg, 81 mg, Oral, Daily, Ailyn Bhtaia MD, 81 mg at 10/20/23 1230    atenolol (TENORMIN) tablet 25 mg, 25 mg, Oral, Daily, Ailyn Bhatia MD, 25 mg at 10/20/23 1230    atorvastatin (LIPITOR) tablet 40 mg, 40 mg, Oral, Daily, Ailyn Bhatia MD, 40 mg at 10/22/23 1013    calcium acetate (PHOSLO) capsule 2,001 mg, 3 capsule, Oral, TID, Ailyn Bhatia MD, 2,001 mg at 10/20/23 1229    divalproex (DEPAKOTE SPRINKLE) DR capsule 500 mg, 500 mg, Oral, BID, Ailyn Bhatia MD, 500 mg at 10/22/23 1013    hydrALAZINE (APRESOLINE) tablet 100 mg, 100 mg, Oral, TID, Ailyn Bhatia MD, 100 mg at 10/20/23 1229    NIFEdipine (PROCARDIA XL) extended release tablet 90 mg, 90 mg, Oral, Daily, Ailyn Bhatia MD, 90 mg at 10/20/23 1230       LABS:    I reviewed today's most current labs and imaging studies. Pertinent labs include:  Recent Labs     10/21/23  1152 10/22/23  0644 10/23/23  0957   WBC 13.0* 11.8* 11.1   HGB 7.4* 6.9* 7.1*   HCT 24.4* 23.6* 23.2*    274 264       Recent Labs     10/22/23  0644 10/23/23  0957   * 134*   K 4.5 4.6   CL 98 99   CO2 28 28   GLUCOSE 174* 171*   BUN 42* 56*   CREATININE 5.58* 6.61*   CALCIUM 7.5* 7.4*   PHOS  --  8.3*   LABALBU  --  2.4*       Xray Result (most recent):  Echo (TTE) complete (PRN contrast/bubble/strain/3D)    Left Ventricle: Normal left ventricular systolic function with a   visually estimated EF of 55 - 60%. Left ventricle size is normal.   Increased wall thickness. Normal wall motion. Right Ventricle: Right ventricle is dilated. Reduced systolic function. Tricuspid Valve: Mild to moderate regurgitation with an eccentrically   directed jet and may underestimate severity. The estimated RVSP is 42   mmHg. Left Atrium: Left atrium is dilated. Right Atrium: Right atrium is dilated.

## 2023-10-24 NOTE — PROGRESS NOTES
Received notification from bedside RN about patient with regards to: \"12/10\" generalized pain, requesting medication for relief  VS: /69, HR 90, RR 16, O2 sat 97% on NC 3 L    Intervention given:   - Dilaudid 0.5 mg IV x 1 dose

## 2023-10-24 NOTE — PROGRESS NOTES
Attempted to schedule PCP hospital follow up appointment. Unable to reach anyone, unable to leave voicemail. Pending patient discharge.  Riana Pinto, Care Management Assistant

## 2023-10-25 LAB
ANION GAP SERPL CALC-SCNC: 8 MMOL/L (ref 5–15)
BASOPHILS # BLD: 0 K/UL (ref 0–0.1)
BASOPHILS NFR BLD: 0 % (ref 0–1)
BUN SERPL-MCNC: 53 MG/DL (ref 6–20)
BUN/CREAT SERPL: 9 (ref 12–20)
CALCIUM SERPL-MCNC: 7.8 MG/DL (ref 8.5–10.1)
CHLORIDE SERPL-SCNC: 96 MMOL/L (ref 97–108)
CO2 SERPL-SCNC: 30 MMOL/L (ref 21–32)
CREAT SERPL-MCNC: 6.17 MG/DL (ref 0.7–1.3)
DIFFERENTIAL METHOD BLD: ABNORMAL
EOSINOPHIL # BLD: 0.1 K/UL (ref 0–0.4)
EOSINOPHIL NFR BLD: 1 % (ref 0–7)
ERYTHROCYTE [DISTWIDTH] IN BLOOD BY AUTOMATED COUNT: 16 % (ref 11.5–14.5)
GLUCOSE BLD STRIP.AUTO-MCNC: 110 MG/DL (ref 65–117)
GLUCOSE BLD STRIP.AUTO-MCNC: 180 MG/DL (ref 65–117)
GLUCOSE BLD STRIP.AUTO-MCNC: 199 MG/DL (ref 65–117)
GLUCOSE BLD STRIP.AUTO-MCNC: 274 MG/DL (ref 65–117)
GLUCOSE SERPL-MCNC: 168 MG/DL (ref 65–100)
HCT VFR BLD AUTO: 25.9 % (ref 36.6–50.3)
HGB BLD-MCNC: 7.8 G/DL (ref 12.1–17)
IMM GRANULOCYTES # BLD AUTO: 0.1 K/UL (ref 0–0.04)
IMM GRANULOCYTES NFR BLD AUTO: 1 % (ref 0–0.5)
LYMPHOCYTES # BLD: 1.1 K/UL (ref 0.8–3.5)
LYMPHOCYTES NFR BLD: 10 % (ref 12–49)
MCH RBC QN AUTO: 28.3 PG (ref 26–34)
MCHC RBC AUTO-ENTMCNC: 30.1 G/DL (ref 30–36.5)
MCV RBC AUTO: 93.8 FL (ref 80–99)
MONOCYTES # BLD: 1.3 K/UL (ref 0–1)
MONOCYTES NFR BLD: 12 % (ref 5–13)
NEUTS SEG # BLD: 8.4 K/UL (ref 1.8–8)
NEUTS SEG NFR BLD: 76 % (ref 32–75)
NRBC # BLD: 0 K/UL (ref 0–0.01)
NRBC BLD-RTO: 0 PER 100 WBC
PLATELET # BLD AUTO: 216 K/UL (ref 150–400)
PMV BLD AUTO: 9.7 FL (ref 8.9–12.9)
POTASSIUM SERPL-SCNC: 5 MMOL/L (ref 3.5–5.1)
RBC # BLD AUTO: 2.76 M/UL (ref 4.1–5.7)
SERVICE CMNT-IMP: ABNORMAL
SERVICE CMNT-IMP: NORMAL
SODIUM SERPL-SCNC: 134 MMOL/L (ref 136–145)
WBC # BLD AUTO: 11 K/UL (ref 4.1–11.1)

## 2023-10-25 PROCEDURE — 36415 COLL VENOUS BLD VENIPUNCTURE: CPT

## 2023-10-25 PROCEDURE — 2700000000 HC OXYGEN THERAPY PER DAY

## 2023-10-25 PROCEDURE — 85025 COMPLETE CBC W/AUTO DIFF WBC: CPT

## 2023-10-25 PROCEDURE — 90935 HEMODIALYSIS ONE EVALUATION: CPT

## 2023-10-25 PROCEDURE — 82962 GLUCOSE BLOOD TEST: CPT

## 2023-10-25 PROCEDURE — 94760 N-INVAS EAR/PLS OXIMETRY 1: CPT

## 2023-10-25 PROCEDURE — 1100000003 HC PRIVATE W/ TELEMETRY

## 2023-10-25 PROCEDURE — 80048 BASIC METABOLIC PNL TOTAL CA: CPT

## 2023-10-25 PROCEDURE — 6360000002 HC RX W HCPCS: Performed by: INTERNAL MEDICINE

## 2023-10-25 PROCEDURE — 2500000003 HC RX 250 WO HCPCS: Performed by: STUDENT IN AN ORGANIZED HEALTH CARE EDUCATION/TRAINING PROGRAM

## 2023-10-25 PROCEDURE — 6370000000 HC RX 637 (ALT 250 FOR IP): Performed by: STUDENT IN AN ORGANIZED HEALTH CARE EDUCATION/TRAINING PROGRAM

## 2023-10-25 PROCEDURE — 6370000000 HC RX 637 (ALT 250 FOR IP): Performed by: GENERAL ACUTE CARE HOSPITAL

## 2023-10-25 PROCEDURE — 2580000003 HC RX 258: Performed by: STUDENT IN AN ORGANIZED HEALTH CARE EDUCATION/TRAINING PROGRAM

## 2023-10-25 RX ORDER — HYDRALAZINE HYDROCHLORIDE 20 MG/ML
10 INJECTION INTRAMUSCULAR; INTRAVENOUS EVERY 4 HOURS PRN
Status: DISCONTINUED | OUTPATIENT
Start: 2023-10-25 | End: 2023-10-27 | Stop reason: HOSPADM

## 2023-10-25 RX ADMIN — FERROUS SULFATE TAB 325 MG (65 MG ELEMENTAL FE) 325 MG: 325 (65 FE) TAB at 15:40

## 2023-10-25 RX ADMIN — DIVALPROEX SODIUM 500 MG: 125 CAPSULE ORAL at 21:20

## 2023-10-25 RX ADMIN — HYDRALAZINE HYDROCHLORIDE 100 MG: 50 TABLET, FILM COATED ORAL at 21:20

## 2023-10-25 RX ADMIN — ERYTHROPOIETIN 15000 UNITS: 20000 INJECTION, SOLUTION INTRAVENOUS; SUBCUTANEOUS at 21:36

## 2023-10-25 RX ADMIN — SODIUM CHLORIDE, PRESERVATIVE FREE 10 ML: 5 INJECTION INTRAVENOUS at 21:25

## 2023-10-25 RX ADMIN — HYDRALAZINE HYDROCHLORIDE 100 MG: 50 TABLET, FILM COATED ORAL at 15:39

## 2023-10-25 RX ADMIN — ACETAMINOPHEN 650 MG: 325 TABLET ORAL at 01:06

## 2023-10-25 RX ADMIN — CALCIUM ACETATE 2001 MG: 667 CAPSULE ORAL at 21:20

## 2023-10-25 RX ADMIN — CALCIUM ACETATE 2001 MG: 667 CAPSULE ORAL at 15:40

## 2023-10-25 ASSESSMENT — PAIN DESCRIPTION - LOCATION
LOCATION: GENERALIZED
LOCATION: GENERALIZED

## 2023-10-25 ASSESSMENT — PAIN DESCRIPTION - PAIN TYPE: TYPE: CHRONIC PAIN

## 2023-10-25 ASSESSMENT — PAIN SCALES - GENERAL: PAINLEVEL_OUTOF10: 0

## 2023-10-25 NOTE — PROGRESS NOTES
End of Shift Note    Bedside shift change report given to *** (oncoming nurse) by Samantha Rodriguez RN (offgoing nurse). Report included the following information {SBAR REPORTS EPTN:[id number]}    Shift worked:  7p-7a     Shift summary and any significant changes:       2100 pt refused insulin.    Concerns for physician to address:       Zone phone for oncoming shift:            Samantha Rodriguez RN

## 2023-10-25 NOTE — PROGRESS NOTES
Spiritual Care Assessment/Progress Note  Marlene    Name: Tom Bedoya MRN: [id number]    Age: 51 y.o. Sex: male   Language: English     Date: 10/25/2023            Total Time Calculated: 12 min              Spiritual Assessment begun in [facility] CARDIOPULMONARY CARE  Service Provided For[de-identified] Patient     Encounter Overview/Reason : Initial Encounter    Spiritual beliefs:      [] Involved in a shin tradition/spiritual practice:      [] Supported by a shin community:      [] Claims no spiritual orientation:      [] Seeking spiritual identity:           [] Adheres to an individual form of spirituality:      [x] Not able to assess:   did not indicate             Identified resources for coping and support system:   Support System: Unknown       [] Prayer                  [] Devotional reading               [] Music                  [] Guided Imagery     [] Pet visits                                        [] Other: (COMMENT)     Specific area/focus of visit   Encounter:    Crisis:    Spiritual/Emotional needs: Type: Other (comment) (No support needs expressed)  Ritual, Rites and Sacraments:    Grief, Loss, and Adjustments:    Ethics/Mediation:    Behavioral Health:    Palliative Care: Advance Care Planning:      Plan/Referrals: Continue Support (comment)    Narrative:  Reviewed chart prior to visit. Patient was seated on side of bed; had finished his lunch. Offered listening presence as he spoke briefly about events leading to hospitalization. He seemed only mildly interested in visit. He denied any needs for spiritual support. Advised him of ongoing availability of chaplains for support.     Leslie Sears MPS, [facility], Staff [facility]    [address] [place] [address] Paging Service  [phone]

## 2023-10-25 NOTE — PROGRESS NOTES
0720  Change of shift report received    0815  Assessment completed except lower extremities and sacrum buttocks refused. [address] completed. 0280  Refused morning medications. \"I don't want any pills. \"    4325  Mealtime insulin refused by patient despite blood sugar 322. \"You cannot tell me what to do with my body. \"  Patient educated on importance of blood sugar control. Continued to refuse insulin. Dr. Nicky Stoddard notified. 1240  Wound care RN at bedside for wound evaluation. 1537  Discharge order received from hospitalist.  Case management working on placement. Patient asked PCT \"if I am more compliant can I stay? \"  Provider notified. Patient allowed PO medications at this time. 1745  Mealtime coverage not needed. Order parameters not met. 2030  End of Shift Note    Bedside shift change report given to [place] (oncoming nurse) by Carl Lucas RN (offgoing nurse). Report included the following information SBAR, Kardex, MAR, Recent Results, and Cardiac Rhythm NSR    Shift worked:  7A-7P     Shift summary and any significant changes:     Patient refused full assessment., PO medications, insulin at lunch. Provider notified. Patient discharged pending case management discharge placement. Wound care evaluation. Concerns for physician to address:  Discharge planning     Zone phone for [facility] shift:   0132         Activity:     Number times ambulated in hallways past shift: 0  Number of times OOB to chair past shift: 0    Cardiac:   Cardiac Monitoring: Yes           Access:  Current line(s): PIV patient states does not work refused flush    Genitourinary:   Urinary status: oliguric    Respiratory:      Chronic home O2 use?: YES  Incentive spirometer at bedside: N/A       GI:     Current diet:  DIET ONE TIME MESSAGE;  ADULT DIET;  Regular; 1200 ml  Passing flatus: YES  Tolerating current diet: YES       Pain Management:   Patient states pain is manageable on current regimen: YES    Skin:     Interventions: increase time out of bed    Patient Safety:  Fall Score:    Interventions: bed/chair alarm, pt to call before getting OOB, and stay with me (per policy)       Length of Stay:  Expected LOS: 4  Actual LOS: 4      Carl Lucas RN

## 2023-10-26 LAB
GLUCOSE BLD STRIP.AUTO-MCNC: 132 MG/DL (ref 65–117)
GLUCOSE BLD STRIP.AUTO-MCNC: 149 MG/DL (ref 65–117)
GLUCOSE BLD STRIP.AUTO-MCNC: 428 MG/DL (ref 65–117)
GLUCOSE BLD STRIP.AUTO-MCNC: 70 MG/DL (ref 65–117)
SERVICE CMNT-IMP: ABNORMAL
SERVICE CMNT-IMP: NORMAL

## 2023-10-26 PROCEDURE — 99223 1ST HOSP IP/OBS HIGH 75: CPT | Performed by: NURSE PRACTITIONER

## 2023-10-26 PROCEDURE — 6370000000 HC RX 637 (ALT 250 FOR IP): Performed by: INTERNAL MEDICINE

## 2023-10-26 PROCEDURE — 2700000000 HC OXYGEN THERAPY PER DAY

## 2023-10-26 PROCEDURE — 2500000003 HC RX 250 WO HCPCS: Performed by: STUDENT IN AN ORGANIZED HEALTH CARE EDUCATION/TRAINING PROGRAM

## 2023-10-26 PROCEDURE — 6370000000 HC RX 637 (ALT 250 FOR IP): Performed by: STUDENT IN AN ORGANIZED HEALTH CARE EDUCATION/TRAINING PROGRAM

## 2023-10-26 PROCEDURE — 82962 GLUCOSE BLOOD TEST: CPT

## 2023-10-26 PROCEDURE — 6360000002 HC RX W HCPCS: Performed by: NURSE PRACTITIONER

## 2023-10-26 PROCEDURE — 6370000000 HC RX 637 (ALT 250 FOR IP): Performed by: GENERAL ACUTE CARE HOSPITAL

## 2023-10-26 PROCEDURE — 1100000003 HC PRIVATE W/ TELEMETRY

## 2023-10-26 PROCEDURE — 2580000003 HC RX 258: Performed by: STUDENT IN AN ORGANIZED HEALTH CARE EDUCATION/TRAINING PROGRAM

## 2023-10-26 RX ORDER — OXYCODONE HYDROCHLORIDE 5 MG/1
5 TABLET ORAL EVERY 6 HOURS PRN
Status: DISCONTINUED | OUTPATIENT
Start: 2023-10-26 | End: 2023-10-27 | Stop reason: HOSPADM

## 2023-10-26 RX ADMIN — SODIUM CHLORIDE, PRESERVATIVE FREE 10 ML: 5 INJECTION INTRAVENOUS at 20:47

## 2023-10-26 RX ADMIN — SODIUM CHLORIDE, PRESERVATIVE FREE 10 ML: 5 INJECTION INTRAVENOUS at 09:49

## 2023-10-26 RX ADMIN — ACETAMINOPHEN 650 MG: 325 TABLET ORAL at 09:01

## 2023-10-26 RX ADMIN — OXYCODONE HYDROCHLORIDE 5 MG: 5 TABLET ORAL at 12:57

## 2023-10-26 RX ADMIN — CALCIUM ACETATE 2001 MG: 667 CAPSULE ORAL at 09:00

## 2023-10-26 RX ADMIN — FERROUS SULFATE TAB 325 MG (65 MG ELEMENTAL FE) 325 MG: 325 (65 FE) TAB at 09:01

## 2023-10-26 RX ADMIN — HYDRALAZINE HYDROCHLORIDE 100 MG: 50 TABLET, FILM COATED ORAL at 09:01

## 2023-10-26 RX ADMIN — CALCIUM ACETATE 2001 MG: 667 CAPSULE ORAL at 12:57

## 2023-10-26 RX ADMIN — DIVALPROEX SODIUM 500 MG: 125 CAPSULE ORAL at 08:59

## 2023-10-26 RX ADMIN — ATENOLOL 25 MG: 25 TABLET ORAL at 09:00

## 2023-10-26 RX ADMIN — ASPIRIN 81 MG: 81 TABLET, COATED ORAL at 09:01

## 2023-10-26 RX ADMIN — ATORVASTATIN CALCIUM 40 MG: 40 TABLET, FILM COATED ORAL at 09:01

## 2023-10-26 RX ADMIN — HYDRALAZINE HYDROCHLORIDE 10 MG: 20 INJECTION INTRAMUSCULAR; INTRAVENOUS at 00:01

## 2023-10-26 RX ADMIN — DIVALPROEX SODIUM 500 MG: 125 CAPSULE ORAL at 20:54

## 2023-10-26 RX ADMIN — INSULIN LISPRO 4 UNITS: 100 INJECTION, SOLUTION INTRAVENOUS; SUBCUTANEOUS at 13:01

## 2023-10-26 RX ADMIN — HYDRALAZINE HYDROCHLORIDE 100 MG: 50 TABLET, FILM COATED ORAL at 12:57

## 2023-10-26 RX ADMIN — FERROUS SULFATE TAB 325 MG (65 MG ELEMENTAL FE) 325 MG: 325 (65 FE) TAB at 18:23

## 2023-10-26 RX ADMIN — HYDRALAZINE HYDROCHLORIDE 100 MG: 50 TABLET, FILM COATED ORAL at 20:46

## 2023-10-26 RX ADMIN — CALCIUM ACETATE 2001 MG: 667 CAPSULE ORAL at 20:46

## 2023-10-26 RX ADMIN — NIFEDIPINE 90 MG: 90 TABLET, EXTENDED RELEASE ORAL at 09:00

## 2023-10-26 ASSESSMENT — PAIN DESCRIPTION - DESCRIPTORS
DESCRIPTORS: ACHING

## 2023-10-26 ASSESSMENT — PAIN SCALES - GENERAL
PAINLEVEL_OUTOF10: 10
PAINLEVEL_OUTOF10: 0
PAINLEVEL_OUTOF10: 9
PAINLEVEL_OUTOF10: 10
PAINLEVEL_OUTOF10: 5
PAINLEVEL_OUTOF10: 8

## 2023-10-26 ASSESSMENT — PAIN DESCRIPTION - ORIENTATION
ORIENTATION: ANTERIOR
ORIENTATION: ANTERIOR

## 2023-10-26 ASSESSMENT — PAIN DESCRIPTION - LOCATION
LOCATION: ABDOMEN

## 2023-10-26 ASSESSMENT — PAIN DESCRIPTION - PAIN TYPE: TYPE: CHRONIC PAIN

## 2023-10-26 NOTE — PROGRESS NOTES
0715  Change of shift report received. 0732  Shift assessment completed. 0900  To dialysis. AM medications held for dialysis. [facility] only completed 2.5 hours of 4 hour course. Removed 2.5 liters. Patient refused to continue. 1420  Wound care completed as ordered. 1430  Change of shift report given to [facility].

## 2023-10-26 NOTE — PROGRESS NOTES
30   GLUCOSE 176* 168*   BUN 39* 53*   CREATININE 4.92* 6.17*   CALCIUM 7.4* 7.8*         Recent Labs     10/24/23  0510 10/25/23  0845   WBC 11.0 11.0   RBC 2.85* 2.76*   HGB 8.0* 7.8*   HCT 27.0* 25.9*   MCV 94.7 93.8   MCH 28.1 28.3   MCHC 29.6* 30.1   RDW 16.4* 16.0*    216   MPV 11.1 9.7       Lab Results   Component Value Date/Time    IRON 38 10/21/2023 11:52 AM    TIBC 207 (L) 10/21/2023 11:52 AM    LABIRON 14 (L) 07/13/2022 01:27 AM     No results found for: \"PTH\"  Lab Results   Component Value Date/Time    LABA1C 7.0 (H) 10/21/2023 01:59 AM     Lab Results   Component Value Date/Time    COLORU YELLOW/STRAW 07/12/2022 04:11 AM    CLARITYU CLEAR 07/12/2022 04:11 AM    GLUCOSEU Negative 07/12/2022 04:11 AM    BILIRUBINUR Negative 07/12/2022 04:11 AM    KETUA Negative 07/12/2022 04:11 AM    SPECGRAV 1.008 07/12/2022 04:11 AM    BLOODU TRACE (A) 07/12/2022 04:11 AM    PHUR 5.5 07/12/2022 04:11 AM    PROTEINU 300 (A) 07/12/2022 04:11 AM    NITRU Negative 07/12/2022 04:11 AM    LEUKOCYTESUR Negative 07/12/2022 04:11 AM     US Results (most recent):  Medication list  reviewed  Current Facility-Administered Medications   Medication Dose Route Frequency    hydrALAZINE (APRESOLINE) injection 10 mg  10 mg IntraVENous Q4H PRN    epoetin brennan (EPOGEN;PROCRIT) injection 15,000 Units  15,000 Units SubCUTAneous Once per day on Mon Wed Fri    ipratropium 0.5 mg-albuterol 2.5 mg (DUONEB) nebulizer solution 1 Dose  1 Dose Inhalation Q4H PRN    ferrous sulfate (IRON 325) tablet 325 mg  325 mg Oral BID WC    sodium chloride flush 0.9 % injection 5-40 mL  5-40 mL IntraVENous 2 times per day    sodium chloride flush 0.9 % injection 5-40 mL  5-40 mL IntraVENous PRN    0.9 % sodium chloride infusion   IntraVENous PRN    ondansetron (ZOFRAN-ODT) disintegrating tablet 4 mg  4 mg Oral Q8H PRN    Or    ondansetron (ZOFRAN) injection 4 mg  4 mg IntraVENous Q6H PRN    polyethylene glycol (GLYCOLAX) packet 17 g  17 g Oral Daily PRN acetaminophen (TYLENOL) tablet 650 mg  650 mg Oral Q6H PRN    Or    acetaminophen (TYLENOL) suppository 650 mg  650 mg Rectal Q6H PRN    insulin lispro (HUMALOG) injection vial 0-8 Units  0-8 Units SubCUTAneous TID WC    insulin lispro (HUMALOG) injection vial 0-4 Units  0-4 Units SubCUTAneous Nightly    insulin glargine (LANTUS) injection vial 15 Units  15 Units SubCUTAneous Nightly    glucose chewable tablet 16 g  4 tablet Oral PRN    dextrose bolus 10% 125 mL  125 mL IntraVENous PRN    Or    dextrose bolus 10% 250 mL  250 mL IntraVENous PRN    glucagon injection 1 mg  1 mg SubCUTAneous PRN    dextrose 10 % infusion   IntraVENous Continuous PRN    aspirin EC tablet 81 mg  81 mg Oral Daily    atenolol (TENORMIN) tablet 25 mg  25 mg Oral Daily    atorvastatin (LIPITOR) tablet 40 mg  40 mg Oral Daily    calcium acetate (PHOSLO) capsule 2,001 mg  3 capsule Oral TID    divalproex (DEPAKOTE SPRINKLE) DR capsule 500 mg  500 mg Oral BID    hydrALAZINE (APRESOLINE) tablet 100 mg  100 mg Oral TID    NIFEdipine (PROCARDIA XL) extended release tablet 90 mg  90 mg Oral Daily        Simona Beauchamp MD  10/26/2023

## 2023-10-26 NOTE — CONSULTS
Palliative Medicine  Patient Name: Cristal Valdivia  YOB: 1975  MRN: [id number]  Age: 51 y.o. Gender: male    Date of Initial Consult: 10/26/2023  Date of Service: 10/26/2023  Time: 4:13 PM  Provider: Miriam Amador Day: 7  Admit Date: 10/20/2023  Referring Provider: Marcelle Morris      Reasons for Consultation:  Goals of Care    HISTORY OF PRESENT ILLNESS (HPI):   Cristal Validvia is a 51 y.o. male with a past medical history of ESRD on HD, OM of the right foot, who was admitted on 10/20/2023 from home due to SOB and orthopnea with a diagnosis of symptomatic anemia, acute on chronic diastolic heart failure, mild pulmonary edema, bibasilar airspace dz, sm to mod bilateral pleural effusions, pulmonary HTN. 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 3 living children -live in the [place] area, has grandchildren   Mother: Rahat Hall. Patient also has a sister   Patient lives with his mother. The mother is disabled as well. Patient has not worked in a number of years. He worked as a cook in the past.  Julia Vuong was a passion. Patient like music- oldies and R&B     Patient needs help with dressing and bathing and with assistance to the BR. Aide provides his meals. Aide is there anywhere from 4- 8 hours a day.    Patient does not bear weight- wheelchair bound,  The patient's mother also has an aide   patient was able to drive before his blindness in January 2023. Has transportation take him to dialysis and to any appointments. PALLIATIVE DIAGNOSES:    Palliative care encounter  ESRD- on HD  SOB  COPD 3 LPM,   Acute on chronic diastolic HF   Blindness- since Jan 2023  Frequent hospitalizations  Chronic OM with right foot wound   Peripheral neuropathy         Pertinent Hospital Diagnoses:  Principal Problem:    Acute on chronic anemia  Resolved Problems:    * No resolved hospital problems. *      ASSESSMENT AND PLAN:   Today, I am treating Ann Ybarra for multiple medical conditions including ESRD, acute on chronic HF, COPD, sz disorder, PVD, blindness. Patient seen and examined. Met with patient and reviewed the role of palliative medicine. Reviewed the reason for this hospital admission  Patient states his SOB is not any better since he was admitted. We reviewed the multiple medical conditions that contribute to his SOB. Reviewed the frequent ED visits and hospitalizations. Noted that SOB is often a reason for coming to the hospital.      Reviewed the patient's current symptoms:   SOB, doesn't sleep well at night due to the SOB (sleeping with his head elevated does not help). Patient reports his appetite is OK, regular BM. Patient c/o itching at the present and states the itching is from the oxycodone. He would rather have dilaudid as this does not cause itching. Patient has pain in the right foot and right leg   Sharp and electric shocks. States gabapentin has not helped. Patient states \"my mother takes care of everything\" when asked about an AMD.    Patient does have 3 children who would be the legal NOK if there is no AMD.    Discussed code status with the patient and all that CPR involves. We discussed the likelihood of a poor outcome if patient were to go through CPR>  Patient wants to include his mother in this discussion.  Wants me to return tomorrow and we will call her via phone   Initial

## 2023-10-26 NOTE — PROGRESS NOTES
Hospitalist Progress Note    NAME:   Charli Neumann   : 1975   MRN: [id number]       Patient PCP: Nabil Bashir MD    Estimated discharge date: stable for DC  Barriers: pt appealed the DC, CM working on LTC    Assessment / Plan:      Symptomatic anemia  Shortness of breath  Acute on chronic diastolic HF  CXR with mild pulmonary edema, bibasilar airspace disease and small to moderate bilateral pleural effusions  Pulmonary hypertension  Given 2 unit of blood   CXR: Unchanged cardiomegaly with mild pulmonary edema, bibasilar airspace disease, and small to moderate bilateral pleural effusions  Fluid management with dialysis  proBNP elevated, likely due to kidney function  ECHO with normal EF, Right ventricle is dilated with Reduced systolic function. Tricuspid Valve: Mild to mod regurg with an eccentrically directed jet and may underestimate severity. The estimated RVSP is 42 mmHg. Right atrium is dilated. Pulmonary hypertension.   Epogen    Ferrous sulfate BID  Fluid restriction 1200 ml     ESRD [facility]  Nephrology consulted  Epogen  Potassium within normal limits     COPD  Baseline he is on 3 L nasal cannula, started on home O2 by end of 2023 for fluid overload  Currently on 3  No wheezing  Shortness of breath likely due to symptomatic anemia versus effusions  Breathing treatment as needed     HTN  Continue with home medication  Monitor blood pressure    DM  Lantus for basal coverage  Lispro sliding scale  Keep blood sugar 1 40-1 80     Hx of seizure:   Continue with home Depakote    PVD  S/p amputation of left forefoot d/t OM  Blindness from CVA  Non complaint     Medical Decision Making:   I personally reviewed labs: yes, as below   I personally reviewed imaging reports: yes, as below   Toxic drug monitoring:   Discussed case with: Pt, RN, CM for placement   Code Status: Full Code   DVT Prophylaxis: scd  GI Prophylaxis:     Subjective:  Assessment / Plan:      SOB, same  On baseline 3 L/M  Leg Alert and oriented X 3, normal speech,   Psych:   Not anxious nor agitated  Skin/MSK: No rashes.   No jaundice  MSK  Foot amputation       Current Inpatient Medications:      Current Facility-Administered Medications:     hydrALAZINE (APRESOLINE) injection 10 mg, 10 mg, IntraVENous, Q4H PRN, Sue Dc APRN - NP, 10 mg at 10/26/23 0001    epoetin brennan (EPOGEN;PROCRIT) injection 15,000 Units, 15,000 Units, SubCUTAneous, Once per day on Mon Wed Fri, Shruthi Francis MD, 15,000 Units at 10/25/23 2136    ipratropium 0.5 mg-albuterol 2.5 mg (DUONEB) nebulizer solution 1 Dose, 1 Dose, Inhalation, Q4H PRN, Bobby Moore MD    ferrous sulfate (IRON 325) tablet 325 mg, 325 mg, Oral, BID WC, Bobby Moore MD, 325 mg at 10/25/23 1540    sodium chloride flush 0.9 % injection 5-40 mL, 5-40 mL, IntraVENous, 2 times per day, Ailyn Bhatia MD, 10 mL at 10/25/23 2125    sodium chloride flush 0.9 % injection 5-40 mL, 5-40 mL, IntraVENous, PRN, Ailyn Bhatia MD    0.9 % sodium chloride infusion, , IntraVENous, PRN, Ailyn Bhatia MD    ondansetron (ZOFRAN-ODT) disintegrating tablet 4 mg, 4 mg, Oral, Q8H PRN **OR** ondansetron (ZOFRAN) injection 4 mg, 4 mg, IntraVENous, Q6H PRN, Ailyn Bhatia MD    polyethylene glycol (GLYCOLAX) packet 17 g, 17 g, Oral, Daily PRN, Ailyn Bhatia MD    acetaminophen (TYLENOL) tablet 650 mg, 650 mg, Oral, Q6H PRN, 650 mg at 10/25/23 0106 **OR** acetaminophen (TYLENOL) suppository 650 mg, 650 mg, Rectal, Q6H PRN, Ailyn Bhatia MD    insulin lispro (HUMALOG) injection vial 0-8 Units, 0-8 Units, SubCUTAneous, TID WC, Ailyn Bhatia MD, 2 Units at 10/22/23 1136    insulin lispro (HUMALOG) injection vial 0-4 Units, 0-4 Units, SubCUTAneous, Nightly, Ailyn Bhatia MD    insulin glargine (LANTUS) injection vial 15 Units, 15 Units, SubCUTAneous, Nightly, Ailyn Bhatia MD    glucose chewable tablet 16 g, 4 tablet, Oral, PRN, Ailyn Bhatia MD    dextrose bolus

## 2023-10-26 NOTE — PROGRESS NOTES
Physician Progress Note      Osman Healy  CSN #:                  [id number]  :                       1975  ADMIT DATE:       10/20/2023 2:20 AM  DISCH DATE:  RESPONDING  PROVIDER #:        Alysia Nuno MD          QUERY TEXT:    Dr Madi Brown  Pt admitted with acute on chronic diastolic CHF. Pt noted to have resp rates   39-32-28 with accessory muscle usage. If possible, please document in the   progress notes and discharge summary if you are evaluating and/or treating any   of the following: The medical record reflects the following:  Risk Factors: COPD home oxygen used, anemia, DM  Clinical Indicators: Resp Rate 39--32-35-28, Tachypnea and accessory muscle   usage present  Treatment: titrate home oxygen as needed, Nebs,  Options provided:  -- Acute on chronic respiratory failure with hypoxia  -- Acute on chronic respiratory failure with hypercapnia  -- Acute on chronic respiratory failure with hypoxia and hypercapnia  -- Other - I will add my own diagnosis  -- Disagree - Not applicable / Not valid  -- Disagree - Clinically unable to determine / Unknown  -- Refer to Clinical Documentation Reviewer    PROVIDER RESPONSE TEXT:    Provider disagreed with this query. Query created by:  Kaity Cheney on 10/26/2023 10:31 AM      Electronically signed by:  Alysia Nuno MD 10/26/2023 2:25 PM

## 2023-10-26 NOTE — PROGRESS NOTES
Hospitalist Progress Note    NAME:   Kellie Pedro   : 1975   MRN: [id number]       Patient PCP: Kaykay Carmona MD    Estimated discharge date: stable for DC  Barriers: pt appealed the DC, CM working on LTC    Assessment / Plan:      Symptomatic anemia  Shortness of breath  Acute on chronic diastolic HF  CXR with mild pulmonary edema, bibasilar airspace disease and small to moderate bilateral pleural effusions  Pulmonary hypertension  Given 2 unit of blood   CXR: Unchanged cardiomegaly with mild pulmonary edema, bibasilar airspace disease, and small to moderate bilateral pleural effusions  Fluid management with dialysis  proBNP elevated, likely due to kidney function  ECHO with normal EF, Right ventricle is dilated with Reduced systolic function. Tricuspid Valve: Mild to mod regurg with an eccentrically directed jet and may underestimate severity. The estimated RVSP is 42 mmHg. Right atrium is dilated. Pulmonary hypertension. Epogen    Ferrous sulfate BID  Fluid restriction 1200 ml     ESRD [facility]  Nephrology consulted  Epogen  Potassium within normal limits     COPD  Baseline he is on 3 L nasal cannula, started on home O2 by end of 2023 for fluid overload  Currently on 3  No wheezing  Shortness of breath likely due to symptomatic anemia versus effusions  Breathing treatment as needed     HTN  Continue with home medication  Monitor blood pressure    DM  Lantus for basal coverage  Lispro sliding scale  Keep blood sugar 1 40-1 80     Hx of seizure:   Continue with home Depakote    PVD  S/p amputation of left forefoot d/t OM  Blindness from CVA  Non complaint     Medical Decision Making:   I personally reviewed labs: yes, as below   I personally reviewed imaging reports: yes, as below   Toxic drug monitoring:   Discussed case with: Pt, RN, CM for placement   Code Status: Full Code   DVT Prophylaxis: scd  GI Prophylaxis:     Subjective:  Assessment / Plan:      No complaint during round.   Patient

## 2023-10-27 VITALS
BODY MASS INDEX: 24.38 KG/M2 | WEIGHT: 180 LBS | OXYGEN SATURATION: 99 % | HEART RATE: 76 BPM | RESPIRATION RATE: 18 BRPM | DIASTOLIC BLOOD PRESSURE: 56 MMHG | HEIGHT: 72 IN | SYSTOLIC BLOOD PRESSURE: 138 MMHG | TEMPERATURE: 97.6 F

## 2023-10-27 PROBLEM — N18.6 ESRD (END STAGE RENAL DISEASE) (HCC): Status: ACTIVE | Noted: 2023-10-27

## 2023-10-27 PROBLEM — J81.1 CHRONIC PULMONARY EDEMA: Status: ACTIVE | Noted: 2023-10-27

## 2023-10-27 PROBLEM — L97.912 CHRONIC ULCER OF RIGHT LOWER EXTREMITY WITH FAT LAYER EXPOSED (HCC): Status: ACTIVE | Noted: 2023-01-01

## 2023-10-27 PROBLEM — L97.922 ULCER OF LEFT LOWER EXTREMITY WITH FAT LAYER EXPOSED (HCC): Status: ACTIVE | Noted: 2023-10-27

## 2023-10-27 PROBLEM — D64.9 ACUTE ANEMIA: Status: ACTIVE | Noted: 2023-01-01

## 2023-10-27 PROBLEM — I63.50 CEREBROVASCULAR ACCIDENT (CVA) DUE TO OCCLUSION OF CEREBRAL ARTERY (HCC): Status: ACTIVE | Noted: 2023-01-01

## 2023-10-27 LAB
ALBUMIN SERPL-MCNC: 2.4 G/DL (ref 3.5–5)
ANION GAP SERPL CALC-SCNC: 4 MMOL/L (ref 5–15)
BASOPHILS # BLD: 0 K/UL (ref 0–0.1)
BASOPHILS NFR BLD: 0 % (ref 0–1)
BUN SERPL-MCNC: 55 MG/DL (ref 6–20)
BUN/CREAT SERPL: 9 (ref 12–20)
CALCIUM SERPL-MCNC: 8.2 MG/DL (ref 8.5–10.1)
CHLORIDE SERPL-SCNC: 94 MMOL/L (ref 97–108)
CO2 SERPL-SCNC: 31 MMOL/L (ref 21–32)
CREAT SERPL-MCNC: 6.04 MG/DL (ref 0.7–1.3)
DIFFERENTIAL METHOD BLD: ABNORMAL
EOSINOPHIL # BLD: 0 K/UL (ref 0–0.4)
EOSINOPHIL NFR BLD: 0 % (ref 0–7)
ERYTHROCYTE [DISTWIDTH] IN BLOOD BY AUTOMATED COUNT: 16 % (ref 11.5–14.5)
GLUCOSE BLD STRIP.AUTO-MCNC: 174 MG/DL (ref 65–117)
GLUCOSE BLD STRIP.AUTO-MCNC: 255 MG/DL (ref 65–117)
GLUCOSE SERPL-MCNC: 266 MG/DL (ref 65–100)
HCT VFR BLD AUTO: 26.4 % (ref 36.6–50.3)
HGB BLD-MCNC: 7.8 G/DL (ref 12.1–17)
IMM GRANULOCYTES # BLD AUTO: 0.1 K/UL (ref 0–0.04)
IMM GRANULOCYTES NFR BLD AUTO: 1 % (ref 0–0.5)
LYMPHOCYTES # BLD: 0.9 K/UL (ref 0.8–3.5)
LYMPHOCYTES NFR BLD: 10 % (ref 12–49)
MCH RBC QN AUTO: 27.5 PG (ref 26–34)
MCHC RBC AUTO-ENTMCNC: 29.5 G/DL (ref 30–36.5)
MCV RBC AUTO: 93 FL (ref 80–99)
MONOCYTES # BLD: 1 K/UL (ref 0–1)
MONOCYTES NFR BLD: 10 % (ref 5–13)
NEUTS SEG # BLD: 7.6 K/UL (ref 1.8–8)
NEUTS SEG NFR BLD: 79 % (ref 32–75)
NRBC # BLD: 0.02 K/UL (ref 0–0.01)
NRBC BLD-RTO: 0.2 PER 100 WBC
PHOSPHATE SERPL-MCNC: 6.3 MG/DL (ref 2.6–4.7)
PLATELET # BLD AUTO: 229 K/UL (ref 150–400)
PMV BLD AUTO: 10.3 FL (ref 8.9–12.9)
POTASSIUM SERPL-SCNC: 5.1 MMOL/L (ref 3.5–5.1)
RBC # BLD AUTO: 2.84 M/UL (ref 4.1–5.7)
SERVICE CMNT-IMP: ABNORMAL
SERVICE CMNT-IMP: ABNORMAL
SODIUM SERPL-SCNC: 129 MMOL/L (ref 136–145)
WBC # BLD AUTO: 9.6 K/UL (ref 4.1–11.1)

## 2023-10-27 PROCEDURE — 85025 COMPLETE CBC W/AUTO DIFF WBC: CPT

## 2023-10-27 PROCEDURE — 2700000000 HC OXYGEN THERAPY PER DAY

## 2023-10-27 PROCEDURE — 90935 HEMODIALYSIS ONE EVALUATION: CPT

## 2023-10-27 PROCEDURE — 80069 RENAL FUNCTION PANEL: CPT

## 2023-10-27 PROCEDURE — 99497 ADVNCD CARE PLAN 30 MIN: CPT | Performed by: NURSE PRACTITIONER

## 2023-10-27 PROCEDURE — 82962 GLUCOSE BLOOD TEST: CPT

## 2023-10-27 PROCEDURE — 99233 SBSQ HOSP IP/OBS HIGH 50: CPT | Performed by: NURSE PRACTITIONER

## 2023-10-27 PROCEDURE — 36415 COLL VENOUS BLD VENIPUNCTURE: CPT

## 2023-10-27 NOTE — PROGRESS NOTES
Spiritual Care Assessment/Progress Note  Marlene    Name: Tony Lorenzana MRN: [id number]    Age: 51 y.o. Sex: male   Language: English     Date: 10/27/2023            Total Time Calculated: 12 min              Spiritual Assessment begun in [facility] CARDIOPULMON[facility] C[facility]  Service Provided For[de-identified] Patient not available  Referral/Consult From[de-identified] Other (comment) ( English)  Encounter Overview/Reason : Follow-up    Spiritual beliefs:      [] Involved in a shin tradition/spiritual practice:      [] Supported by a shin community:      [] Claims no spiritual orientation:      [] Seeking spiritual identity:           [] Adheres to an individual form of spirituality:      [x] Not able to assess:                Identified resources for coping and support system:   Support System: Unknown       [] Prayer                  [] Devotional reading               [] Music                  [] Guided Imagery     [] Pet visits                                        [] Other: (COMMENT)     Specific area/focus of visit   Encounter:    Crisis:    Spiritual/Emotional needs: Type: Spiritual Support  Ritual, Rites and Sacraments:    Grief, Loss, and Adjustments:    Ethics/Mediation:    Behavioral Health:    Palliative Care: Advance Care Planning:      Plan/Referrals: Continue Support (comment)    Narrative:  received a consult order from Nba SCHREIBER in reference to an Advance Medical Directive.  reviewed the patient's chart prior to the visit. Mr. Angie Alamo was asleep when the  entered his room. Please note the Karmen Bowden LCSW tried to visit with the patient today as well.  services are available 24 hours a day as requested. Rev. YURY Mancilla  [address]   Paging Service [phone])

## 2023-10-27 NOTE — PLAN OF CARE
Problem: Respiratory - Adult  Goal: Achieves optimal ventilation and oxygenation  Outcome: Progressing  Flowsheets  Taken 10/21/2023 1910 by Mike Valdivia RN  Achieves optimal ventilation and oxygenation:   Assess for changes in respiratory status   Assess for changes in mentation and behavior   Position to facilitate oxygenation and minimize respiratory effort   Oxygen supplementation based on oxygen saturation or arterial blood gases  Taken 10/21/2023 0800 by Wanda Barrientos RN  Achieves optimal ventilation and oxygenation: Assess for changes in respiratory status     Problem: Pain  Goal: Verbalizes/displays adequate comfort level or baseline comfort level  Outcome: Progressing  Flowsheets (Taken 10/21/2023 1940)  Verbalizes/displays adequate comfort level or baseline comfort level:   Encourage patient to monitor pain and request assistance   Assess pain using appropriate pain scale     Problem: Safety - Adult  Goal: Free from fall injury  Outcome: Progressing     Problem: Skin/Tissue Integrity  Goal: Absence of new skin breakdown  Description: 1. Monitor for areas of redness and/or skin breakdown  2. Assess vascular access sites hourly  3. Every 4-6 hours minimum:  Change oxygen saturation probe site  4. Every 4-6 hours:  If on nasal continuous positive airway pressure, respiratory therapy assess nares and determine need for appliance change or resting period.   Outcome: Progressing
Problem: Respiratory - Adult  Goal: Achieves optimal ventilation and oxygenation  Outcome: Progressing  Flowsheets (Taken 10/22/2023 0805)  Achieves optimal ventilation and oxygenation: Assess for changes in respiratory status     Problem: Discharge Planning  Goal: Discharge to home or other facility with appropriate resources  Outcome: Progressing  Flowsheets (Taken 10/22/2023 0805)  Discharge to home or other facility with appropriate resources: Identify barriers to discharge with patient and caregiver     Problem: Pain  Goal: Verbalizes/displays adequate comfort level or baseline comfort level  Outcome: Progressing  Flowsheets  Taken 10/22/2023 1507  Verbalizes/displays adequate comfort level or baseline comfort level:   Encourage patient to monitor pain and request assistance   Assess pain using appropriate pain scale  Taken 10/22/2023 0805  Verbalizes/displays adequate comfort level or baseline comfort level:   Encourage patient to monitor pain and request assistance   Assess pain using appropriate pain scale     Problem: Safety - Adult  Goal: Free from fall injury  Outcome: Progressing     Problem: Skin/Tissue Integrity  Goal: Absence of new skin breakdown  Description: 1. Monitor for areas of redness and/or skin breakdown  2. Assess vascular access sites hourly  3. Every 4-6 hours minimum:  Change oxygen saturation probe site  4. Every 4-6 hours:  If on nasal continuous positive airway pressure, respiratory therapy assess nares and determine need for appliance change or resting period.   Outcome: Progressing     Problem: Chronic Conditions and Co-morbidities  Goal: Patient's chronic conditions and co-morbidity symptoms are monitored and maintained or improved  Outcome: Progressing  Flowsheets (Taken 10/22/2023 0805)  Care Plan - Patient's Chronic Conditions and Co-Morbidity Symptoms are Monitored and Maintained or Improved: Monitor and assess patient's chronic conditions and comorbid symptoms for
discharge  Outcome: Adequate for Discharge  Goal: Absence of infection during hospitalization  Outcome: Adequate for Discharge  Goal: Absence of fever/infection during anticipated neutropenic period  Outcome: Adequate for Discharge     Problem: Metabolic/Fluid and Electrolytes - Adult  Goal: Electrolytes maintained within normal limits  Outcome: Adequate for Discharge  Goal: Hemodynamic stability and optimal renal function maintained  Outcome: Adequate for Discharge  Goal: Glucose maintained within prescribed range  Outcome: Adequate for Discharge     Problem: Discharge Planning  Goal: Discharge to home or other facility with appropriate resources  Outcome: Adequate for Discharge     Problem: Safety - Adult  Goal: Free from fall injury  Outcome: Adequate for Discharge     Problem: Skin/Tissue Integrity  Goal: Absence of new skin breakdown  Description: 1. Monitor for areas of redness and/or skin breakdown  2. Assess vascular access sites hourly  3. Every 4-6 hours minimum:  Change oxygen saturation probe site  4. Every 4-6 hours:  If on nasal continuous positive airway pressure, respiratory therapy assess nares and determine need for appliance change or resting period.   Outcome: Adequate for Discharge     Problem: Chronic Conditions and Co-morbidities  Goal: Patient's chronic conditions and co-morbidity symptoms are monitored and maintained or improved  Outcome: Adequate for Discharge     Problem: ABCDS Injury Assessment  Goal: Absence of physical injury  Outcome: Adequate for Discharge

## 2023-10-27 NOTE — ACP (ADVANCE CARE PLANNING)
Patient has resumed smoking at about 3-4 cigarettes per day. She states that she relapsed after the death of two family members. She has not been as consistent with Chantix dose the last few weeks. We discussed and reviewed: triggers, self awareness, is she ready to refocus and recommit on her quit, strategies of delay, distract, move it, coping interventions and self care.  The patient will resume the prescribed tobacco cessation medication regimen of 1 mg Chantix BID. She will follow up in clinic in 2 weeks.    Advance Care Planning     Advance Care Planning (ACP) Physician/NP/PA Conversation    Date of Conversation:  10/27/2023  Conducted with: Patient with [address]: Named in Advance Directive or Healthcare Power of  (name) [address] [place] [address] Decision Maker:    Primary Decision Maker: Elver Mora - Parent - [phone]    Secondary Decision Maker: Gio Arias - Brother/Sister - [phone]  Click here to complete Healthcare Decision Makers including selection of the Healthcare Decision Maker Relationship (ie \"Primary\"). Today we documented desired Decision Maker(s), who is (are) NOT Legal Next of Kin. ACP documents are required for decision maker authority. Patient elected his sister as the secondary MPOA. He has 3 children but elected the sister    Care Preferences:    Hospitalization: \"If your health worsens and it becomes clear that your chance of recovery is unlikely, what would be your preference regarding hospitalization? \"  The patient would prefer hospitalization. Ventilation: \"If you were unable to breath on your own and your chance of recovery was unlikely, what would be your preference about the use of a ventilator (breathing machine) if it was available to you? \"  The patient would desire the use of a ventilator. Resuscitation: \"In the event your heart stopped as a result of an underlying serious health condition, would you want attempts made to restart your heart, or would you prefer a natural death? \"  Yes, attempt to resuscitate.     treatment goals and benefit/burden of treatment options    Conversation Outcomes / Follow-Up Plan:  ACP complete - no further action today  Reviewed DNR/DNI and patient elects Full Code (Attempt Resuscitation)    Length of Voluntary ACP Conversation in minutes:  30 minutes    KATIE Shrestha - NP

## 2023-10-27 NOTE — PROGRESS NOTES
before his blindness in January 2023. Has transportation take him to dialysis and to any appointments. PALLIATIVE DIAGNOSES:    Palliative care encounter  ESRD- on HD  SOB  COPD 3 LPM,   Acute on chronic diastolic HF   Blindness- since Jan 2023  Frequent hospitalizations  Chronic OM with right foot wound   Peripheral neuropathy         Pertinent Hospital Diagnoses:  Principal Problem:    Acute on chronic anemia  Resolved Problems:    * No resolved hospital problems. *      ASSESSMENT AND PLAN:   Today, I am treating Rebeca Nunez for multiple medical conditions including ESRD, acute on chronic HF, COPD, sz disorder, PVD, blindness. Patient back to his room after dialysis. Asked if OK to call his mother to ask about AMD and continue discussion about code status. Patient acknowledged this was OK. Call the patient's mother, Bria Dillon. Asked about the patient's AMD.  Patient said not to worry about it. Nato Braun indicated the patient had completed an AMD in the past and she was only decision maker. Discussed with D and his mother that perhaps he would want to list a second person. The patient's 3 children would be the legal NOK/   D does not want his children to make decisions. Patient to list his sister, Kyaw Sosa as the secondary. Completed the AMD. Since the patient is blind, the RN came to read the document back to the patient. AMD completed  Discussed code status including what CPR includes and the likelihood of a poor outcome if the patient were to have a cardiac arrest.   Patient wanted his mother to weigh in.  Nato Braun said to continue with Full Code status. See ACP note. Case management in to discuss discharge plans   Initial consult note routed to primary continuity provider and/or primary health care team members  Please call with any palliative questions or concerns. Palliative Care Team is available via perfect serve or via phone.     Referrals to:   [] Outpatient (176 lb 5.9 oz)   08/02/23 81.6 kg (180 lb)   07/24/23 81.6 kg (180 lb)   07/15/23 89.9 kg (198 lb 3.1 oz)   06/29/23 82 kg (180 lb 12.4 oz)   06/24/23 81.6 kg (180 lb)   05/18/23 82 kg (180 lb 12.4 oz)   05/16/23 82 kg (180 lb 12.8 oz)        Current Diet: DIET ONE TIME MESSAGE;  ADULT DIET; Regular; 1200 ml; No Pork       PSYCHOSOCIAL/SPIRITUAL SCREENING:   Palliative IDT has assessed this patient for cultural preferences / practices and a referral made as appropriate to needs (Cultural Services, Patient Advocacy, Ethics, etc.)    Spiritual Affiliation: Caodaism    Any spiritual / Advent concerns:  [] Yes /  [x] No   If \"Yes\" to discuss with pastoral care during IDT     Does caregiver feel burdened by caring for their loved one:   [] Yes /  [x] No /  [] No Caregiver Present/Available [] No Caregiver [] Pt Lives at [facility]  If \"Yes\" to discuss with social work during IDT    Anticipatory grief assessment:   [x] Normal  / [] Maladaptive     If \"Maladaptive\" to discuss with social work during IDT    ESAS Anxiety:      ESAS Depression:          LAB AND IMAGING FINDINGS:   Objective data reviewed:  labs, images, records, medication use, vitals, and chart     FINAL COMMENTS   Thank you for allowing Palliative Medicine to participate in the care of Joshua Joyce. Only check if applicable and billing time based rather than MDM  [x] The total encounter time on this service date was 52 minutes which was spent performing a face-to-face encounter and personally completing the provider-level activities documented in the note. This includes time spent prior to the visit and after the visit in direct care of the patient. This time does not include time spent in any separately reportable services.     Electronically signed by   KATIE Blankenship NP  Palliative Care Team  on 10/27/2023 at 5:09 PM

## 2023-10-27 NOTE — ACP (ADVANCE CARE PLANNING)
Advance Care Planning     Advance Care Planning Inpatient Note  Spiritual Care Department    Today's Date: 10/27/2023  Unit: [facility] 2 CARDIOPULMONARY CARE    Received request from [employer]. Upon review of chart and communication with care team, Spiritual Care will defer advance care planning with patient at this time. . Patient was/were present in the room during visit. Goals of ACP Conversation:  Patient is asleep. Health Care Decision Makers:     No healthcare decision makers have been documented. Click here to complete [address] including selection of the Healthcare Decision Maker Relationship (ie \"Primary\")  Summary:  No Decision Maker named by patient at this time    Advance Care Planning Documents (Patient Wishes):  Living Will/Advance Directive     Assessment:   received a consult order from Nba SCHREIBER in reference to an [address] [place] [address] Directive.  reviewed the patient's chart prior to the visit. Mr. Mateo Garcia was asleep when the  entered his room. Please note the Danny Fitzpatrick [facility] tried to visit with the patient today as well. Interventions:  Patient is asleep.     Care Preferences Communicated:   No    Outcomes/Plan:  N/A    Electronically signed by Heidi Lanes on 10/27/2023 at 3:07 PM

## 2023-10-27 NOTE — PROGRESS NOTES
Hospitalist Progress Note    NAME:   Cindy Rice   : 1975   MRN: [id number]       Patient PCP: Marianela Garrison MD    Estimated discharge date: stable for DC  Barriers: pt appealed the DC, CM working on LTC    Assessment / Plan:      Symptomatic anemia  Shortness of breath  Acute on chronic diastolic HF  CXR with mild pulmonary edema, bibasilar airspace disease and small to moderate bilateral pleural effusions  Pulmonary hypertension  Given 2 unit of blood   CXR: Unchanged cardiomegaly with mild pulmonary edema, bibasilar airspace disease, and small to moderate bilateral pleural effusions  Fluid management with dialysis  proBNP elevated, likely due to kidney function  ECHO with normal EF, Right ventricle is dilated with Reduced systolic function. Tricuspid Valve: Mild to mod regurg with an eccentrically directed jet and may underestimate severity. The estimated RVSP is 42 mmHg. Right atrium is dilated. Pulmonary hypertension. Epogen    Ferrous sulfate BID  Fluid restriction 1200 ml     ESRD [facility]  Nephrology consulted  Epogen  Potassium within normal limits     COPD  Baseline he is on 3 L nasal cannula, started on home O2 by end of 2023 for fluid overload  Currently on 3  No wheezing  Shortness of breath likely due to symptomatic anemia versus effusions  Breathing treatment as needed     HTN  Continue with home medication  Monitor blood pressure    DM  Lantus for basal coverage  Lispro sliding scale  Keep blood sugar 1 40-1 80     Hx of seizure:   Continue with home Depakote    PVD  S/p amputation of left forefoot d/t OM  Blindness from CVA  Non complaint     Medical Decision Making:   I personally reviewed labs: yes, as below   I personally reviewed imaging reports: yes, as below   Toxic drug monitoring:   Discussed case with: Pt, RN, CM for placement   Code Status: Full Code   DVT Prophylaxis: scd  GI Prophylaxis:     Subjective:  Assessment / Plan:      No complaint during round. an eccentrically   directed jet and may underestimate severity. The estimated RVSP is 42   mmHg. Left Atrium: Left atrium is dilated. Right Atrium: Right atrium is dilated. Pulmonary Arteries: Pulmonary hypertension present. IVC/SVC: IVC diameter is greater than 21 mm and decreases less than 50%   during inspiration; therefore the estimated right atrial pressure is   elevated (~15 mmHg). XR CHEST PORTABLE  Narrative: EXAM:  XR CHEST PORTABLE    INDICATION: Shortness of breath    COMPARISON: 10/10/2023    TECHNIQUE: Portable chest AP view    FINDINGS: Heart size remains enlarged. There is mild pulmonary edema with  bibasilar airspace disease and small to moderate bilateral pleural effusions,  similar to the prior study. The visualized bones and upper abdomen are  unremarkable. Impression: Unchanged cardiomegaly with mild pulmonary edema, bibasilar airspace disease,  and small to moderate bilateral pleural effusions. Microbiology:  Results       ** No results found for the last 336 hours. **          ECHO:   10/20/23    ECHO (TTE) COMPLETE (PRN CONTRAST/BUBBLE/STRAIN/3D) 10/20/2023  4:06 PM (Final)    Interpretation Summary    Left Ventricle: Normal left ventricular systolic function with a visually estimated EF of 55 - 60%. Left ventricle size is normal. Increased wall thickness. Normal wall motion. Right Ventricle: Right ventricle is dilated. Reduced systolic function. Tricuspid Valve: Mild to moderate regurgitation with an eccentrically directed jet and may underestimate severity. The estimated RVSP is 42 mmHg. Left Atrium: Left atrium is dilated. Right Atrium: Right atrium is dilated. Pulmonary Arteries: Pulmonary hypertension present. IVC/SVC: IVC diameter is greater than 21 mm and decreases less than 50% during inspiration; therefore the estimated right atrial pressure is elevated (~15 mmHg).     Signed by: Latrell Weinstein MD on 10/20/2023  4:06 PM    Procedures: see

## 2023-10-27 NOTE — PROGRESS NOTES
Palliative Medicine SW Note    LCSW and NP Maximino Russ attempted to see patient, however he was ARLENE for dialysis. This writer plans to just introduce self to offer psychosocial support to patient and family as appropriate. Thank you for including Palliative team in the care of  Mr. Nirmala Ferguson.     Tabitha Ford, [facility]  Palliative Medicine [phone] ([phone])

## 2023-10-27 NOTE — DISCHARGE INSTRUCTIONS
HOSPITALIST DISCHARGE INSTRUCTIONS    NAME: Adam Smiley   :  1975   MRN:  [id number]     Date/Time:  10/27/2023 6:16 PM    ADMIT DATE: 10/20/2023     DISCHARGE DATE: 10/27/2023     DISCHARGE DIAGNOSIS:  Symptomatic anemia  Shortness of breath  Acute on chronic diastolic HF  CXR with mild pulmonary edema, bibasilar airspace disease and small to moderate bilateral pleural effusions  Pulmonary hypertension  ESRD MWF  COPD- Baseline he is on 3 L nasal cannula, started on home O2 by end of 2023 for fluid overload  HTN  DM  Seizure dz  PVD  S/p amputation of left forefoot d/t OM  Blindness from CVA  Non complaint   Full code    MEDICATIONS:  As per medication reconciliation  list  It is important that you take the medication exactly as they are prescribed. Keep your medication in the bottles provided by the pharmacist and keep a list of the medication names, dosages, and times to be taken in your wallet. Do not take other medications without consulting your doctor. Pain Management: per above medications    What to do at Home    Recommended diet:  regular diet, cardiac diet, and renal diet    Recommended activity: activity as tolerated    If you have questions regarding the hospital related prescriptions or hospital related issues please call at [phone]. If you experience any of the following symptoms then please call your primary care physician or return to the emergency room if you cannot get hold of your doctor:  Fever, chills, nausea, vomiting, diarrhea, change in mentation, falling, bleeding, shortness of breath,     Follow Up:  PCP  you are to call and set up an appointment to see them in 7-10 days. OP dialysis Monday now      Information obtained by :  I understand that if any problems occur once I am at home I am to contact my physician. I understand and acknowledge receipt of the instructions indicated above.

## 2023-10-27 NOTE — PROGRESS NOTES
Nephrology Progress Note  [address]       [facility] / [address] [facility], [address]  Phone - [phone]  Fax - [phone]                 Patient: Katelynn Comer                     YOB: 1975        Date- 10/27/2023                                     Admit Date: 10/20/2023   CC: Follow up for ESRD          IMPRESSION & PLAN:   ESRD -- MWF, [facility]  Hypertension  Hypervolemia  ANEMIA  of ckd  Sec. hyperpara  Dm 2  legally blind  left forefoot amputation secondary to osteomyelitis  Seizure disorder      PLAN-  Plan for HD today with [facility]. Continue Epogen for anemia  Awaiting placement       Subjective: Interval History:   -Seen and examined today on HD  -Denies any shortness of breath      Objective:   Vitals:    10/27/23 0915 10/27/23 0930 10/27/23 0945 10/27/23 1000   BP: 138/62 (!) 149/70 (!) 160/70 (!) 160/75   Pulse: 61 65 63 65   Resp:       Temp:       TempSrc:       SpO2:       Weight:       Height:          I/O last 3 completed shifts: In: 9446 [P.O.:1400; I.V.:5]  Out: -   No intake/output data recorded. Physical exam:    GEN: NAD  NECK- no mass  RESP: No wheezing, decreased BS b/l  CVS: S1,S2  RRR  NEURO: Normal speech, Non focal  EXT: 1+ edema      Chart reviewed. Pertinent Notes reviewed.      Data Review :  Lab Results   Component Value Date/Time     10/27/2023 09:40 AM    K 5.1 10/27/2023 09:40 AM    CL 94 10/27/2023 09:40 AM    CO2 31 10/27/2023 09:40 AM    BUN 55 10/27/2023 09:40 AM    CREATININE 6.04 10/27/2023 09:40 AM    GLUCOSE 266 10/27/2023 09:40 AM    CALCIUM 8.2 10/27/2023 09:40 AM       Lab Results   Component Value Date    WBC 9.6 10/27/2023    HGB 7.8 (L) 10/27/2023    HCT 26.4 (L) 10/27/2023    MCV 93.0 10/27/2023     10/27/2023      Recent Labs     10/25/23  0845 10/27/23  0940   * 129*   K 5.0 5.1   CL 96* 94*   CO2 30 31   GLUCOSE 168* 266*   BUN

## 2023-10-27 NOTE — CARE COORDINATION
10/27/23 [address] Discharge   Transition of Care Consult (CM Consult) [facility] Resource Information Provided? No   Mode of Transport at Discharge BLS   Condition of Participation: Discharge Planning   The Plan for Transition of Care is related to the following treatment goals:  Follow up with PCP, specialist, dialysis MWF     Patient to go home with Hospital to Home at 7:00 pm  His mother is aware he is coming home tonight  She will contact his [facility] to resume care [facility] will resume care and they have received KENNEDY orders  CM called dialysis to let them know he is coming in for dialysis Monday,    Beatriz  [facility]
CM Note: I talked with patient several times this am about going home as he is discharged. \" I am not ready to go because I am short of breath\" Patient does not appear in distress, sleeping during the am. RN says he is refusing all his medications. Jackie Birmingham make me bound up when they give them all at one time\" I encouraged him to get something for constipation or take the medications spaced apart. I noted [facility]. I asked him about this and he said his mother was waiting for them to call her. I called her and she said she would call them right now and get back with me. CM will follow up.     Yodit Russ  [facility]
CM Note: Referrals to LTC are pending.     Madelaine Osuna RN [address] [place] [address]
CM Note;  I have made several calls to [facility]. Javier [phone]  left word for the  to call me. I am waiting for call back, will follow on Friday.     Isadora Oliveira  [facility]
Care Management Initial Assessment       RUR:29%  Readmission? No  1st IM letter given? No  1st  letter given: No     10/23/23 1501   Service Assessment   Patient Orientation Alert and Oriented   Cognition Alert   History Provided By Patient   Primary Caregiver Other (Comment)   [id number] is: Patient Declined (Legal Next of Kin Remains as Decision Maker)   PCP Verified by CM Yes   Last Visit to PCP Within last 3 months   Prior Functional Level Assistance with the following:;Bathing;Dressing   Current Functional Level Assistance with the following:   Can patient return to prior living arrangement Yes   Ability to make needs known: Good   Family able to assist with home care needs: Yes   Would you like for me to discuss the discharge plan with any other family members/significant others, and if so, who? Yes  (mother)   Financial Resources Medicaid   Social/Functional History   Lives With Parent   Type of [address]  One level   Home Access   (1 step)   Bathroom Toilet Bedside commode   Home Equipment Walker, standard; [address] Help From Family   ADL Assistance Needs assistance   Ambulation Assistance Needs assistance   Transfer Assistance Needs assistance   Active  No   Discharge Planning   Living Arrangements Parent   Current Services Prior To Admission Oxygen Therapy   Potential Assistance Purchasing Medications No   Type of [address]   Patient expects to be discharged to: [facility]   [address] Provided? No   Mode of Transport at Discharge Other (see comment)   Condition of Participation: Discharge Planning   The Plan for Transition of Care is related to the following treatment goals: continue with dialysis, PCP and specialist appt     CM has met with patient and talked with his mother on the phone.  Patient is blind and his mother is only able to help him
SANDRO Note:  Numerous referrals were placed to SNF. Mother initially refused to take him back. She had initiated [facility] but they did not take him. No other SNF would accept him for Long Term Care. I found him a private home but it was $900.00/mo with a deposit of $400.00. He said he could not afford it and his mother could not either. He talked with his mother and she agreed to take him back. I talked with her at length. Patient was sitting up on side of bed eating. Ride was arranged for him and I contacted [facility] to let them know to expect him Monday. I sent updates.     Anand Khan RN [id number] [facility] [address]
Home

## 2023-10-27 NOTE — DISCHARGE SUMMARY
Discharge Summary    Name: Meaghan Smith  [id number]  YOB: 1975 (Age: 51 y.o.)   Date of Admission: 10/20/2023  Date of Discharge: 10/27/2023  Attending Physician: Val Beverly MD    Discharge Diagnosis:   Symptomatic anemia  Shortness of breath  Acute on chronic diastolic HF  CXR with mild pulmonary edema, bibasilar airspace disease and small to moderate bilateral pleural effusions  Pulmonary hypertension  ESRD MWF  COPD- Baseline he is on 3 L nasal cannula, started on home O2 by end of July 2023 for fluid overload  HTN  DM  Seizure dz  PVD  S/p amputation of left forefoot d/t OM  Blindness from CVA  Non complaint   Full code    Consultations:  IP CONSULT TO NEPHROLOGY  IP WOUND CARE NURSE CONSULT TO EVAL  IP CONSULT TO PICC TEAM   [address]  IP CONSULT TO SPIRITUAL CARE  IP CONSULT TO PALLIATIVE CARE      Brief Admission History/Reason for Admission Per Carlota Lennon MD:   Nakul.Dolphin y.o.  male with PMHx significant for end-stage renal disease on is on dialysis MWF, and also oxygen dependent COPD, came today because shortness of breath brought to the ED by EMS after having difficulty breathing, patient denied any cough, fever, recent respiratory tract infection, no sick contact with COVID, patient also denied abdominal pain, nausea or vomiting, no change in his bowel habits. We were asked to admit for work up and evaluation of the above problems.  \"    Brief Hospital Course by Main Problems:   Symptomatic anemia  Shortness of breath  Acute on chronic diastolic HF  CXR with mild pulmonary edema, bibasilar airspace disease and small to moderate bilateral pleural effusions  Pulmonary hypertension  Given 2 unit of blood   CXR: Unchanged cardiomegaly with mild pulmonary edema, bibasilar airspace disease, and small to moderate bilateral pleural effusions  Fluid management with dialysis  proBNP elevated, likely due to kidney function  ECHO with normal EF,

## 2023-10-30 NOTE — TELEPHONE ENCOUNTER
Patient mother Cinthia Mora states that her son need a hosp.fu appointment on a Tues. or Thurs. please give her a call @ [phone]

## 2023-11-06 NOTE — ED PROVIDER NOTES
[facility] EMERGENCY DEPT  EMERGENCY DEPARTMENT ENCOUNTER       Pt Name: Daylin Eli  MRN: [id number]  [address] [facility] 1975  Date of evaluation: 11/6/2023  Provider: Malik Wright MD   PCP: Erica Soto MD  Note Started: 10:57 AM EST 11/6/23     CHIEF COMPLAINT       Chief Complaint   Patient presents with    Code     Post arrest        HISTORY OF PRESENT ILLNESS: 1 or more elements      History From: ems, History limited by: patient unresponsive     Daylin Eli is a 51 y.o. male presenting with cardiac arrest.  Per EMS he was last known well at 8 PM last night. He was found unresponsive this morning with his oxygen cannula next to him. They administered 5 rounds of epinephrine and inserted a Andrew airway and transported to the hospital.       Please See MDM for Additional Details of the HPI/PMH  Nursing Notes were all reviewed and agreed with or any disagreements were addressed in the HPI. REVIEW OF SYSTEMS        Positives and Pertinent negatives as per HPI.     PAST HISTORY     Past Medical History:  Past Medical History:   Diagnosis Date    Bulging eyes     Diabetes ([address])     Dialysis patient ([address])     End stage renal disease ([address])     Hypertension     Seizure ([address])     Stroke [facility])        Past Surgical History:  Past Surgical History:   Procedure Laterality Date    FOOT DEBRIDEMENT Right 6/29/2023    RIGHT FOOT DEBRIDEMENT INCISION AND DRAINAGE performed by Jaspal Vera DPM at [facility] MAIN OR    IR NONTUNNELED VASCULAR CATHETER  08/19/2022    IR NONTUNNELED VASCULAR CATHETER 8/19/2022 [facility] RAD ANGIO IR    IR TUNNELED CATHETER PLACEMENT GREATER THAN 5 YEARS  08/23/2022    IR TUNNELED CATHETER PLACEMENT GREATER THAN 5 YEARS 8/23/2022 [facility] RAD ANGIO IR    IR TUNNELED CATHETER PLACEMENT GREATER THAN 5 YEARS  8/23/2022    TOE AMPUTATION Bilateral 2022    all toes removed    VASCULAR SURGERY Left     LLE       Family History:  Family History   Problem Relation Age of Onset    Heart Disease Mother

## 2023-11-06 NOTE — ED NOTES
Due to unit acuity, direct charting of the code progress was not able to be completed. On arrival, the patient had been intubated by EMS , blood noted in the ET tube. The patient had an IO placed in the right upper shoulder which NS was running through. The pt had a pulse rate of roughly 36 when placed on ED monitor, but appeared without regularity or pattern. A femoral pulse was palpated by  and decision to continue care was implemented. Ursula Krishnan, Pharmacist, provided myself with 1 amp of calcium which was infused through his IO access. Blood work and EKG obtained and interpreted by . Visualization of the heart performed with ultrasound by the provider and decision to call TOD was made. ET tube was approved to be removed by the provider. The patient was cleaned, dressed in a gown and family brought to the room by the .         Regi Camacho RN  11/06/23 4153

## 2023-11-06 NOTE — PROGRESS NOTES
Spiritual Care Assessment/Progress Note  Marlene    Name: Giselle Franz MRN: [id number]    Age: 51 y.o. Sex: male   Language: English     Date: 11/6/2023            Total Time Calculated: 35 min              Spiritual Assessment begun in [facility] EMERGENCY DEPT  Service Provided For[de-identified] Family, Patient not available  Referral/Consult From[de-identified] Nurse  Encounter Overview/Reason : Crisis    Spiritual beliefs:      [x] Involved in a shin tradition/spiritual practice:      [] Supported by a shin community:      [] Claims no spiritual orientation:      [] Seeking spiritual identity:           [] Adheres to an individual form of spirituality:      [] Not able to assess:                Identified resources for coping and support system:   Support System: Parent, Family members       [x] Prayer                  [] Devotional reading               [] Music                  [] Guided Imagery     [] Pet visits                                        [] Other: (COMMENT)     Specific area/focus of visit   Encounter:    Crisis: Type: Emotional distress, Family Care  Spiritual/Emotional needs: Type: Spiritual Support  Ritual, Rites and Sacraments:    Grief, Loss, and Adjustments: Type: Death, Grief and loss  Ethics/Mediation:    Behavioral Health:    Palliative Care: Advance Care Planning:      Plan/Referrals: Continue Support (comment)    Narrative:  received a daytime page from the nurse in reference to the death of Mr. Charis Vidal.  talked with the doctor and coordinated services with him to meet the family in the consult room. His mother and caretaker was present. The patient's mother became distraught and screamed and cried.  provide comfort and prayer for the family.  placed the family in the ER-14 room with Mr. Charis Vidal.  prayed and read scripture.  went to type notes in the ER and realized later the family left.   called [facility]. [facility] and her daughter

## 2023-11-08 LAB
EKG ATRIAL RATE: 9 BPM
EKG DIAGNOSIS: NORMAL
EKG Q-T INTERVAL: 558 MS
EKG QRS DURATION: 144 MS
EKG QTC CALCULATION (BAZETT): 215 MS
EKG R AXIS: 37 DEGREES
EKG T AXIS: 0 DEGREES
EKG VENTRICULAR RATE: 9 BPM

## 2025-02-20 NOTE — DISCHARGE INSTRUCTIONS
DISCHARGE DIAGNOSIS:  PONCHO on CKD stage IV, POA  Hyperkalemia, POA,  Acute on chronic diastolic heart failure, POA  Uncontrolled HTN  Chronic normocytic anemia, POA  Psychiatric disorder  T2DM  R foot wound       MEDICATIONS:  It is important that you take the medication exactly as they are prescribed. Keep your medication in the bottles provided by the pharmacist and keep a list of the medication names, dosages, and times to be taken in your wallet. Do not take other medications without consulting your doctor. Pain Management: per above medications    What to do at Home    Recommended diet:  Renal Diet    Recommended activity: Activity as tolerated    If you have questions regarding the hospital related prescriptions or hospital related issues please [facility] . You can always direct your questions to your primary care doctor if you are unable to reach your hospital physician; your PCP works as an extension of your hospital doctor just like your hospital doctor is an extension of your PCP for your time at the hospital [facility]).     If you experience any of the following symptoms then please call your primary care physician or return to the emergency room if you cannot get hold of your doctor:  Fever, chills, nausea, vomiting, diarrhea, change in mentation, falling, bleeding, shortness of breath, 1.62 1.62

## (undated) DEVICE — CONTAINER SPEC ANAERB VACTNR

## (undated) DEVICE — DISPOSABLE TOURNIQUET CUFF SINGLE BLADDER, DUAL PORT AND QUICK CONNECT CONNECTOR: Brand: COLOR CUFF

## (undated) DEVICE — DRESSING STERILE PETRO W3XL8IN N ADH OIL EMUL GZ CURAD

## (undated) DEVICE — EXTREMITY-MRMC: Brand: MEDLINE INDUSTRIES, INC.

## (undated) DEVICE — BANDAGE,ELASTIC,ESMARK,STERILE,4"X9',LF: Brand: MEDLINE

## (undated) DEVICE — GAUZE,PACKING STRIP,IODOFORM,1/2"X5YD,ST: Brand: CURAD

## (undated) DEVICE — 450 ML BOTTLE OF 0.05% CHLORHEXIDINE GLUCONATE IN 99.95% STERILE WATER FOR IRRIGATION, USP AND APPLICATOR.: Brand: IRRISEPT ANTIMICROBIAL WOUND LAVAGE

## (undated) DEVICE — GLOVE ORANGE PI 7   MSG9070

## (undated) DEVICE — BANDAGE,GAUZE,BULKEE II,4.5"X4.1YD,STRL: Brand: MEDLINE

## (undated) DEVICE — SHEET,DRAPE,UNDERBUTTOCK,GRAD POUCH,PORT: Brand: MEDLINE

## (undated) DEVICE — SOLUTION IRRIG 1000ML 0.9% SOD CHL USP POUR PLAS BTL